# Patient Record
Sex: MALE | Race: WHITE | Employment: OTHER | URBAN - METROPOLITAN AREA
[De-identification: names, ages, dates, MRNs, and addresses within clinical notes are randomized per-mention and may not be internally consistent; named-entity substitution may affect disease eponyms.]

---

## 2018-01-01 ENCOUNTER — HOSPITAL ENCOUNTER (OUTPATIENT)
Dept: GENERAL RADIOLOGY | Age: 83
Discharge: HOME | End: 2018-11-15
Payer: MEDICARE

## 2018-01-01 DIAGNOSIS — R05.9 COUGH: ICD-10-CM

## 2018-01-01 PROCEDURE — 71046 X-RAY EXAM CHEST 2 VIEWS: CPT

## 2019-01-01 ENCOUNTER — HOSPITAL ENCOUNTER (OUTPATIENT)
Dept: LAB | Age: 84
Discharge: HOME | End: 2019-05-07
Attending: INTERNAL MEDICINE
Payer: MEDICARE

## 2019-01-01 ENCOUNTER — APPOINTMENT (OUTPATIENT)
Dept: CT IMAGING | Age: 84
End: 2019-01-01
Attending: EMERGENCY MEDICINE
Payer: MEDICARE

## 2019-01-01 ENCOUNTER — HOSPICE ADMISSION (OUTPATIENT)
Dept: HOSPICE | Facility: HOSPICE | Age: 84
End: 2019-01-01
Payer: MEDICARE

## 2019-01-01 ENCOUNTER — HOSPITAL ENCOUNTER (INPATIENT)
Age: 84
LOS: 4 days | Discharge: HOME HEALTH | DRG: 683 | End: 2019-07-19
Attending: EMERGENCY MEDICINE | Admitting: HOSPITALIST
Payer: MEDICARE

## 2019-01-01 ENCOUNTER — APPOINTMENT (OUTPATIENT)
Dept: GENERAL RADIOLOGY | Age: 84
DRG: 190 | End: 2019-01-01
Attending: EMERGENCY MEDICINE
Payer: MEDICARE

## 2019-01-01 ENCOUNTER — PATIENT OUTREACH (OUTPATIENT)
Dept: CASE MANAGEMENT | Age: 84
End: 2019-01-01

## 2019-01-01 ENCOUNTER — APPOINTMENT (OUTPATIENT)
Dept: GENERAL RADIOLOGY | Age: 84
DRG: 273 | End: 2019-01-01
Attending: SURGERY
Payer: MEDICARE

## 2019-01-01 ENCOUNTER — HOSPITAL ENCOUNTER (EMERGENCY)
Age: 84
Discharge: HOME | End: 2019-07-09
Attending: EMERGENCY MEDICINE
Payer: MEDICARE

## 2019-01-01 ENCOUNTER — HOSPITAL ENCOUNTER (OUTPATIENT)
Dept: LAB | Age: 84
Discharge: HOME | End: 2019-05-21
Payer: MEDICARE

## 2019-01-01 ENCOUNTER — APPOINTMENT (OUTPATIENT)
Dept: CT IMAGING | Age: 84
DRG: 273 | End: 2019-01-01
Attending: EMERGENCY MEDICINE
Payer: MEDICARE

## 2019-01-01 ENCOUNTER — HOSPITAL ENCOUNTER (OUTPATIENT)
Dept: LAB | Age: 84
Discharge: HOME | End: 2019-04-09
Payer: MEDICARE

## 2019-01-01 ENCOUNTER — APPOINTMENT (OUTPATIENT)
Dept: NUCLEAR MEDICINE | Age: 84
DRG: 281 | End: 2019-01-01
Attending: EMERGENCY MEDICINE
Payer: MEDICARE

## 2019-01-01 ENCOUNTER — APPOINTMENT (OUTPATIENT)
Dept: GENERAL RADIOLOGY | Age: 84
DRG: 167 | End: 2019-01-01
Attending: EMERGENCY MEDICINE
Payer: MEDICARE

## 2019-01-01 ENCOUNTER — APPOINTMENT (OUTPATIENT)
Dept: GENERAL RADIOLOGY | Age: 84
DRG: 683 | End: 2019-01-01
Attending: HOSPITALIST
Payer: MEDICARE

## 2019-01-01 ENCOUNTER — APPOINTMENT (OUTPATIENT)
Dept: ULTRASOUND IMAGING | Age: 84
DRG: 190 | End: 2019-01-01
Attending: INTERNAL MEDICINE
Payer: MEDICARE

## 2019-01-01 ENCOUNTER — ANESTHESIA EVENT (OUTPATIENT)
Dept: SURGERY | Age: 84
DRG: 273 | End: 2019-01-01
Payer: MEDICARE

## 2019-01-01 ENCOUNTER — ANESTHESIA (OUTPATIENT)
Dept: SURGERY | Age: 84
DRG: 273 | End: 2019-01-01
Payer: MEDICARE

## 2019-01-01 ENCOUNTER — APPOINTMENT (OUTPATIENT)
Dept: ULTRASOUND IMAGING | Age: 84
DRG: 273 | End: 2019-01-01
Attending: NURSE PRACTITIONER
Payer: MEDICARE

## 2019-01-01 ENCOUNTER — HOSPITAL ENCOUNTER (INPATIENT)
Age: 84
LOS: 3 days | Discharge: HOSPICE | DRG: 190 | End: 2019-07-24
Attending: EMERGENCY MEDICINE | Admitting: FAMILY MEDICINE
Payer: MEDICARE

## 2019-01-01 ENCOUNTER — HOSPITAL ENCOUNTER (INPATIENT)
Age: 84
LOS: 2 days | Discharge: HOME | DRG: 281 | End: 2019-06-26
Attending: EMERGENCY MEDICINE | Admitting: INTERNAL MEDICINE
Payer: MEDICARE

## 2019-01-01 ENCOUNTER — APPOINTMENT (OUTPATIENT)
Dept: CT IMAGING | Age: 84
DRG: 167 | End: 2019-01-01
Attending: EMERGENCY MEDICINE
Payer: MEDICARE

## 2019-01-01 ENCOUNTER — HOME CARE VISIT (OUTPATIENT)
Dept: HOME HEALTH SERVICES | Facility: HOME HEALTH | Age: 84
End: 2019-01-01

## 2019-01-01 ENCOUNTER — HOSPITAL ENCOUNTER (INPATIENT)
Age: 84
LOS: 3 days | Discharge: HOME | DRG: 167 | End: 2019-07-04
Attending: EMERGENCY MEDICINE | Admitting: FAMILY MEDICINE
Payer: MEDICARE

## 2019-01-01 ENCOUNTER — HOSPITAL ENCOUNTER (INPATIENT)
Age: 84
LOS: 9 days | Discharge: HOME HEALTH | DRG: 273 | End: 2019-03-22
Attending: EMERGENCY MEDICINE | Admitting: SURGERY
Payer: MEDICARE

## 2019-01-01 ENCOUNTER — HOSPITAL ENCOUNTER (OUTPATIENT)
Dept: LAB | Age: 84
Discharge: HOME | End: 2019-07-08
Payer: MEDICARE

## 2019-01-01 ENCOUNTER — HOME CARE VISIT (OUTPATIENT)
Dept: SCHEDULING | Facility: HOME HEALTH | Age: 84
End: 2019-01-01

## 2019-01-01 ENCOUNTER — APPOINTMENT (OUTPATIENT)
Dept: GENERAL RADIOLOGY | Age: 84
End: 2019-01-01
Attending: EMERGENCY MEDICINE
Payer: MEDICARE

## 2019-01-01 ENCOUNTER — APPOINTMENT (OUTPATIENT)
Dept: GENERAL RADIOLOGY | Age: 84
DRG: 281 | End: 2019-01-01
Attending: EMERGENCY MEDICINE
Payer: MEDICARE

## 2019-01-01 ENCOUNTER — HOSPITAL ENCOUNTER (OUTPATIENT)
Dept: LAB | Age: 84
Discharge: HOME | End: 2019-04-23
Payer: MEDICARE

## 2019-01-01 ENCOUNTER — APPOINTMENT (OUTPATIENT)
Dept: GENERAL RADIOLOGY | Age: 84
DRG: 683 | End: 2019-01-01
Attending: EMERGENCY MEDICINE
Payer: MEDICARE

## 2019-01-01 ENCOUNTER — HOME HEALTH ADMISSION (OUTPATIENT)
Dept: HOME HEALTH SERVICES | Facility: HOME HEALTH | Age: 84
End: 2019-01-01

## 2019-01-01 ENCOUNTER — HOSPITAL ENCOUNTER (INPATIENT)
Age: 84
LOS: 5 days | End: 2019-07-29
Attending: INTERNAL MEDICINE | Admitting: INTERNAL MEDICINE
Payer: MEDICARE

## 2019-01-01 ENCOUNTER — HOSPITAL ENCOUNTER (EMERGENCY)
Age: 84
Discharge: HOME | End: 2019-03-12
Attending: EMERGENCY MEDICINE
Payer: MEDICARE

## 2019-01-01 ENCOUNTER — HOSPITAL ENCOUNTER (OUTPATIENT)
Dept: LAB | Age: 84
Discharge: HOME | End: 2019-07-11
Payer: MEDICARE

## 2019-01-01 ENCOUNTER — HOSPITAL ENCOUNTER (OUTPATIENT)
Dept: NUCLEAR MEDICINE | Age: 84
Discharge: HOME | DRG: 190 | End: 2019-07-22
Attending: FAMILY MEDICINE
Payer: MEDICARE

## 2019-01-01 VITALS
BODY MASS INDEX: 24.49 KG/M2 | HEIGHT: 66 IN | OXYGEN SATURATION: 91 % | DIASTOLIC BLOOD PRESSURE: 66 MMHG | HEART RATE: 76 BPM | TEMPERATURE: 97.9 F | RESPIRATION RATE: 19 BRPM | WEIGHT: 152.4 LBS | SYSTOLIC BLOOD PRESSURE: 97 MMHG

## 2019-01-01 VITALS
SYSTOLIC BLOOD PRESSURE: 97 MMHG | RESPIRATION RATE: 20 BRPM | OXYGEN SATURATION: 92 % | DIASTOLIC BLOOD PRESSURE: 59 MMHG | TEMPERATURE: 97.6 F | HEART RATE: 90 BPM | WEIGHT: 136.6 LBS | HEIGHT: 67 IN | BODY MASS INDEX: 21.44 KG/M2

## 2019-01-01 VITALS
BODY MASS INDEX: 21.86 KG/M2 | HEART RATE: 80 BPM | RESPIRATION RATE: 18 BRPM | TEMPERATURE: 96.8 F | HEIGHT: 66 IN | WEIGHT: 136 LBS | OXYGEN SATURATION: 93 % | SYSTOLIC BLOOD PRESSURE: 96 MMHG | DIASTOLIC BLOOD PRESSURE: 65 MMHG

## 2019-01-01 VITALS
BODY MASS INDEX: 23.54 KG/M2 | HEART RATE: 79 BPM | RESPIRATION RATE: 16 BRPM | SYSTOLIC BLOOD PRESSURE: 99 MMHG | DIASTOLIC BLOOD PRESSURE: 66 MMHG | HEIGHT: 66 IN | WEIGHT: 146.5 LBS | OXYGEN SATURATION: 94 % | TEMPERATURE: 98 F

## 2019-01-01 VITALS
DIASTOLIC BLOOD PRESSURE: 62 MMHG | BODY MASS INDEX: 24.49 KG/M2 | WEIGHT: 147 LBS | HEART RATE: 80 BPM | OXYGEN SATURATION: 96 % | SYSTOLIC BLOOD PRESSURE: 94 MMHG | RESPIRATION RATE: 16 BRPM | TEMPERATURE: 97 F | HEIGHT: 65 IN

## 2019-01-01 VITALS
HEART RATE: 60 BPM | OXYGEN SATURATION: 98 % | DIASTOLIC BLOOD PRESSURE: 61 MMHG | RESPIRATION RATE: 18 BRPM | SYSTOLIC BLOOD PRESSURE: 111 MMHG | TEMPERATURE: 98.7 F

## 2019-01-01 VITALS
DIASTOLIC BLOOD PRESSURE: 43 MMHG | HEART RATE: 80 BPM | RESPIRATION RATE: 18 BRPM | TEMPERATURE: 96.7 F | SYSTOLIC BLOOD PRESSURE: 63 MMHG

## 2019-01-01 VITALS
SYSTOLIC BLOOD PRESSURE: 104 MMHG | RESPIRATION RATE: 18 BRPM | HEIGHT: 63 IN | HEART RATE: 89 BPM | DIASTOLIC BLOOD PRESSURE: 67 MMHG | BODY MASS INDEX: 24.49 KG/M2 | TEMPERATURE: 97.8 F | WEIGHT: 138.2 LBS | OXYGEN SATURATION: 96 %

## 2019-01-01 DIAGNOSIS — I50.9 CHRONIC CONGESTIVE HEART FAILURE, UNSPECIFIED HEART FAILURE TYPE (HCC): ICD-10-CM

## 2019-01-01 DIAGNOSIS — Z79.01 LONG TERM (CURRENT) USE OF ANTICOAGULANTS: ICD-10-CM

## 2019-01-01 DIAGNOSIS — Z85.46 H/O PROSTATE CANCER: ICD-10-CM

## 2019-01-01 DIAGNOSIS — I50.22 CHRONIC SYSTOLIC CHF (CONGESTIVE HEART FAILURE) (HCC): ICD-10-CM

## 2019-01-01 DIAGNOSIS — R09.02 HYPOXIA: ICD-10-CM

## 2019-01-01 DIAGNOSIS — R33.9 URINARY RETENTION: Primary | ICD-10-CM

## 2019-01-01 DIAGNOSIS — D53.9 MACROCYTIC ANEMIA: ICD-10-CM

## 2019-01-01 DIAGNOSIS — J43.2 CENTRILOBULAR EMPHYSEMA (HCC): Chronic | ICD-10-CM

## 2019-01-01 DIAGNOSIS — Z86.79 HISTORY OF CORONARY ARTERY DISEASE: ICD-10-CM

## 2019-01-01 DIAGNOSIS — R59.0 MEDIASTINAL ADENOPATHY: ICD-10-CM

## 2019-01-01 DIAGNOSIS — I74.9 ARTERIAL THROMBOSIS (HCC): Primary | ICD-10-CM

## 2019-01-01 DIAGNOSIS — Z51.5 ENCOUNTER FOR PALLIATIVE CARE: ICD-10-CM

## 2019-01-01 DIAGNOSIS — R07.89 PAIN, CHEST WALL: ICD-10-CM

## 2019-01-01 DIAGNOSIS — R06.00 DYSPNEA, UNSPECIFIED TYPE: ICD-10-CM

## 2019-01-01 DIAGNOSIS — Z51.5 PALLIATIVE CARE BY SPECIALIST: ICD-10-CM

## 2019-01-01 DIAGNOSIS — I48.91 ATRIAL FIBRILLATION, UNSPECIFIED TYPE (HCC): ICD-10-CM

## 2019-01-01 DIAGNOSIS — R07.81 RIB PAIN: ICD-10-CM

## 2019-01-01 DIAGNOSIS — J43.2 CENTRILOBULAR EMPHYSEMA (HCC): ICD-10-CM

## 2019-01-01 DIAGNOSIS — I51.3 THROMBUS OF LEFT ATRIAL APPENDAGE: ICD-10-CM

## 2019-01-01 DIAGNOSIS — Z85.46 HISTORY OF PROSTATE CANCER: ICD-10-CM

## 2019-01-01 DIAGNOSIS — R59.0 HILAR ADENOPATHY: ICD-10-CM

## 2019-01-01 DIAGNOSIS — R11.2 NAUSEA AND VOMITING, INTRACTABILITY OF VOMITING NOT SPECIFIED, UNSPECIFIED VOMITING TYPE: ICD-10-CM

## 2019-01-01 DIAGNOSIS — R06.02 SHORTNESS OF BREATH: Primary | ICD-10-CM

## 2019-01-01 DIAGNOSIS — R07.81 PLEURITIC CHEST PAIN: Primary | ICD-10-CM

## 2019-01-01 DIAGNOSIS — Z86.718 HISTORY OF DVT (DEEP VEIN THROMBOSIS): ICD-10-CM

## 2019-01-01 DIAGNOSIS — J44.9 CHRONIC OBSTRUCTIVE PULMONARY DISEASE, UNSPECIFIED COPD TYPE (HCC): ICD-10-CM

## 2019-01-01 DIAGNOSIS — C34.02 MALIGNANT NEOPLASM OF HILUS OF LEFT LUNG (HCC): ICD-10-CM

## 2019-01-01 DIAGNOSIS — N18.9 ACUTE ON CHRONIC RENAL INSUFFICIENCY: Primary | ICD-10-CM

## 2019-01-01 DIAGNOSIS — R91.8 HILAR MASS: ICD-10-CM

## 2019-01-01 DIAGNOSIS — N17.9 ACUTE KIDNEY INJURY (HCC): ICD-10-CM

## 2019-01-01 DIAGNOSIS — R33.9 URINARY RETENTION: ICD-10-CM

## 2019-01-01 DIAGNOSIS — Z95.810 AICD (AUTOMATIC CARDIOVERTER/DEFIBRILLATOR) PRESENT: ICD-10-CM

## 2019-01-01 DIAGNOSIS — I48.91 ATRIAL FIBRILLATION, UNSPECIFIED TYPE (HCC): Chronic | ICD-10-CM

## 2019-01-01 DIAGNOSIS — I50.22 CHRONIC SYSTOLIC CONGESTIVE HEART FAILURE (HCC): ICD-10-CM

## 2019-01-01 DIAGNOSIS — N30.00 ACUTE CYSTITIS WITHOUT HEMATURIA: ICD-10-CM

## 2019-01-01 DIAGNOSIS — G89.3 CANCER RELATED PAIN: ICD-10-CM

## 2019-01-01 DIAGNOSIS — N28.9 ACUTE ON CHRONIC RENAL INSUFFICIENCY: Primary | ICD-10-CM

## 2019-01-01 DIAGNOSIS — R77.8 ELEVATED TROPONIN: ICD-10-CM

## 2019-01-01 DIAGNOSIS — R11.2 INTRACTABLE VOMITING WITH NAUSEA, UNSPECIFIED VOMITING TYPE: ICD-10-CM

## 2019-01-01 DIAGNOSIS — R53.81 DEBILITY: ICD-10-CM

## 2019-01-01 DIAGNOSIS — R91.8 LUNG MASS: ICD-10-CM

## 2019-01-01 DIAGNOSIS — R62.7 FTT (FAILURE TO THRIVE) IN ADULT: ICD-10-CM

## 2019-01-01 DIAGNOSIS — Z79.01 LONG TERM (CURRENT) USE OF ANTICOAGULANTS: Chronic | ICD-10-CM

## 2019-01-01 DIAGNOSIS — R11.2 NON-INTRACTABLE VOMITING WITH NAUSEA, UNSPECIFIED VOMITING TYPE: Primary | ICD-10-CM

## 2019-01-01 DIAGNOSIS — I21.4 NSTEMI (NON-ST ELEVATED MYOCARDIAL INFARCTION) (HCC): Primary | ICD-10-CM

## 2019-01-01 DIAGNOSIS — N17.9 AKI (ACUTE KIDNEY INJURY) (HCC): ICD-10-CM

## 2019-01-01 DIAGNOSIS — C34.91 NON-SMALL CELL CANCER OF RIGHT LUNG (HCC): ICD-10-CM

## 2019-01-01 DIAGNOSIS — R77.8 ELEVATED TROPONIN I LEVEL: ICD-10-CM

## 2019-01-01 DIAGNOSIS — R53.83 FATIGUE, UNSPECIFIED TYPE: ICD-10-CM

## 2019-01-01 LAB
25(OH)D3+25(OH)D2 SERPL-MCNC: 25.1 NG/ML (ref 30–100)
ALBUMIN SERPL-MCNC: 2.1 G/DL (ref 3.2–4.6)
ALBUMIN SERPL-MCNC: 2.2 G/DL (ref 3.2–4.6)
ALBUMIN SERPL-MCNC: 2.2 G/DL (ref 3.2–4.6)
ALBUMIN SERPL-MCNC: 2.7 G/DL (ref 3.2–4.6)
ALBUMIN SERPL-MCNC: 2.8 G/DL (ref 3.2–4.6)
ALBUMIN SERPL-MCNC: 2.9 G/DL (ref 3.2–4.6)
ALBUMIN SERPL-MCNC: 2.9 G/DL (ref 3.2–4.6)
ALBUMIN SERPL-MCNC: 3 G/DL (ref 3.2–4.6)
ALBUMIN SERPL-MCNC: 3.1 G/DL (ref 3.2–4.6)
ALBUMIN SERPL-MCNC: 3.2 G/DL (ref 3.2–4.6)
ALBUMIN SERPL-MCNC: 3.5 G/DL (ref 3.2–4.6)
ALBUMIN SERPL-MCNC: 3.5 G/DL (ref 3.2–4.6)
ALBUMIN/GLOB SERPL: 0.6 {RATIO} (ref 1.2–3.5)
ALBUMIN/GLOB SERPL: 0.6 {RATIO} (ref 1.2–3.5)
ALBUMIN/GLOB SERPL: 0.7 {RATIO} (ref 1.2–3.5)
ALBUMIN/GLOB SERPL: 0.8 {RATIO} (ref 1.2–3.5)
ALBUMIN/GLOB SERPL: 0.9 {RATIO} (ref 1.2–3.5)
ALP SERPL-CCNC: 100 U/L (ref 50–136)
ALP SERPL-CCNC: 118 U/L (ref 50–136)
ALP SERPL-CCNC: 131 U/L (ref 50–136)
ALP SERPL-CCNC: 136 U/L (ref 50–136)
ALP SERPL-CCNC: 197 U/L (ref 50–136)
ALP SERPL-CCNC: 229 U/L (ref 50–136)
ALP SERPL-CCNC: 70 U/L (ref 50–136)
ALP SERPL-CCNC: 85 U/L (ref 50–136)
ALT SERPL-CCNC: 17 U/L (ref 12–65)
ALT SERPL-CCNC: 21 U/L (ref 12–65)
ALT SERPL-CCNC: 21 U/L (ref 12–65)
ALT SERPL-CCNC: 29 U/L (ref 12–65)
ALT SERPL-CCNC: 29 U/L (ref 12–65)
ALT SERPL-CCNC: 30 U/L (ref 12–65)
ALT SERPL-CCNC: 50 U/L (ref 12–65)
ALT SERPL-CCNC: 84 U/L (ref 12–65)
AMORPH CRY URNS QL MICRO: NORMAL
ANION GAP SERPL CALC-SCNC: 10 MMOL/L (ref 7–16)
ANION GAP SERPL CALC-SCNC: 11 MMOL/L (ref 7–16)
ANION GAP SERPL CALC-SCNC: 12 MMOL/L (ref 7–16)
ANION GAP SERPL CALC-SCNC: 14 MMOL/L (ref 7–16)
ANION GAP SERPL CALC-SCNC: 19 MMOL/L (ref 7–16)
ANION GAP SERPL CALC-SCNC: 6 MMOL/L (ref 7–16)
ANION GAP SERPL CALC-SCNC: 7 MMOL/L (ref 7–16)
ANION GAP SERPL CALC-SCNC: 7 MMOL/L (ref 7–16)
ANION GAP SERPL CALC-SCNC: 8 MMOL/L
ANION GAP SERPL CALC-SCNC: 8 MMOL/L (ref 7–16)
ANION GAP SERPL CALC-SCNC: 9 MMOL/L (ref 7–16)
APPEARANCE UR: ABNORMAL
APPEARANCE UR: ABNORMAL
APPEARANCE UR: CLEAR
APTT PPP: 28.6 SEC (ref 24.7–39.8)
APTT PPP: 45.5 SEC (ref 24.7–39.8)
APTT PPP: 65.3 SEC (ref 24.7–39.8)
APTT PPP: 65.8 SEC (ref 24.7–39.8)
APTT PPP: 70.9 SEC (ref 24.7–39.8)
APTT PPP: 72.6 SEC (ref 24.7–39.8)
APTT PPP: 73.7 SEC (ref 24.7–39.8)
APTT PPP: 87.2 SEC (ref 24.7–39.8)
APTT PPP: 87.4 SEC (ref 24.7–39.8)
APTT PPP: >200 SEC (ref 24.7–39.8)
ARTERIAL PATENCY WRIST A: ABNORMAL
AST SERPL-CCNC: 105 U/L (ref 15–37)
AST SERPL-CCNC: 28 U/L (ref 15–37)
AST SERPL-CCNC: 38 U/L (ref 15–37)
AST SERPL-CCNC: 41 U/L (ref 15–37)
AST SERPL-CCNC: 62 U/L (ref 15–37)
AST SERPL-CCNC: 64 U/L (ref 15–37)
AST SERPL-CCNC: 69 U/L (ref 15–37)
AST SERPL-CCNC: 72 U/L (ref 15–37)
ATRIAL RATE: 66 BPM
ATRIAL RATE: 77 BPM
ATRIAL RATE: 78 BPM
ATRIAL RATE: 81 BPM
ATRIAL RATE: 86 BPM
ATRIAL RATE: 88 BPM
BACTERIA SPEC CULT: NORMAL
BACTERIA URNS QL MICRO: 0 /HPF
BACTERIA URNS QL MICRO: NORMAL /HPF
BASE DEFICIT BLD-SCNC: 2 MMOL/L
BASOPHILS # BLD: 0 K/UL (ref 0–0.2)
BASOPHILS # BLD: 0.1 K/UL (ref 0–0.2)
BASOPHILS NFR BLD: 0 % (ref 0–2)
BASOPHILS NFR BLD: 1 % (ref 0–2)
BDY SITE: ABNORMAL
BILIRUB SERPL-MCNC: 0.4 MG/DL (ref 0.2–1.1)
BILIRUB SERPL-MCNC: 0.6 MG/DL (ref 0.2–1.1)
BILIRUB SERPL-MCNC: 0.8 MG/DL (ref 0.2–1.1)
BILIRUB SERPL-MCNC: 1 MG/DL (ref 0.2–1.1)
BILIRUB SERPL-MCNC: 1.3 MG/DL (ref 0.2–1.1)
BILIRUB UR QL: NEGATIVE
BNP SERPL-MCNC: 1212 PG/ML
BNP SERPL-MCNC: 428 PG/ML
BNP SERPL-MCNC: 934 PG/ML
BODY TEMPERATURE: 98.6
BUN SERPL-MCNC: 102 MG/DL (ref 8–23)
BUN SERPL-MCNC: 116 MG/DL (ref 8–23)
BUN SERPL-MCNC: 18 MG/DL (ref 8–23)
BUN SERPL-MCNC: 23 MG/DL (ref 8–23)
BUN SERPL-MCNC: 24 MG/DL (ref 8–23)
BUN SERPL-MCNC: 24 MG/DL (ref 8–23)
BUN SERPL-MCNC: 28 MG/DL (ref 8–23)
BUN SERPL-MCNC: 29 MG/DL (ref 8–23)
BUN SERPL-MCNC: 30 MG/DL (ref 8–23)
BUN SERPL-MCNC: 31 MG/DL (ref 8–23)
BUN SERPL-MCNC: 31 MG/DL (ref 8–23)
BUN SERPL-MCNC: 32 MG/DL (ref 8–23)
BUN SERPL-MCNC: 38 MG/DL (ref 8–23)
BUN SERPL-MCNC: 39 MG/DL (ref 8–23)
BUN SERPL-MCNC: 41 MG/DL (ref 8–23)
BUN SERPL-MCNC: 42 MG/DL (ref 8–23)
BUN SERPL-MCNC: 43 MG/DL (ref 8–23)
BUN SERPL-MCNC: 44 MG/DL (ref 8–23)
BUN SERPL-MCNC: 44 MG/DL (ref 8–23)
BUN SERPL-MCNC: 48 MG/DL (ref 8–23)
BUN SERPL-MCNC: 50 MG/DL (ref 8–23)
BUN SERPL-MCNC: 56 MG/DL (ref 8–23)
BUN SERPL-MCNC: 58 MG/DL (ref 8–23)
BUN SERPL-MCNC: 85 MG/DL (ref 8–23)
BUN SERPL-MCNC: 87 MG/DL (ref 8–23)
CALCIUM SERPL-MCNC: 8.1 MG/DL (ref 8.3–10.4)
CALCIUM SERPL-MCNC: 8.2 MG/DL (ref 8.3–10.4)
CALCIUM SERPL-MCNC: 8.4 MG/DL (ref 8.3–10.4)
CALCIUM SERPL-MCNC: 8.5 MG/DL (ref 8.3–10.4)
CALCIUM SERPL-MCNC: 8.5 MG/DL (ref 8.3–10.4)
CALCIUM SERPL-MCNC: 8.7 MG/DL (ref 8.3–10.4)
CALCIUM SERPL-MCNC: 8.8 MG/DL (ref 8.3–10.4)
CALCIUM SERPL-MCNC: 8.9 MG/DL (ref 8.3–10.4)
CALCIUM SERPL-MCNC: 9 MG/DL (ref 8.3–10.4)
CALCIUM SERPL-MCNC: 9.1 MG/DL (ref 8.3–10.4)
CALCIUM SERPL-MCNC: 9.1 MG/DL (ref 8.3–10.4)
CALCIUM SERPL-MCNC: 9.2 MG/DL (ref 8.3–10.4)
CALCIUM SERPL-MCNC: 9.2 MG/DL (ref 8.3–10.4)
CALCIUM SERPL-MCNC: 9.3 MG/DL (ref 8.3–10.4)
CALCIUM SERPL-MCNC: 9.4 MG/DL (ref 8.3–10.4)
CALCIUM SERPL-MCNC: 9.4 MG/DL (ref 8.3–10.4)
CALCIUM SERPL-MCNC: 9.5 MG/DL (ref 8.3–10.4)
CALCIUM SERPL-MCNC: 9.6 MG/DL (ref 8.3–10.4)
CALCIUM SERPL-MCNC: 9.7 MG/DL (ref 8.3–10.4)
CALCIUM SERPL-MCNC: 9.7 MG/DL (ref 8.3–10.4)
CALCULATED P AXIS, ECG09: 110 DEGREES
CALCULATED P AXIS, ECG09: 76 DEGREES
CALCULATED P AXIS, ECG09: 83 DEGREES
CALCULATED R AXIS, ECG10: -86 DEGREES
CALCULATED R AXIS, ECG10: -87 DEGREES
CALCULATED R AXIS, ECG10: -87 DEGREES
CALCULATED R AXIS, ECG10: -89 DEGREES
CALCULATED R AXIS, ECG10: -96 DEGREES
CALCULATED R AXIS, ECG10: 156 DEGREES
CALCULATED T AXIS, ECG11: -8 DEGREES
CALCULATED T AXIS, ECG11: 102 DEGREES
CALCULATED T AXIS, ECG11: 90 DEGREES
CALCULATED T AXIS, ECG11: 92 DEGREES
CALCULATED T AXIS, ECG11: 94 DEGREES
CALCULATED T AXIS, ECG11: 95 DEGREES
CASTS URNS QL MICRO: ABNORMAL /LPF
CASTS URNS QL MICRO: NORMAL /LPF
CEA SERPL-MCNC: 225.3 NG/ML (ref 0–3)
CHLORIDE SERPL-SCNC: 100 MMOL/L (ref 98–107)
CHLORIDE SERPL-SCNC: 101 MMOL/L (ref 98–107)
CHLORIDE SERPL-SCNC: 102 MMOL/L (ref 98–107)
CHLORIDE SERPL-SCNC: 103 MMOL/L (ref 98–107)
CHLORIDE SERPL-SCNC: 104 MMOL/L (ref 98–107)
CHLORIDE SERPL-SCNC: 105 MMOL/L (ref 98–107)
CHLORIDE SERPL-SCNC: 105 MMOL/L (ref 98–107)
CHLORIDE SERPL-SCNC: 106 MMOL/L (ref 98–107)
CHLORIDE SERPL-SCNC: 97 MMOL/L (ref 98–107)
CHOLEST SERPL-MCNC: 95 MG/DL
CO2 BLD-SCNC: 22 MMOL/L
CO2 SERPL-SCNC: 15 MMOL/L (ref 21–32)
CO2 SERPL-SCNC: 20 MMOL/L (ref 21–32)
CO2 SERPL-SCNC: 22 MMOL/L (ref 21–32)
CO2 SERPL-SCNC: 22 MMOL/L (ref 21–32)
CO2 SERPL-SCNC: 23 MMOL/L (ref 21–32)
CO2 SERPL-SCNC: 24 MMOL/L (ref 21–32)
CO2 SERPL-SCNC: 25 MMOL/L (ref 21–32)
CO2 SERPL-SCNC: 26 MMOL/L (ref 21–32)
CO2 SERPL-SCNC: 27 MMOL/L (ref 21–32)
CO2 SERPL-SCNC: 28 MMOL/L (ref 21–32)
CO2 SERPL-SCNC: 29 MMOL/L (ref 21–32)
CO2 SERPL-SCNC: 30 MMOL/L (ref 21–32)
CO2 SERPL-SCNC: 30 MMOL/L (ref 21–32)
COLLECT TIME,HTIME: 2018
COLOR UR: YELLOW
CREAT SERPL-MCNC: 1.66 MG/DL (ref 0.8–1.5)
CREAT SERPL-MCNC: 1.66 MG/DL (ref 0.8–1.5)
CREAT SERPL-MCNC: 1.69 MG/DL (ref 0.8–1.5)
CREAT SERPL-MCNC: 1.79 MG/DL (ref 0.8–1.5)
CREAT SERPL-MCNC: 1.83 MG/DL (ref 0.8–1.5)
CREAT SERPL-MCNC: 1.84 MG/DL (ref 0.8–1.5)
CREAT SERPL-MCNC: 1.89 MG/DL (ref 0.8–1.5)
CREAT SERPL-MCNC: 1.9 MG/DL (ref 0.8–1.5)
CREAT SERPL-MCNC: 1.94 MG/DL (ref 0.8–1.5)
CREAT SERPL-MCNC: 1.95 MG/DL (ref 0.8–1.5)
CREAT SERPL-MCNC: 2 MG/DL (ref 0.8–1.5)
CREAT SERPL-MCNC: 2.06 MG/DL (ref 0.8–1.5)
CREAT SERPL-MCNC: 2.16 MG/DL (ref 0.8–1.5)
CREAT SERPL-MCNC: 2.19 MG/DL (ref 0.8–1.5)
CREAT SERPL-MCNC: 2.2 MG/DL (ref 0.8–1.5)
CREAT SERPL-MCNC: 2.36 MG/DL (ref 0.8–1.5)
CREAT SERPL-MCNC: 2.39 MG/DL (ref 0.8–1.5)
CREAT SERPL-MCNC: 2.39 MG/DL (ref 0.8–1.5)
CREAT SERPL-MCNC: 2.4 MG/DL (ref 0.8–1.5)
CREAT SERPL-MCNC: 2.55 MG/DL (ref 0.8–1.5)
CREAT SERPL-MCNC: 2.77 MG/DL (ref 0.8–1.5)
CREAT SERPL-MCNC: 2.77 MG/DL (ref 0.8–1.5)
CREAT SERPL-MCNC: 3.13 MG/DL (ref 0.8–1.5)
CREAT SERPL-MCNC: 3.13 MG/DL (ref 0.8–1.5)
CREAT SERPL-MCNC: 3.18 MG/DL (ref 0.8–1.5)
CREAT SERPL-MCNC: 3.5 MG/DL (ref 0.8–1.5)
CREAT SERPL-MCNC: 3.54 MG/DL (ref 0.8–1.5)
CREAT SERPL-MCNC: 3.8 MG/DL (ref 0.8–1.5)
CREAT SERPL-MCNC: 4.37 MG/DL (ref 0.8–1.5)
CREAT UR-MCNC: 34.2 MG/DL
CREAT UR-MCNC: 85.4 MG/DL
CRYSTALS URNS QL MICRO: 0 /LPF
DIAGNOSIS, 93000: NORMAL
DIFFERENTIAL METHOD BLD: ABNORMAL
EOSINOPHIL # BLD: 0 K/UL (ref 0–0.8)
EOSINOPHIL # BLD: 0.1 K/UL (ref 0–0.8)
EOSINOPHIL # BLD: 0.2 K/UL (ref 0–0.8)
EOSINOPHIL NFR BLD: 0 % (ref 0.5–7.8)
EOSINOPHIL NFR BLD: 1 % (ref 0.5–7.8)
EOSINOPHIL NFR BLD: 2 % (ref 0.5–7.8)
EOSINOPHIL NFR BLD: 2 % (ref 0.5–7.8)
EOSINOPHIL NFR BLD: 4 % (ref 0.5–7.8)
EPI CELLS #/AREA URNS HPF: ABNORMAL /HPF
EPI CELLS #/AREA URNS HPF: ABNORMAL /HPF
EPI CELLS #/AREA URNS HPF: NORMAL /HPF
ERYTHROCYTE [DISTWIDTH] IN BLOOD BY AUTOMATED COUNT: 13.3 % (ref 11.9–14.6)
ERYTHROCYTE [DISTWIDTH] IN BLOOD BY AUTOMATED COUNT: 13.3 % (ref 11.9–14.6)
ERYTHROCYTE [DISTWIDTH] IN BLOOD BY AUTOMATED COUNT: 13.4 % (ref 11.9–14.6)
ERYTHROCYTE [DISTWIDTH] IN BLOOD BY AUTOMATED COUNT: 13.5 %
ERYTHROCYTE [DISTWIDTH] IN BLOOD BY AUTOMATED COUNT: 13.6 % (ref 11.9–14.6)
ERYTHROCYTE [DISTWIDTH] IN BLOOD BY AUTOMATED COUNT: 13.7 % (ref 11.9–14.6)
ERYTHROCYTE [DISTWIDTH] IN BLOOD BY AUTOMATED COUNT: 13.7 % (ref 11.9–14.6)
ERYTHROCYTE [DISTWIDTH] IN BLOOD BY AUTOMATED COUNT: 13.8 % (ref 11.9–14.6)
ERYTHROCYTE [DISTWIDTH] IN BLOOD BY AUTOMATED COUNT: 13.9 % (ref 11.9–14.6)
ERYTHROCYTE [DISTWIDTH] IN BLOOD BY AUTOMATED COUNT: 14.7 % (ref 11.9–14.6)
ERYTHROCYTE [DISTWIDTH] IN BLOOD BY AUTOMATED COUNT: 15.1 % (ref 11.9–14.6)
ERYTHROCYTE [DISTWIDTH] IN BLOOD BY AUTOMATED COUNT: 15.8 % (ref 11.9–14.6)
ERYTHROCYTE [DISTWIDTH] IN BLOOD BY AUTOMATED COUNT: 16.8 % (ref 11.9–14.6)
ERYTHROCYTE [DISTWIDTH] IN BLOOD BY AUTOMATED COUNT: 16.9 % (ref 11.9–14.6)
ERYTHROCYTE [DISTWIDTH] IN BLOOD BY AUTOMATED COUNT: 17.1 % (ref 11.9–14.6)
ERYTHROCYTE [DISTWIDTH] IN BLOOD BY AUTOMATED COUNT: 17.3 % (ref 11.9–14.6)
ERYTHROCYTE [DISTWIDTH] IN BLOOD BY AUTOMATED COUNT: 18.7 % (ref 11.9–14.6)
ERYTHROCYTE [DISTWIDTH] IN BLOOD BY AUTOMATED COUNT: 19.1 % (ref 11.9–14.6)
ERYTHROCYTE [DISTWIDTH] IN BLOOD BY AUTOMATED COUNT: 22.6 % (ref 11.9–14.6)
ERYTHROCYTE [DISTWIDTH] IN BLOOD BY AUTOMATED COUNT: 22.6 % (ref 11.9–14.6)
FLOW RATE ISTAT,IFRATE: 6 L/MIN
GAS FLOW.O2 O2 DELIVERY SYS: ABNORMAL L/MIN
GLOBULIN SER CALC-MCNC: 3.7 G/DL (ref 2.3–3.5)
GLOBULIN SER CALC-MCNC: 3.7 G/DL (ref 2.3–3.5)
GLOBULIN SER CALC-MCNC: 3.8 G/DL (ref 2.3–3.5)
GLOBULIN SER CALC-MCNC: 4 G/DL (ref 2.3–3.5)
GLOBULIN SER CALC-MCNC: 4.3 G/DL (ref 2.3–3.5)
GLOBULIN SER CALC-MCNC: 4.3 G/DL (ref 2.3–3.5)
GLOBULIN SER CALC-MCNC: 4.5 G/DL (ref 2.3–3.5)
GLOBULIN SER CALC-MCNC: 4.7 G/DL (ref 2.3–3.5)
GLUCOSE SERPL-MCNC: 100 MG/DL (ref 65–100)
GLUCOSE SERPL-MCNC: 101 MG/DL (ref 65–100)
GLUCOSE SERPL-MCNC: 101 MG/DL (ref 65–100)
GLUCOSE SERPL-MCNC: 102 MG/DL (ref 65–100)
GLUCOSE SERPL-MCNC: 103 MG/DL (ref 65–100)
GLUCOSE SERPL-MCNC: 104 MG/DL (ref 65–100)
GLUCOSE SERPL-MCNC: 106 MG/DL (ref 65–100)
GLUCOSE SERPL-MCNC: 107 MG/DL (ref 65–100)
GLUCOSE SERPL-MCNC: 108 MG/DL (ref 65–100)
GLUCOSE SERPL-MCNC: 111 MG/DL (ref 65–100)
GLUCOSE SERPL-MCNC: 117 MG/DL (ref 65–100)
GLUCOSE SERPL-MCNC: 119 MG/DL (ref 65–100)
GLUCOSE SERPL-MCNC: 121 MG/DL (ref 65–100)
GLUCOSE SERPL-MCNC: 122 MG/DL (ref 65–100)
GLUCOSE SERPL-MCNC: 123 MG/DL (ref 65–100)
GLUCOSE SERPL-MCNC: 124 MG/DL (ref 65–100)
GLUCOSE SERPL-MCNC: 134 MG/DL (ref 65–100)
GLUCOSE SERPL-MCNC: 137 MG/DL (ref 65–100)
GLUCOSE SERPL-MCNC: 137 MG/DL (ref 65–100)
GLUCOSE SERPL-MCNC: 152 MG/DL (ref 65–100)
GLUCOSE SERPL-MCNC: 87 MG/DL (ref 65–100)
GLUCOSE SERPL-MCNC: 89 MG/DL (ref 65–100)
GLUCOSE SERPL-MCNC: 94 MG/DL (ref 65–100)
GLUCOSE SERPL-MCNC: 95 MG/DL (ref 65–100)
GLUCOSE SERPL-MCNC: 95 MG/DL (ref 65–100)
GLUCOSE SERPL-MCNC: 96 MG/DL (ref 65–100)
GLUCOSE SERPL-MCNC: 99 MG/DL (ref 65–100)
GLUCOSE UR STRIP.AUTO-MCNC: NEGATIVE MG/DL
HCO3 BLD-SCNC: 21.2 MMOL/L (ref 22–26)
HCT VFR BLD AUTO: 23.3 % (ref 41.1–50.3)
HCT VFR BLD AUTO: 24.8 % (ref 41.1–50.3)
HCT VFR BLD AUTO: 25.3 % (ref 41.1–50.3)
HCT VFR BLD AUTO: 26.3 % (ref 41.1–50.3)
HCT VFR BLD AUTO: 27.2 % (ref 41.1–50.3)
HCT VFR BLD AUTO: 27.3 % (ref 41.1–50.3)
HCT VFR BLD AUTO: 29.9 % (ref 41.1–50.3)
HCT VFR BLD AUTO: 30.3 % (ref 41.1–50.3)
HCT VFR BLD AUTO: 30.7 % (ref 41.1–50.3)
HCT VFR BLD AUTO: 31.5 % (ref 41.1–50.3)
HCT VFR BLD AUTO: 31.6 % (ref 41.1–50.3)
HCT VFR BLD AUTO: 31.8 % (ref 41.1–50.3)
HCT VFR BLD AUTO: 32.5 % (ref 41.1–50.3)
HCT VFR BLD AUTO: 33.6 % (ref 41.1–50.3)
HCT VFR BLD AUTO: 34.2 % (ref 41.1–50.3)
HCT VFR BLD AUTO: 34.5 % (ref 41.1–50.3)
HCT VFR BLD AUTO: 34.5 % (ref 41.1–50.3)
HCT VFR BLD AUTO: 35.3 % (ref 41.1–50.3)
HCT VFR BLD AUTO: 35.8 % (ref 41.1–50.3)
HCT VFR BLD AUTO: 36.4 % (ref 41.1–50.3)
HDLC SERPL-MCNC: 45 MG/DL (ref 40–60)
HDLC SERPL: 2.1 {RATIO}
HEMOCCULT STL QL: NEGATIVE
HGB BLD-MCNC: 10 G/DL (ref 13.6–17.2)
HGB BLD-MCNC: 10.3 G/DL (ref 13.6–17.2)
HGB BLD-MCNC: 10.5 G/DL (ref 13.6–17.2)
HGB BLD-MCNC: 10.9 G/DL (ref 13.6–17.2)
HGB BLD-MCNC: 11.1 G/DL (ref 13.6–17.2)
HGB BLD-MCNC: 11.5 G/DL (ref 13.6–17.2)
HGB BLD-MCNC: 11.5 G/DL (ref 13.6–17.2)
HGB BLD-MCNC: 11.7 G/DL (ref 13.6–17.2)
HGB BLD-MCNC: 11.7 G/DL (ref 13.6–17.2)
HGB BLD-MCNC: 11.8 G/DL (ref 13.6–17.2)
HGB BLD-MCNC: 11.9 G/DL (ref 13.6–17.2)
HGB BLD-MCNC: 12.1 G/DL (ref 13.6–17.2)
HGB BLD-MCNC: 8.7 G/DL (ref 13.6–17.2)
HGB BLD-MCNC: 8.8 G/DL (ref 13.6–17.2)
HGB BLD-MCNC: 9 G/DL (ref 13.6–17.2)
HGB BLD-MCNC: 9.1 G/DL (ref 13.6–17.2)
HGB BLD-MCNC: 9.9 G/DL (ref 13.6–17.2)
HGB BLD-MCNC: 9.9 G/DL (ref 13.6–17.2)
HGB UR QL STRIP: ABNORMAL
HGB UR QL STRIP: NEGATIVE
HGB UR QL STRIP: NEGATIVE
IMM GRANULOCYTES # BLD AUTO: 0 K/UL (ref 0–0.5)
IMM GRANULOCYTES # BLD AUTO: 0.1 K/UL (ref 0–0.5)
IMM GRANULOCYTES # BLD AUTO: 0.2 K/UL (ref 0–0.5)
IMM GRANULOCYTES # BLD AUTO: 0.2 K/UL (ref 0–0.5)
IMM GRANULOCYTES NFR BLD AUTO: 1 % (ref 0–5)
IMM GRANULOCYTES NFR BLD AUTO: 2 % (ref 0–5)
INR PPP: 1.3
INR PPP: 1.6
INR PPP: 1.7
INR PPP: 2
INR PPP: 2.2
INR PPP: 2.5
INR PPP: 4.3
INR PPP: 4.4
KETONES UR QL STRIP.AUTO: NEGATIVE MG/DL
LACTATE BLD-SCNC: 1.72 MMOL/L (ref 0.5–1.9)
LACTATE BLD-SCNC: 1.8 MMOL/L (ref 0.5–1.9)
LDLC SERPL CALC-MCNC: 31.2 MG/DL
LEUKOCYTE ESTERASE UR QL STRIP.AUTO: ABNORMAL
LEUKOCYTE ESTERASE UR QL STRIP.AUTO: NEGATIVE
LEUKOCYTE ESTERASE UR QL STRIP.AUTO: NEGATIVE
LIPASE SERPL-CCNC: 139 U/L (ref 73–393)
LIPASE SERPL-CCNC: 86 U/L (ref 73–393)
LIPID PROFILE,FLP: NORMAL
LYMPHOCYTES # BLD: 0.3 K/UL (ref 0.5–4.6)
LYMPHOCYTES # BLD: 0.5 K/UL (ref 0.5–4.6)
LYMPHOCYTES # BLD: 0.5 K/UL (ref 0.5–4.6)
LYMPHOCYTES # BLD: 0.6 K/UL (ref 0.5–4.6)
LYMPHOCYTES # BLD: 0.6 K/UL (ref 0.5–4.6)
LYMPHOCYTES # BLD: 0.7 K/UL (ref 0.5–4.6)
LYMPHOCYTES # BLD: 0.8 K/UL (ref 0.5–4.6)
LYMPHOCYTES # BLD: 0.9 K/UL (ref 0.5–4.6)
LYMPHOCYTES # BLD: 0.9 K/UL (ref 0.5–4.6)
LYMPHOCYTES # BLD: 1.1 K/UL (ref 0.5–4.6)
LYMPHOCYTES # BLD: 1.1 K/UL (ref 0.5–4.6)
LYMPHOCYTES NFR BLD: 10 % (ref 13–44)
LYMPHOCYTES NFR BLD: 13 % (ref 13–44)
LYMPHOCYTES NFR BLD: 14 % (ref 13–44)
LYMPHOCYTES NFR BLD: 15 % (ref 13–44)
LYMPHOCYTES NFR BLD: 17 % (ref 13–44)
LYMPHOCYTES NFR BLD: 2 % (ref 13–44)
LYMPHOCYTES NFR BLD: 21 % (ref 13–44)
LYMPHOCYTES NFR BLD: 6 % (ref 13–44)
LYMPHOCYTES NFR BLD: 7 % (ref 13–44)
LYMPHOCYTES NFR BLD: 7 % (ref 13–44)
LYMPHOCYTES NFR BLD: 8 % (ref 13–44)
LYMPHOCYTES NFR BLD: 8 % (ref 13–44)
LYMPHOCYTES NFR BLD: 9 % (ref 13–44)
MAGNESIUM SERPL-MCNC: 1.8 MG/DL (ref 1.8–2.4)
MAGNESIUM SERPL-MCNC: 1.9 MG/DL (ref 1.8–2.4)
MAGNESIUM SERPL-MCNC: 2 MG/DL (ref 1.8–2.4)
MAGNESIUM SERPL-MCNC: 2.1 MG/DL (ref 1.8–2.4)
MCH RBC QN AUTO: 30.7 PG (ref 26.1–32.9)
MCH RBC QN AUTO: 31.1 PG (ref 26.1–32.9)
MCH RBC QN AUTO: 31.4 PG (ref 26.1–32.9)
MCH RBC QN AUTO: 31.9 PG (ref 26.1–32.9)
MCH RBC QN AUTO: 31.9 PG (ref 26.1–32.9)
MCH RBC QN AUTO: 32 PG (ref 26.1–32.9)
MCH RBC QN AUTO: 32.4 PG (ref 26.1–32.9)
MCH RBC QN AUTO: 32.8 PG (ref 26.1–32.9)
MCH RBC QN AUTO: 32.8 PG (ref 26.1–32.9)
MCH RBC QN AUTO: 33.2 PG (ref 26.1–32.9)
MCH RBC QN AUTO: 33.2 PG (ref 26.1–32.9)
MCH RBC QN AUTO: 33.8 PG (ref 26.1–32.9)
MCH RBC QN AUTO: 35.5 PG (ref 26.1–32.9)
MCH RBC QN AUTO: 36.3 PG (ref 26.1–32.9)
MCH RBC QN AUTO: 36.8 PG (ref 26.1–32.9)
MCH RBC QN AUTO: 36.8 PG (ref 26.1–32.9)
MCH RBC QN AUTO: 37.2 PG (ref 26.1–32.9)
MCH RBC QN AUTO: 37.3 PG (ref 26.1–32.9)
MCH RBC QN AUTO: 38.5 PG (ref 26.1–32.9)
MCH RBC QN AUTO: 40.4 PG (ref 26.1–32.9)
MCHC RBC AUTO-ENTMCNC: 32.6 G/DL (ref 31.4–35)
MCHC RBC AUTO-ENTMCNC: 32.7 G/DL (ref 31.4–35)
MCHC RBC AUTO-ENTMCNC: 33.1 G/DL (ref 31.4–35)
MCHC RBC AUTO-ENTMCNC: 33.1 G/DL (ref 31.4–35)
MCHC RBC AUTO-ENTMCNC: 33.4 G/DL (ref 31.4–35)
MCHC RBC AUTO-ENTMCNC: 33.6 G/DL (ref 31.4–35)
MCHC RBC AUTO-ENTMCNC: 33.8 G/DL (ref 31.4–35)
MCHC RBC AUTO-ENTMCNC: 33.9 G/DL (ref 31.4–35)
MCHC RBC AUTO-ENTMCNC: 34.2 G/DL (ref 31.4–35)
MCHC RBC AUTO-ENTMCNC: 34.2 G/DL (ref 31.4–35)
MCHC RBC AUTO-ENTMCNC: 34.6 G/DL (ref 31.4–35)
MCHC RBC AUTO-ENTMCNC: 34.8 G/DL (ref 31.4–35)
MCHC RBC AUTO-ENTMCNC: 35.5 G/DL (ref 31.4–35)
MCHC RBC AUTO-ENTMCNC: 35.6 G/DL (ref 31.4–35)
MCHC RBC AUTO-ENTMCNC: 36.2 G/DL (ref 31.4–35)
MCHC RBC AUTO-ENTMCNC: 36.3 G/DL (ref 31.4–35)
MCHC RBC AUTO-ENTMCNC: 36.4 G/DL (ref 31.4–35)
MCHC RBC AUTO-ENTMCNC: 36.6 G/DL (ref 31.4–35)
MCHC RBC AUTO-ENTMCNC: 36.6 G/DL (ref 31.4–35)
MCHC RBC AUTO-ENTMCNC: 39.1 G/DL (ref 31.4–35)
MCV RBC AUTO: 100.4 FL (ref 79.6–97.8)
MCV RBC AUTO: 100.4 FL (ref 79.6–97.8)
MCV RBC AUTO: 101.1 FL (ref 79.6–97.8)
MCV RBC AUTO: 102.9 FL (ref 79.6–97.8)
MCV RBC AUTO: 103.6 FL (ref 79.6–97.8)
MCV RBC AUTO: 105.1 FL (ref 79.6–97.8)
MCV RBC AUTO: 108.1 FL (ref 79.6–97.8)
MCV RBC AUTO: 92.4 FL (ref 79.6–97.8)
MCV RBC AUTO: 93.6 FL (ref 79.6–97.8)
MCV RBC AUTO: 94 FL (ref 79.6–97.8)
MCV RBC AUTO: 94.5 FL (ref 79.6–97.8)
MCV RBC AUTO: 95.4 FL (ref 79.6–97.8)
MCV RBC AUTO: 95.6 FL (ref 79.6–97.8)
MCV RBC AUTO: 95.8 FL (ref 79.6–97.8)
MCV RBC AUTO: 96 FL (ref 79.6–97.8)
MCV RBC AUTO: 96 FL (ref 79.6–97.8)
MCV RBC AUTO: 97.1 FL (ref 79.6–97.8)
MCV RBC AUTO: 97.9 FL (ref 79.6–97.8)
MCV RBC AUTO: 99 FL (ref 79.6–97.8)
MCV RBC AUTO: 99 FL (ref 79.6–97.8)
MM INDURATION POC: 0 MM (ref 0–5)
MM INDURATION POC: NORMAL 0 MM (ref 0–5)
MM INDURATION POC: NORMAL 0 MM (ref 0–5)
MM INDURATION POC: NORMAL MM (ref 0–5)
MONOCYTES # BLD: 0.4 K/UL (ref 0.1–1.3)
MONOCYTES # BLD: 0.4 K/UL (ref 0.1–1.3)
MONOCYTES # BLD: 0.5 K/UL (ref 0.1–1.3)
MONOCYTES # BLD: 0.6 K/UL (ref 0.1–1.3)
MONOCYTES # BLD: 0.6 K/UL (ref 0.1–1.3)
MONOCYTES # BLD: 0.8 K/UL (ref 0.1–1.3)
MONOCYTES # BLD: 0.9 K/UL (ref 0.1–1.3)
MONOCYTES # BLD: 1.2 K/UL (ref 0.1–1.3)
MONOCYTES NFR BLD: 11 % (ref 4–12)
MONOCYTES NFR BLD: 6 % (ref 4–12)
MONOCYTES NFR BLD: 6 % (ref 4–12)
MONOCYTES NFR BLD: 7 % (ref 4–12)
MONOCYTES NFR BLD: 7 % (ref 4–12)
MONOCYTES NFR BLD: 8 % (ref 4–12)
MONOCYTES NFR BLD: 9 % (ref 4–12)
MUCOUS THREADS URNS QL MICRO: 0 /LPF
NEUTS SEG # BLD: 16.6 K/UL (ref 1.7–8.2)
NEUTS SEG # BLD: 3.5 K/UL (ref 1.7–8.2)
NEUTS SEG # BLD: 3.5 K/UL (ref 1.7–8.2)
NEUTS SEG # BLD: 3.7 K/UL (ref 1.7–8.2)
NEUTS SEG # BLD: 3.8 K/UL (ref 1.7–8.2)
NEUTS SEG # BLD: 5.4 K/UL (ref 1.7–8.2)
NEUTS SEG # BLD: 6.4 K/UL (ref 1.7–8.2)
NEUTS SEG # BLD: 6.6 K/UL (ref 1.7–8.2)
NEUTS SEG # BLD: 6.8 K/UL (ref 1.7–8.2)
NEUTS SEG # BLD: 7.2 K/UL (ref 1.7–8.2)
NEUTS SEG # BLD: 7.4 K/UL (ref 1.7–8.2)
NEUTS SEG # BLD: 7.6 K/UL (ref 1.7–8.2)
NEUTS SEG # BLD: 8.4 K/UL (ref 1.7–8.2)
NEUTS SEG NFR BLD: 66 % (ref 43–78)
NEUTS SEG NFR BLD: 70 % (ref 43–78)
NEUTS SEG NFR BLD: 72 % (ref 43–78)
NEUTS SEG NFR BLD: 73 % (ref 43–78)
NEUTS SEG NFR BLD: 74 % (ref 43–78)
NEUTS SEG NFR BLD: 80 % (ref 43–78)
NEUTS SEG NFR BLD: 81 % (ref 43–78)
NEUTS SEG NFR BLD: 81 % (ref 43–78)
NEUTS SEG NFR BLD: 82 % (ref 43–78)
NEUTS SEG NFR BLD: 83 % (ref 43–78)
NEUTS SEG NFR BLD: 84 % (ref 43–78)
NEUTS SEG NFR BLD: 85 % (ref 43–78)
NEUTS SEG NFR BLD: 90 % (ref 43–78)
NITRITE UR QL STRIP.AUTO: NEGATIVE
NRBC # BLD: 0 K/UL (ref 0–0.2)
NRBC # BLD: 0.02 K/UL (ref 0–0.2)
NRBC # BLD: 0.1 K/UL (ref 0–0.2)
NRBC # BLD: 0.12 K/UL (ref 0–0.2)
O2/TOTAL GAS SETTING VFR VENT: 44 %
P-R INTERVAL, ECG05: 162 MS
P-R INTERVAL, ECG05: 216 MS
P-R INTERVAL, ECG05: 216 MS
PCO2 BLD: 30.6 MMHG (ref 35–45)
PH BLD: 7.45 [PH] (ref 7.35–7.45)
PH UR STRIP: 5 [PH] (ref 5–9)
PH UR STRIP: 5.5 [PH] (ref 5–9)
PH UR STRIP: 5.5 [PH] (ref 5–9)
PHOSPHATE SERPL-MCNC: 2.5 MG/DL (ref 2.3–3.7)
PHOSPHATE SERPL-MCNC: 2.9 MG/DL (ref 2.3–3.7)
PHOSPHATE SERPL-MCNC: 3.8 MG/DL (ref 2.3–3.7)
PHOSPHATE SERPL-MCNC: 4.1 MG/DL (ref 2.3–3.7)
PHOSPHATE SERPL-MCNC: 4.5 MG/DL (ref 2.3–3.7)
PLATELET # BLD AUTO: 104 K/UL (ref 150–450)
PLATELET # BLD AUTO: 111 K/UL (ref 150–450)
PLATELET # BLD AUTO: 127 K/UL (ref 150–450)
PLATELET # BLD AUTO: 132 K/UL (ref 150–450)
PLATELET # BLD AUTO: 141 K/UL (ref 150–450)
PLATELET # BLD AUTO: 149 K/UL (ref 150–450)
PLATELET # BLD AUTO: 151 K/UL (ref 150–450)
PLATELET # BLD AUTO: 154 K/UL
PLATELET # BLD AUTO: 156 K/UL (ref 150–450)
PLATELET # BLD AUTO: 157 K/UL (ref 150–450)
PLATELET # BLD AUTO: 162 K/UL (ref 150–450)
PLATELET # BLD AUTO: 165 K/UL (ref 150–450)
PLATELET # BLD AUTO: 166 K/UL (ref 150–450)
PLATELET # BLD AUTO: 168 K/UL (ref 150–450)
PLATELET # BLD AUTO: 170 K/UL (ref 150–450)
PLATELET # BLD AUTO: 172 K/UL (ref 150–450)
PLATELET # BLD AUTO: 197 K/UL (ref 150–450)
PLATELET # BLD AUTO: 204 K/UL (ref 150–450)
PLATELET # BLD AUTO: 206 K/UL (ref 150–450)
PLATELET # BLD AUTO: 74 K/UL (ref 150–450)
PMV BLD AUTO: 10.1 FL (ref 9.4–12.3)
PMV BLD AUTO: 10.3 FL (ref 9.4–12.3)
PMV BLD AUTO: 10.3 FL (ref 9.4–12.3)
PMV BLD AUTO: 10.4 FL (ref 9.4–12.3)
PMV BLD AUTO: 10.5 FL (ref 9.4–12.3)
PMV BLD AUTO: 10.5 FL (ref 9.4–12.3)
PMV BLD AUTO: 10.6 FL (ref 9.4–12.3)
PMV BLD AUTO: 10.7 FL (ref 9.4–12.3)
PMV BLD AUTO: 10.7 FL (ref 9.4–12.3)
PMV BLD AUTO: 10.8 FL (ref 9.4–12.3)
PMV BLD AUTO: 10.9 FL (ref 9.4–12.3)
PMV BLD AUTO: 11.1 FL (ref 9.4–12.3)
PMV BLD AUTO: 11.2 FL (ref 9.4–12.3)
PMV BLD AUTO: 9.4 FL (ref 9.4–12.3)
PMV BLD AUTO: 9.9 FL (ref 9.4–12.3)
PO2 BLD: 69 MMHG (ref 75–100)
POTASSIUM SERPL-SCNC: 2.9 MMOL/L (ref 3.5–5.1)
POTASSIUM SERPL-SCNC: 3.1 MMOL/L (ref 3.5–5.1)
POTASSIUM SERPL-SCNC: 3.2 MMOL/L (ref 3.5–5.1)
POTASSIUM SERPL-SCNC: 3.2 MMOL/L (ref 3.5–5.1)
POTASSIUM SERPL-SCNC: 3.3 MMOL/L (ref 3.5–5.1)
POTASSIUM SERPL-SCNC: 3.5 MMOL/L (ref 3.5–5.1)
POTASSIUM SERPL-SCNC: 3.7 MMOL/L (ref 3.5–5.1)
POTASSIUM SERPL-SCNC: 3.8 MMOL/L (ref 3.5–5.1)
POTASSIUM SERPL-SCNC: 3.8 MMOL/L (ref 3.5–5.1)
POTASSIUM SERPL-SCNC: 3.9 MMOL/L (ref 3.5–5.1)
POTASSIUM SERPL-SCNC: 3.9 MMOL/L (ref 3.5–5.1)
POTASSIUM SERPL-SCNC: 4 MMOL/L (ref 3.5–5.1)
POTASSIUM SERPL-SCNC: 4.2 MMOL/L (ref 3.5–5.1)
POTASSIUM SERPL-SCNC: 4.7 MMOL/L (ref 3.5–5.1)
POTASSIUM SERPL-SCNC: 4.7 MMOL/L (ref 3.5–5.1)
POTASSIUM SERPL-SCNC: 4.8 MMOL/L (ref 3.5–5.1)
POTASSIUM SERPL-SCNC: 5 MMOL/L (ref 3.5–5.1)
POTASSIUM SERPL-SCNC: 5.1 MMOL/L (ref 3.5–5.1)
POTASSIUM SERPL-SCNC: 5.5 MMOL/L (ref 3.5–5.1)
POTASSIUM SERPL-SCNC: 5.6 MMOL/L (ref 3.5–5.1)
POTASSIUM SERPL-SCNC: 5.9 MMOL/L (ref 3.5–5.1)
PPD POC: NEGATIVE NEGATIVE
PPD POC: NEGATIVE NEGATIVE
PPD POC: NORMAL
PPD POC: NORMAL NEGATIVE
PROT SERPL-MCNC: 6.6 G/DL (ref 6.3–8.2)
PROT SERPL-MCNC: 6.7 G/DL (ref 6.3–8.2)
PROT SERPL-MCNC: 6.8 G/DL (ref 6.3–8.2)
PROT SERPL-MCNC: 7.2 G/DL (ref 6.3–8.2)
PROT SERPL-MCNC: 7.2 G/DL (ref 6.3–8.2)
PROT SERPL-MCNC: 7.4 G/DL (ref 6.3–8.2)
PROT SERPL-MCNC: 7.5 G/DL (ref 6.3–8.2)
PROT SERPL-MCNC: 7.7 G/DL (ref 6.3–8.2)
PROT UR STRIP-MCNC: 30 MG/DL
PROT UR STRIP-MCNC: 30 MG/DL
PROT UR STRIP-MCNC: NEGATIVE MG/DL
PROT UR-MCNC: 48 MG/DL
PROT/CREAT UR-RTO: 1.4
PROTHROMBIN TIME: 16.2 SEC (ref 11.7–14.5)
PROTHROMBIN TIME: 19.7 SEC (ref 11.7–14.5)
PROTHROMBIN TIME: 19.9 SEC (ref 11.7–14.5)
PROTHROMBIN TIME: 22.8 SEC (ref 11.7–14.5)
PROTHROMBIN TIME: 26.3 SEC (ref 11.7–14.5)
PROTHROMBIN TIME: 26.7 SEC (ref 11.7–14.5)
PROTHROMBIN TIME: 51 SEC (ref 11.7–14.5)
PROTHROMBIN TIME: 52.3 SEC (ref 11.7–14.5)
PSA SERPL-MCNC: 0.1 NG/ML
PTH-INTACT SERPL-MCNC: 96.3 PG/ML (ref 18.5–88)
Q-T INTERVAL, ECG07: 434 MS
Q-T INTERVAL, ECG07: 472 MS
Q-T INTERVAL, ECG07: 480 MS
Q-T INTERVAL, ECG07: 484 MS
Q-T INTERVAL, ECG07: 528 MS
Q-T INTERVAL, ECG07: 542 MS
QRS DURATION, ECG06: 150 MS
QRS DURATION, ECG06: 162 MS
QRS DURATION, ECG06: 170 MS
QRS DURATION, ECG06: 196 MS
QRS DURATION, ECG06: 212 MS
QRS DURATION, ECG06: 214 MS
QTC CALCULATION (BEZET), ECG08: 525 MS
QTC CALCULATION (BEZET), ECG08: 548 MS
QTC CALCULATION (BEZET), ECG08: 553 MS
QTC CALCULATION (BEZET), ECG08: 565 MS
QTC CALCULATION (BEZET), ECG08: 590 MS
QTC CALCULATION (BEZET), ECG08: 629 MS
RBC # BLD AUTO: 2.25 M/UL (ref 4.23–5.6)
RBC # BLD AUTO: 2.34 M/UL (ref 4.23–5.6)
RBC # BLD AUTO: 2.36 M/UL (ref 4.23–5.6)
RBC # BLD AUTO: 2.62 M/UL (ref 4.23–5.6)
RBC # BLD AUTO: 2.69 M/UL (ref 4.23–5.6)
RBC # BLD AUTO: 2.72 M/UL (ref 4.23–5.6)
RBC # BLD AUTO: 3.02 M/UL (ref 4.23–5.6)
RBC # BLD AUTO: 3.06 M/UL (ref 4.23–5.6)
RBC # BLD AUTO: 3.09 M/UL (ref 4.23–5.6)
RBC # BLD AUTO: 3.28 M/UL (ref 4.23–5.6)
RBC # BLD AUTO: 3.28 M/UL (ref 4.23–5.6)
RBC # BLD AUTO: 3.32 M/UL (ref 4.23–5.6)
RBC # BLD AUTO: 3.36 M/UL (ref 4.23–5.67)
RBC # BLD AUTO: 3.46 M/UL (ref 4.23–5.6)
RBC # BLD AUTO: 3.6 M/UL (ref 4.23–5.6)
RBC # BLD AUTO: 3.61 M/UL (ref 4.23–5.6)
RBC # BLD AUTO: 3.7 M/UL
RBC # BLD AUTO: 3.7 M/UL (ref 4.23–5.6)
RBC # BLD AUTO: 3.79 M/UL (ref 4.23–5.6)
RBC # BLD AUTO: 3.79 M/UL (ref 4.23–5.6)
RBC #/AREA URNS HPF: ABNORMAL /HPF
RBC #/AREA URNS HPF: NORMAL /HPF
SAO2 % BLD: 95 % (ref 95–98)
SERVICE CMNT-IMP: ABNORMAL
SERVICE CMNT-IMP: ABNORMAL
SERVICE CMNT-IMP: NORMAL
SODIUM SERPL-SCNC: 133 MMOL/L (ref 136–145)
SODIUM SERPL-SCNC: 134 MMOL/L (ref 136–145)
SODIUM SERPL-SCNC: 134 MMOL/L (ref 136–145)
SODIUM SERPL-SCNC: 136 MMOL/L (ref 136–145)
SODIUM SERPL-SCNC: 137 MMOL/L (ref 136–145)
SODIUM SERPL-SCNC: 138 MMOL/L (ref 136–145)
SODIUM SERPL-SCNC: 139 MMOL/L (ref 136–145)
SODIUM SERPL-SCNC: 140 MMOL/L (ref 136–145)
SODIUM SERPL-SCNC: 141 MMOL/L (ref 136–145)
SODIUM SERPL-SCNC: 142 MMOL/L (ref 136–145)
SODIUM SERPL-SCNC: 143 MMOL/L (ref 136–145)
SODIUM SERPL-SCNC: 143 MMOL/L (ref 136–145)
SODIUM UR-SCNC: 21 MMOL/L
SODIUM UR-SCNC: 41 MMOL/L
SP GR UR REFRACTOMETRY: 1.01 (ref 1–1.02)
SP GR UR REFRACTOMETRY: 1.01 (ref 1–1.02)
SP GR UR REFRACTOMETRY: 1.02 (ref 1–1.02)
SPECIMEN TYPE: ABNORMAL
TRIGL SERPL-MCNC: 94 MG/DL (ref 35–150)
TROPONIN I BLD-MCNC: 0.47 NG/ML (ref 0.02–0.05)
TROPONIN I BLD-MCNC: 0.67 NG/ML (ref 0.02–0.05)
TROPONIN I SERPL-MCNC: 0.63 NG/ML (ref 0.02–0.05)
TROPONIN I SERPL-MCNC: 0.65 NG/ML (ref 0.02–0.05)
TROPONIN I SERPL-MCNC: 0.66 NG/ML (ref 0.02–0.05)
TROPONIN I SERPL-MCNC: 0.77 NG/ML (ref 0.02–0.05)
TROPONIN I SERPL-MCNC: 0.82 NG/ML (ref 0.02–0.05)
UROBILINOGEN UR QL STRIP.AUTO: 0.2 EU/DL (ref 0.2–1)
UROBILINOGEN UR QL STRIP.AUTO: 0.2 EU/DL (ref 0.2–1)
UROBILINOGEN UR QL STRIP.AUTO: 1 EU/DL (ref 0.2–1)
VENTRICULAR RATE, ECG03: 66 BPM
VENTRICULAR RATE, ECG03: 81 BPM
VENTRICULAR RATE, ECG03: 81 BPM
VENTRICULAR RATE, ECG03: 82 BPM
VENTRICULAR RATE, ECG03: 88 BPM
VENTRICULAR RATE, ECG03: 91 BPM
VLDLC SERPL CALC-MCNC: 18.8 MG/DL (ref 6–23)
WBC # BLD AUTO: 10 K/UL (ref 4.3–11.1)
WBC # BLD AUTO: 10.4 K/UL (ref 4.3–11.1)
WBC # BLD AUTO: 10.5 K/UL (ref 4.3–11.1)
WBC # BLD AUTO: 18.4 K/UL (ref 4.3–11.1)
WBC # BLD AUTO: 4.8 K/UL (ref 4.3–11.1)
WBC # BLD AUTO: 5.1 K/UL (ref 4.3–11.1)
WBC # BLD AUTO: 5.3 K/UL (ref 4.3–11.1)
WBC # BLD AUTO: 5.3 K/UL (ref 4.3–11.1)
WBC # BLD AUTO: 5.4 K/UL (ref 4.3–11.1)
WBC # BLD AUTO: 6 K/UL (ref 4.3–11.1)
WBC # BLD AUTO: 6.4 K/UL (ref 4.3–11.1)
WBC # BLD AUTO: 7 K/UL (ref 4.3–11.1)
WBC # BLD AUTO: 7.1 K/UL (ref 4.3–11.1)
WBC # BLD AUTO: 7.5 K/UL (ref 4.3–11.1)
WBC # BLD AUTO: 7.6 K/UL (ref 4.3–11.1)
WBC # BLD AUTO: 7.7 K/UL (ref 4.3–11.1)
WBC # BLD AUTO: 8.5 K/UL (ref 4.3–11.1)
WBC # BLD AUTO: 8.8 K/UL (ref 4.3–11.1)
WBC # BLD AUTO: 9 K/UL (ref 4.3–11.1)
WBC # BLD AUTO: 9.5 K/UL (ref 4.3–11.1)
WBC URNS QL MICRO: ABNORMAL /HPF
WBC URNS QL MICRO: NORMAL /HPF

## 2019-01-01 PROCEDURE — 74011250636 HC RX REV CODE- 250/636: Performed by: NURSE PRACTITIONER

## 2019-01-01 PROCEDURE — 94760 N-INVAS EAR/PLS OXIMETRY 1: CPT

## 2019-01-01 PROCEDURE — 0656 HSPC GENERAL INPATIENT

## 2019-01-01 PROCEDURE — 74011000302 HC RX REV CODE- 302: Performed by: HOSPITALIST

## 2019-01-01 PROCEDURE — 97535 SELF CARE MNGMENT TRAINING: CPT

## 2019-01-01 PROCEDURE — 94640 AIRWAY INHALATION TREATMENT: CPT

## 2019-01-01 PROCEDURE — 76450000000

## 2019-01-01 PROCEDURE — 74011250637 HC RX REV CODE- 250/637: Performed by: HOSPITALIST

## 2019-01-01 PROCEDURE — 74011000258 HC RX REV CODE- 258: Performed by: INTERNAL MEDICINE

## 2019-01-01 PROCEDURE — 84484 ASSAY OF TROPONIN QUANT: CPT

## 2019-01-01 PROCEDURE — 85027 COMPLETE CBC AUTOMATED: CPT

## 2019-01-01 PROCEDURE — 85730 THROMBOPLASTIN TIME PARTIAL: CPT

## 2019-01-01 PROCEDURE — 74011250636 HC RX REV CODE- 250/636: Performed by: HOSPITALIST

## 2019-01-01 PROCEDURE — 93005 ELECTROCARDIOGRAM TRACING: CPT | Performed by: INTERNAL MEDICINE

## 2019-01-01 PROCEDURE — 04CL0ZZ EXTIRPATION OF MATTER FROM LEFT FEMORAL ARTERY, OPEN APPROACH: ICD-10-PCS | Performed by: SURGERY

## 2019-01-01 PROCEDURE — 65660000000 HC RM CCU STEPDOWN

## 2019-01-01 PROCEDURE — 36415 COLL VENOUS BLD VENIPUNCTURE: CPT

## 2019-01-01 PROCEDURE — 80048 BASIC METABOLIC PNL TOTAL CA: CPT

## 2019-01-01 PROCEDURE — 97161 PT EVAL LOW COMPLEX 20 MIN: CPT

## 2019-01-01 PROCEDURE — 74011000250 HC RX REV CODE- 250: Performed by: SURGERY

## 2019-01-01 PROCEDURE — 85610 PROTHROMBIN TIME: CPT

## 2019-01-01 PROCEDURE — 77010033678 HC OXYGEN DAILY

## 2019-01-01 PROCEDURE — 74011250637 HC RX REV CODE- 250/637: Performed by: SURGERY

## 2019-01-01 PROCEDURE — 74011250636 HC RX REV CODE- 250/636: Performed by: FAMILY MEDICINE

## 2019-01-01 PROCEDURE — 88342 IMHCHEM/IMCYTCHM 1ST ANTB: CPT

## 2019-01-01 PROCEDURE — 82378 CARCINOEMBRYONIC ANTIGEN: CPT

## 2019-01-01 PROCEDURE — 99284 EMERGENCY DEPT VISIT MOD MDM: CPT | Performed by: EMERGENCY MEDICINE

## 2019-01-01 PROCEDURE — 99285 EMERGENCY DEPT VISIT HI MDM: CPT | Performed by: EMERGENCY MEDICINE

## 2019-01-01 PROCEDURE — G0299 HHS/HOSPICE OF RN EA 15 MIN: HCPCS

## 2019-01-01 PROCEDURE — 77030020263 HC SOL INJ SOD CL0.9% LFCR 1000ML

## 2019-01-01 PROCEDURE — 83605 ASSAY OF LACTIC ACID: CPT

## 2019-01-01 PROCEDURE — 74011250636 HC RX REV CODE- 250/636

## 2019-01-01 PROCEDURE — 74011250636 HC RX REV CODE- 250/636: Performed by: SURGERY

## 2019-01-01 PROCEDURE — 77030005518 HC CATH URETH FOL 2W BARD -B: Performed by: EMERGENCY MEDICINE

## 2019-01-01 PROCEDURE — 74011250637 HC RX REV CODE- 250/637: Performed by: NURSE PRACTITIONER

## 2019-01-01 PROCEDURE — 76040000026: Performed by: INTERNAL MEDICINE

## 2019-01-01 PROCEDURE — 74011000250 HC RX REV CODE- 250: Performed by: INTERNAL MEDICINE

## 2019-01-01 PROCEDURE — 99232 SBSQ HOSP IP/OBS MODERATE 35: CPT | Performed by: INTERNAL MEDICINE

## 2019-01-01 PROCEDURE — 74011000302 HC RX REV CODE- 302: Performed by: INTERNAL MEDICINE

## 2019-01-01 PROCEDURE — 74011250636 HC RX REV CODE- 250/636: Performed by: PHYSICIAN ASSISTANT

## 2019-01-01 PROCEDURE — 85025 COMPLETE CBC W/AUTO DIFF WBC: CPT

## 2019-01-01 PROCEDURE — 3336500001 HSPC ELECTION

## 2019-01-01 PROCEDURE — 36416 COLLJ CAPILLARY BLOOD SPEC: CPT

## 2019-01-01 PROCEDURE — 81003 URINALYSIS AUTO W/O SCOPE: CPT | Performed by: EMERGENCY MEDICINE

## 2019-01-01 PROCEDURE — 71046 X-RAY EXAM CHEST 2 VIEWS: CPT

## 2019-01-01 PROCEDURE — 71045 X-RAY EXAM CHEST 1 VIEW: CPT

## 2019-01-01 PROCEDURE — 74011250637 HC RX REV CODE- 250/637: Performed by: FAMILY MEDICINE

## 2019-01-01 PROCEDURE — 83735 ASSAY OF MAGNESIUM: CPT

## 2019-01-01 PROCEDURE — 81003 URINALYSIS AUTO W/O SCOPE: CPT

## 2019-01-01 PROCEDURE — 97530 THERAPEUTIC ACTIVITIES: CPT

## 2019-01-01 PROCEDURE — 80053 COMPREHEN METABOLIC PANEL: CPT

## 2019-01-01 PROCEDURE — 74011250637 HC RX REV CODE- 250/637: Performed by: INTERNAL MEDICINE

## 2019-01-01 PROCEDURE — 74011250636 HC RX REV CODE- 250/636: Performed by: ANESTHESIOLOGY

## 2019-01-01 PROCEDURE — 76210000006 HC OR PH I REC 0.5 TO 1 HR: Performed by: SURGERY

## 2019-01-01 PROCEDURE — 83690 ASSAY OF LIPASE: CPT

## 2019-01-01 PROCEDURE — 99223 1ST HOSP IP/OBS HIGH 75: CPT | Performed by: INTERNAL MEDICINE

## 2019-01-01 PROCEDURE — 74011000250 HC RX REV CODE- 250: Performed by: FAMILY MEDICINE

## 2019-01-01 PROCEDURE — 97165 OT EVAL LOW COMPLEX 30 MIN: CPT

## 2019-01-01 PROCEDURE — 80069 RENAL FUNCTION PANEL: CPT

## 2019-01-01 PROCEDURE — 88172 CYTP DX EVAL FNA 1ST EA SITE: CPT

## 2019-01-01 PROCEDURE — 74011000250 HC RX REV CODE- 250: Performed by: HOSPITALIST

## 2019-01-01 PROCEDURE — 74011250636 HC RX REV CODE- 250/636: Performed by: EMERGENCY MEDICINE

## 2019-01-01 PROCEDURE — B246ZZ4 ULTRASONOGRAPHY OF RIGHT AND LEFT HEART, TRANSESOPHAGEAL: ICD-10-PCS | Performed by: INTERNAL MEDICINE

## 2019-01-01 PROCEDURE — 70450 CT HEAD/BRAIN W/O DYE: CPT

## 2019-01-01 PROCEDURE — 77030031139 HC SUT VCRL2 J&J -A: Performed by: SURGERY

## 2019-01-01 PROCEDURE — 99153 MOD SED SAME PHYS/QHP EA: CPT | Performed by: INTERNAL MEDICINE

## 2019-01-01 PROCEDURE — 77030004534 HC CATH ANGI DX INFN CARD -A

## 2019-01-01 PROCEDURE — G0156 HHCP-SVS OF AIDE,EA 15 MIN: HCPCS

## 2019-01-01 PROCEDURE — 82570 ASSAY OF URINE CREATININE: CPT

## 2019-01-01 PROCEDURE — 82803 BLOOD GASES ANY COMBINATION: CPT

## 2019-01-01 PROCEDURE — 93005 ELECTROCARDIOGRAM TRACING: CPT | Performed by: EMERGENCY MEDICINE

## 2019-01-01 PROCEDURE — 74011636320 HC RX REV CODE- 636/320: Performed by: INTERNAL MEDICINE

## 2019-01-01 PROCEDURE — 77030013292 HC BOWL MX PRSM J&J -A: Performed by: ANESTHESIOLOGY

## 2019-01-01 PROCEDURE — 74011000250 HC RX REV CODE- 250: Performed by: NURSE PRACTITIONER

## 2019-01-01 PROCEDURE — 93458 L HRT ARTERY/VENTRICLE ANGIO: CPT

## 2019-01-01 PROCEDURE — P9047 ALBUMIN (HUMAN), 25%, 50ML: HCPCS | Performed by: HOSPITALIST

## 2019-01-01 PROCEDURE — 74011000250 HC RX REV CODE- 250

## 2019-01-01 PROCEDURE — 76770 US EXAM ABDO BACK WALL COMP: CPT

## 2019-01-01 PROCEDURE — 94729 DIFFUSING CAPACITY: CPT

## 2019-01-01 PROCEDURE — 76060000034 HC ANESTHESIA 1.5 TO 2 HR: Performed by: SURGERY

## 2019-01-01 PROCEDURE — 83880 ASSAY OF NATRIURETIC PEPTIDE: CPT

## 2019-01-01 PROCEDURE — C8929 TTE W OR WO FOL WCON,DOPPLER: HCPCS

## 2019-01-01 PROCEDURE — 5A2204Z RESTORATION OF CARDIAC RHYTHM, SINGLE: ICD-10-PCS | Performed by: INTERNAL MEDICINE

## 2019-01-01 PROCEDURE — 77030002933 HC SUT MCRYL J&J -A: Performed by: SURGERY

## 2019-01-01 PROCEDURE — 96375 TX/PRO/DX INJ NEW DRUG ADDON: CPT | Performed by: EMERGENCY MEDICINE

## 2019-01-01 PROCEDURE — 99231 SBSQ HOSP IP/OBS SF/LOW 25: CPT | Performed by: NURSE PRACTITIONER

## 2019-01-01 PROCEDURE — C1769 GUIDE WIRE: HCPCS | Performed by: SURGERY

## 2019-01-01 PROCEDURE — 77030009046 HC CATH BRNCH BLLN OCOA -B: Performed by: INTERNAL MEDICINE

## 2019-01-01 PROCEDURE — 51798 US URINE CAPACITY MEASURE: CPT

## 2019-01-01 PROCEDURE — 77030013140 HC MSK NEB VYRM -A

## 2019-01-01 PROCEDURE — 74011000258 HC RX REV CODE- 258: Performed by: NURSE PRACTITIONER

## 2019-01-01 PROCEDURE — 74022 RADEX COMPL AQT ABD SERIES: CPT

## 2019-01-01 PROCEDURE — 88305 TISSUE EXAM BY PATHOLOGIST: CPT

## 2019-01-01 PROCEDURE — 76060000033 HC ANESTHESIA 1 TO 1.5 HR: Performed by: INTERNAL MEDICINE

## 2019-01-01 PROCEDURE — 84300 ASSAY OF URINE SODIUM: CPT

## 2019-01-01 PROCEDURE — 93312 ECHO TRANSESOPHAGEAL: CPT

## 2019-01-01 PROCEDURE — 31652 BRONCH EBUS SAMPLNG 1/2 NODE: CPT | Performed by: INTERNAL MEDICINE

## 2019-01-01 PROCEDURE — 97166 OT EVAL MOD COMPLEX 45 MIN: CPT

## 2019-01-01 PROCEDURE — 88360 TUMOR IMMUNOHISTOCHEM/MANUAL: CPT

## 2019-01-01 PROCEDURE — 77030002996 HC SUT SLK J&J -A: Performed by: SURGERY

## 2019-01-01 PROCEDURE — 0BBG8ZX EXCISION OF LEFT UPPER LUNG LOBE, VIA NATURAL OR ARTIFICIAL OPENING ENDOSCOPIC, DIAGNOSTIC: ICD-10-PCS | Performed by: INTERNAL MEDICINE

## 2019-01-01 PROCEDURE — B2151ZZ FLUOROSCOPY OF LEFT HEART USING LOW OSMOLAR CONTRAST: ICD-10-PCS | Performed by: INTERNAL MEDICINE

## 2019-01-01 PROCEDURE — 78582 LUNG VENTILAT&PERFUS IMAGING: CPT

## 2019-01-01 PROCEDURE — 99152 MOD SED SAME PHYS/QHP 5/>YRS: CPT | Performed by: INTERNAL MEDICINE

## 2019-01-01 PROCEDURE — 96374 THER/PROPH/DIAG INJ IV PUSH: CPT | Performed by: EMERGENCY MEDICINE

## 2019-01-01 PROCEDURE — 96365 THER/PROPH/DIAG IV INF INIT: CPT | Performed by: EMERGENCY MEDICINE

## 2019-01-01 PROCEDURE — 93283 PRGRMG EVAL IMPLANTABLE DFB: CPT

## 2019-01-01 PROCEDURE — 65270000029 HC RM PRIVATE

## 2019-01-01 PROCEDURE — C1894 INTRO/SHEATH, NON-LASER: HCPCS

## 2019-01-01 PROCEDURE — 99222 1ST HOSP IP/OBS MODERATE 55: CPT | Performed by: INTERNAL MEDICINE

## 2019-01-01 PROCEDURE — 51702 INSERT TEMP BLADDER CATH: CPT | Performed by: EMERGENCY MEDICINE

## 2019-01-01 PROCEDURE — 87086 URINE CULTURE/COLONY COUNT: CPT

## 2019-01-01 PROCEDURE — 02583ZZ DESTRUCTION OF CONDUCTION MECHANISM, PERCUTANEOUS APPROACH: ICD-10-PCS | Performed by: INTERNAL MEDICINE

## 2019-01-01 PROCEDURE — C1732 CATH, EP, DIAG/ABL, 3D/VECT: HCPCS

## 2019-01-01 PROCEDURE — 84156 ASSAY OF PROTEIN URINE: CPT

## 2019-01-01 PROCEDURE — 88341 IMHCHEM/IMCYTCHM EA ADD ANTB: CPT

## 2019-01-01 PROCEDURE — C1760 CLOSURE DEV, VASC: HCPCS

## 2019-01-01 PROCEDURE — B2111ZZ FLUOROSCOPY OF MULTIPLE CORONARY ARTERIES USING LOW OSMOLAR CONTRAST: ICD-10-PCS | Performed by: INTERNAL MEDICINE

## 2019-01-01 PROCEDURE — 77030021907 HC KT URIN FOL O&M -A

## 2019-01-01 PROCEDURE — 74011250636 HC RX REV CODE- 250/636: Performed by: INTERNAL MEDICINE

## 2019-01-01 PROCEDURE — 99223 1ST HOSP IP/OBS HIGH 75: CPT | Performed by: NURSE PRACTITIONER

## 2019-01-01 PROCEDURE — 77030003406 HC NDL ASPIR BIOP OCOA -C: Performed by: INTERNAL MEDICINE

## 2019-01-01 PROCEDURE — 86580 TB INTRADERMAL TEST: CPT | Performed by: FAMILY MEDICINE

## 2019-01-01 PROCEDURE — 82306 VITAMIN D 25 HYDROXY: CPT

## 2019-01-01 PROCEDURE — 77030039425 HC BLD LARYNG TRULITE DISP TELE -A: Performed by: ANESTHESIOLOGY

## 2019-01-01 PROCEDURE — 86580 TB INTRADERMAL TEST: CPT | Performed by: HOSPITALIST

## 2019-01-01 PROCEDURE — 77030013687 HC GD NDL BARD -B

## 2019-01-01 PROCEDURE — 81001 URINALYSIS AUTO W/SCOPE: CPT

## 2019-01-01 PROCEDURE — 82270 OCCULT BLOOD FECES: CPT

## 2019-01-01 PROCEDURE — 93650 ICAR CATH ABLTJ AV NODE FUNC: CPT

## 2019-01-01 PROCEDURE — 77030021532 HC CATH ANGI DX IMPRS MRTM -B: Performed by: SURGERY

## 2019-01-01 PROCEDURE — 76010000162 HC OR TIME 1.5 TO 2 HR INTENSV-TIER 1: Performed by: SURGERY

## 2019-01-01 PROCEDURE — 77030004558 HC CATH ANGI DX SUPR TORQ CARD -A

## 2019-01-01 PROCEDURE — 77030037088 HC TUBE ENDOTRACH ORAL NSL COVD-A: Performed by: ANESTHESIOLOGY

## 2019-01-01 PROCEDURE — 94010 BREATHING CAPACITY TEST: CPT

## 2019-01-01 PROCEDURE — 77030035291 HC TBNG PMP SMARTABLATE J&J -B

## 2019-01-01 PROCEDURE — 77030019908 HC STETH ESOPH SIMS -A: Performed by: ANESTHESIOLOGY

## 2019-01-01 PROCEDURE — 88173 CYTOPATH EVAL FNA REPORT: CPT

## 2019-01-01 PROCEDURE — 81015 MICROSCOPIC EXAM OF URINE: CPT

## 2019-01-01 PROCEDURE — 99152 MOD SED SAME PHYS/QHP 5/>YRS: CPT

## 2019-01-01 PROCEDURE — 99231 SBSQ HOSP IP/OBS SF/LOW 25: CPT | Performed by: INTERNAL MEDICINE

## 2019-01-01 PROCEDURE — 36600 WITHDRAWAL OF ARTERIAL BLOOD: CPT

## 2019-01-01 PROCEDURE — 84100 ASSAY OF PHOSPHORUS: CPT

## 2019-01-01 PROCEDURE — C1894 INTRO/SHEATH, NON-LASER: HCPCS | Performed by: SURGERY

## 2019-01-01 PROCEDURE — 80061 LIPID PANEL: CPT

## 2019-01-01 PROCEDURE — 77030009397 HC ELECTRD ECG PACE ZOLL -B

## 2019-01-01 PROCEDURE — 77030005401 HC CATH RAD ARRO -A: Performed by: ANESTHESIOLOGY

## 2019-01-01 PROCEDURE — 77030004559 HC CATH ANGI DX SUPT CARD -B

## 2019-01-01 PROCEDURE — 71260 CT THORAX DX C+: CPT

## 2019-01-01 PROCEDURE — 83970 ASSAY OF PARATHORMONE: CPT

## 2019-01-01 PROCEDURE — 96360 HYDRATION IV INFUSION INIT: CPT | Performed by: EMERGENCY MEDICINE

## 2019-01-01 PROCEDURE — 77030012699 HC VLV SUC CNTRL OCOA -A: Performed by: INTERNAL MEDICINE

## 2019-01-01 PROCEDURE — 77030013794 HC KT TRNSDUC BLD EDWD -B: Performed by: ANESTHESIOLOGY

## 2019-01-01 PROCEDURE — C1757 CATH, THROMBECTOMY/EMBOLECT: HCPCS | Performed by: SURGERY

## 2019-01-01 PROCEDURE — 4A023N7 MEASUREMENT OF CARDIAC SAMPLING AND PRESSURE, LEFT HEART, PERCUTANEOUS APPROACH: ICD-10-PCS | Performed by: INTERNAL MEDICINE

## 2019-01-01 PROCEDURE — 97110 THERAPEUTIC EXERCISES: CPT

## 2019-01-01 PROCEDURE — 77030018673: Performed by: SURGERY

## 2019-01-01 PROCEDURE — 94726 PLETHYSMOGRAPHY LUNG VOLUMES: CPT

## 2019-01-01 PROCEDURE — 74011000258 HC RX REV CODE- 258: Performed by: EMERGENCY MEDICINE

## 2019-01-01 PROCEDURE — 84153 ASSAY OF PSA TOTAL: CPT

## 2019-01-01 PROCEDURE — 77030020782 HC GWN BAIR PAWS FLX 3M -B: Performed by: ANESTHESIOLOGY

## 2019-01-01 PROCEDURE — 07D74ZX EXTRACTION OF THORAX LYMPHATIC, PERCUTANEOUS ENDOSCOPIC APPROACH, DIAGNOSTIC: ICD-10-PCS | Performed by: INTERNAL MEDICINE

## 2019-01-01 PROCEDURE — 99153 MOD SED SAME PHYS/QHP EA: CPT

## 2019-01-01 PROCEDURE — 74011000302 HC RX REV CODE- 302: Performed by: FAMILY MEDICINE

## 2019-01-01 PROCEDURE — 86580 TB INTRADERMAL TEST: CPT | Performed by: INTERNAL MEDICINE

## 2019-01-01 PROCEDURE — 77030002986 HC SUT PROL J&J -A: Performed by: SURGERY

## 2019-01-01 PROCEDURE — 88177 CYTP FNA EVAL EA ADDL: CPT

## 2019-01-01 RX ORDER — ALBUTEROL SULFATE 0.83 MG/ML
2.5 SOLUTION RESPIRATORY (INHALATION)
Status: DISCONTINUED | OUTPATIENT
Start: 2019-01-01 | End: 2019-01-01 | Stop reason: HOSPADM

## 2019-01-01 RX ORDER — FENTANYL CITRATE 50 UG/ML
50 INJECTION, SOLUTION INTRAMUSCULAR; INTRAVENOUS ONCE
Status: COMPLETED | OUTPATIENT
Start: 2019-01-01 | End: 2019-01-01

## 2019-01-01 RX ORDER — ONDANSETRON 2 MG/ML
4 INJECTION INTRAMUSCULAR; INTRAVENOUS
Status: DISCONTINUED | OUTPATIENT
Start: 2019-01-01 | End: 2019-01-01 | Stop reason: HOSPADM

## 2019-01-01 RX ORDER — PROPOFOL 10 MG/ML
INJECTION, EMULSION INTRAVENOUS AS NEEDED
Status: DISCONTINUED | OUTPATIENT
Start: 2019-01-01 | End: 2019-01-01 | Stop reason: HOSPADM

## 2019-01-01 RX ORDER — NALOXONE HYDROCHLORIDE 0.4 MG/ML
0.4 INJECTION, SOLUTION INTRAMUSCULAR; INTRAVENOUS; SUBCUTANEOUS
Status: DISCONTINUED | OUTPATIENT
Start: 2019-01-01 | End: 2019-01-01 | Stop reason: HOSPADM

## 2019-01-01 RX ORDER — HEPARIN SODIUM 5000 [USP'U]/ML
35 INJECTION, SOLUTION INTRAVENOUS; SUBCUTANEOUS ONCE
Status: COMPLETED | OUTPATIENT
Start: 2019-01-01 | End: 2019-01-01

## 2019-01-01 RX ORDER — POTASSIUM CHLORIDE 20 MEQ/1
40 TABLET, EXTENDED RELEASE ORAL
Status: COMPLETED | OUTPATIENT
Start: 2019-01-01 | End: 2019-01-01

## 2019-01-01 RX ORDER — EPHEDRINE SULFATE 50 MG/ML
INJECTION, SOLUTION INTRAVENOUS AS NEEDED
Status: DISCONTINUED | OUTPATIENT
Start: 2019-01-01 | End: 2019-01-01 | Stop reason: HOSPADM

## 2019-01-01 RX ORDER — ROSUVASTATIN CALCIUM 20 MG/1
20 TABLET, COATED ORAL
Qty: 30 TAB | Refills: 11 | Status: SHIPPED | OUTPATIENT
Start: 2019-01-01 | End: 2019-01-01

## 2019-01-01 RX ORDER — POTASSIUM CHLORIDE 20 MEQ/1
40 TABLET, EXTENDED RELEASE ORAL 2 TIMES DAILY
Status: COMPLETED | OUTPATIENT
Start: 2019-01-01 | End: 2019-01-01

## 2019-01-01 RX ORDER — FENTANYL CITRATE 50 UG/ML
50 INJECTION, SOLUTION INTRAMUSCULAR; INTRAVENOUS
Status: DISCONTINUED | OUTPATIENT
Start: 2019-01-01 | End: 2019-01-01 | Stop reason: HOSPADM

## 2019-01-01 RX ORDER — PROTAMINE SULFATE 10 MG/ML
INJECTION, SOLUTION INTRAVENOUS AS NEEDED
Status: DISCONTINUED | OUTPATIENT
Start: 2019-01-01 | End: 2019-01-01 | Stop reason: HOSPADM

## 2019-01-01 RX ORDER — SODIUM CHLORIDE 0.9 % (FLUSH) 0.9 %
5-40 SYRINGE (ML) INJECTION EVERY 8 HOURS
Status: DISCONTINUED | OUTPATIENT
Start: 2019-01-01 | End: 2019-01-01 | Stop reason: HOSPADM

## 2019-01-01 RX ORDER — WARFARIN SODIUM 5 MG/1
5 TABLET ORAL EVERY EVENING
Status: DISCONTINUED | OUTPATIENT
Start: 2019-01-01 | End: 2019-01-01

## 2019-01-01 RX ORDER — MORPHINE SULFATE 2 MG/ML
2 INJECTION, SOLUTION INTRAMUSCULAR; INTRAVENOUS
Status: DISCONTINUED | OUTPATIENT
Start: 2019-01-01 | End: 2019-01-01 | Stop reason: HOSPADM

## 2019-01-01 RX ORDER — CEFAZOLIN SODIUM/WATER 2 G/20 ML
2 SYRINGE (ML) INTRAVENOUS
Status: COMPLETED | OUTPATIENT
Start: 2019-01-01 | End: 2019-01-01

## 2019-01-01 RX ORDER — ONDANSETRON 2 MG/ML
INJECTION INTRAMUSCULAR; INTRAVENOUS AS NEEDED
Status: DISCONTINUED | OUTPATIENT
Start: 2019-01-01 | End: 2019-01-01 | Stop reason: HOSPADM

## 2019-01-01 RX ORDER — HEPARIN SODIUM 5000 [USP'U]/100ML
18-36 INJECTION, SOLUTION INTRAVENOUS
Status: DISCONTINUED | OUTPATIENT
Start: 2019-01-01 | End: 2019-01-01

## 2019-01-01 RX ORDER — HYDROMORPHONE HYDROCHLORIDE 1 MG/ML
0.5 INJECTION, SOLUTION INTRAMUSCULAR; INTRAVENOUS; SUBCUTANEOUS
Status: DISCONTINUED | OUTPATIENT
Start: 2019-01-01 | End: 2019-01-01 | Stop reason: HOSPADM

## 2019-01-01 RX ORDER — BUDESONIDE 0.5 MG/2ML
500 INHALANT ORAL
Status: DISCONTINUED | OUTPATIENT
Start: 2019-01-01 | End: 2019-01-01 | Stop reason: HOSPADM

## 2019-01-01 RX ORDER — TRAMADOL HYDROCHLORIDE 50 MG/1
50 TABLET ORAL
Status: DISCONTINUED | OUTPATIENT
Start: 2019-01-01 | End: 2019-01-01 | Stop reason: HOSPADM

## 2019-01-01 RX ORDER — LIDOCAINE 4 G/100G
1 PATCH TOPICAL EVERY 24 HOURS
Status: DISCONTINUED | OUTPATIENT
Start: 2019-01-01 | End: 2019-01-01 | Stop reason: HOSPADM

## 2019-01-01 RX ORDER — GLYCOPYRROLATE 0.2 MG/ML
INJECTION INTRAMUSCULAR; INTRAVENOUS AS NEEDED
Status: DISCONTINUED | OUTPATIENT
Start: 2019-01-01 | End: 2019-01-01 | Stop reason: HOSPADM

## 2019-01-01 RX ORDER — CEFAZOLIN SODIUM/WATER 2 G/20 ML
2 SYRINGE (ML) INTRAVENOUS EVERY 12 HOURS
Status: COMPLETED | OUTPATIENT
Start: 2019-01-01 | End: 2019-01-01

## 2019-01-01 RX ORDER — ROSUVASTATIN CALCIUM 20 MG/1
20 TABLET, COATED ORAL
Status: DISCONTINUED | OUTPATIENT
Start: 2019-01-01 | End: 2019-01-01 | Stop reason: HOSPADM

## 2019-01-01 RX ORDER — ASPIRIN 81 MG/1
81 TABLET ORAL DAILY
Status: DISCONTINUED | OUTPATIENT
Start: 2019-01-01 | End: 2019-01-01 | Stop reason: HOSPADM

## 2019-01-01 RX ORDER — DIPHENHYDRAMINE HYDROCHLORIDE 50 MG/ML
12.5 INJECTION, SOLUTION INTRAMUSCULAR; INTRAVENOUS
Status: DISCONTINUED | OUTPATIENT
Start: 2019-01-01 | End: 2019-01-01 | Stop reason: HOSPADM

## 2019-01-01 RX ORDER — CIPROFLOXACIN 500 MG/1
500 TABLET ORAL 2 TIMES DAILY
Qty: 14 TAB | Refills: 0 | Status: SHIPPED | OUTPATIENT
Start: 2019-01-01 | End: 2019-01-01

## 2019-01-01 RX ORDER — METOPROLOL SUCCINATE 25 MG/1
25 TABLET, EXTENDED RELEASE ORAL DAILY
Status: DISCONTINUED | OUTPATIENT
Start: 2019-01-01 | End: 2019-01-01

## 2019-01-01 RX ORDER — ROCURONIUM BROMIDE 10 MG/ML
INJECTION, SOLUTION INTRAVENOUS AS NEEDED
Status: DISCONTINUED | OUTPATIENT
Start: 2019-01-01 | End: 2019-01-01 | Stop reason: HOSPADM

## 2019-01-01 RX ORDER — SODIUM CHLORIDE 9 MG/ML
50 INJECTION, SOLUTION INTRAVENOUS CONTINUOUS
Status: DISCONTINUED | OUTPATIENT
Start: 2019-01-01 | End: 2019-01-01

## 2019-01-01 RX ORDER — FUROSEMIDE 20 MG/1
20 TABLET ORAL DAILY
Status: DISCONTINUED | OUTPATIENT
Start: 2019-01-01 | End: 2019-01-01

## 2019-01-01 RX ORDER — SODIUM CHLORIDE, SODIUM LACTATE, POTASSIUM CHLORIDE, CALCIUM CHLORIDE 600; 310; 30; 20 MG/100ML; MG/100ML; MG/100ML; MG/100ML
75 INJECTION, SOLUTION INTRAVENOUS CONTINUOUS
Status: DISCONTINUED | OUTPATIENT
Start: 2019-01-01 | End: 2019-01-01 | Stop reason: HOSPADM

## 2019-01-01 RX ORDER — OXYCODONE AND ACETAMINOPHEN 5; 325 MG/1; MG/1
1 TABLET ORAL
Status: DISCONTINUED | OUTPATIENT
Start: 2019-01-01 | End: 2019-01-01 | Stop reason: HOSPADM

## 2019-01-01 RX ORDER — MIDAZOLAM HYDROCHLORIDE 1 MG/ML
2 INJECTION, SOLUTION INTRAMUSCULAR; INTRAVENOUS ONCE
Status: DISCONTINUED | OUTPATIENT
Start: 2019-01-01 | End: 2019-01-01

## 2019-01-01 RX ORDER — ONDANSETRON 2 MG/ML
4 INJECTION INTRAMUSCULAR; INTRAVENOUS
Status: COMPLETED | OUTPATIENT
Start: 2019-01-01 | End: 2019-01-01

## 2019-01-01 RX ORDER — PANTOPRAZOLE SODIUM 40 MG/1
40 TABLET, DELAYED RELEASE ORAL
Status: DISCONTINUED | OUTPATIENT
Start: 2019-01-01 | End: 2019-01-01 | Stop reason: HOSPADM

## 2019-01-01 RX ORDER — ACETAMINOPHEN 325 MG/1
650 TABLET ORAL
Status: DISCONTINUED | OUTPATIENT
Start: 2019-01-01 | End: 2019-01-01 | Stop reason: HOSPADM

## 2019-01-01 RX ORDER — TAMSULOSIN HYDROCHLORIDE 0.4 MG/1
0.4 CAPSULE ORAL DAILY
Status: DISCONTINUED | OUTPATIENT
Start: 2019-01-01 | End: 2019-01-01 | Stop reason: HOSPADM

## 2019-01-01 RX ORDER — OXYCODONE HYDROCHLORIDE 5 MG/1
10 TABLET ORAL
Status: DISCONTINUED | OUTPATIENT
Start: 2019-01-01 | End: 2019-01-01

## 2019-01-01 RX ORDER — IPRATROPIUM BROMIDE AND ALBUTEROL SULFATE 2.5; .5 MG/3ML; MG/3ML
3 SOLUTION RESPIRATORY (INHALATION)
Status: DISCONTINUED | OUTPATIENT
Start: 2019-01-01 | End: 2019-01-01 | Stop reason: HOSPADM

## 2019-01-01 RX ORDER — FENTANYL CITRATE 50 UG/ML
INJECTION, SOLUTION INTRAMUSCULAR; INTRAVENOUS AS NEEDED
Status: DISCONTINUED | OUTPATIENT
Start: 2019-01-01 | End: 2019-01-01 | Stop reason: HOSPADM

## 2019-01-01 RX ORDER — OXYCODONE HYDROCHLORIDE 5 MG/1
5 TABLET ORAL
Status: DISCONTINUED | OUTPATIENT
Start: 2019-01-01 | End: 2019-01-01 | Stop reason: HOSPADM

## 2019-01-01 RX ORDER — DILTIAZEM HYDROCHLORIDE 5 MG/ML
5 INJECTION INTRAVENOUS ONCE
Status: DISPENSED | OUTPATIENT
Start: 2019-01-01 | End: 2019-01-01

## 2019-01-01 RX ORDER — FUROSEMIDE 20 MG/1
20 TABLET ORAL 2 TIMES DAILY
Status: DISCONTINUED | OUTPATIENT
Start: 2019-01-01 | End: 2019-01-01 | Stop reason: HOSPADM

## 2019-01-01 RX ORDER — SODIUM CHLORIDE 0.9 % (FLUSH) 0.9 %
10 SYRINGE (ML) INJECTION
Status: COMPLETED | OUTPATIENT
Start: 2019-01-01 | End: 2019-01-01

## 2019-01-01 RX ORDER — SODIUM CHLORIDE 0.9 % (FLUSH) 0.9 %
5-40 SYRINGE (ML) INJECTION AS NEEDED
Status: DISCONTINUED | OUTPATIENT
Start: 2019-01-01 | End: 2019-01-01 | Stop reason: HOSPADM

## 2019-01-01 RX ORDER — METOPROLOL SUCCINATE 25 MG/1
25 TABLET, EXTENDED RELEASE ORAL DAILY
Status: DISCONTINUED | OUTPATIENT
Start: 2019-01-01 | End: 2019-01-01 | Stop reason: HOSPADM

## 2019-01-01 RX ORDER — ROSUVASTATIN CALCIUM 20 MG/1
20 TABLET, COATED ORAL
Status: DISCONTINUED | OUTPATIENT
Start: 2019-01-01 | End: 2019-01-01

## 2019-01-01 RX ORDER — ACETAMINOPHEN 650 MG/1
650 SUPPOSITORY RECTAL
Status: DISCONTINUED | OUTPATIENT
Start: 2019-01-01 | End: 2019-01-01 | Stop reason: HOSPADM

## 2019-01-01 RX ORDER — MAG HYDROX/ALUMINUM HYD/SIMETH 200-200-20
30 SUSPENSION, ORAL (FINAL DOSE FORM) ORAL
Status: DISCONTINUED | OUTPATIENT
Start: 2019-01-01 | End: 2019-01-01

## 2019-01-01 RX ORDER — NALOXONE HYDROCHLORIDE 0.4 MG/ML
0.2 INJECTION, SOLUTION INTRAMUSCULAR; INTRAVENOUS; SUBCUTANEOUS AS NEEDED
Status: DISCONTINUED | OUTPATIENT
Start: 2019-01-01 | End: 2019-01-01 | Stop reason: HOSPADM

## 2019-01-01 RX ORDER — SODIUM CHLORIDE, SODIUM LACTATE, POTASSIUM CHLORIDE, CALCIUM CHLORIDE 600; 310; 30; 20 MG/100ML; MG/100ML; MG/100ML; MG/100ML
100 INJECTION, SOLUTION INTRAVENOUS CONTINUOUS
Status: DISCONTINUED | OUTPATIENT
Start: 2019-01-01 | End: 2019-01-01

## 2019-01-01 RX ORDER — DEXTROSE MONOHYDRATE AND SODIUM CHLORIDE 5; .9 G/100ML; G/100ML
1000 INJECTION, SOLUTION INTRAVENOUS CONTINUOUS
Status: DISCONTINUED | OUTPATIENT
Start: 2019-01-01 | End: 2019-01-01

## 2019-01-01 RX ORDER — PROMETHAZINE HYDROCHLORIDE 25 MG/1
25 TABLET ORAL
Status: DISCONTINUED | OUTPATIENT
Start: 2019-01-01 | End: 2019-01-01 | Stop reason: HOSPADM

## 2019-01-01 RX ORDER — METOPROLOL SUCCINATE 25 MG/1
12.5 TABLET, EXTENDED RELEASE ORAL ONCE
Status: COMPLETED | OUTPATIENT
Start: 2019-01-01 | End: 2019-01-01

## 2019-01-01 RX ORDER — ALBUTEROL SULFATE 0.83 MG/ML
2.5 SOLUTION RESPIRATORY (INHALATION) AS NEEDED
Status: DISCONTINUED | OUTPATIENT
Start: 2019-01-01 | End: 2019-01-01

## 2019-01-01 RX ORDER — LIDOCAINE HYDROCHLORIDE 10 MG/ML
0.1 INJECTION INFILTRATION; PERINEURAL AS NEEDED
Status: DISCONTINUED | OUTPATIENT
Start: 2019-01-01 | End: 2019-01-01 | Stop reason: HOSPADM

## 2019-01-01 RX ORDER — OXYCODONE HYDROCHLORIDE 5 MG/1
2.5 TABLET ORAL
Status: DISCONTINUED | OUTPATIENT
Start: 2019-01-01 | End: 2019-01-01 | Stop reason: HOSPADM

## 2019-01-01 RX ORDER — MORPHINE SULFATE 2 MG/ML
2 INJECTION, SOLUTION INTRAMUSCULAR; INTRAVENOUS
Status: COMPLETED | OUTPATIENT
Start: 2019-01-01 | End: 2019-01-01

## 2019-01-01 RX ORDER — METOPROLOL SUCCINATE 25 MG/1
12.5 TABLET, EXTENDED RELEASE ORAL
Status: DISCONTINUED | OUTPATIENT
Start: 2019-01-01 | End: 2019-01-01 | Stop reason: SDUPTHER

## 2019-01-01 RX ORDER — PROPOFOL 10 MG/ML
INJECTION, EMULSION INTRAVENOUS
Status: DISCONTINUED | OUTPATIENT
Start: 2019-01-01 | End: 2019-01-01 | Stop reason: HOSPADM

## 2019-01-01 RX ORDER — LIDOCAINE HYDROCHLORIDE 40 MG/ML
SOLUTION TOPICAL ONCE
Status: COMPLETED | OUTPATIENT
Start: 2019-01-01 | End: 2019-01-01

## 2019-01-01 RX ORDER — LIDOCAINE HYDROCHLORIDE 20 MG/ML
JELLY TOPICAL ONCE
Status: DISCONTINUED | OUTPATIENT
Start: 2019-01-01 | End: 2019-01-01 | Stop reason: HOSPADM

## 2019-01-01 RX ORDER — ALBUTEROL SULFATE 90 UG/1
2 AEROSOL, METERED RESPIRATORY (INHALATION)
Status: DISCONTINUED | OUTPATIENT
Start: 2019-01-01 | End: 2019-01-01 | Stop reason: SDUPTHER

## 2019-01-01 RX ORDER — ACETAMINOPHEN 325 MG/1
650 TABLET ORAL EVERY 8 HOURS
Status: DISCONTINUED | OUTPATIENT
Start: 2019-01-01 | End: 2019-01-01 | Stop reason: HOSPADM

## 2019-01-01 RX ORDER — ALBUMIN HUMAN 250 G/1000ML
12.5 SOLUTION INTRAVENOUS EVERY 6 HOURS
Status: COMPLETED | OUTPATIENT
Start: 2019-01-01 | End: 2019-01-01

## 2019-01-01 RX ORDER — HALOPERIDOL 5 MG/ML
2 INJECTION INTRAMUSCULAR
Status: DISCONTINUED | OUTPATIENT
Start: 2019-01-01 | End: 2019-01-01 | Stop reason: HOSPADM

## 2019-01-01 RX ORDER — MELATONIN
1000 DAILY
Status: DISCONTINUED | OUTPATIENT
Start: 2019-01-01 | End: 2019-01-01 | Stop reason: HOSPADM

## 2019-01-01 RX ORDER — TRAMADOL HYDROCHLORIDE 50 MG/1
50 TABLET ORAL
Qty: 30 TAB | Refills: 0 | Status: SHIPPED | OUTPATIENT
Start: 2019-01-01 | End: 2019-01-01

## 2019-01-01 RX ORDER — LANOLIN ALCOHOL/MO/W.PET/CERES
1000 CREAM (GRAM) TOPICAL DAILY
Status: DISCONTINUED | OUTPATIENT
Start: 2019-01-01 | End: 2019-01-01 | Stop reason: HOSPADM

## 2019-01-01 RX ORDER — SODIUM CHLORIDE 9 MG/ML
75 INJECTION, SOLUTION INTRAVENOUS ONCE
Status: COMPLETED | OUTPATIENT
Start: 2019-01-01 | End: 2019-01-01

## 2019-01-01 RX ORDER — POTASSIUM CHLORIDE 20 MEQ/1
20 TABLET, EXTENDED RELEASE ORAL 2 TIMES DAILY
Status: COMPLETED | OUTPATIENT
Start: 2019-01-01 | End: 2019-01-01

## 2019-01-01 RX ORDER — FENTANYL CITRATE 50 UG/ML
100 INJECTION, SOLUTION INTRAMUSCULAR; INTRAVENOUS ONCE
Status: DISCONTINUED | OUTPATIENT
Start: 2019-01-01 | End: 2019-01-01 | Stop reason: HOSPADM

## 2019-01-01 RX ORDER — ONDANSETRON 8 MG/1
8 TABLET, ORALLY DISINTEGRATING ORAL
Qty: 12 TAB | Refills: 0 | Status: ON HOLD | OUTPATIENT
Start: 2019-01-01 | End: 2019-01-01

## 2019-01-01 RX ORDER — SODIUM CHLORIDE, SODIUM LACTATE, POTASSIUM CHLORIDE, CALCIUM CHLORIDE 600; 310; 30; 20 MG/100ML; MG/100ML; MG/100ML; MG/100ML
25 INJECTION, SOLUTION INTRAVENOUS CONTINUOUS
Status: DISCONTINUED | OUTPATIENT
Start: 2019-01-01 | End: 2019-01-01 | Stop reason: HOSPADM

## 2019-01-01 RX ORDER — SODIUM POLYSTYRENE SULFONATE 4.1 MEQ/G
30 POWDER, FOR SUSPENSION ORAL; RECTAL
Status: COMPLETED | OUTPATIENT
Start: 2019-01-01 | End: 2019-01-01

## 2019-01-01 RX ORDER — LISINOPRIL 2.5 MG/1
2.5 TABLET ORAL DAILY
Qty: 30 TAB | Refills: 11 | Status: SHIPPED | OUTPATIENT
Start: 2019-01-01 | End: 2019-01-01

## 2019-01-01 RX ORDER — NALOXONE HYDROCHLORIDE 0.4 MG/ML
0.4 INJECTION, SOLUTION INTRAMUSCULAR; INTRAVENOUS; SUBCUTANEOUS AS NEEDED
Status: DISCONTINUED | OUTPATIENT
Start: 2019-01-01 | End: 2019-01-01 | Stop reason: HOSPADM

## 2019-01-01 RX ORDER — IPRATROPIUM BROMIDE AND ALBUTEROL SULFATE 2.5; .5 MG/3ML; MG/3ML
3 SOLUTION RESPIRATORY (INHALATION)
Status: DISCONTINUED | OUTPATIENT
Start: 2019-01-01 | End: 2019-01-01

## 2019-01-01 RX ORDER — AZITHROMYCIN 250 MG/1
250 TABLET, FILM COATED ORAL DAILY
Status: DISCONTINUED | OUTPATIENT
Start: 2019-01-01 | End: 2019-01-01 | Stop reason: HOSPADM

## 2019-01-01 RX ORDER — HYDROMORPHONE HYDROCHLORIDE 2 MG/ML
0.5 INJECTION, SOLUTION INTRAMUSCULAR; INTRAVENOUS; SUBCUTANEOUS
Status: DISCONTINUED | OUTPATIENT
Start: 2019-01-01 | End: 2019-01-01

## 2019-01-01 RX ORDER — LIDOCAINE 50 MG/G
1 PATCH TOPICAL EVERY 24 HOURS
Status: DISCONTINUED | OUTPATIENT
Start: 2019-01-01 | End: 2019-01-01 | Stop reason: CLARIF

## 2019-01-01 RX ORDER — OXYCODONE HYDROCHLORIDE 5 MG/1
5 TABLET ORAL
Status: DISCONTINUED | OUTPATIENT
Start: 2019-01-01 | End: 2019-01-01

## 2019-01-01 RX ORDER — FUROSEMIDE 10 MG/ML
20 INJECTION INTRAMUSCULAR; INTRAVENOUS ONCE
Status: COMPLETED | OUTPATIENT
Start: 2019-01-01 | End: 2019-01-01

## 2019-01-01 RX ORDER — HALOPERIDOL 5 MG/ML
2 INJECTION INTRAMUSCULAR EVERY 6 HOURS
Status: DISCONTINUED | OUTPATIENT
Start: 2019-01-01 | End: 2019-01-01 | Stop reason: HOSPADM

## 2019-01-01 RX ORDER — CIPROFLOXACIN 500 MG/1
500 TABLET ORAL EVERY 24 HOURS
Status: COMPLETED | OUTPATIENT
Start: 2019-01-01 | End: 2019-01-01

## 2019-01-01 RX ORDER — HALOPERIDOL 5 MG/ML
2 INJECTION INTRAMUSCULAR EVERY 8 HOURS
Status: DISCONTINUED | OUTPATIENT
Start: 2019-01-01 | End: 2019-01-01

## 2019-01-01 RX ORDER — MIDAZOLAM HYDROCHLORIDE 1 MG/ML
2 INJECTION, SOLUTION INTRAMUSCULAR; INTRAVENOUS
Status: DISCONTINUED | OUTPATIENT
Start: 2019-01-01 | End: 2019-01-01

## 2019-01-01 RX ORDER — AZITHROMYCIN 250 MG/1
500 TABLET, FILM COATED ORAL DAILY
Status: DISCONTINUED | OUTPATIENT
Start: 2019-01-01 | End: 2019-01-01

## 2019-01-01 RX ORDER — LORAZEPAM 2 MG/ML
1 INJECTION INTRAMUSCULAR
Status: DISCONTINUED | OUTPATIENT
Start: 2019-01-01 | End: 2019-01-01 | Stop reason: HOSPADM

## 2019-01-01 RX ORDER — SODIUM CHLORIDE 9 MG/ML
100 INJECTION, SOLUTION INTRAVENOUS CONTINUOUS
Status: DISCONTINUED | OUTPATIENT
Start: 2019-01-01 | End: 2019-01-01 | Stop reason: HOSPADM

## 2019-01-01 RX ORDER — HEPARIN SODIUM 5000 [USP'U]/100ML
12-25 INJECTION, SOLUTION INTRAVENOUS
Status: DISCONTINUED | OUTPATIENT
Start: 2019-01-01 | End: 2019-01-01

## 2019-01-01 RX ORDER — ONDANSETRON 2 MG/ML
8 INJECTION INTRAMUSCULAR; INTRAVENOUS
Status: COMPLETED | OUTPATIENT
Start: 2019-01-01 | End: 2019-01-01

## 2019-01-01 RX ORDER — POTASSIUM CHLORIDE 20 MEQ/1
20 TABLET, EXTENDED RELEASE ORAL
Status: COMPLETED | OUTPATIENT
Start: 2019-01-01 | End: 2019-01-01

## 2019-01-01 RX ORDER — HYDROMORPHONE HYDROCHLORIDE 2 MG/ML
0.5 INJECTION, SOLUTION INTRAMUSCULAR; INTRAVENOUS; SUBCUTANEOUS
Status: DISCONTINUED | OUTPATIENT
Start: 2019-01-01 | End: 2019-01-01 | Stop reason: HOSPADM

## 2019-01-01 RX ORDER — GLYCOPYRROLATE 0.2 MG/ML
0.2 INJECTION INTRAMUSCULAR; INTRAVENOUS
Status: DISCONTINUED | OUTPATIENT
Start: 2019-01-01 | End: 2019-01-01 | Stop reason: HOSPADM

## 2019-01-01 RX ORDER — GUAIFENESIN 100 MG/5ML
81 LIQUID (ML) ORAL DAILY
Status: DISCONTINUED | OUTPATIENT
Start: 2019-01-01 | End: 2019-01-01 | Stop reason: SDUPTHER

## 2019-01-01 RX ORDER — MIDAZOLAM HYDROCHLORIDE 1 MG/ML
.5-2 INJECTION, SOLUTION INTRAMUSCULAR; INTRAVENOUS
Status: DISCONTINUED | OUTPATIENT
Start: 2019-01-01 | End: 2019-01-01

## 2019-01-01 RX ORDER — ONDANSETRON 4 MG/1
4 TABLET, ORALLY DISINTEGRATING ORAL
Status: DISCONTINUED | OUTPATIENT
Start: 2019-01-01 | End: 2019-01-01 | Stop reason: HOSPADM

## 2019-01-01 RX ORDER — MELATONIN
1000 DAILY
Status: DISCONTINUED | OUTPATIENT
Start: 2019-01-01 | End: 2019-01-01

## 2019-01-01 RX ORDER — SUCCINYLCHOLINE CHLORIDE 20 MG/ML
INJECTION INTRAMUSCULAR; INTRAVENOUS AS NEEDED
Status: DISCONTINUED | OUTPATIENT
Start: 2019-01-01 | End: 2019-01-01 | Stop reason: HOSPADM

## 2019-01-01 RX ORDER — ONDANSETRON 8 MG/1
8 TABLET, ORALLY DISINTEGRATING ORAL
Qty: 30 TAB | Refills: 2 | Status: SHIPPED | OUTPATIENT
Start: 2019-01-01 | End: 2019-01-01

## 2019-01-01 RX ORDER — MORPHINE SULFATE 10 MG/ML
5 INJECTION, SOLUTION INTRAMUSCULAR; INTRAVENOUS
Status: DISCONTINUED | OUTPATIENT
Start: 2019-01-01 | End: 2019-01-01 | Stop reason: HOSPADM

## 2019-01-01 RX ORDER — LISINOPRIL 5 MG/1
2.5 TABLET ORAL DAILY
Status: DISCONTINUED | OUTPATIENT
Start: 2019-01-01 | End: 2019-01-01 | Stop reason: HOSPADM

## 2019-01-01 RX ORDER — ALBUTEROL SULFATE 0.83 MG/ML
2.5 SOLUTION RESPIRATORY (INHALATION)
Status: DISCONTINUED | OUTPATIENT
Start: 2019-01-01 | End: 2019-01-01

## 2019-01-01 RX ORDER — MIDAZOLAM HYDROCHLORIDE 1 MG/ML
2 INJECTION, SOLUTION INTRAMUSCULAR; INTRAVENOUS ONCE
Status: DISCONTINUED | OUTPATIENT
Start: 2019-01-01 | End: 2019-01-01 | Stop reason: HOSPADM

## 2019-01-01 RX ORDER — DIPHENHYDRAMINE HYDROCHLORIDE 50 MG/ML
12.5 INJECTION, SOLUTION INTRAMUSCULAR; INTRAVENOUS
Status: DISCONTINUED | OUTPATIENT
Start: 2019-01-01 | End: 2019-01-01

## 2019-01-01 RX ORDER — LOSARTAN POTASSIUM 25 MG/1
25 TABLET ORAL DAILY
Status: DISCONTINUED | OUTPATIENT
Start: 2019-01-01 | End: 2019-01-01

## 2019-01-01 RX ORDER — SODIUM CHLORIDE 0.9 % (FLUSH) 0.9 %
3 SYRINGE (ML) INJECTION AS NEEDED
Status: DISCONTINUED | OUTPATIENT
Start: 2019-01-01 | End: 2019-01-01 | Stop reason: HOSPADM

## 2019-01-01 RX ORDER — LIDOCAINE HYDROCHLORIDE 10 MG/ML
0.1 INJECTION INFILTRATION; PERINEURAL AS NEEDED
Status: DISCONTINUED | OUTPATIENT
Start: 2019-01-01 | End: 2019-01-01

## 2019-01-01 RX ORDER — METOPROLOL SUCCINATE 25 MG/1
25 TABLET, EXTENDED RELEASE ORAL DAILY
Qty: 30 TAB | Refills: 5 | Status: SHIPPED | OUTPATIENT
Start: 2019-01-01 | End: 2019-01-01

## 2019-01-01 RX ORDER — MIDAZOLAM HYDROCHLORIDE 1 MG/ML
.5-5 INJECTION, SOLUTION INTRAMUSCULAR; INTRAVENOUS
Status: DISCONTINUED | OUTPATIENT
Start: 2019-01-01 | End: 2019-01-01

## 2019-01-01 RX ORDER — HEPARIN SODIUM 5000 [USP'U]/ML
5000 INJECTION, SOLUTION INTRAVENOUS; SUBCUTANEOUS
Status: COMPLETED | OUTPATIENT
Start: 2019-01-01 | End: 2019-01-01

## 2019-01-01 RX ORDER — LIDOCAINE HYDROCHLORIDE 20 MG/ML
INJECTION, SOLUTION EPIDURAL; INFILTRATION; INTRACAUDAL; PERINEURAL AS NEEDED
Status: DISCONTINUED | OUTPATIENT
Start: 2019-01-01 | End: 2019-01-01 | Stop reason: HOSPADM

## 2019-01-01 RX ORDER — FLUMAZENIL 0.1 MG/ML
0.2 INJECTION INTRAVENOUS
Status: DISCONTINUED | OUTPATIENT
Start: 2019-01-01 | End: 2019-01-01 | Stop reason: HOSPADM

## 2019-01-01 RX ORDER — SODIUM CHLORIDE 9 MG/ML
50 INJECTION, SOLUTION INTRAVENOUS CONTINUOUS
Status: DISCONTINUED | OUTPATIENT
Start: 2019-01-01 | End: 2019-01-01 | Stop reason: HOSPADM

## 2019-01-01 RX ORDER — NALOXONE HYDROCHLORIDE 0.4 MG/ML
0.1 INJECTION, SOLUTION INTRAMUSCULAR; INTRAVENOUS; SUBCUTANEOUS AS NEEDED
Status: DISCONTINUED | OUTPATIENT
Start: 2019-01-01 | End: 2019-01-01

## 2019-01-01 RX ORDER — CARVEDILOL 6.25 MG/1
6.25 TABLET ORAL 2 TIMES DAILY
Status: DISCONTINUED | OUTPATIENT
Start: 2019-01-01 | End: 2019-01-01

## 2019-01-01 RX ORDER — SODIUM CHLORIDE 9 MG/ML
75 INJECTION, SOLUTION INTRAVENOUS CONTINUOUS
Status: DISCONTINUED | OUTPATIENT
Start: 2019-01-01 | End: 2019-01-01

## 2019-01-01 RX ORDER — ONDANSETRON 8 MG/1
8 TABLET, ORALLY DISINTEGRATING ORAL
Status: DISCONTINUED | OUTPATIENT
Start: 2019-01-01 | End: 2019-01-01 | Stop reason: HOSPADM

## 2019-01-01 RX ORDER — HEPARIN SODIUM 5000 [USP'U]/ML
4000 INJECTION, SOLUTION INTRAVENOUS; SUBCUTANEOUS ONCE
Status: COMPLETED | OUTPATIENT
Start: 2019-01-01 | End: 2019-01-01

## 2019-01-01 RX ORDER — HEPARIN SODIUM 1000 [USP'U]/ML
INJECTION, SOLUTION INTRAVENOUS; SUBCUTANEOUS AS NEEDED
Status: DISCONTINUED | OUTPATIENT
Start: 2019-01-01 | End: 2019-01-01 | Stop reason: HOSPADM

## 2019-01-01 RX ORDER — SODIUM CHLORIDE, SODIUM LACTATE, POTASSIUM CHLORIDE, CALCIUM CHLORIDE 600; 310; 30; 20 MG/100ML; MG/100ML; MG/100ML; MG/100ML
INJECTION, SOLUTION INTRAVENOUS
Status: DISCONTINUED | OUTPATIENT
Start: 2019-01-01 | End: 2019-01-01 | Stop reason: HOSPADM

## 2019-01-01 RX ORDER — MIDAZOLAM HYDROCHLORIDE 1 MG/ML
2 INJECTION, SOLUTION INTRAMUSCULAR; INTRAVENOUS
Status: DISCONTINUED | OUTPATIENT
Start: 2019-01-01 | End: 2019-01-01 | Stop reason: HOSPADM

## 2019-01-01 RX ORDER — CEFAZOLIN SODIUM/WATER 2 G/20 ML
SYRINGE (ML) INTRAVENOUS AS NEEDED
Status: DISCONTINUED | OUTPATIENT
Start: 2019-01-01 | End: 2019-01-01 | Stop reason: HOSPADM

## 2019-01-01 RX ORDER — SIMVASTATIN 20 MG/1
20 TABLET, FILM COATED ORAL
Qty: 30 TAB | Refills: 5 | Status: SHIPPED | OUTPATIENT
Start: 2019-01-01 | End: 2019-01-01

## 2019-01-01 RX ORDER — NALOXONE HYDROCHLORIDE 0.4 MG/ML
0.2 INJECTION, SOLUTION INTRAMUSCULAR; INTRAVENOUS; SUBCUTANEOUS
Status: DISCONTINUED | OUTPATIENT
Start: 2019-01-01 | End: 2019-01-01 | Stop reason: HOSPADM

## 2019-01-01 RX ORDER — FENTANYL CITRATE 50 UG/ML
25-100 INJECTION, SOLUTION INTRAMUSCULAR; INTRAVENOUS
Status: DISCONTINUED | OUTPATIENT
Start: 2019-01-01 | End: 2019-01-01

## 2019-01-01 RX ORDER — FENTANYL CITRATE 50 UG/ML
100 INJECTION, SOLUTION INTRAMUSCULAR; INTRAVENOUS ONCE
Status: DISCONTINUED | OUTPATIENT
Start: 2019-01-01 | End: 2019-01-01

## 2019-01-01 RX ORDER — MIDAZOLAM HYDROCHLORIDE 1 MG/ML
.25-5 INJECTION, SOLUTION INTRAMUSCULAR; INTRAVENOUS
Status: DISCONTINUED | OUTPATIENT
Start: 2019-01-01 | End: 2019-01-01 | Stop reason: HOSPADM

## 2019-01-01 RX ORDER — METOPROLOL SUCCINATE 25 MG/1
12.5 TABLET, EXTENDED RELEASE ORAL
Status: DISCONTINUED | OUTPATIENT
Start: 2019-01-01 | End: 2019-01-01

## 2019-01-01 RX ORDER — LIDOCAINE HYDROCHLORIDE 20 MG/ML
15 SOLUTION OROPHARYNGEAL AS NEEDED
Status: DISCONTINUED | OUTPATIENT
Start: 2019-01-01 | End: 2019-01-01 | Stop reason: HOSPADM

## 2019-01-01 RX ORDER — WARFARIN SODIUM 5 MG/1
5 TABLET ORAL EVERY EVENING
Qty: 30 TAB | Refills: 5 | Status: SHIPPED | OUTPATIENT
Start: 2019-01-01 | End: 2019-01-01 | Stop reason: ALTCHOICE

## 2019-01-01 RX ORDER — NITROGLYCERIN 0.4 MG/1
0.4 TABLET SUBLINGUAL
Status: DISCONTINUED | OUTPATIENT
Start: 2019-01-01 | End: 2019-01-01 | Stop reason: HOSPADM

## 2019-01-01 RX ORDER — ASPIRIN 81 MG/1
81 TABLET ORAL DAILY
Status: DISCONTINUED | OUTPATIENT
Start: 2019-01-01 | End: 2019-01-01 | Stop reason: SDUPTHER

## 2019-01-01 RX ORDER — HEPARIN SODIUM 200 [USP'U]/100ML
3 INJECTION, SOLUTION INTRAVENOUS CONTINUOUS
Status: DISCONTINUED | OUTPATIENT
Start: 2019-01-01 | End: 2019-01-01

## 2019-01-01 RX ORDER — ONDANSETRON 2 MG/ML
4 INJECTION INTRAMUSCULAR; INTRAVENOUS ONCE
Status: DISCONTINUED | OUTPATIENT
Start: 2019-01-01 | End: 2019-01-01

## 2019-01-01 RX ORDER — FENTANYL 25 UG/1
1 PATCH TRANSDERMAL
Status: DISCONTINUED | OUTPATIENT
Start: 2019-01-01 | End: 2019-01-01 | Stop reason: HOSPADM

## 2019-01-01 RX ORDER — FENTANYL CITRATE 50 UG/ML
25-50 INJECTION, SOLUTION INTRAMUSCULAR; INTRAVENOUS
Status: DISCONTINUED | OUTPATIENT
Start: 2019-01-01 | End: 2019-01-01

## 2019-01-01 RX ORDER — NEOSTIGMINE METHYLSULFATE 1 MG/ML
INJECTION, SOLUTION INTRAVENOUS AS NEEDED
Status: DISCONTINUED | OUTPATIENT
Start: 2019-01-01 | End: 2019-01-01 | Stop reason: HOSPADM

## 2019-01-01 RX ORDER — ONDANSETRON 4 MG/1
4 TABLET, ORALLY DISINTEGRATING ORAL
Qty: 20 TAB | Refills: 0 | Status: SHIPPED | OUTPATIENT
Start: 2019-01-01 | End: 2019-01-01

## 2019-01-01 RX ORDER — LIDOCAINE HYDROCHLORIDE 20 MG/ML
JELLY TOPICAL
Status: COMPLETED | OUTPATIENT
Start: 2019-01-01 | End: 2019-01-01

## 2019-01-01 RX ORDER — ALBUTEROL SULFATE 0.83 MG/ML
SOLUTION RESPIRATORY (INHALATION)
Status: COMPLETED
Start: 2019-01-01 | End: 2019-01-01

## 2019-01-01 RX ORDER — SODIUM CHLORIDE 0.9 % (FLUSH) 0.9 %
3 SYRINGE (ML) INJECTION EVERY 12 HOURS
Status: DISCONTINUED | OUTPATIENT
Start: 2019-01-01 | End: 2019-01-01 | Stop reason: HOSPADM

## 2019-01-01 RX ORDER — FACIAL-BODY WIPES
10 EACH TOPICAL AS NEEDED
Status: DISCONTINUED | OUTPATIENT
Start: 2019-01-01 | End: 2019-01-01 | Stop reason: HOSPADM

## 2019-01-01 RX ORDER — LIDOCAINE HYDROCHLORIDE 10 MG/ML
10-200 INJECTION INFILTRATION; PERINEURAL ONCE
Status: COMPLETED | OUTPATIENT
Start: 2019-01-01 | End: 2019-01-01

## 2019-01-01 RX ORDER — AMLODIPINE BESYLATE 5 MG/1
2.5 TABLET ORAL DAILY
Status: DISCONTINUED | OUTPATIENT
Start: 2019-01-01 | End: 2019-01-01

## 2019-01-01 RX ORDER — ASPIRIN 81 MG/1
81 TABLET ORAL DAILY
Status: DISCONTINUED | OUTPATIENT
Start: 2019-01-01 | End: 2019-01-01

## 2019-01-01 RX ORDER — SIMVASTATIN 40 MG/1
20 TABLET, FILM COATED ORAL
Status: DISCONTINUED | OUTPATIENT
Start: 2019-01-01 | End: 2019-01-01 | Stop reason: HOSPADM

## 2019-01-01 RX ADMIN — Medication 5 ML: at 05:05

## 2019-01-01 RX ADMIN — HEPARIN SODIUM 2350 UNITS: 5000 INJECTION INTRAVENOUS; SUBCUTANEOUS at 19:58

## 2019-01-01 RX ADMIN — TAMSULOSIN HYDROCHLORIDE 0.4 MG: 0.4 CAPSULE ORAL at 08:26

## 2019-01-01 RX ADMIN — MIDAZOLAM HYDROCHLORIDE 2 MG: 1 INJECTION, SOLUTION INTRAMUSCULAR; INTRAVENOUS at 11:20

## 2019-01-01 RX ADMIN — TAMSULOSIN HYDROCHLORIDE 0.4 MG: 0.4 CAPSULE ORAL at 08:19

## 2019-01-01 RX ADMIN — CARVEDILOL 6.25 MG: 6.25 TABLET, FILM COATED ORAL at 17:28

## 2019-01-01 RX ADMIN — ALBUTEROL SULFATE 2.5 MG: 2.5 SOLUTION RESPIRATORY (INHALATION) at 07:44

## 2019-01-01 RX ADMIN — MIDAZOLAM HYDROCHLORIDE 2 MG: 1 INJECTION, SOLUTION INTRAMUSCULAR; INTRAVENOUS at 15:00

## 2019-01-01 RX ADMIN — ALBUTEROL SULFATE 2.5 MG: 2.5 SOLUTION RESPIRATORY (INHALATION) at 21:39

## 2019-01-01 RX ADMIN — SODIUM CHLORIDE, PRESERVATIVE FREE 3 ML: 5 INJECTION INTRAVENOUS at 08:29

## 2019-01-01 RX ADMIN — GLYCOPYRROLATE 0.2 MG: 0.2 INJECTION, SOLUTION INTRAMUSCULAR; INTRAVENOUS at 05:53

## 2019-01-01 RX ADMIN — Medication 10 ML: at 13:31

## 2019-01-01 RX ADMIN — SODIUM CHLORIDE, PRESERVATIVE FREE 3 ML: 5 INJECTION INTRAVENOUS at 23:28

## 2019-01-01 RX ADMIN — Medication 10 ML: at 09:09

## 2019-01-01 RX ADMIN — Medication 10 ML: at 22:20

## 2019-01-01 RX ADMIN — HYDROMORPHONE HYDROCHLORIDE 0.5 MG: 1 INJECTION, SOLUTION INTRAMUSCULAR; INTRAVENOUS; SUBCUTANEOUS at 09:17

## 2019-01-01 RX ADMIN — TRAMADOL HYDROCHLORIDE 50 MG: 50 TABLET, FILM COATED ORAL at 20:18

## 2019-01-01 RX ADMIN — APIXABAN 2.5 MG: 2.5 TABLET, FILM COATED ORAL at 09:35

## 2019-01-01 RX ADMIN — FENTANYL CITRATE 50 MCG: 50 INJECTION INTRAMUSCULAR; INTRAVENOUS at 13:21

## 2019-01-01 RX ADMIN — HALOPERIDOL LACTATE 2 MG: 5 INJECTION INTRAMUSCULAR at 21:18

## 2019-01-01 RX ADMIN — CIPROFLOXACIN HYDROCHLORIDE 500 MG: 500 TABLET, FILM COATED ORAL at 18:13

## 2019-01-01 RX ADMIN — CYANOCOBALAMIN TAB 1000 MCG 1000 MCG: 1000 TAB at 09:08

## 2019-01-01 RX ADMIN — TIOTROPIUM BROMIDE 18 MCG: 18 CAPSULE ORAL; RESPIRATORY (INHALATION) at 07:44

## 2019-01-01 RX ADMIN — WARFARIN SODIUM 5 MG: 5 TABLET ORAL at 17:44

## 2019-01-01 RX ADMIN — FUROSEMIDE 20 MG: 20 TABLET ORAL at 22:19

## 2019-01-01 RX ADMIN — VITAMIN D, TAB 1000IU (100/BT) 1000 UNITS: 25 TAB at 09:09

## 2019-01-01 RX ADMIN — HALOPERIDOL LACTATE 2 MG: 5 INJECTION INTRAMUSCULAR at 17:41

## 2019-01-01 RX ADMIN — APIXABAN 2.5 MG: 2.5 TABLET, FILM COATED ORAL at 17:45

## 2019-01-01 RX ADMIN — Medication 5 ML: at 21:44

## 2019-01-01 RX ADMIN — Medication 2 G: at 09:58

## 2019-01-01 RX ADMIN — SIMVASTATIN 20 MG: 40 TABLET, FILM COATED ORAL at 20:58

## 2019-01-01 RX ADMIN — METOPROLOL SUCCINATE 25 MG: 25 TABLET, EXTENDED RELEASE ORAL at 17:34

## 2019-01-01 RX ADMIN — NEOSTIGMINE METHYLSULFATE 3 MG: 1 INJECTION, SOLUTION INTRAVENOUS at 16:10

## 2019-01-01 RX ADMIN — TIOTROPIUM BROMIDE 18 MCG: 18 CAPSULE ORAL; RESPIRATORY (INHALATION) at 07:53

## 2019-01-01 RX ADMIN — ALBUMIN (HUMAN) 12.5 G: 0.25 INJECTION, SOLUTION INTRAVENOUS at 17:22

## 2019-01-01 RX ADMIN — SODIUM CHLORIDE 100 ML/HR: 900 INJECTION, SOLUTION INTRAVENOUS at 03:00

## 2019-01-01 RX ADMIN — BUDESONIDE 500 MCG: 0.5 INHALANT RESPIRATORY (INHALATION) at 08:32

## 2019-01-01 RX ADMIN — METOPROLOL SUCCINATE 25 MG: 25 TABLET, EXTENDED RELEASE ORAL at 09:00

## 2019-01-01 RX ADMIN — Medication 10 ML: at 21:46

## 2019-01-01 RX ADMIN — SODIUM CHLORIDE 150 ML: 900 INJECTION, SOLUTION INTRAVENOUS at 19:45

## 2019-01-01 RX ADMIN — GLYCOPYRROLATE 0.2 MG: 0.2 INJECTION, SOLUTION INTRAMUSCULAR; INTRAVENOUS at 20:55

## 2019-01-01 RX ADMIN — TAMSULOSIN HYDROCHLORIDE 0.4 MG: 0.4 CAPSULE ORAL at 08:54

## 2019-01-01 RX ADMIN — TAMSULOSIN HYDROCHLORIDE 0.4 MG: 0.4 CAPSULE ORAL at 09:35

## 2019-01-01 RX ADMIN — Medication 5 ML: at 20:59

## 2019-01-01 RX ADMIN — SODIUM CHLORIDE, PRESERVATIVE FREE 3 ML: 5 INJECTION INTRAVENOUS at 05:23

## 2019-01-01 RX ADMIN — ONDANSETRON 4 MG: 2 INJECTION INTRAMUSCULAR; INTRAVENOUS at 22:59

## 2019-01-01 RX ADMIN — ALBUTEROL SULFATE 2.5 MG: 2.5 SOLUTION RESPIRATORY (INHALATION) at 07:38

## 2019-01-01 RX ADMIN — ASPIRIN 81 MG: 81 TABLET, COATED ORAL at 10:00

## 2019-01-01 RX ADMIN — CARVEDILOL 6.25 MG: 6.25 TABLET, FILM COATED ORAL at 08:41

## 2019-01-01 RX ADMIN — Medication 10 ML: at 05:25

## 2019-01-01 RX ADMIN — LISINOPRIL 2.5 MG: 5 TABLET ORAL at 09:07

## 2019-01-01 RX ADMIN — FUROSEMIDE 20 MG: 20 TABLET ORAL at 21:17

## 2019-01-01 RX ADMIN — ALBUTEROL SULFATE 2.5 MG: 2.5 SOLUTION RESPIRATORY (INHALATION) at 09:15

## 2019-01-01 RX ADMIN — HALOPERIDOL LACTATE 2 MG: 5 INJECTION INTRAMUSCULAR at 00:28

## 2019-01-01 RX ADMIN — AMLODIPINE BESYLATE 2.5 MG: 5 TABLET ORAL at 09:58

## 2019-01-01 RX ADMIN — HYDROMORPHONE HYDROCHLORIDE 0.5 MG: 1 INJECTION, SOLUTION INTRAMUSCULAR; INTRAVENOUS; SUBCUTANEOUS at 05:26

## 2019-01-01 RX ADMIN — HEPARIN SODIUM 12 UNITS/KG/HR: 5000 INJECTION, SOLUTION INTRAVENOUS at 20:45

## 2019-01-01 RX ADMIN — SODIUM CHLORIDE, PRESERVATIVE FREE 3 ML: 5 INJECTION INTRAVENOUS at 23:56

## 2019-01-01 RX ADMIN — APIXABAN 5 MG: 5 TABLET, FILM COATED ORAL at 09:17

## 2019-01-01 RX ADMIN — VITAMIN D, TAB 1000IU (100/BT) 1000 UNITS: 25 TAB at 08:35

## 2019-01-01 RX ADMIN — PANTOPRAZOLE SODIUM 40 MG: 40 TABLET, DELAYED RELEASE ORAL at 05:51

## 2019-01-01 RX ADMIN — ALBUTEROL SULFATE 2.5 MG: 2.5 SOLUTION RESPIRATORY (INHALATION) at 09:06

## 2019-01-01 RX ADMIN — TAMSULOSIN HYDROCHLORIDE 0.4 MG: 0.4 CAPSULE ORAL at 08:09

## 2019-01-01 RX ADMIN — ALBUTEROL SULFATE 2.5 MG: 2.5 SOLUTION RESPIRATORY (INHALATION) at 07:20

## 2019-01-01 RX ADMIN — Medication 10 ML: at 15:12

## 2019-01-01 RX ADMIN — TAMSULOSIN HYDROCHLORIDE 0.4 MG: 0.4 CAPSULE ORAL at 09:00

## 2019-01-01 RX ADMIN — Medication 10 ML: at 21:35

## 2019-01-01 RX ADMIN — CIPROFLOXACIN HYDROCHLORIDE 500 MG: 500 TABLET, FILM COATED ORAL at 18:31

## 2019-01-01 RX ADMIN — Medication 5 ML: at 22:32

## 2019-01-01 RX ADMIN — FUROSEMIDE 20 MG: 20 TABLET ORAL at 08:13

## 2019-01-01 RX ADMIN — ALBUTEROL SULFATE 2.5 MG: 2.5 SOLUTION RESPIRATORY (INHALATION) at 20:37

## 2019-01-01 RX ADMIN — GLYCOPYRROLATE 0.2 MG: 0.2 INJECTION, SOLUTION INTRAMUSCULAR; INTRAVENOUS at 08:24

## 2019-01-01 RX ADMIN — SUCCINYLCHOLINE CHLORIDE 120 MG: 20 INJECTION INTRAMUSCULAR; INTRAVENOUS at 14:54

## 2019-01-01 RX ADMIN — TAMSULOSIN HYDROCHLORIDE 0.4 MG: 0.4 CAPSULE ORAL at 08:51

## 2019-01-01 RX ADMIN — ALBUTEROL SULFATE 2.5 MG: 2.5 SOLUTION RESPIRATORY (INHALATION) at 07:22

## 2019-01-01 RX ADMIN — SIMVASTATIN 20 MG: 40 TABLET, FILM COATED ORAL at 21:37

## 2019-01-01 RX ADMIN — METOPROLOL SUCCINATE 25 MG: 25 TABLET, EXTENDED RELEASE ORAL at 11:49

## 2019-01-01 RX ADMIN — HALOPERIDOL LACTATE 2 MG: 5 INJECTION INTRAMUSCULAR at 05:52

## 2019-01-01 RX ADMIN — ALBUTEROL SULFATE 2.5 MG: 2.5 SOLUTION RESPIRATORY (INHALATION) at 15:06

## 2019-01-01 RX ADMIN — SODIUM CHLORIDE 1000 ML: 900 INJECTION, SOLUTION INTRAVENOUS at 00:08

## 2019-01-01 RX ADMIN — FUROSEMIDE 20 MG: 20 TABLET ORAL at 08:26

## 2019-01-01 RX ADMIN — SODIUM CHLORIDE, PRESERVATIVE FREE 3 ML: 5 INJECTION INTRAVENOUS at 08:08

## 2019-01-01 RX ADMIN — APIXABAN 2.5 MG: 2.5 TABLET, FILM COATED ORAL at 08:32

## 2019-01-01 RX ADMIN — APIXABAN 5 MG: 5 TABLET, FILM COATED ORAL at 09:15

## 2019-01-01 RX ADMIN — METOPROLOL SUCCINATE 25 MG: 25 TABLET, EXTENDED RELEASE ORAL at 08:19

## 2019-01-01 RX ADMIN — APIXABAN 5 MG: 5 TABLET, FILM COATED ORAL at 08:21

## 2019-01-01 RX ADMIN — ROSUVASTATIN CALCIUM 20 MG: 20 TABLET, COATED ORAL at 20:53

## 2019-01-01 RX ADMIN — ALBUTEROL SULFATE 2.5 MG: 2.5 SOLUTION RESPIRATORY (INHALATION) at 14:04

## 2019-01-01 RX ADMIN — TUBERCULIN PURIFIED PROTEIN DERIVATIVE 5 UNITS: 5 INJECTION, SOLUTION INTRADERMAL at 13:39

## 2019-01-01 RX ADMIN — HEPARIN SODIUM 2400 UNITS: 5000 INJECTION INTRAVENOUS; SUBCUTANEOUS at 05:50

## 2019-01-01 RX ADMIN — Medication 5 ML: at 18:15

## 2019-01-01 RX ADMIN — ASPIRIN 81 MG: 81 TABLET, COATED ORAL at 08:51

## 2019-01-01 RX ADMIN — TIOTROPIUM BROMIDE 18 MCG: 18 CAPSULE ORAL; RESPIRATORY (INHALATION) at 07:47

## 2019-01-01 RX ADMIN — GLYCOPYRROLATE 0.2 MG: 0.2 INJECTION, SOLUTION INTRAMUSCULAR; INTRAVENOUS at 11:11

## 2019-01-01 RX ADMIN — FENTANYL CITRATE 50 MCG: 50 INJECTION, SOLUTION INTRAMUSCULAR; INTRAVENOUS at 15:24

## 2019-01-01 RX ADMIN — SODIUM CHLORIDE, PRESERVATIVE FREE 3 ML: 5 INJECTION INTRAVENOUS at 06:21

## 2019-01-01 RX ADMIN — HEPARIN SODIUM 18 UNITS/KG/HR: 5000 INJECTION, SOLUTION INTRAVENOUS at 13:21

## 2019-01-01 RX ADMIN — ALBUTEROL SULFATE 2.5 MG: 2.5 SOLUTION RESPIRATORY (INHALATION) at 13:48

## 2019-01-01 RX ADMIN — FUROSEMIDE 20 MG: 20 TABLET ORAL at 08:23

## 2019-01-01 RX ADMIN — PROTAMINE SULFATE 10 MG: 10 INJECTION, SOLUTION INTRAVENOUS at 15:47

## 2019-01-01 RX ADMIN — SODIUM CHLORIDE 250 ML: 900 INJECTION, SOLUTION INTRAVENOUS at 20:56

## 2019-01-01 RX ADMIN — GLYCOPYRROLATE 0.2 MG: 0.2 INJECTION, SOLUTION INTRAMUSCULAR; INTRAVENOUS at 18:17

## 2019-01-01 RX ADMIN — FAMOTIDINE 20 MG: 10 INJECTION, SOLUTION INTRAVENOUS at 09:02

## 2019-01-01 RX ADMIN — APIXABAN 2.5 MG: 2.5 TABLET, FILM COATED ORAL at 08:54

## 2019-01-01 RX ADMIN — SODIUM CHLORIDE 75 ML/HR: 900 INJECTION, SOLUTION INTRAVENOUS at 06:17

## 2019-01-01 RX ADMIN — HYDROMORPHONE HYDROCHLORIDE 0.5 MG: 1 INJECTION, SOLUTION INTRAMUSCULAR; INTRAVENOUS; SUBCUTANEOUS at 08:28

## 2019-01-01 RX ADMIN — ONDANSETRON 4 MG: 2 INJECTION INTRAMUSCULAR; INTRAVENOUS at 17:06

## 2019-01-01 RX ADMIN — Medication 5 ML: at 21:45

## 2019-01-01 RX ADMIN — TIOTROPIUM BROMIDE 18 MCG: 18 CAPSULE ORAL; RESPIRATORY (INHALATION) at 08:28

## 2019-01-01 RX ADMIN — ONDANSETRON 4 MG: 2 INJECTION INTRAMUSCULAR; INTRAVENOUS at 17:46

## 2019-01-01 RX ADMIN — FUROSEMIDE 20 MG: 20 TABLET ORAL at 21:43

## 2019-01-01 RX ADMIN — BUDESONIDE 500 MCG: 0.5 INHALANT RESPIRATORY (INHALATION) at 07:50

## 2019-01-01 RX ADMIN — SODIUM CHLORIDE, PRESERVATIVE FREE 3 ML: 5 INJECTION INTRAVENOUS at 08:13

## 2019-01-01 RX ADMIN — Medication 10 ML: at 05:31

## 2019-01-01 RX ADMIN — BUDESONIDE 500 MCG: 0.5 INHALANT RESPIRATORY (INHALATION) at 19:04

## 2019-01-01 RX ADMIN — PERFLUTREN 1 ML: 6.52 INJECTION, SUSPENSION INTRAVENOUS at 11:42

## 2019-01-01 RX ADMIN — IPRATROPIUM BROMIDE AND ALBUTEROL SULFATE 3 ML: .5; 3 SOLUTION RESPIRATORY (INHALATION) at 14:57

## 2019-01-01 RX ADMIN — SODIUM CHLORIDE, PRESERVATIVE FREE 3 ML: 5 INJECTION INTRAVENOUS at 21:18

## 2019-01-01 RX ADMIN — SODIUM CHLORIDE, PRESERVATIVE FREE 3 ML: 5 INJECTION INTRAVENOUS at 18:15

## 2019-01-01 RX ADMIN — BUDESONIDE 500 MCG: 0.5 INHALANT RESPIRATORY (INHALATION) at 07:20

## 2019-01-01 RX ADMIN — Medication 5 ML: at 22:12

## 2019-01-01 RX ADMIN — ALBUTEROL SULFATE 2.5 MG: 2.5 SOLUTION RESPIRATORY (INHALATION) at 07:48

## 2019-01-01 RX ADMIN — HALOPERIDOL LACTATE 2 MG: 5 INJECTION INTRAMUSCULAR at 23:28

## 2019-01-01 RX ADMIN — ONDANSETRON 4 MG: 2 INJECTION INTRAMUSCULAR; INTRAVENOUS at 13:43

## 2019-01-01 RX ADMIN — TRAMADOL HYDROCHLORIDE 50 MG: 50 TABLET, FILM COATED ORAL at 13:43

## 2019-01-01 RX ADMIN — FUROSEMIDE 20 MG: 20 TABLET ORAL at 08:41

## 2019-01-01 RX ADMIN — ACETAMINOPHEN 650 MG: 325 TABLET, FILM COATED ORAL at 21:23

## 2019-01-01 RX ADMIN — FENTANYL CITRATE 25 MCG: 50 INJECTION, SOLUTION INTRAMUSCULAR; INTRAVENOUS at 15:20

## 2019-01-01 RX ADMIN — SODIUM CHLORIDE 250 ML: 900 INJECTION, SOLUTION INTRAVENOUS at 20:46

## 2019-01-01 RX ADMIN — ACETAMINOPHEN 650 MG: 325 TABLET, FILM COATED ORAL at 09:07

## 2019-01-01 RX ADMIN — CYANOCOBALAMIN TAB 1000 MCG 1000 MCG: 1000 TAB at 08:54

## 2019-01-01 RX ADMIN — MIDAZOLAM HYDROCHLORIDE 2 MG: 1 INJECTION, SOLUTION INTRAMUSCULAR; INTRAVENOUS at 15:42

## 2019-01-01 RX ADMIN — FAMOTIDINE 20 MG: 10 INJECTION, SOLUTION INTRAVENOUS at 08:34

## 2019-01-01 RX ADMIN — ONDANSETRON 4 MG: 2 INJECTION INTRAMUSCULAR; INTRAVENOUS at 13:27

## 2019-01-01 RX ADMIN — METOPROLOL SUCCINATE 12.5 MG: 25 TABLET, EXTENDED RELEASE ORAL at 23:23

## 2019-01-01 RX ADMIN — ALBUTEROL SULFATE 5 MG: 2.5 SOLUTION RESPIRATORY (INHALATION) at 22:08

## 2019-01-01 RX ADMIN — ACETAMINOPHEN 650 MG: 325 TABLET, FILM COATED ORAL at 13:42

## 2019-01-01 RX ADMIN — TIOTROPIUM BROMIDE 18 MCG: 18 CAPSULE ORAL; RESPIRATORY (INHALATION) at 08:37

## 2019-01-01 RX ADMIN — Medication 5 ML: at 13:04

## 2019-01-01 RX ADMIN — PANTOPRAZOLE SODIUM 40 MG: 40 TABLET, DELAYED RELEASE ORAL at 05:48

## 2019-01-01 RX ADMIN — Medication 10 ML: at 12:14

## 2019-01-01 RX ADMIN — TUBERCULIN PURIFIED PROTEIN DERIVATIVE 5 UNITS: 5 INJECTION, SOLUTION INTRADERMAL at 17:55

## 2019-01-01 RX ADMIN — ALBUTEROL SULFATE 2.5 MG: 2.5 SOLUTION RESPIRATORY (INHALATION) at 07:39

## 2019-01-01 RX ADMIN — IPRATROPIUM BROMIDE AND ALBUTEROL SULFATE 3 ML: .5; 3 SOLUTION RESPIRATORY (INHALATION) at 07:27

## 2019-01-01 RX ADMIN — VITAMIN D, TAB 1000IU (100/BT) 1000 UNITS: 25 TAB at 09:00

## 2019-01-01 RX ADMIN — ALBUTEROL SULFATE 2.5 MG: 2.5 SOLUTION RESPIRATORY (INHALATION) at 19:48

## 2019-01-01 RX ADMIN — APIXABAN 2.5 MG: 2.5 TABLET, FILM COATED ORAL at 09:07

## 2019-01-01 RX ADMIN — BUDESONIDE 500 MCG: 0.5 INHALANT RESPIRATORY (INHALATION) at 19:48

## 2019-01-01 RX ADMIN — ONDANSETRON 4 MG: 2 INJECTION INTRAMUSCULAR; INTRAVENOUS at 05:39

## 2019-01-01 RX ADMIN — APIXABAN 2.5 MG: 2.5 TABLET, FILM COATED ORAL at 08:26

## 2019-01-01 RX ADMIN — GLYCOPYRROLATE 0.4 MG: 0.2 INJECTION INTRAMUSCULAR; INTRAVENOUS at 16:10

## 2019-01-01 RX ADMIN — VITAMIN D, TAB 1000IU (100/BT) 1000 UNITS: 25 TAB at 09:03

## 2019-01-01 RX ADMIN — SODIUM CHLORIDE 75 ML/HR: 900 INJECTION, SOLUTION INTRAVENOUS at 10:16

## 2019-01-01 RX ADMIN — ASPIRIN 81 MG: 81 TABLET, COATED ORAL at 08:13

## 2019-01-01 RX ADMIN — TAMSULOSIN HYDROCHLORIDE 0.4 MG: 0.4 CAPSULE ORAL at 09:58

## 2019-01-01 RX ADMIN — FUROSEMIDE 20 MG: 20 TABLET ORAL at 08:52

## 2019-01-01 RX ADMIN — FUROSEMIDE 20 MG: 20 TABLET ORAL at 09:58

## 2019-01-01 RX ADMIN — Medication 10 ML: at 15:23

## 2019-01-01 RX ADMIN — CARVEDILOL 6.25 MG: 6.25 TABLET, FILM COATED ORAL at 16:57

## 2019-01-01 RX ADMIN — ALBUMIN (HUMAN) 12.5 G: 0.25 INJECTION, SOLUTION INTRAVENOUS at 00:05

## 2019-01-01 RX ADMIN — IPRATROPIUM BROMIDE AND ALBUTEROL SULFATE 3 ML: .5; 3 SOLUTION RESPIRATORY (INHALATION) at 03:26

## 2019-01-01 RX ADMIN — ALBUTEROL SULFATE 2.5 MG: 2.5 SOLUTION RESPIRATORY (INHALATION) at 07:55

## 2019-01-01 RX ADMIN — LISINOPRIL 2.5 MG: 5 TABLET ORAL at 09:42

## 2019-01-01 RX ADMIN — Medication 2 G: at 05:27

## 2019-01-01 RX ADMIN — Medication 10 ML: at 05:28

## 2019-01-01 RX ADMIN — Medication 5 ML: at 05:36

## 2019-01-01 RX ADMIN — ACETAMINOPHEN 650 MG: 325 TABLET, FILM COATED ORAL at 13:31

## 2019-01-01 RX ADMIN — TIOTROPIUM BROMIDE 18 MCG: 18 CAPSULE ORAL; RESPIRATORY (INHALATION) at 07:39

## 2019-01-01 RX ADMIN — TIOTROPIUM BROMIDE 18 MCG: 18 CAPSULE ORAL; RESPIRATORY (INHALATION) at 07:56

## 2019-01-01 RX ADMIN — APIXABAN 2.5 MG: 2.5 TABLET, FILM COATED ORAL at 17:10

## 2019-01-01 RX ADMIN — Medication 10 ML: at 20:53

## 2019-01-01 RX ADMIN — ASPIRIN 81 MG: 81 TABLET, COATED ORAL at 08:26

## 2019-01-01 RX ADMIN — FAMOTIDINE 20 MG: 10 INJECTION, SOLUTION INTRAVENOUS at 08:53

## 2019-01-01 RX ADMIN — HALOPERIDOL LACTATE 2 MG: 5 INJECTION INTRAMUSCULAR at 05:14

## 2019-01-01 RX ADMIN — BUDESONIDE 500 MCG: 0.5 INHALANT RESPIRATORY (INHALATION) at 08:37

## 2019-01-01 RX ADMIN — HEPARIN SODIUM 3 ML/HR: 5000 INJECTION, SOLUTION INTRAVENOUS; SUBCUTANEOUS at 15:29

## 2019-01-01 RX ADMIN — MORPHINE SULFATE 2 MG: 2 INJECTION, SOLUTION INTRAMUSCULAR; INTRAVENOUS at 23:25

## 2019-01-01 RX ADMIN — APIXABAN 5 MG: 5 TABLET, FILM COATED ORAL at 13:46

## 2019-01-01 RX ADMIN — Medication 20 ML: at 00:33

## 2019-01-01 RX ADMIN — VITAMIN D, TAB 1000IU (100/BT) 1000 UNITS: 25 TAB at 08:53

## 2019-01-01 RX ADMIN — FUROSEMIDE 20 MG: 20 TABLET ORAL at 21:45

## 2019-01-01 RX ADMIN — FUROSEMIDE 20 MG: 20 TABLET ORAL at 09:15

## 2019-01-01 RX ADMIN — FUROSEMIDE 20 MG: 20 TABLET ORAL at 20:00

## 2019-01-01 RX ADMIN — GLYCOPYRROLATE 0.2 MG: 0.2 INJECTION, SOLUTION INTRAMUSCULAR; INTRAVENOUS at 15:32

## 2019-01-01 RX ADMIN — ONDANSETRON 4 MG: 2 INJECTION INTRAMUSCULAR; INTRAVENOUS at 00:28

## 2019-01-01 RX ADMIN — ONDANSETRON 4 MG: 2 INJECTION INTRAMUSCULAR; INTRAVENOUS at 22:26

## 2019-01-01 RX ADMIN — TAMSULOSIN HYDROCHLORIDE 0.4 MG: 0.4 CAPSULE ORAL at 08:13

## 2019-01-01 RX ADMIN — TAMSULOSIN HYDROCHLORIDE 0.4 MG: 0.4 CAPSULE ORAL at 08:21

## 2019-01-01 RX ADMIN — METOPROLOL SUCCINATE 25 MG: 25 TABLET, EXTENDED RELEASE ORAL at 09:17

## 2019-01-01 RX ADMIN — ONDANSETRON 4 MG: 2 INJECTION INTRAMUSCULAR; INTRAVENOUS at 18:05

## 2019-01-01 RX ADMIN — ONDANSETRON 8 MG: 2 INJECTION INTRAMUSCULAR; INTRAVENOUS at 12:32

## 2019-01-01 RX ADMIN — TIOTROPIUM BROMIDE 18 MCG: 18 CAPSULE ORAL; RESPIRATORY (INHALATION) at 10:27

## 2019-01-01 RX ADMIN — ALBUTEROL SULFATE 2.5 MG: 2.5 SOLUTION RESPIRATORY (INHALATION) at 19:04

## 2019-01-01 RX ADMIN — HEPARIN SODIUM 18 UNITS/KG/HR: 5000 INJECTION, SOLUTION INTRAVENOUS at 15:17

## 2019-01-01 RX ADMIN — BUDESONIDE 500 MCG: 0.5 INHALANT RESPIRATORY (INHALATION) at 19:40

## 2019-01-01 RX ADMIN — Medication 10 ML: at 05:58

## 2019-01-01 RX ADMIN — CYANOCOBALAMIN TAB 1000 MCG 1000 MCG: 1000 TAB at 09:41

## 2019-01-01 RX ADMIN — WARFARIN SODIUM 5 MG: 5 TABLET ORAL at 20:00

## 2019-01-01 RX ADMIN — PANTOPRAZOLE SODIUM 40 MG: 40 TABLET, DELAYED RELEASE ORAL at 05:31

## 2019-01-01 RX ADMIN — TIOTROPIUM BROMIDE 18 MCG: 18 CAPSULE ORAL; RESPIRATORY (INHALATION) at 09:21

## 2019-01-01 RX ADMIN — ASPIRIN 81 MG: 81 TABLET, COATED ORAL at 08:24

## 2019-01-01 RX ADMIN — SODIUM CHLORIDE, PRESERVATIVE FREE 3 ML: 5 INJECTION INTRAVENOUS at 07:16

## 2019-01-01 RX ADMIN — VITAMIN D, TAB 1000IU (100/BT) 1000 UNITS: 25 TAB at 09:42

## 2019-01-01 RX ADMIN — FUROSEMIDE 20 MG: 20 TABLET ORAL at 08:19

## 2019-01-01 RX ADMIN — METOPROLOL SUCCINATE 25 MG: 25 TABLET, EXTENDED RELEASE ORAL at 08:13

## 2019-01-01 RX ADMIN — APIXABAN 2.5 MG: 2.5 TABLET, FILM COATED ORAL at 09:00

## 2019-01-01 RX ADMIN — ROSUVASTATIN CALCIUM 20 MG: 20 TABLET, COATED ORAL at 02:41

## 2019-01-01 RX ADMIN — FUROSEMIDE 20 MG: 20 TABLET ORAL at 21:39

## 2019-01-01 RX ADMIN — IOPAMIDOL 55 ML: 755 INJECTION, SOLUTION INTRAVENOUS at 15:51

## 2019-01-01 RX ADMIN — Medication 10 ML: at 06:08

## 2019-01-01 RX ADMIN — CYANOCOBALAMIN TAB 1000 MCG 1000 MCG: 1000 TAB at 08:35

## 2019-01-01 RX ADMIN — IOPAMIDOL 100 ML: 755 INJECTION, SOLUTION INTRAVENOUS at 10:14

## 2019-01-01 RX ADMIN — POTASSIUM CHLORIDE 20 MEQ: 20 TABLET, EXTENDED RELEASE ORAL at 18:07

## 2019-01-01 RX ADMIN — ONDANSETRON 4 MG: 2 INJECTION INTRAMUSCULAR; INTRAVENOUS at 11:10

## 2019-01-01 RX ADMIN — BUDESONIDE 500 MCG: 0.5 INHALANT RESPIRATORY (INHALATION) at 19:55

## 2019-01-01 RX ADMIN — FUROSEMIDE 20 MG: 20 TABLET ORAL at 09:17

## 2019-01-01 RX ADMIN — APIXABAN 5 MG: 5 TABLET, FILM COATED ORAL at 21:06

## 2019-01-01 RX ADMIN — CIPROFLOXACIN HYDROCHLORIDE 500 MG: 500 TABLET, FILM COATED ORAL at 18:07

## 2019-01-01 RX ADMIN — ASPIRIN 81 MG: 81 TABLET, COATED ORAL at 09:15

## 2019-01-01 RX ADMIN — TIOTROPIUM BROMIDE 18 MCG: 18 CAPSULE ORAL; RESPIRATORY (INHALATION) at 07:41

## 2019-01-01 RX ADMIN — METHYLPREDNISOLONE SODIUM SUCCINATE 60 MG: 125 INJECTION, POWDER, FOR SOLUTION INTRAMUSCULAR; INTRAVENOUS at 18:00

## 2019-01-01 RX ADMIN — FUROSEMIDE 20 MG: 20 TABLET ORAL at 08:21

## 2019-01-01 RX ADMIN — METOPROLOL SUCCINATE 12.5 MG: 25 TABLET, EXTENDED RELEASE ORAL at 14:18

## 2019-01-01 RX ADMIN — ALBUTEROL SULFATE 2.5 MG: 2.5 SOLUTION RESPIRATORY (INHALATION) at 08:32

## 2019-01-01 RX ADMIN — PANTOPRAZOLE SODIUM 40 MG: 40 TABLET, DELAYED RELEASE ORAL at 09:42

## 2019-01-01 RX ADMIN — ALBUTEROL SULFATE 2.5 MG: 2.5 SOLUTION RESPIRATORY (INHALATION) at 08:19

## 2019-01-01 RX ADMIN — SIMVASTATIN 20 MG: 40 TABLET, FILM COATED ORAL at 21:43

## 2019-01-01 RX ADMIN — BUDESONIDE 500 MCG: 0.5 INHALANT RESPIRATORY (INHALATION) at 08:19

## 2019-01-01 RX ADMIN — ROSUVASTATIN CALCIUM 20 MG: 20 TABLET, COATED ORAL at 21:29

## 2019-01-01 RX ADMIN — PROPOFOL 20 MG: 10 INJECTION, EMULSION INTRAVENOUS at 13:13

## 2019-01-01 RX ADMIN — ALBUTEROL SULFATE 2.5 MG: 2.5 SOLUTION RESPIRATORY (INHALATION) at 17:30

## 2019-01-01 RX ADMIN — SODIUM CHLORIDE 50 ML/HR: 900 INJECTION, SOLUTION INTRAVENOUS at 22:30

## 2019-01-01 RX ADMIN — NITROGLYCERIN 1 INCH: 20 OINTMENT TOPICAL at 20:42

## 2019-01-01 RX ADMIN — ALBUTEROL SULFATE 2.5 MG: 2.5 SOLUTION RESPIRATORY (INHALATION) at 09:20

## 2019-01-01 RX ADMIN — ROSUVASTATIN CALCIUM 20 MG: 20 TABLET, COATED ORAL at 21:44

## 2019-01-01 RX ADMIN — HYDROMORPHONE HYDROCHLORIDE 0.5 MG: 1 INJECTION, SOLUTION INTRAMUSCULAR; INTRAVENOUS; SUBCUTANEOUS at 08:13

## 2019-01-01 RX ADMIN — ONDANSETRON 4 MG: 2 INJECTION INTRAMUSCULAR; INTRAVENOUS at 15:23

## 2019-01-01 RX ADMIN — FUROSEMIDE 20 MG: 20 TABLET ORAL at 22:32

## 2019-01-01 RX ADMIN — METOPROLOL SUCCINATE 25 MG: 25 TABLET, EXTENDED RELEASE ORAL at 08:24

## 2019-01-01 RX ADMIN — Medication 10 ML: at 21:07

## 2019-01-01 RX ADMIN — BUDESONIDE 500 MCG: 0.5 INHALANT RESPIRATORY (INHALATION) at 08:56

## 2019-01-01 RX ADMIN — Medication 5 ML: at 17:54

## 2019-01-01 RX ADMIN — SODIUM CHLORIDE, SODIUM LACTATE, POTASSIUM CHLORIDE, AND CALCIUM CHLORIDE 25 ML/HR: 600; 310; 30; 20 INJECTION, SOLUTION INTRAVENOUS at 14:15

## 2019-01-01 RX ADMIN — APIXABAN 5 MG: 5 TABLET, FILM COATED ORAL at 08:40

## 2019-01-01 RX ADMIN — Medication 10 ML: at 19:23

## 2019-01-01 RX ADMIN — ASPIRIN 81 MG: 81 TABLET, COATED ORAL at 08:41

## 2019-01-01 RX ADMIN — METOPROLOL SUCCINATE 25 MG: 25 TABLET, EXTENDED RELEASE ORAL at 08:09

## 2019-01-01 RX ADMIN — PANTOPRAZOLE SODIUM 40 MG: 40 TABLET, DELAYED RELEASE ORAL at 08:19

## 2019-01-01 RX ADMIN — ACETAMINOPHEN 650 MG: 325 TABLET, FILM COATED ORAL at 15:19

## 2019-01-01 RX ADMIN — ALBUTEROL SULFATE 2.5 MG: 2.5 SOLUTION RESPIRATORY (INHALATION) at 02:46

## 2019-01-01 RX ADMIN — PANTOPRAZOLE SODIUM 40 MG: 40 TABLET, DELAYED RELEASE ORAL at 08:13

## 2019-01-01 RX ADMIN — LOSARTAN POTASSIUM 25 MG: 25 TABLET ORAL at 08:21

## 2019-01-01 RX ADMIN — Medication 10 ML: at 08:14

## 2019-01-01 RX ADMIN — FUROSEMIDE 20 MG: 20 TABLET ORAL at 21:06

## 2019-01-01 RX ADMIN — HYDROMORPHONE HYDROCHLORIDE 0.5 MG: 1 INJECTION, SOLUTION INTRAMUSCULAR; INTRAVENOUS; SUBCUTANEOUS at 19:49

## 2019-01-01 RX ADMIN — NITROGLYCERIN 1 INCH: 20 OINTMENT TOPICAL at 05:42

## 2019-01-01 RX ADMIN — Medication 5 ML: at 22:06

## 2019-01-01 RX ADMIN — PROPOFOL 50 MG: 10 INJECTION, EMULSION INTRAVENOUS at 15:45

## 2019-01-01 RX ADMIN — TIOTROPIUM BROMIDE 18 MCG: 18 CAPSULE ORAL; RESPIRATORY (INHALATION) at 08:56

## 2019-01-01 RX ADMIN — ALBUTEROL SULFATE 2.5 MG: 2.5 SOLUTION RESPIRATORY (INHALATION) at 15:04

## 2019-01-01 RX ADMIN — PERFLUTREN 1 ML: 6.52 INJECTION, SUSPENSION INTRAVENOUS at 14:00

## 2019-01-01 RX ADMIN — Medication 10 ML: at 17:08

## 2019-01-01 RX ADMIN — IPRATROPIUM BROMIDE AND ALBUTEROL SULFATE 3 ML: .5; 3 SOLUTION RESPIRATORY (INHALATION) at 07:26

## 2019-01-01 RX ADMIN — SODIUM CHLORIDE, PRESERVATIVE FREE 3 ML: 5 INJECTION INTRAVENOUS at 19:49

## 2019-01-01 RX ADMIN — SODIUM CHLORIDE, PRESERVATIVE FREE 3 ML: 5 INJECTION INTRAVENOUS at 18:31

## 2019-01-01 RX ADMIN — ALBUTEROL SULFATE 2.5 MG: 2.5 SOLUTION RESPIRATORY (INHALATION) at 19:55

## 2019-01-01 RX ADMIN — PANTOPRAZOLE SODIUM 40 MG: 40 TABLET, DELAYED RELEASE ORAL at 05:58

## 2019-01-01 RX ADMIN — ONDANSETRON 4 MG: 2 INJECTION INTRAMUSCULAR; INTRAVENOUS at 20:18

## 2019-01-01 RX ADMIN — Medication 5 ML: at 13:02

## 2019-01-01 RX ADMIN — IPRATROPIUM BROMIDE AND ALBUTEROL SULFATE 3 ML: .5; 3 SOLUTION RESPIRATORY (INHALATION) at 11:35

## 2019-01-01 RX ADMIN — BUDESONIDE 500 MCG: 0.5 INHALANT RESPIRATORY (INHALATION) at 07:55

## 2019-01-01 RX ADMIN — METOPROLOL SUCCINATE 25 MG: 25 TABLET, EXTENDED RELEASE ORAL at 08:26

## 2019-01-01 RX ADMIN — HYDROMORPHONE HYDROCHLORIDE 0.5 MG: 1 INJECTION, SOLUTION INTRAMUSCULAR; INTRAVENOUS; SUBCUTANEOUS at 21:12

## 2019-01-01 RX ADMIN — SODIUM CHLORIDE, PRESERVATIVE FREE 3 ML: 5 INJECTION INTRAVENOUS at 08:57

## 2019-01-01 RX ADMIN — ROSUVASTATIN CALCIUM 20 MG: 20 TABLET, COATED ORAL at 21:20

## 2019-01-01 RX ADMIN — WARFARIN SODIUM 5 MG: 5 TABLET ORAL at 17:46

## 2019-01-01 RX ADMIN — ONDANSETRON 4 MG: 2 INJECTION INTRAMUSCULAR; INTRAVENOUS at 02:19

## 2019-01-01 RX ADMIN — PANTOPRAZOLE SODIUM 40 MG: 40 TABLET, DELAYED RELEASE ORAL at 09:08

## 2019-01-01 RX ADMIN — APIXABAN 2.5 MG: 2.5 TABLET, FILM COATED ORAL at 09:03

## 2019-01-01 RX ADMIN — ALBUTEROL SULFATE 2.5 MG: 2.5 SOLUTION RESPIRATORY (INHALATION) at 13:51

## 2019-01-01 RX ADMIN — AMLODIPINE BESYLATE 2.5 MG: 5 TABLET ORAL at 08:21

## 2019-01-01 RX ADMIN — SIMVASTATIN 20 MG: 40 TABLET, FILM COATED ORAL at 22:31

## 2019-01-01 RX ADMIN — FUROSEMIDE 20 MG: 20 TABLET ORAL at 09:08

## 2019-01-01 RX ADMIN — Medication 5 ML: at 15:18

## 2019-01-01 RX ADMIN — FENTANYL CITRATE 25 MCG: 50 INJECTION, SOLUTION INTRAMUSCULAR; INTRAVENOUS at 11:20

## 2019-01-01 RX ADMIN — HALOPERIDOL LACTATE 2 MG: 5 INJECTION INTRAMUSCULAR at 07:14

## 2019-01-01 RX ADMIN — METHYLPREDNISOLONE SODIUM SUCCINATE 60 MG: 125 INJECTION, POWDER, FOR SOLUTION INTRAMUSCULAR; INTRAVENOUS at 10:49

## 2019-01-01 RX ADMIN — BUDESONIDE 500 MCG: 0.5 INHALANT RESPIRATORY (INHALATION) at 07:39

## 2019-01-01 RX ADMIN — ONDANSETRON 4 MG: 2 INJECTION INTRAMUSCULAR; INTRAVENOUS at 09:15

## 2019-01-01 RX ADMIN — SIMVASTATIN 20 MG: 40 TABLET, FILM COATED ORAL at 21:06

## 2019-01-01 RX ADMIN — ALBUTEROL SULFATE 2.5 MG: 2.5 SOLUTION RESPIRATORY (INHALATION) at 08:56

## 2019-01-01 RX ADMIN — HYDROMORPHONE HYDROCHLORIDE 0.5 MG: 1 INJECTION, SOLUTION INTRAMUSCULAR; INTRAVENOUS; SUBCUTANEOUS at 14:30

## 2019-01-01 RX ADMIN — ACETAMINOPHEN 650 MG: 325 TABLET, FILM COATED ORAL at 20:57

## 2019-01-01 RX ADMIN — ACETAMINOPHEN 650 MG: 325 TABLET, FILM COATED ORAL at 05:25

## 2019-01-01 RX ADMIN — ALBUTEROL SULFATE 2.5 MG: 2.5 SOLUTION RESPIRATORY (INHALATION) at 15:35

## 2019-01-01 RX ADMIN — POTASSIUM CHLORIDE 40 MEQ: 20 TABLET, EXTENDED RELEASE ORAL at 16:57

## 2019-01-01 RX ADMIN — TIOTROPIUM BROMIDE 18 MCG: 18 CAPSULE ORAL; RESPIRATORY (INHALATION) at 09:18

## 2019-01-01 RX ADMIN — ASPIRIN 81 MG: 81 TABLET ORAL at 09:00

## 2019-01-01 RX ADMIN — Medication 5 ML: at 21:18

## 2019-01-01 RX ADMIN — TAMSULOSIN HYDROCHLORIDE 0.4 MG: 0.4 CAPSULE ORAL at 10:00

## 2019-01-01 RX ADMIN — SODIUM CHLORIDE 100 ML/HR: 900 INJECTION, SOLUTION INTRAVENOUS at 14:13

## 2019-01-01 RX ADMIN — PROTAMINE SULFATE 10 MG: 10 INJECTION, SOLUTION INTRAVENOUS at 15:49

## 2019-01-01 RX ADMIN — TAMSULOSIN HYDROCHLORIDE 0.4 MG: 0.4 CAPSULE ORAL at 08:24

## 2019-01-01 RX ADMIN — FUROSEMIDE 20 MG: 20 TABLET ORAL at 20:55

## 2019-01-01 RX ADMIN — Medication 2 G: at 15:16

## 2019-01-01 RX ADMIN — FUROSEMIDE 20 MG: 20 TABLET ORAL at 21:37

## 2019-01-01 RX ADMIN — ROSUVASTATIN CALCIUM 20 MG: 20 TABLET, COATED ORAL at 21:17

## 2019-01-01 RX ADMIN — ALBUMIN (HUMAN) 12.5 G: 0.25 INJECTION, SOLUTION INTRAVENOUS at 05:24

## 2019-01-01 RX ADMIN — ALBUTEROL SULFATE 2.5 MG: 2.5 SOLUTION RESPIRATORY (INHALATION) at 13:53

## 2019-01-01 RX ADMIN — TIOTROPIUM BROMIDE 18 MCG: 18 CAPSULE ORAL; RESPIRATORY (INHALATION) at 08:00

## 2019-01-01 RX ADMIN — HEPARIN SODIUM 5000 UNITS: 1000 INJECTION, SOLUTION INTRAVENOUS; SUBCUTANEOUS at 15:28

## 2019-01-01 RX ADMIN — CYANOCOBALAMIN TAB 1000 MCG 1000 MCG: 1000 TAB at 08:18

## 2019-01-01 RX ADMIN — TAMSULOSIN HYDROCHLORIDE 0.4 MG: 0.4 CAPSULE ORAL at 08:32

## 2019-01-01 RX ADMIN — Medication 5 ML: at 14:01

## 2019-01-01 RX ADMIN — CYANOCOBALAMIN TAB 1000 MCG 1000 MCG: 1000 TAB at 09:09

## 2019-01-01 RX ADMIN — CYANOCOBALAMIN TAB 1000 MCG 1000 MCG: 1000 TAB at 09:00

## 2019-01-01 RX ADMIN — LIDOCAINE HYDROCHLORIDE: 40 SOLUTION TOPICAL at 15:18

## 2019-01-01 RX ADMIN — SIMVASTATIN 20 MG: 40 TABLET, FILM COATED ORAL at 22:06

## 2019-01-01 RX ADMIN — SODIUM CHLORIDE, SODIUM LACTATE, POTASSIUM CHLORIDE, CALCIUM CHLORIDE: 600; 310; 30; 20 INJECTION, SOLUTION INTRAVENOUS at 13:07

## 2019-01-01 RX ADMIN — ALBUTEROL SULFATE 2.5 MG: 2.5 SOLUTION RESPIRATORY (INHALATION) at 21:35

## 2019-01-01 RX ADMIN — Medication 5 ML: at 04:23

## 2019-01-01 RX ADMIN — LIDOCAINE HYDROCHLORIDE 10 ML: 10 INJECTION, SOLUTION INFILTRATION; PERINEURAL at 15:42

## 2019-01-01 RX ADMIN — BUDESONIDE 500 MCG: 0.5 INHALANT RESPIRATORY (INHALATION) at 19:50

## 2019-01-01 RX ADMIN — LIDOCAINE HYDROCHLORIDE 50 MG: 20 INJECTION, SOLUTION EPIDURAL; INFILTRATION; INTRACAUDAL; PERINEURAL at 14:54

## 2019-01-01 RX ADMIN — SODIUM CHLORIDE, PRESERVATIVE FREE 3 ML: 5 INJECTION INTRAVENOUS at 20:53

## 2019-01-01 RX ADMIN — ALBUTEROL SULFATE 2.5 MG: 2.5 SOLUTION RESPIRATORY (INHALATION) at 19:31

## 2019-01-01 RX ADMIN — APIXABAN 5 MG: 5 TABLET, FILM COATED ORAL at 22:19

## 2019-01-01 RX ADMIN — Medication 2 G: at 13:13

## 2019-01-01 RX ADMIN — ALBUTEROL SULFATE 2.5 MG: 2.5 SOLUTION RESPIRATORY (INHALATION) at 13:49

## 2019-01-01 RX ADMIN — ALBUTEROL SULFATE 2.5 MG: 2.5 SOLUTION RESPIRATORY (INHALATION) at 13:55

## 2019-01-01 RX ADMIN — HEPARIN SODIUM 2150 UNITS: 5000 INJECTION, SOLUTION INTRAVENOUS; SUBCUTANEOUS at 05:37

## 2019-01-01 RX ADMIN — ALBUTEROL SULFATE 2.5 MG: 2.5 SOLUTION RESPIRATORY (INHALATION) at 08:03

## 2019-01-01 RX ADMIN — BUDESONIDE 500 MCG: 0.5 INHALANT RESPIRATORY (INHALATION) at 20:13

## 2019-01-01 RX ADMIN — FUROSEMIDE 20 MG: 20 TABLET ORAL at 08:09

## 2019-01-01 RX ADMIN — FUROSEMIDE 20 MG: 20 TABLET ORAL at 21:15

## 2019-01-01 RX ADMIN — SODIUM CHLORIDE 1000 ML: 900 INJECTION, SOLUTION INTRAVENOUS at 23:25

## 2019-01-01 RX ADMIN — SODIUM CHLORIDE, PRESERVATIVE FREE 3 ML: 5 INJECTION INTRAVENOUS at 00:30

## 2019-01-01 RX ADMIN — FENTANYL CITRATE 25 MCG: 50 INJECTION, SOLUTION INTRAMUSCULAR; INTRAVENOUS at 15:42

## 2019-01-01 RX ADMIN — CEFTRIAXONE SODIUM 1 G: 1 INJECTION, POWDER, FOR SOLUTION INTRAMUSCULAR; INTRAVENOUS at 23:25

## 2019-01-01 RX ADMIN — CIPROFLOXACIN HYDROCHLORIDE 500 MG: 500 TABLET, FILM COATED ORAL at 18:41

## 2019-01-01 RX ADMIN — HEPARIN SODIUM 2350 UNITS: 5000 INJECTION INTRAVENOUS; SUBCUTANEOUS at 12:59

## 2019-01-01 RX ADMIN — BUDESONIDE 500 MCG: 0.5 INHALANT RESPIRATORY (INHALATION) at 19:31

## 2019-01-01 RX ADMIN — BUDESONIDE 500 MCG: 0.5 INHALANT RESPIRATORY (INHALATION) at 07:13

## 2019-01-01 RX ADMIN — HALOPERIDOL LACTATE 2 MG: 5 INJECTION INTRAMUSCULAR at 11:49

## 2019-01-01 RX ADMIN — OXYCODONE AND ACETAMINOPHEN 1 TABLET: 5; 325 TABLET ORAL at 15:49

## 2019-01-01 RX ADMIN — LISINOPRIL 2.5 MG: 5 TABLET ORAL at 08:26

## 2019-01-01 RX ADMIN — PANTOPRAZOLE SODIUM 40 MG: 40 TABLET, DELAYED RELEASE ORAL at 05:24

## 2019-01-01 RX ADMIN — TAMSULOSIN HYDROCHLORIDE 0.4 MG: 0.4 CAPSULE ORAL at 09:03

## 2019-01-01 RX ADMIN — VITAMIN D, TAB 1000IU (100/BT) 1000 UNITS: 25 TAB at 08:19

## 2019-01-01 RX ADMIN — BUDESONIDE 500 MCG: 0.5 INHALANT RESPIRATORY (INHALATION) at 19:02

## 2019-01-01 RX ADMIN — Medication 10 ML: at 21:31

## 2019-01-01 RX ADMIN — HALOPERIDOL LACTATE 2 MG: 5 INJECTION INTRAMUSCULAR at 05:23

## 2019-01-01 RX ADMIN — HALOPERIDOL LACTATE 2 MG: 5 INJECTION INTRAMUSCULAR at 08:56

## 2019-01-01 RX ADMIN — ONDANSETRON 4 MG: 4 TABLET, ORALLY DISINTEGRATING ORAL at 17:09

## 2019-01-01 RX ADMIN — APIXABAN 5 MG: 5 TABLET, FILM COATED ORAL at 08:23

## 2019-01-01 RX ADMIN — METOPROLOL SUCCINATE 25 MG: 25 TABLET, EXTENDED RELEASE ORAL at 09:07

## 2019-01-01 RX ADMIN — ONDANSETRON 4 MG: 2 INJECTION INTRAMUSCULAR; INTRAVENOUS at 05:47

## 2019-01-01 RX ADMIN — ALBUTEROL SULFATE 2.5 MG: 2.5 SOLUTION RESPIRATORY (INHALATION) at 19:56

## 2019-01-01 RX ADMIN — FENTANYL CITRATE 50 MCG: 50 INJECTION, SOLUTION INTRAMUSCULAR; INTRAVENOUS at 15:08

## 2019-01-01 RX ADMIN — HYDROMORPHONE HYDROCHLORIDE 0.5 MG: 1 INJECTION, SOLUTION INTRAMUSCULAR; INTRAVENOUS; SUBCUTANEOUS at 01:46

## 2019-01-01 RX ADMIN — ROSUVASTATIN CALCIUM 20 MG: 20 TABLET, COATED ORAL at 20:55

## 2019-01-01 RX ADMIN — GLYCOPYRROLATE 0.2 MG: 0.2 INJECTION, SOLUTION INTRAMUSCULAR; INTRAVENOUS at 06:29

## 2019-01-01 RX ADMIN — ALBUTEROL SULFATE 2.5 MG: 2.5 SOLUTION RESPIRATORY (INHALATION) at 07:13

## 2019-01-01 RX ADMIN — SODIUM CHLORIDE 100 ML: 900 INJECTION, SOLUTION INTRAVENOUS at 10:14

## 2019-01-01 RX ADMIN — Medication 10 ML: at 06:21

## 2019-01-01 RX ADMIN — ONDANSETRON 4 MG: 2 INJECTION INTRAMUSCULAR; INTRAVENOUS at 10:51

## 2019-01-01 RX ADMIN — ROSUVASTATIN CALCIUM 20 MG: 20 TABLET, COATED ORAL at 21:23

## 2019-01-01 RX ADMIN — APIXABAN 5 MG: 5 TABLET, FILM COATED ORAL at 21:37

## 2019-01-01 RX ADMIN — SODIUM CHLORIDE 75 ML/HR: 900 INJECTION, SOLUTION INTRAVENOUS at 02:19

## 2019-01-01 RX ADMIN — FENTANYL CITRATE 50 MCG: 50 INJECTION, SOLUTION INTRAMUSCULAR; INTRAVENOUS at 15:00

## 2019-01-01 RX ADMIN — FAMOTIDINE 20 MG: 10 INJECTION, SOLUTION INTRAVENOUS at 08:09

## 2019-01-01 RX ADMIN — HYDROMORPHONE HYDROCHLORIDE 0.5 MG: 1 INJECTION, SOLUTION INTRAMUSCULAR; INTRAVENOUS; SUBCUTANEOUS at 06:19

## 2019-01-01 RX ADMIN — ASPIRIN 81 MG: 81 TABLET ORAL at 09:08

## 2019-01-01 RX ADMIN — APIXABAN 2.5 MG: 2.5 TABLET, FILM COATED ORAL at 17:00

## 2019-01-01 RX ADMIN — AZITHROMYCIN 500 MG: 250 TABLET, FILM COATED ORAL at 08:54

## 2019-01-01 RX ADMIN — ONDANSETRON 4 MG: 2 INJECTION INTRAMUSCULAR; INTRAVENOUS at 00:19

## 2019-01-01 RX ADMIN — ALBUTEROL SULFATE 2.5 MG: 2.5 SOLUTION RESPIRATORY (INHALATION) at 20:13

## 2019-01-01 RX ADMIN — CARVEDILOL 6.25 MG: 6.25 TABLET, FILM COATED ORAL at 08:21

## 2019-01-01 RX ADMIN — SODIUM CHLORIDE, SODIUM LACTATE, POTASSIUM CHLORIDE, AND CALCIUM CHLORIDE 1000 ML: 600; 310; 30; 20 INJECTION, SOLUTION INTRAVENOUS at 12:31

## 2019-01-01 RX ADMIN — ASPIRIN 81 MG: 81 TABLET ORAL at 08:53

## 2019-01-01 RX ADMIN — TIOTROPIUM BROMIDE 18 MCG: 18 CAPSULE ORAL; RESPIRATORY (INHALATION) at 09:06

## 2019-01-01 RX ADMIN — METOPROLOL SUCCINATE 25 MG: 25 TABLET, EXTENDED RELEASE ORAL at 17:45

## 2019-01-01 RX ADMIN — FUROSEMIDE 20 MG: 10 INJECTION, SOLUTION INTRAMUSCULAR; INTRAVENOUS at 17:22

## 2019-01-01 RX ADMIN — ASPIRIN 81 MG: 81 TABLET ORAL at 08:35

## 2019-01-01 RX ADMIN — HALOPERIDOL LACTATE 2 MG: 5 INJECTION INTRAMUSCULAR at 23:56

## 2019-01-01 RX ADMIN — CIPROFLOXACIN HYDROCHLORIDE 500 MG: 500 TABLET, FILM COATED ORAL at 19:23

## 2019-01-01 RX ADMIN — HALOPERIDOL LACTATE 2 MG: 5 INJECTION INTRAMUSCULAR at 18:30

## 2019-01-01 RX ADMIN — Medication 10 ML: at 22:31

## 2019-01-01 RX ADMIN — POTASSIUM CHLORIDE 20 MEQ: 20 TABLET, EXTENDED RELEASE ORAL at 11:28

## 2019-01-01 RX ADMIN — TIOTROPIUM BROMIDE 18 MCG: 18 CAPSULE ORAL; RESPIRATORY (INHALATION) at 08:32

## 2019-01-01 RX ADMIN — VITAMIN D, TAB 1000IU (100/BT) 1000 UNITS: 25 TAB at 08:09

## 2019-01-01 RX ADMIN — HYDROMORPHONE HYDROCHLORIDE 0.5 MG: 1 INJECTION, SOLUTION INTRAMUSCULAR; INTRAVENOUS; SUBCUTANEOUS at 08:24

## 2019-01-01 RX ADMIN — TIOTROPIUM BROMIDE 18 MCG: 18 CAPSULE ORAL; RESPIRATORY (INHALATION) at 07:27

## 2019-01-01 RX ADMIN — ASPIRIN 81 MG: 81 TABLET, COATED ORAL at 09:17

## 2019-01-01 RX ADMIN — FAMOTIDINE 20 MG: 10 INJECTION, SOLUTION INTRAVENOUS at 09:00

## 2019-01-01 RX ADMIN — TIOTROPIUM BROMIDE 18 MCG: 18 CAPSULE ORAL; RESPIRATORY (INHALATION) at 07:22

## 2019-01-01 RX ADMIN — TAMSULOSIN HYDROCHLORIDE 0.4 MG: 0.4 CAPSULE ORAL at 09:08

## 2019-01-01 RX ADMIN — Medication 10 ML: at 05:47

## 2019-01-01 RX ADMIN — HYDROMORPHONE HYDROCHLORIDE 0.5 MG: 1 INJECTION, SOLUTION INTRAMUSCULAR; INTRAVENOUS; SUBCUTANEOUS at 00:28

## 2019-01-01 RX ADMIN — Medication 10 ML: at 20:52

## 2019-01-01 RX ADMIN — SODIUM CHLORIDE 75 ML/HR: 900 INJECTION, SOLUTION INTRAVENOUS at 06:01

## 2019-01-01 RX ADMIN — ASPIRIN 81 MG: 81 TABLET ORAL at 09:42

## 2019-01-01 RX ADMIN — METHYLPREDNISOLONE SODIUM SUCCINATE 60 MG: 125 INJECTION, POWDER, FOR SOLUTION INTRAMUSCULAR; INTRAVENOUS at 09:35

## 2019-01-01 RX ADMIN — AZITHROMYCIN 250 MG: 250 TABLET, FILM COATED ORAL at 09:35

## 2019-01-01 RX ADMIN — ASPIRIN 81 MG: 81 TABLET, COATED ORAL at 08:09

## 2019-01-01 RX ADMIN — HALOPERIDOL LACTATE 2 MG: 5 INJECTION INTRAMUSCULAR at 08:13

## 2019-01-01 RX ADMIN — HEPARIN SODIUM 4000 UNITS: 5000 INJECTION, SOLUTION INTRAVENOUS; SUBCUTANEOUS at 20:43

## 2019-01-01 RX ADMIN — HALOPERIDOL LACTATE 2 MG: 5 INJECTION INTRAMUSCULAR at 14:30

## 2019-01-01 RX ADMIN — HALOPERIDOL LACTATE 2 MG: 5 INJECTION INTRAMUSCULAR at 09:17

## 2019-01-01 RX ADMIN — SODIUM CHLORIDE 50 ML/HR: 900 INJECTION, SOLUTION INTRAVENOUS at 22:14

## 2019-01-01 RX ADMIN — ASPIRIN 81 MG: 81 TABLET, COATED ORAL at 08:21

## 2019-01-01 RX ADMIN — TRAMADOL HYDROCHLORIDE 50 MG: 50 TABLET, FILM COATED ORAL at 00:32

## 2019-01-01 RX ADMIN — CARVEDILOL 6.25 MG: 6.25 TABLET, FILM COATED ORAL at 18:13

## 2019-01-01 RX ADMIN — ALBUTEROL SULFATE 2.5 MG: 2.5 SOLUTION RESPIRATORY (INHALATION) at 20:01

## 2019-01-01 RX ADMIN — Medication 5 ML: at 21:37

## 2019-01-01 RX ADMIN — ASPIRIN 81 MG: 81 TABLET ORAL at 09:09

## 2019-01-01 RX ADMIN — ACETAMINOPHEN 650 MG: 325 TABLET, FILM COATED ORAL at 21:45

## 2019-01-01 RX ADMIN — SIMVASTATIN 20 MG: 40 TABLET, FILM COATED ORAL at 21:39

## 2019-01-01 RX ADMIN — Medication 10 ML: at 13:07

## 2019-01-01 RX ADMIN — FUROSEMIDE 20 MG: 20 TABLET ORAL at 09:42

## 2019-01-01 RX ADMIN — PANTOPRAZOLE SODIUM 40 MG: 40 TABLET, DELAYED RELEASE ORAL at 09:09

## 2019-01-01 RX ADMIN — Medication 10 ML: at 13:56

## 2019-01-01 RX ADMIN — TIOTROPIUM BROMIDE 18 MCG: 18 CAPSULE ORAL; RESPIRATORY (INHALATION) at 08:19

## 2019-01-01 RX ADMIN — OXYCODONE HYDROCHLORIDE 5 MG: 5 TABLET ORAL at 20:51

## 2019-01-01 RX ADMIN — Medication 10 ML: at 05:46

## 2019-01-01 RX ADMIN — FUROSEMIDE 20 MG: 20 TABLET ORAL at 09:03

## 2019-01-01 RX ADMIN — PANTOPRAZOLE SODIUM 40 MG: 40 TABLET, DELAYED RELEASE ORAL at 05:27

## 2019-01-01 RX ADMIN — ALBUTEROL SULFATE 2.5 MG: 2.5 SOLUTION RESPIRATORY (INHALATION) at 19:40

## 2019-01-01 RX ADMIN — SODIUM CHLORIDE, PRESERVATIVE FREE 3 ML: 5 INJECTION INTRAVENOUS at 01:47

## 2019-01-01 RX ADMIN — HYDROMORPHONE HYDROCHLORIDE 0.5 MG: 1 INJECTION, SOLUTION INTRAMUSCULAR; INTRAVENOUS; SUBCUTANEOUS at 07:15

## 2019-01-01 RX ADMIN — SIMVASTATIN 20 MG: 40 TABLET, FILM COATED ORAL at 22:36

## 2019-01-01 RX ADMIN — CIPROFLOXACIN HYDROCHLORIDE 500 MG: 500 TABLET, FILM COATED ORAL at 20:00

## 2019-01-01 RX ADMIN — FUROSEMIDE 20 MG: 20 TABLET ORAL at 21:29

## 2019-01-01 RX ADMIN — METOPROLOL SUCCINATE 25 MG: 25 TABLET, EXTENDED RELEASE ORAL at 09:03

## 2019-01-01 RX ADMIN — Medication 10 ML: at 20:56

## 2019-01-01 RX ADMIN — SODIUM CHLORIDE, PRESERVATIVE FREE 3 ML: 5 INJECTION INTRAVENOUS at 14:30

## 2019-01-01 RX ADMIN — LIDOCAINE HYDROCHLORIDE 15 ML: 20 SOLUTION ORAL; TOPICAL at 10:00

## 2019-01-01 RX ADMIN — IPRATROPIUM BROMIDE AND ALBUTEROL SULFATE 3 ML: .5; 3 SOLUTION RESPIRATORY (INHALATION) at 19:02

## 2019-01-01 RX ADMIN — HEPARIN SODIUM 5000 UNITS: 5000 INJECTION INTRAVENOUS; SUBCUTANEOUS at 13:04

## 2019-01-01 RX ADMIN — ALBUTEROL SULFATE 2.5 MG: 2.5 SOLUTION RESPIRATORY (INHALATION) at 10:26

## 2019-01-01 RX ADMIN — ROSUVASTATIN CALCIUM 20 MG: 20 TABLET, COATED ORAL at 21:45

## 2019-01-01 RX ADMIN — APIXABAN 2.5 MG: 2.5 TABLET, FILM COATED ORAL at 17:34

## 2019-01-01 RX ADMIN — SODIUM CHLORIDE 75 ML/HR: 900 INJECTION, SOLUTION INTRAVENOUS at 17:10

## 2019-01-01 RX ADMIN — SODIUM POLYSTYRENE SULFONATE 30 G: 4.1 POWDER, FOR SUSPENSION ORAL; RECTAL at 08:54

## 2019-01-01 RX ADMIN — VITAMIN D, TAB 1000IU (100/BT) 1000 UNITS: 25 TAB at 09:07

## 2019-01-01 RX ADMIN — HALOPERIDOL LACTATE 2 MG: 5 INJECTION INTRAMUSCULAR at 03:14

## 2019-01-01 RX ADMIN — Medication 10 ML: at 10:14

## 2019-01-01 RX ADMIN — BUDESONIDE 500 MCG: 0.5 INHALANT RESPIRATORY (INHALATION) at 09:20

## 2019-01-01 RX ADMIN — CIPROFLOXACIN HYDROCHLORIDE 500 MG: 500 TABLET, FILM COATED ORAL at 18:16

## 2019-01-01 RX ADMIN — ALBUMIN (HUMAN) 12.5 G: 0.25 INJECTION, SOLUTION INTRAVENOUS at 11:31

## 2019-01-01 RX ADMIN — TAMSULOSIN HYDROCHLORIDE 0.4 MG: 0.4 CAPSULE ORAL at 09:42

## 2019-01-01 RX ADMIN — ALBUTEROL SULFATE 2.5 MG: 2.5 SOLUTION RESPIRATORY (INHALATION) at 08:42

## 2019-01-01 RX ADMIN — ONDANSETRON 4 MG: 2 INJECTION INTRAMUSCULAR; INTRAVENOUS at 01:23

## 2019-01-01 RX ADMIN — EPHEDRINE SULFATE 10 MG: 50 INJECTION, SOLUTION INTRAVENOUS at 15:54

## 2019-01-01 RX ADMIN — SODIUM CHLORIDE, PRESERVATIVE FREE 3 ML: 5 INJECTION INTRAVENOUS at 21:12

## 2019-01-01 RX ADMIN — TUBERCULIN PURIFIED PROTEIN DERIVATIVE 5 UNITS: 5 INJECTION, SOLUTION INTRADERMAL at 14:24

## 2019-01-01 RX ADMIN — ONDANSETRON 4 MG: 2 INJECTION INTRAMUSCULAR; INTRAVENOUS at 23:25

## 2019-01-01 RX ADMIN — LISINOPRIL 2.5 MG: 5 TABLET ORAL at 08:19

## 2019-01-01 RX ADMIN — ROSUVASTATIN CALCIUM 20 MG: 20 TABLET, COATED ORAL at 21:34

## 2019-01-01 RX ADMIN — HYDROMORPHONE HYDROCHLORIDE 0.5 MG: 1 INJECTION, SOLUTION INTRAMUSCULAR; INTRAVENOUS; SUBCUTANEOUS at 08:56

## 2019-01-01 RX ADMIN — SODIUM CHLORIDE 75 ML/HR: 900 INJECTION, SOLUTION INTRAVENOUS at 00:23

## 2019-01-01 RX ADMIN — ALBUTEROL SULFATE 2.5 MG: 2.5 SOLUTION RESPIRATORY (INHALATION) at 19:50

## 2019-01-01 RX ADMIN — SODIUM CHLORIDE, PRESERVATIVE FREE 3 ML: 5 INJECTION INTRAVENOUS at 15:09

## 2019-01-01 RX ADMIN — SODIUM CHLORIDE 50 ML/HR: 900 INJECTION, SOLUTION INTRAVENOUS at 05:44

## 2019-01-01 RX ADMIN — Medication 10 ML: at 17:50

## 2019-01-01 RX ADMIN — POTASSIUM CHLORIDE 40 MEQ: 20 TABLET, EXTENDED RELEASE ORAL at 10:16

## 2019-01-01 RX ADMIN — ROCURONIUM BROMIDE 10 MG: 10 INJECTION, SOLUTION INTRAVENOUS at 14:54

## 2019-01-01 RX ADMIN — PANTOPRAZOLE SODIUM 40 MG: 40 TABLET, DELAYED RELEASE ORAL at 06:01

## 2019-01-01 RX ADMIN — APIXABAN 5 MG: 5 TABLET, FILM COATED ORAL at 21:15

## 2019-01-01 RX ADMIN — METHYLPREDNISOLONE SODIUM SUCCINATE 60 MG: 125 INJECTION, POWDER, FOR SOLUTION INTRAMUSCULAR; INTRAVENOUS at 09:02

## 2019-01-01 RX ADMIN — HALOPERIDOL LACTATE 2 MG: 5 INJECTION INTRAMUSCULAR at 11:11

## 2019-01-01 RX ADMIN — TAMSULOSIN HYDROCHLORIDE 0.4 MG: 0.4 CAPSULE ORAL at 08:41

## 2019-01-01 RX ADMIN — SIMVASTATIN 20 MG: 40 TABLET, FILM COATED ORAL at 21:15

## 2019-01-01 RX ADMIN — POTASSIUM CHLORIDE 20 MEQ: 20 TABLET, EXTENDED RELEASE ORAL at 09:12

## 2019-01-01 RX ADMIN — SODIUM CHLORIDE, SODIUM LACTATE, POTASSIUM CHLORIDE, AND CALCIUM CHLORIDE: 600; 310; 30; 20 INJECTION, SOLUTION INTRAVENOUS at 16:10

## 2019-01-01 RX ADMIN — OXYCODONE AND ACETAMINOPHEN 1 TABLET: 5; 325 TABLET ORAL at 13:01

## 2019-01-01 RX ADMIN — GLYCOPYRROLATE 0.2 MG: 0.2 INJECTION, SOLUTION INTRAMUSCULAR; INTRAVENOUS at 01:46

## 2019-01-01 RX ADMIN — FENTANYL CITRATE 50 MCG: 50 INJECTION, SOLUTION INTRAMUSCULAR; INTRAVENOUS at 15:46

## 2019-01-01 RX ADMIN — FENTANYL CITRATE 25 MCG: 50 INJECTION, SOLUTION INTRAMUSCULAR; INTRAVENOUS at 15:25

## 2019-01-01 RX ADMIN — APIXABAN 2.5 MG: 2.5 TABLET, FILM COATED ORAL at 22:27

## 2019-01-01 RX ADMIN — BUDESONIDE 500 MCG: 0.5 INHALANT RESPIRATORY (INHALATION) at 19:56

## 2019-01-01 RX ADMIN — ACETAMINOPHEN 650 MG: 325 TABLET, FILM COATED ORAL at 21:17

## 2019-01-01 RX ADMIN — LOSARTAN POTASSIUM 25 MG: 25 TABLET ORAL at 09:58

## 2019-01-01 RX ADMIN — Medication 10 ML: at 05:43

## 2019-01-01 RX ADMIN — METHYLPREDNISOLONE SODIUM SUCCINATE 60 MG: 125 INJECTION, POWDER, FOR SOLUTION INTRAMUSCULAR; INTRAVENOUS at 17:47

## 2019-01-01 RX ADMIN — APIXABAN 2.5 MG: 2.5 TABLET, FILM COATED ORAL at 17:22

## 2019-01-01 RX ADMIN — CYANOCOBALAMIN TAB 1000 MCG 1000 MCG: 1000 TAB at 08:09

## 2019-01-01 RX ADMIN — POTASSIUM CHLORIDE 40 MEQ: 20 TABLET, EXTENDED RELEASE ORAL at 09:42

## 2019-01-01 RX ADMIN — ONDANSETRON 4 MG: 2 INJECTION INTRAMUSCULAR; INTRAVENOUS at 16:03

## 2019-01-01 RX ADMIN — ACETAMINOPHEN 650 MG: 325 TABLET, FILM COATED ORAL at 06:21

## 2019-01-01 RX ADMIN — HALOPERIDOL LACTATE 2 MG: 5 INJECTION INTRAMUSCULAR at 15:08

## 2019-01-01 RX ADMIN — AZITHROMYCIN 500 MG: 250 TABLET, FILM COATED ORAL at 13:27

## 2019-01-01 RX ADMIN — TRAMADOL HYDROCHLORIDE 50 MG: 50 TABLET, FILM COATED ORAL at 13:56

## 2019-01-01 RX ADMIN — ASPIRIN 81 MG: 81 TABLET ORAL at 08:19

## 2019-01-01 RX ADMIN — METHYLPREDNISOLONE SODIUM SUCCINATE 60 MG: 125 INJECTION, POWDER, FOR SOLUTION INTRAMUSCULAR; INTRAVENOUS at 17:08

## 2019-01-01 RX ADMIN — ACETAMINOPHEN 650 MG: 325 TABLET, FILM COATED ORAL at 17:10

## 2019-01-01 RX ADMIN — SODIUM CHLORIDE, PRESERVATIVE FREE 3 ML: 5 INJECTION INTRAVENOUS at 20:56

## 2019-01-01 RX ADMIN — LISINOPRIL 2.5 MG: 5 TABLET ORAL at 09:09

## 2019-01-01 RX ADMIN — TAMSULOSIN HYDROCHLORIDE 0.4 MG: 0.4 CAPSULE ORAL at 08:35

## 2019-01-01 RX ADMIN — LIDOCAINE HYDROCHLORIDE: 20 JELLY TOPICAL at 15:18

## 2019-01-01 RX ADMIN — FENTANYL CITRATE 50 MCG: 50 INJECTION, SOLUTION INTRAMUSCULAR; INTRAVENOUS at 14:48

## 2019-01-01 RX ADMIN — HALOPERIDOL LACTATE 2 MG: 5 INJECTION INTRAMUSCULAR at 05:22

## 2019-01-01 RX ADMIN — HEPARIN SODIUM 18 UNITS/KG/HR: 5000 INJECTION, SOLUTION INTRAVENOUS at 20:14

## 2019-01-01 RX ADMIN — Medication 10 ML: at 05:10

## 2019-01-01 RX ADMIN — SODIUM CHLORIDE, PRESERVATIVE FREE 3 ML: 5 INJECTION INTRAVENOUS at 05:53

## 2019-01-01 RX ADMIN — TAMSULOSIN HYDROCHLORIDE 0.4 MG: 0.4 CAPSULE ORAL at 09:09

## 2019-01-01 RX ADMIN — ALBUTEROL SULFATE 2.5 MG: 2.5 SOLUTION RESPIRATORY (INHALATION) at 19:14

## 2019-01-01 RX ADMIN — OXYCODONE AND ACETAMINOPHEN 1 TABLET: 5; 325 TABLET ORAL at 12:58

## 2019-01-01 RX ADMIN — APIXABAN 2.5 MG: 2.5 TABLET, FILM COATED ORAL at 22:12

## 2019-01-01 RX ADMIN — TIOTROPIUM BROMIDE 18 MCG: 18 CAPSULE ORAL; RESPIRATORY (INHALATION) at 08:04

## 2019-01-01 RX ADMIN — TIOTROPIUM BROMIDE 18 MCG: 18 CAPSULE ORAL; RESPIRATORY (INHALATION) at 07:13

## 2019-01-01 RX ADMIN — Medication 5 ML: at 06:04

## 2019-01-01 RX ADMIN — BUDESONIDE 500 MCG: 0.5 INHALANT RESPIRATORY (INHALATION) at 07:27

## 2019-01-01 RX ADMIN — ACETAMINOPHEN 650 MG: 325 TABLET, FILM COATED ORAL at 05:36

## 2019-01-01 RX ADMIN — HALOPERIDOL LACTATE 2 MG: 5 INJECTION INTRAMUSCULAR at 20:31

## 2019-01-01 RX ADMIN — PROPOFOL 75 MCG/KG/MIN: 10 INJECTION, EMULSION INTRAVENOUS at 13:13

## 2019-01-01 RX ADMIN — ONDANSETRON 4 MG: 2 INJECTION INTRAMUSCULAR; INTRAVENOUS at 03:10

## 2019-01-01 RX ADMIN — SODIUM CHLORIDE, PRESERVATIVE FREE 3 ML: 5 INJECTION INTRAVENOUS at 05:15

## 2019-01-01 RX ADMIN — ASPIRIN 81 MG: 81 TABLET ORAL at 08:09

## 2019-01-01 RX ADMIN — ALBUTEROL SULFATE 2.5 MG: 2.5 SOLUTION RESPIRATORY (INHALATION) at 21:55

## 2019-01-01 RX ADMIN — BUDESONIDE 500 MCG: 0.5 INHALANT RESPIRATORY (INHALATION) at 08:41

## 2019-01-01 RX ADMIN — SODIUM CHLORIDE, PRESERVATIVE FREE 3 ML: 5 INJECTION INTRAVENOUS at 06:29

## 2019-01-01 RX ADMIN — METHYLPREDNISOLONE SODIUM SUCCINATE 60 MG: 125 INJECTION, POWDER, FOR SOLUTION INTRAMUSCULAR; INTRAVENOUS at 02:02

## 2019-01-01 RX ADMIN — ALBUTEROL SULFATE 2.5 MG: 2.5 SOLUTION RESPIRATORY (INHALATION) at 20:29

## 2019-01-01 RX ADMIN — PROMETHAZINE HYDROCHLORIDE 25 MG: 25 TABLET ORAL at 04:46

## 2019-01-01 RX ADMIN — HALOPERIDOL LACTATE 2 MG: 5 INJECTION INTRAMUSCULAR at 18:15

## 2019-01-01 RX ADMIN — METHYLPREDNISOLONE SODIUM SUCCINATE 60 MG: 125 INJECTION, POWDER, FOR SOLUTION INTRAMUSCULAR; INTRAVENOUS at 01:19

## 2019-01-01 RX ADMIN — METOPROLOL SUCCINATE 25 MG: 25 TABLET, EXTENDED RELEASE ORAL at 09:09

## 2019-01-01 RX ADMIN — APIXABAN 5 MG: 5 TABLET, FILM COATED ORAL at 20:58

## 2019-01-01 RX ADMIN — Medication 5 ML: at 05:46

## 2019-01-01 RX ADMIN — ASPIRIN 81 MG: 81 TABLET, COATED ORAL at 09:58

## 2019-01-01 RX ADMIN — Medication 10 ML: at 14:26

## 2019-01-01 RX ADMIN — ALBUTEROL SULFATE 2.5 MG: 2.5 SOLUTION RESPIRATORY (INHALATION) at 19:47

## 2019-01-01 RX ADMIN — Medication 5 ML: at 13:08

## 2019-01-01 RX ADMIN — Medication 10 ML: at 22:06

## 2019-01-01 RX ADMIN — APIXABAN 2.5 MG: 2.5 TABLET, FILM COATED ORAL at 08:35

## 2019-01-01 RX ADMIN — Medication 5 ML: at 06:02

## 2019-01-01 RX ADMIN — TRAMADOL HYDROCHLORIDE 50 MG: 50 TABLET, FILM COATED ORAL at 22:27

## 2019-01-01 RX ADMIN — PANTOPRAZOLE SODIUM 40 MG: 40 TABLET, DELAYED RELEASE ORAL at 05:10

## 2019-01-01 RX ADMIN — ROSUVASTATIN CALCIUM 20 MG: 20 TABLET, COATED ORAL at 00:32

## 2019-01-01 RX ADMIN — GLYCOPYRROLATE 0.2 MG: 0.2 INJECTION, SOLUTION INTRAMUSCULAR; INTRAVENOUS at 14:30

## 2019-01-01 RX ADMIN — ASPIRIN 81 MG: 81 TABLET ORAL at 09:03

## 2019-01-01 RX ADMIN — HYDROMORPHONE HYDROCHLORIDE 0.5 MG: 1 INJECTION, SOLUTION INTRAMUSCULAR; INTRAVENOUS; SUBCUTANEOUS at 03:14

## 2019-01-01 RX ADMIN — BUDESONIDE 500 MCG: 0.5 INHALANT RESPIRATORY (INHALATION) at 08:04

## 2019-01-01 RX ADMIN — ALBUTEROL SULFATE 2.5 MG: 2.5 SOLUTION RESPIRATORY (INHALATION) at 08:37

## 2019-01-01 RX ADMIN — SODIUM CHLORIDE, PRESERVATIVE FREE 3 ML: 5 INJECTION INTRAVENOUS at 09:18

## 2019-01-01 RX ADMIN — HEPARIN SODIUM 2350 UNITS: 5000 INJECTION INTRAVENOUS; SUBCUTANEOUS at 10:54

## 2019-01-01 RX ADMIN — ALBUTEROL SULFATE 2.5 MG: 2.5 SOLUTION RESPIRATORY (INHALATION) at 07:47

## 2019-01-01 RX ADMIN — Medication 5 ML: at 21:40

## 2019-01-01 RX ADMIN — IPRATROPIUM BROMIDE AND ALBUTEROL SULFATE 3 ML: .5; 3 SOLUTION RESPIRATORY (INHALATION) at 23:31

## 2019-01-01 RX ADMIN — BUDESONIDE 500 MCG: 0.5 INHALANT RESPIRATORY (INHALATION) at 19:14

## 2019-01-01 RX ADMIN — TAMSULOSIN HYDROCHLORIDE 0.4 MG: 0.4 CAPSULE ORAL at 09:17

## 2019-01-01 RX ADMIN — Medication 5 ML: at 05:51

## 2019-01-01 RX ADMIN — FUROSEMIDE 20 MG: 20 TABLET ORAL at 09:09

## 2019-01-01 RX ADMIN — TAMSULOSIN HYDROCHLORIDE 0.4 MG: 0.4 CAPSULE ORAL at 09:15

## 2019-01-01 RX ADMIN — CYANOCOBALAMIN TAB 1000 MCG 1000 MCG: 1000 TAB at 09:03

## 2019-01-01 RX ADMIN — METOPROLOL SUCCINATE 25 MG: 25 TABLET, EXTENDED RELEASE ORAL at 09:42

## 2019-01-01 RX ADMIN — CARVEDILOL 6.25 MG: 6.25 TABLET, FILM COATED ORAL at 09:58

## 2019-03-12 NOTE — DISCHARGE INSTRUCTIONS
Patient Education        Urinary Tract Infections in Men: Care Instructions  Your Care Instructions    A urinary tract infection, or UTI, is a general term for an infection anywhere between the kidneys and the tip of the penis. UTIs can also be a result of a prostate problem. Most cause pain or burning when you urinate. Most UTIs are caused by bacteria and can be cured with antibiotics. It is important to complete your treatment so that the infection does not get worse. Follow-up care is a key part of your treatment and safety. Be sure to make and go to all appointments, and call your doctor if you are having problems. It's also a good idea to know your test results and keep a list of the medicines you take. How can you care for yourself at home? · Take your antibiotics as prescribed. Do not stop taking them just because you feel better. You need to take the full course of antibiotics. · Take your medicines exactly as prescribed. Your doctor may have prescribed a medicine, such as phenazopyridine (Pyridium), to help relieve pain when you urinate. This turns your urine orange. You may stop taking it when your symptoms get better. But be sure to take all of your antibiotics, which treat the infection. · Drink extra water for the next day or two. This will help make the urine less concentrated and help wash out the bacteria causing the infection. (If you have kidney, heart, or liver disease and have to limit your fluids, talk with your doctor before you increase your fluid intake.)  · Avoid drinks that are carbonated or have caffeine. They can irritate the bladder. · Urinate often. Try to empty your bladder each time. · To relieve pain, take a hot bath or lay a heating pad (set on low) over your lower belly or genital area. Never go to sleep with a heating pad in place. To help prevent UTIs  · Drink plenty of fluids, enough so that your urine is light yellow or clear like water.  If you have kidney, heart, or liver disease and have to limit fluids, talk with your doctor before you increase the amount of fluids you drink. · Urinate when you have the urge. Do not hold your urine for a long time. Urinate before you go to sleep. · Keep your penis clean. Catheter care  If you have a drainage tube (catheter) in place, the following steps will help you care for it. · Always wash your hands before and after touching your catheter. · Check the area around the urethra for inflammation or signs of infection. Signs of infection include irritated, swollen, red, or tender skin, or pus around the catheter. · Clean the area around the catheter with soap and water two times a day. Dry with a clean towel afterward. · Do not apply powder or lotion to the skin around the catheter. To empty the urine collection bag  · Wash your hands with soap and water. · Without touching the drain spout, remove the spout from its sleeve at the bottom of the collection bag. Open the valve on the spout. · Let the urine flow out of the bag and into the toilet or a container. Do not let the tubing or drain spout touch anything. · After you empty the bag, clean the end of the drain spout with tissue and water. Close the valve and put the drain spout back into its sleeve at the bottom of the collection bag. · Wash your hands with soap and water. When should you call for help? Call your doctor now or seek immediate medical care if:    · Symptoms such as a fever, chills, nausea, or vomiting get worse or happen for the first time.     · You have new pain in your back just below your rib cage. This is called flank pain.     · There is new blood or pus in your urine.     · You are not able to take or keep down your antibiotics.    Watch closely for changes in your health, and be sure to contact your doctor if:    · You are not getting better after taking an antibiotic for 2 days.     · Your symptoms go away but then come back.    Where can you learn more?  Go to http://rachel-amarilys.info/. Enter N200 in the search box to learn more about \"Urinary Tract Infections in Men: Care Instructions. \"  Current as of: March 20, 2018  Content Version: 11.9  © 1665-4242 [employer]. Care instructions adapted under license by [employer] (which disclaims liability or warranty for this information). If you have questions about a medical condition or this instruction, always ask your healthcare professional. [place] [address] any warranty or liability for your use of this information. Patient Education        Urinary Retention: Care Instructions  Your Care Instructions    Urinary retention means that you aren't able to urinate. In men, it is often caused by a blockage of the urinary tract from an enlarged prostate gland. In men and women, it can also be caused by an infection or nerve damage. Or it may be a side effect of a medicine. The doctor may have drained the urine from your bladder. If you still have problems passing urine, you may need to use a catheter at home. This is used to empty your bladder until the problem can be fixed. Your doctor may put a catheter in your bladder before you go home. If so, he or she will tell you when to come back to have the catheter removed. The doctor has checked you closely. But problems can develop later. If you notice any problems or new symptoms, get medical treatment right away. Follow-up care is a key part of your treatment and safety. Be sure to make and go to all appointments, and call your doctor if you are having problems. It's also a good idea to know your test results and keep a list of the medicines you take. How can you care for yourself at home? · Take your medicines exactly as prescribed. Call your doctor if you think you are having a problem with your medicine. You will get more details on the specific medicines your doctor prescribes.   · Check with your doctor before you use any over-the-counter medicines. Many cold and allergy medicines, for example, can make this problem worse. Make sure your doctor knows all of the medicines, vitamins, supplements, and herbal remedies you take. · Spread out through the day the amount of fluid you drink. Do not drink a lot at bedtime. · Avoid alcohol and caffeine. · If you have been given a catheter, or if one is already in place, follow the instructions you were given. Always wash your hands before and after you handle the catheter. When should you call for help? Call your doctor now or seek immediate medical care if:    · You cannot urinate at all, or it is getting harder to urinate.     · You have symptoms of a urinary tract infection. These may include:  ? Pain or burning when you urinate. ? A frequent need to urinate without being able to pass much urine. ? Pain in the flank, which is just below the rib cage and above the waist on either side of the back. ? Blood in your urine. ? A fever.    Watch closely for changes in your health, and be sure to contact your doctor if:    · You have any problems with your catheter.     · You do not get better as expected. Where can you learn more? Go to http://rachel-amarilys.info/. Enter M244 in the search box to learn more about \"Urinary Retention: Care Instructions. \"  Current as of: March 20, 2018  Content Version: 11.9  © 4900-7385 [employer], [employer]. Care instructions adapted under license by [employer] (which disclaims liability or warranty for this information). If you have questions about a medical condition or this instruction, always ask your healthcare professional. [place] [address] any warranty or liability for your use of this information.

## 2019-03-12 NOTE — ED TRIAGE NOTES
Pt coming from home,  Pt has not been able to empty his bladder since yesterday, pt is on Lasix, pt states this has never happened to him before. Pt states he is in pain, bowel movements normal. Pt does states he has an enlarged prostate as well as hx of prostate cancer.

## 2019-03-12 NOTE — ED NOTES
I have reviewed discharge instructions with the patient and caregiver. The patient and caregiver verbalized understanding. Patient left ED via Discharge Method: wheelchair to Home with family. Opportunity for questions and clarification provided. Patient given 2 scripts. To continue your aftercare when you leave the hospital, you may receive an automated call from our care team to check in on how you are doing. This is a free service and part of our promise to provide the best care and service to meet your aftercare needs.  If you have questions, or wish to unsubscribe from this service please call [phone]. Thank you for Choosing our [facility] Department.

## 2019-03-12 NOTE — ED PROVIDER NOTES
Patient presented to the ER complaining of decreased urinary output. Reports around 5:00 today he had urge urinate but only got a couple \"dribbles out\". Reports some suprapubic pain and tenderness. Denies any fevers or vomiting. Patient does have a known history of prostate cancer. The history is provided by the patient. Urinary Retention    This is a new problem. The current episode started 6 to 12 hours ago. The problem occurs constantly. The pain is located in the suprapubic region. The quality of the pain is aching and cramping. The pain is at a severity of 5/10. The pain is moderate. Pertinent negatives include no fever, no hematochezia, no nausea, no vomiting, no constipation, no frequency, no chest pain and no back pain. Nothing worsens the pain. His past medical history does not include PUD, GERD, ulcerative colitis, ovarian cysts or small bowel obstruction. Past Medical History:   Diagnosis Date    Abnormal EKG     Acute kidney injury (HCC)     AICD (automatic cardioverter/defibrillator) present     Alterations of sensations     Anemia     Arthritis     Back pain     Back pain 6/17/2016    Benign prostatic hyperplasia 6/17/2016    Bilateral pubic rami fractures [facility]     CAD (coronary artery disease)     ?  MI 4-2014    CHF (congestive heart failure) ([facility])     Chronic obstructive pulmonary disease (HCC)     Chronic systolic CHF (congestive heart failure) ([facility] 10/7/2014    COPD (chronic obstructive pulmonary disease) ([facility])     CRI (chronic renal insufficiency)     Depression     Dizziness     Dyspnea     Elevated ferritin     Elevated PSA 6/17/2016    GERD (gastroesophageal reflux disease)     Heart failure ([facility])     Hernia, inguinal     Hernia, inguinal 6/17/2016    Hyperlipidemia     Hyperlipidemia 10/19/2015    Hypertension     Hypokalemia     Hypokalemia 6/17/2016    Ill-defined condition     EF 35%    Keratosis, actinic     Leg cramps     Malaise and fatigue     Malaise and fatigue 2016    OA (osteoarthritis) of hip 2016    Orthostasis     Pneumonia     Respiratory failure ([facility] 2016    Due to tractor accident      Sinoatrial node dysfunction ([facility]     Sinusitis        Past Surgical History:   Procedure Laterality Date    HX CARPAL TUNNEL RELEASE      bilat    HX CATARACT REMOVAL      HX CATARACT REMOVAL      bilat    HX COLONOSCOPY      HX HEART CATHETERIZATION      HX ORTHOPAEDIC      internal fixation fractured pelvis    HX OTHER SURGICAL  2006    prostate bx     HX OTHER SURGICAL      transiliac screw fixation of L hemipelvis and ORIF of anterior pelvic ring disruption     HX OTHER SURGICAL      irrigation and debridment of R hip wound: VAC placement     HX OTHER SURGICAL      implanted defibrillator    HX PACEMAKER      dual chamber          Family History:   Problem Relation Age of Onset    Heart Attack Son 48        mi    Heart Disease Other        Social History     Socioeconomic History    Marital status:      Spouse name: Not on file    Number of children: Not on file    Years of education: Not on file    Highest education level: Not on file   Social Needs    Financial resource strain: Not on file    Food insecurity - worry: Not on file    Food insecurity - inability: Not on file   [employer] needs - medical: Not on file   [employer] needs - non-medical: Not on file   Occupational History    Not on file   Tobacco Use    Smoking status: Former Smoker     Packs/day: 2.00     Years: 50.00     Pack years: 100.00     Last attempt to quit: 10/17/2013     Years since quittin.4    Smokeless tobacco: Never Used   Substance and Sexual Activity    Alcohol use: No    Drug use: No    Sexual activity: Not on file   Other Topics Concern    Not on file   Social History Narrative    ** Merged History Encounter **              ALLERGIES: Lortab [hydrocodone-acetaminophen];  Lortab [hydrocodone-acetaminophen]; and Other medication    Review of Systems   Constitutional: Negative for fever and unexpected weight change. HENT: Negative for congestion and dental problem. Eyes: Negative for photophobia, redness and visual disturbance. Respiratory: Negative for choking and chest tightness. Cardiovascular: Negative for chest pain. Gastrointestinal: Negative for abdominal pain, constipation, hematochezia, nausea and vomiting. Endocrine: Negative for polydipsia, polyphagia and polyuria. Genitourinary: Positive for decreased urine volume and urgency. Negative for flank pain and frequency. Musculoskeletal: Negative for back pain. Skin: Negative for pallor and rash. Allergic/Immunologic: Negative for food allergies and immunocompromised state. Neurological: Negative for light-headedness. Hematological: Negative for adenopathy. Does not bruise/bleed easily. Psychiatric/Behavioral: Negative for behavioral problems. All other systems reviewed and are negative. Vitals:    03/11/19 2212   BP: 117/81   Pulse: 60   Resp: 18   Temp: 98.7 °F (37.1 °C)   SpO2: 98%            Physical Exam   Constitutional: He is oriented to person, place, and time. He appears well-developed and well-nourished. HENT:   Head: Normocephalic and atraumatic. Cardiovascular: Normal rate, regular rhythm, normal heart sounds and intact distal pulses. No murmur heard. Pulmonary/Chest: Effort normal and breath sounds normal. No respiratory distress. Abdominal: Soft. Bowel sounds are normal. There is tenderness in the suprapubic area. Musculoskeletal: Normal range of motion. He exhibits no edema or deformity. Neurological: He is alert and oriented to person, place, and time. No cranial nerve deficit. Nursing note and vitals reviewed.        MDM  Number of Diagnoses or Management Options  Diagnosis management comments: Differential diagnoses includes urinary retention, UTI, BPH    11:15 PM  Small amount of urine output. Normal white blood cell count. Normal hemoglobin. Chemistry panel shows a stable creatinine    Urine obtained is cloudy, concern for infection. 1:23 AM  Urine was sent for culture. We'll send him out on Cipro. Encouraged follow-up with his urologist       Amount and/or Complexity of Data Reviewed  Clinical lab tests: ordered and reviewed    Risk of Complications, Morbidity, and/or Mortality  Presenting problems: moderate  Diagnostic procedures: low  Management options: low    Patient Progress  Patient progress: improved         Procedures           Results Include:    Recent Results (from the past 24 hour(s))   CBC WITH AUTOMATED DIFF    Collection Time: 03/11/19 10:32 PM   Result Value Ref Range    WBC 8.5 4.3 - 11.1 K/uL    RBC 3.60 (L) 4.23 - 5.6 M/uL    HGB 11.8 (L) 13.6 - 17.2 g/dL    HCT 34.5 (L) 41.1 - 50.3 %    MCV 95.8 79.6 - 97.8 FL    MCH 32.8 26.1 - 32.9 PG    MCHC 34.2 31.4 - 35.0 g/dL    RDW 13.4 11.9 - 14.6 %    PLATELET 129 (L) 996 - 450 K/uL    MPV 10.9 9.4 - 12.3 FL    ABSOLUTE NRBC 0.00 0.0 - 0.2 K/uL    DF AUTOMATED      NEUTROPHILS 81 (H) 43 - 78 %    LYMPHOCYTES 10 (L) 13 - 44 %    MONOCYTES 7 4.0 - 12.0 %    EOSINOPHILS 1 0.5 - 7.8 %    BASOPHILS 1 0.0 - 2.0 %    IMMATURE GRANULOCYTES 1 0.0 - 5.0 %    ABS. NEUTROPHILS 6.8 1.7 - 8.2 K/UL    ABS. LYMPHOCYTES 0.8 0.5 - 4.6 K/UL    ABS. MONOCYTES 0.6 0.1 - 1.3 K/UL    ABS. EOSINOPHILS 0.1 0.0 - 0.8 K/UL    ABS. BASOPHILS 0.1 0.0 - 0.2 K/UL    ABS. IMM.  GRANS. 0.1 0.0 - 0.5 K/UL   METABOLIC PANEL, COMPREHENSIVE    Collection Time: 03/11/19 10:32 PM   Result Value Ref Range    Sodium 139 136 - 145 mmol/L    Potassium 3.5 3.5 - 5.1 mmol/L    Chloride 106 98 - 107 mmol/L    CO2 25 21 - 32 mmol/L    Anion gap 8 7 - 16 mmol/L    Glucose 123 (H) 65 - 100 mg/dL    BUN 18 8 - 23 MG/DL    Creatinine 1.66 (H) 0.8 - 1.5 MG/DL    GFR est AA 51 (L) >60 ml/min/1.73m2    GFR est non-AA 42 (L) >60 ml/min/1.73m2    Calcium 9.2 8.3 - 10.4 MG/DL    Bilirubin, total 1.0 0.2 - 1.1 MG/DL    ALT (SGPT) 21 12 - 65 U/L    AST (SGOT) 28 15 - 37 U/L    Alk. phosphatase 85 50 - 136 U/L    Protein, total 7.2 6.3 - 8.2 g/dL    Albumin 3.5 3.2 - 4.6 g/dL    Globulin 3.7 (H) 2.3 - 3.5 g/dL    A-G Ratio 0.9 (L) 1.2 - 3.5     BNP    Collection Time: 03/11/19 10:32 PM   Result Value Ref Range     (H) 0 pg/mL     Voice dictation software was used during the making of this note. This software is not perfect and grammatical and other typographical errors may be present. This note has been proofread, but may still contain errors.   Kishan Rubin MD; 3/11/2019 @11:15 PM   ===================================================================

## 2019-03-13 PROBLEM — I82.90 THROMBUS: Status: ACTIVE | Noted: 2019-01-01

## 2019-03-13 PROBLEM — I99.8 ISCHEMIA OF LEFT LOWER EXTREMITY: Status: ACTIVE | Noted: 2019-01-01

## 2019-03-13 NOTE — ED NOTES
Patient transported off floor to preop at this time. Belongings secured in patient belongings bag, family member at bedside to accompany patient to preop.

## 2019-03-13 NOTE — PROGRESS NOTES
TRANSFER - IN REPORT:    Verbal report received from YANI Brown on Ulyess Longest being received from PACU for routine post - op      Report consisted of patients Situation, Background, Assessment and Recommendations(SBAR). Information from the following report(s) SBAR, Kardex, Procedure Summary, Intake/Output, MAR and Recent Results was reviewed with the receiving nurse. Opportunity for questions and clarification was provided. Assessment completed upon patients arrival to unit and care assumed. Patient placed on tele monitor. Bed low and locked, call light within reach. Educated on limited movement of arm and leg. Both sites assessed, all are clean dry and intact and free of swelling. Patient denies needs, family at bedside. Skin: sites are free of bleeding and swelling. Left groin site incision is glued together. Small bruises over entire body. Back and sacrum are intact.

## 2019-03-13 NOTE — BRIEF OP NOTE
BRIEF OPERATIVE NOTE    Date of Procedure: 3/13/2019   Preoperative Diagnosis: COLD LEFT LEG  Postoperative Diagnosis: COLD LEFT LEG    Procedure(s):  LEFT COMMON FEMORAL ARTERY THROMBO -embolectomy   Surgeon(s) and Role:     * Tiago Nayak MD - Primary         Surgical Assistant:    Surgical Staff:  Circ-1: Willa Matthews RN  Circ-Relief: Janey Doss RN; Belvin Holter, RN  Radiology Technician: Israel Pineda RT, R  Scrub Tech-1: Des Sheriff  Scrub Tech-2: Lukasz ALBERT  Event Time In Time Out   Incision Start 1518    Incision Close 1608      Anesthesia: General   Estimated Blood Loss: 25cc  Specimens:   ID Type Source Tests Collected by Time Destination   1 : left femoral thrombus  Fresh Leg, Left  Tiago Nayak MD 3/13/2019 1534 Pathology      Findings: acute thrombus at CFA bifurcation   Complications: None  Implants: * No implants in log *

## 2019-03-13 NOTE — ANESTHESIA PREPROCEDURE EVALUATION
Anesthetic History   No history of anesthetic complications            Review of Systems / Medical History  Patient summary reviewed and pertinent labs reviewed    Pulmonary    COPD: moderate      Shortness of breath and smoker (Current smoker)         Neuro/Psych         Psychiatric history (Depression)     Cardiovascular    Hypertension: well controlled        Dysrhythmias : PVC  Pacemaker (AICD/BiV pacemaker) and PAD    Exercise tolerance: <4 METS  Comments: TTE 2015 EF 38%  Nonischemic cardiomyopathy   GI/Hepatic/Renal     GERD: well controlled    Renal disease: CRI       Endo/Other        Arthritis     Other Findings            Physical Exam    Airway  Mallampati: II  TM Distance: 4 - 6 cm  Neck ROM: normal range of motion   Mouth opening: Normal     Cardiovascular    Rhythm: regular  Rate: normal         Dental    Dentition: Edentulous     Pulmonary      Decreased breath sounds: bilateral           Abdominal  GI exam deferred       Other Findings            Anesthetic Plan    ASA: 4, emergent  Anesthesia type: general    Monitoring Plan: Arterial line      Induction: Intravenous  Anesthetic plan and risks discussed with: Patient and Family

## 2019-03-13 NOTE — ED NOTES
TRANSFER - OUT REPORT:    Verbal report given to [facility] RN(name) on Ngozi Mccullough  being transferred to preop(unit) for urgent transfer       Report consisted of patients Situation, Background, Assessment and   Recommendations(SBAR). Information from the following report(s) SBAR, ED Summary and Recent Results was reviewed with the receiving nurse. Lines:   Peripheral IV 03/13/19 Right Antecubital (Active)   Site Assessment Clean, dry, & intact 3/13/2019  1:01 PM   Phlebitis Assessment 0 3/13/2019  1:01 PM   Infiltration Assessment 0 3/13/2019  1:01 PM   Dressing Status Clean, dry, & intact 3/13/2019  1:01 PM   Dressing Type Transparent 3/13/2019  1:01 PM   Hub Color/Line Status Green 3/13/2019  1:01 PM       Peripheral IV 03/13/19 Left Antecubital (Active)        Opportunity for questions and clarification was provided.

## 2019-03-13 NOTE — ED TRIAGE NOTES
Pt arrives from home via [facility] with c/o lle pain. Per ems upon their arrival weak pedal pulse noted however unable to detect pedal pulse enroute. Upon arrival to ed discoloration noted to left lower extremity extending from groin to left foot. Leg noted to be cold to touch from knee down to left foot. Unable to move foot or toes to left foot on arrival.  Pt seen here yesterday with c/o urinary retention, candelaria placed with follow up this am with urology.

## 2019-03-13 NOTE — ED PROVIDER NOTES
Presents with complaints of acute pain in his left lower extremity. States it started this morning around 8:30. Unable to move his foot since around 10:00. Numb to his knee. EMS reports over past 10 minutes mottled appearance of his left lower extremity has moved from his ankle to above his knee. no injury no previous. He denies chest pain or abdominal pain. The history is provided by the patient. The history is limited by the condition of the patient (acutely ill). Leg Pain    This is a new problem. Episode onset: 8:30 AM. The problem has been rapidly worsening. The pain is present in the left lower leg. The quality of the pain is described as constant. The pain is severe. Associated symptoms include numbness and limited range of motion. There has been no history of extremity trauma. Past Medical History:   Diagnosis Date    Abnormal EKG     Acute kidney injury (HCC)     AICD (automatic cardioverter/defibrillator) present     Alterations of sensations     Anemia     Arthritis     Back pain     Back pain 6/17/2016    Benign prostatic hyperplasia 6/17/2016    Bilateral pubic rami fractures ([facility])     CAD (coronary artery disease)     ?  MI 4-2014    CHF (congestive heart failure) ([facility]     Chronic obstructive pulmonary disease (HCC)     Chronic systolic CHF (congestive heart failure) ([facility] [address] 10/7/2014    COPD (chronic obstructive pulmonary disease) ([facility])     CRI (chronic renal insufficiency)     Depression     Dizziness     Dyspnea     Elevated ferritin     Elevated PSA 6/17/2016    GERD (gastroesophageal reflux disease)     Heart failure ([facility])     Hernia, inguinal     Hernia, inguinal 6/17/2016    Hyperlipidemia     Hyperlipidemia 10/19/2015    Hypertension     Hypokalemia     Hypokalemia 6/17/2016    Ill-defined condition     EF 35%    Keratosis, actinic     Leg cramps     Malaise and fatigue     Malaise and fatigue 6/17/2016    OA (osteoarthritis) of hip 2016    Orthostasis     Pneumonia     Respiratory failure ([facility] [address] 2016    Due to tractor accident      Sinoatrial node dysfunction ([facility] [address]     Sinusitis        Past Surgical History:   Procedure Laterality Date    HX CARPAL TUNNEL RELEASE      bilat    HX CATARACT REMOVAL      HX CATARACT REMOVAL      bilat    HX COLONOSCOPY      HX HEART CATHETERIZATION      HX ORTHOPAEDIC      internal fixation fractured pelvis    HX OTHER SURGICAL  2006    prostate bx     HX OTHER SURGICAL      transiliac screw fixation of L hemipelvis and ORIF of anterior pelvic ring disruption     HX OTHER SURGICAL      irrigation and debridment of R hip wound: VAC placement     HX OTHER SURGICAL      implanted defibrillator    HX PACEMAKER      dual chamber          Family History:   Problem Relation Age of Onset    Heart Attack Son 48        mi    Heart Disease Other        Social History     Socioeconomic History    Marital status:      Spouse name: Not on file    Number of children: Not on file    Years of education: Not on file    Highest education level: Not on file   Social Needs    Financial resource strain: Not on file    Food insecurity - worry: Not on file    Food insecurity - inability: Not on file   [employer] needs - medical: Not on file   [employer] needs - non-medical: Not on file   Occupational History    Not on file   Tobacco Use    Smoking status: Former Smoker     Packs/day: 2.00     Years: 50.00     Pack years: 100.00     Last attempt to quit: 10/17/2013     Years since quittin.4    Smokeless tobacco: Never Used   Substance and Sexual Activity    Alcohol use: No    Drug use: No    Sexual activity: Not on file   Other Topics Concern    Not on file   Social History Narrative    ** Merged History Encounter **              ALLERGIES: Lortab [hydrocodone-acetaminophen];  Lortab [hydrocodone-acetaminophen]; and Other medication    Review of Systems   Unable to perform ROS: Unstable vital signs   Constitutional: Negative for chills and fever. Neurological: Positive for numbness. All other systems reviewed and are negative. Vitals:    03/13/19 1252 03/13/19 1322   BP: 150/83 139/67   Pulse: 83 83   Resp: 20 24   Temp: 98 °F (36.7 °C)    SpO2: 95% 96%   Weight: 68.5 kg (151 lb)    Height: 5' 4.5\" (1.638 m)             Physical Exam   Constitutional: He is oriented to person, place, and time. He appears well-developed and well-nourished. He appears distressed. HENT:   Head: Normocephalic and atraumatic. Neck: Normal range of motion. Neck supple. Cardiovascular: Normal rate. An irregular rhythm present. Pulmonary/Chest: Effort normal. No respiratory distress. Abdominal: Soft. He exhibits no distension. There is no tenderness. There is no guarding. Musculoskeletal: He exhibits no edema. Left lower extremity is cold, but is pale, mottled to mid thigh, no palpable pulse in the left femoral artery, unable to move LLE below knee   Neurological: He is alert and oriented to person, place, and time. No cranial nerve deficit. Skin: Skin is warm and dry. Mottled LLE   Nursing note and vitals reviewed. MDM  Number of Diagnoses or Management Options  Arterial thrombosis [facility]):   Diagnosis management comments: Called and spoke with Dr. Jose Alberto Uribe immediately after I saw pt. Patient with acute arterial occlusion no CT scan per Dr. Jose Alberto Uribe. He received heparin bolus and heparin drip and will be going to the OR. Fentanyl for pain control. Pt to OR.      ===================================================================  This patient is critically ill and there is a high probability of of imminent or life threatening deterioration in the patient's condition without immediate management.     The nature of the patient's clinical problem is:LLE arterial occlusion  I have spent 35 minutes in direct patient care, documentation, review of labs/xrays/old records, discussion with Staff, Colleague, Nursing . The time involved in the performance of separately reportable procedures was not counted toward critical care time.      Shae Duran MD; 3/13/2019 @1:49 PM  ===================================================================           Amount and/or Complexity of Data Reviewed  Clinical lab tests: ordered and reviewed  Review and summarize past medical records: yes (Here 2 days ago for urinary retention)  Discuss the patient with other providers: yes  Independent visualization of images, tracings, or specimens: yes (Sinus no ST elevation)    Risk of Complications, Morbidity, and/or Mortality  Presenting problems: high  Diagnostic procedures: high  Management options: high    Critical Care  Total time providing critical care: 30-74 minutes    Patient Progress  Patient progress: stable         Procedures

## 2019-03-13 NOTE — ANESTHESIA PROCEDURE NOTES
Arterial Line Placement    Start time: 3/13/2019 2:53 PM  End time: 3/13/2019 2:53 PM  Performed by: Kaylee Dubois CRNA  Authorized by: Kaylee Dubois CRNA     Pre-Procedure  Indications:  Arterial pressure monitoring and blood sampling  Preanesthetic Checklist: patient identified, risks and benefits discussed, anesthesia consent, site marked, patient being monitored, timeout performed and patient being monitored    Timeout Time: 14:52        Procedure:   Prep:  Chlorhexidine  Seldinger Technique?: Yes    Orientation:  Left  Location:  Radial artery  Catheter size:  20 G  Number of attempts:  2  Cont Cardiac Output Sensor: No      Assessment:   Post-procedure:  Line secured and sterile dressing applied  Patient Tolerance:  Patient tolerated the procedure well with no immediate complications

## 2019-03-13 NOTE — PERIOP NOTES
TRANSFER - IN REPORT:    Verbal report received from Santosh on Gordan Camera  being received from ER for ordered procedure      Report consisted of patients Situation, Background, Assessment and   Recommendations(SBAR). Information from the following report(s) SBAR and MAR was reviewed with the receiving nurse. Opportunity for questions and clarification was provided. Assessment completed upon patients arrival to unit and care assumed.

## 2019-03-13 NOTE — PERIOP NOTES
TRANSFER - OUT REPORT:    Verbal report given to [address] RN on Addi Kirk  being transferred to [phone] for routine post - op       Report consisted of patients Situation, Background, Assessment and   Recommendations(SBAR). Information from the following report(s) SBAR, OR Summary, Procedure Summary, Intake/Output and MAR was reviewed with the receiving nurse. Lines:   Peripheral IV 03/13/19 Right Antecubital (Active)   Site Assessment Clean, dry, & intact 3/13/2019  5:08 PM   Phlebitis Assessment 0 3/13/2019  4:34 PM   Infiltration Assessment 0 3/13/2019  4:34 PM   Dressing Status Clean, dry, & intact; Occlusive 3/13/2019  4:34 PM   Dressing Type Transparent;Tape 3/13/2019  4:34 PM   Hub Color/Line Status Green; Infusing 3/13/2019  4:34 PM   Alcohol Cap Used No 3/13/2019  4:34 PM       Peripheral IV 03/13/19 Left Antecubital (Active)   Site Assessment Clean, dry, & intact 3/13/2019  4:34 PM   Phlebitis Assessment 0 3/13/2019  4:34 PM   Infiltration Assessment 0 3/13/2019  4:34 PM   Dressing Status Clean, dry, & intact; Occlusive 3/13/2019  4:34 PM   Dressing Type Transparent;Tape 3/13/2019  4:34 PM   Hub Color/Line Status Green; Infusing 3/13/2019  4:34 PM   Alcohol Cap Used No 3/13/2019  4:34 PM       Arterial Line 03/13/19 Left Radial artery (Active)   Site Assessment Clean, dry, & intact 3/13/2019  4:34 PM   Dressing Status Clean, dry, & intact; Occlusive 3/13/2019  4:34 PM   Dressing Type Transparent;Tape 3/13/2019  4:34 PM   Line Status Intact and in place 3/13/2019  4:34 PM        Opportunity for questions and clarification was provided. Patient transported with:   Monitor  O2 @ 4 liters  Registered Nurse    VTE prophylaxis orders have been written for Addi Kirk. Patient and family given floor number and nurses name. Family updated re: pt status after security code verified.

## 2019-03-13 NOTE — ANESTHESIA POSTPROCEDURE EVALUATION
Procedure(s):  LEFT LEG THROMBOLECTOMY.     Anesthesia Post Evaluation      Multimodal analgesia: multimodal analgesia not used between 6 hours prior to anesthesia start to PACU discharge  Patient location during evaluation: PACU  Patient participation: complete - patient participated  Level of consciousness: awake  Pain management: adequate  Airway patency: patent  Anesthetic complications: no  Cardiovascular status: acceptable  Respiratory status: nasal airway and acceptable  Hydration status: acceptable  Post anesthesia nausea and vomiting:  none      Visit Vitals  /87   Pulse 78   Temp 37.2 °C (98.9 °F)   Resp 16   Ht 5' 4.5\" (1.638 m)   Wt 68.5 kg (151 lb)   SpO2 94%   BMI 25.52 kg/m²

## 2019-03-13 NOTE — CONSULTS
[address] [facility]  [phone]    History and Physical / Surgical Consultation   Aspen Talley    Admit date: 3/13/2019    MRN: [id number]     : 1935     Age: 82 y.o.          3/13/2019 1:19 PM    Subjective/HPI:   This patient is a 82 y.o. white male seen at the request of Gardenia Santiago MD and evaluated for cold, pulseless left foot. PMH with CHF, HTN, AICD in place, COPD, past prostate cancer, chronic renal insufficiency and other medical issues, patient was brought in to ED via EMS with painful L LE. Pedal pulses present in field per paramedic, with pulses lost in transport ~12h00. Patient states he ambulated around house as normal until ~10h00, then L LE became painful. Patient reports sensation to only noxious stimuli below knee, significant pain above knee. States he cannot move left foot or toes. Patient takes daily ASA and took one already this AM; he denies other anticoagulants. Cardiac history includes defibrillator placement ~5y ago, [facility] 2014 ([facility]) with nonischemic cardiomyopathy. Patient was in ED yesterday with urinary retention, received candelaria catheter and was to follow-up with outpatient urology. He states he last ate Monday and has been nauseated since with some vomiting. He has extensive past smoking history but admits to intermittent tobacco use in recent past, last cigarette was last week. He denies fever or chills. Patient's past medical, surgical, family and social histories were reviewed as noted here and below. Review of Systems  Pertinent items are noted in HPI.     Past Medical History:   Diagnosis Date    Abnormal EKG     Acute kidney injury (HCC)     AICD (automatic cardioverter/defibrillator) present     Alterations of sensations     Anemia     Arthritis     Back pain     Back pain 2016    Benign prostatic hyperplasia 2016    Bilateral pubic rami fractures (HCC)     CAD (coronary artery disease)     ? MI 4-2014    CHF (congestive heart failure) (HCC)     Chronic obstructive pulmonary disease (HCC)     Chronic systolic CHF (congestive heart failure) ([facility] [address] 10/7/2014    COPD (chronic obstructive pulmonary disease) (HCC)     CRI (chronic renal insufficiency)     Depression     Dizziness     Dyspnea     Elevated ferritin     Elevated PSA 6/17/2016    GERD (gastroesophageal reflux disease)     Heart failure (HCC)     Hernia, inguinal     Hernia, inguinal 6/17/2016    Hyperlipidemia     Hyperlipidemia 10/19/2015    Hypertension     Hypokalemia     Hypokalemia 6/17/2016    Ill-defined condition     EF 35%    Keratosis, actinic     Leg cramps     Malaise and fatigue     Malaise and fatigue 6/17/2016    OA (osteoarthritis) of hip 9/16/2016    Orthostasis     Pneumonia     Respiratory failure ([facility] [address] 6/17/2016    Due to tractor accident      Sinoatrial node dysfunction ([facility] [address]     Sinusitis       Past Surgical History:   Procedure Laterality Date    HX CARPAL TUNNEL RELEASE      bilat    HX CATARACT REMOVAL      HX CATARACT REMOVAL      bilat    HX COLONOSCOPY      HX HEART CATHETERIZATION      HX ORTHOPAEDIC      internal fixation fractured pelvis    HX OTHER SURGICAL  4/2006    prostate bx     HX OTHER SURGICAL      transiliac screw fixation of L hemipelvis and ORIF of anterior pelvic ring disruption     HX OTHER SURGICAL      irrigation and debridment of R hip wound: VAC placement     HX OTHER SURGICAL      implanted defibrillator    HX PACEMAKER      dual chamber       Allergies   Allergen Reactions    Lortab [Hydrocodone-Acetaminophen] Other (comments)     Makes him sick. 1/2 tab doesn't make him sick     Lortab [Hydrocodone-Acetaminophen] Other (comments)     Pt states that a whole pill makes him feel sick. 1/2 tab does not.  Other Medication Other (comments)     He has a hx of a prolonged QT interval, so precaution with meds that affect that. Social History     Tobacco Use    Smoking status: Former Smoker     Packs/day: 2.00     Years: 50.00     Pack years: 100.00     Last attempt to quit: 10/17/2013     Years since quittin.4    Smokeless tobacco: Never Used   Substance Use Topics    Alcohol use: No      Family History   Problem Relation Age of Onset    Heart Attack Son 48        mi    Heart Disease Other       Prior to Admission Medications   Prescriptions Last Dose Informant Patient Reported? Taking? albuterol (PROVENTIL HFA, VENTOLIN HFA, PROAIR HFA) 90 mcg/actuation inhaler   No No   Sig: Take 2 Puffs by inhalation every four (4) hours as needed for Wheezing. albuterol-ipratropium (DUO-NEB) 2.5 mg-0.5 mg/3 ml nebu   No No   Sig: 3 mL by Nebulization route every four (4) hours as needed for Cough. amLODIPine (NORVASC) 2.5 mg tablet   No No   Sig: Take 1 Tab by mouth daily. aspirin delayed-release 81 mg tablet   Yes No   Sig: Take 81 mg by mouth daily. carvedilol (COREG) 6.25 mg tablet   No No   Sig: Take 1 Tab by mouth two (2) times a day. cholecalciferol (VITAMIN D3) 1,000 unit tablet   Yes No   Sig: Take  by mouth daily. ciprofloxacin HCl (CIPRO) 500 mg tablet   No No   Sig: Take 1 Tab by mouth two (2) times a day for 7 days. cyanocobalamin (VITAMIN B-12) 1,000 mcg sublingual tablet   Yes No   Sig: Take 1,000 mcg by mouth daily. fluticasone-vilanterol (BREO ELLIPTA) 100-25 mcg/dose inhaler   Yes No   Sig: Take 1 Puff by inhalation daily. furosemide (LASIX) 20 mg tablet   No No   Sig: Take 1 Tab by mouth two (2) times a day. losartan (COZAAR) 25 mg tablet   No No   Sig: Take 1 Tab by mouth daily. omeprazole (PRILOSEC) 20 mg capsule   No No   Sig: Take 1 Cap by mouth daily. ondansetron (ZOFRAN ODT) 8 mg disintegrating tablet   No No   Sig: Take 1 Tab by mouth every eight (8) hours as needed for Nausea. simvastatin (ZOCOR) 40 mg tablet   No No   Sig: Take 1 Tab by mouth nightly.    tamsulosin (FLOMAX) 0.4 mg capsule   No No   Sig: Take 1 Cap by mouth daily. tiotropium (SPIRIVA WITH HANDIHALER) 18 mcg inhalation capsule   Yes No   Sig: Take 1 Cap by inhalation daily. Facility-Administered Medications: None     Current Facility-Administered Medications   Medication Dose Route Frequency    heparin 25,000 units in dextrose 500 mL infusion  18-36 Units/kg/hr IntraVENous TITRATE    fentaNYL citrate (PF) injection 50 mcg  50 mcg IntraVENous ONCE     Current Outpatient Medications   Medication Sig    ciprofloxacin HCl (CIPRO) 500 mg tablet Take 1 Tab by mouth two (2) times a day for 7 days.  ondansetron (ZOFRAN ODT) 8 mg disintegrating tablet Take 1 Tab by mouth every eight (8) hours as needed for Nausea.  albuterol-ipratropium (DUO-NEB) 2.5 mg-0.5 mg/3 ml nebu 3 mL by Nebulization route every four (4) hours as needed for Cough.  tamsulosin (FLOMAX) 0.4 mg capsule Take 1 Cap by mouth daily.  simvastatin (ZOCOR) 40 mg tablet Take 1 Tab by mouth nightly.  omeprazole (PRILOSEC) 20 mg capsule Take 1 Cap by mouth daily.  albuterol (PROVENTIL HFA, VENTOLIN HFA, PROAIR HFA) 90 mcg/actuation inhaler Take 2 Puffs by inhalation every four (4) hours as needed for Wheezing.  amLODIPine (NORVASC) 2.5 mg tablet Take 1 Tab by mouth daily.  furosemide (LASIX) 20 mg tablet Take 1 Tab by mouth two (2) times a day.  carvedilol (COREG) 6.25 mg tablet Take 1 Tab by mouth two (2) times a day.  losartan (COZAAR) 25 mg tablet Take 1 Tab by mouth daily.  tiotropium (SPIRIVA WITH HANDIHALER) 18 mcg inhalation capsule Take 1 Cap by inhalation daily.  fluticasone-vilanterol (BREO ELLIPTA) 100-25 mcg/dose inhaler Take 1 Puff by inhalation daily.  cholecalciferol (VITAMIN D3) 1,000 unit tablet Take  by mouth daily.  aspirin delayed-release 81 mg tablet Take 81 mg by mouth daily.  cyanocobalamin (VITAMIN B-12) 1,000 mcg sublingual tablet Take 1,000 mcg by mouth daily.      Objective:     Vitals: 03/13/19 1252   BP: 150/83   Pulse: 83   Resp: 20   Temp: 98 °F (36.7 °C)   SpO2: 95%   Weight: 151 lb (68.5 kg)   Height: 5' 4.5\" (1.638 m)       Physical Exam:   General well-developed, mildly anxious and in pain  Skin  (see below for L LE) warm, dry with texture of chronic sun exposure; no jaundice or rashes  HEENT normocephalic, extraocular muscles intact, edentulous, oral mucosa moist  Neck  supple without JVD or bruits; trachea midline  Lungs  respirations unlabored, lungs clear to auscultation bilaterally  Heart  regular rate and rhythm, no appreciable murmurs  Abdomen soft, protuberant but non-distended, non-tender; bowel sounds infrequent but of normal pitch and character  Extremities L LE cool to touch, mottled to hip with diminished sensation below knee, no active ROM below knee; B UE and R LE warm without cyanosis  Pulses  non-palpable, non-Dopplerable left pedal pulses, non-palpable left femoral pulse; bounding R LE pulses  Neurological alert and oriented; sensorimotor deficits as above     Data Review   Recent Labs     03/11/19  2232   WBC 8.5   HGB 11.8*   HCT 34.5*   *     Recent Labs     03/11/19  2232      K 3.5      CO2 25   *   BUN 18   CREA 1.66*       Assessment / Plan / Recommendations / Medical Decision Making:     Hospital Problems  Date Reviewed: 9/18/2017    None          Patient Active Problem List    Diagnosis Date Noted    Prostate [facility] ([facility] 11/01/2016    OA (osteoarthritis) of hip 09/16/2016    History of respiratory failure 06/17/2016    Back pain 06/17/2016    Hernia, inguinal 06/17/2016    Hypokalemia 06/17/2016    Benign prostatic hyperplasia, 06/17/2016    Hypokalemia     CRI (chronic renal insufficiency)     Hyperlipidemia 10/19/2015    Hypertension     Arthritis     GERD (gastroesophageal reflux disease)     CAD (coronary artery disease), atherosclerotic of the native vessel     Chronic obstructive pulmonary disease ([facility] [address]     History of sinoatrial node dysfunction     AICD (automatic cardioverter/defibrillator) present     Chronic systolic CHF (congestive heart failure) ([facility] [address] 10/07/2014         Bernarda Cates is a 82 y.o. male with acute onset L LE ischemia. Patient seen in the ED in conjunction with Dr. Kee Champagne and will be taken urgently to the OR for thrombolytic intervention. - remains NPO  - heparin bolus + IV  - pain control  - consent    Thank you very much for this referral. We appreciate the opportunity to participate in this patient's care. We will follow along with above stated plan. I have seen and examined the patient with the PA and agree with the above assessment and plan. Acute LLe Ischemia, appears to be L CFA acute occlusion. Plan to take to OR for thrombectomy. I have discussed the need for the procedure with the patient. I have discussed the procedure with the patient/family in detail today including but not limited to the risk of death, bleeding requiring transfusion, infection, limb loss, or the necessity of subsequent procedures. All questions were answered. He understands and agree to proceed. Keira Platt MD    Elements of this note have been dictated using speech recognition software. As a result, errors of speech recognition may have occurred. Megha Kaur PA-C  Physician Assistant with [facility]  Reji Kimble.  Beulah Rosales MD / Keira Platt MD

## 2019-03-14 NOTE — CONSULTS
Urology Consult    Subjective:     Date of Consultation:  March 14, 2019    Referring Physician: Familia Payne MD    Reason for Consultation:  Recent urinary retention with candelaria catheter. History of Present Illness:   Mr. Armin Khoury is a 82 y.o.  male who was recently in the ER with urinary retention and had candelaria catheter placed. He had also c/o vomiting. UA negative for infection, urine culture negative. He has hx of prostate cancer and followed at [facility] by Dr. Fay Geronimo. He was sent home from the ER and had urgent follow-up appt scheduled with Dr. Fay Geronimo on 3/13 but could not make it due to current events. He started having pain in his LLE around 10 am Tuesday AM and when he got to the ER, he had cold, pulseless left foot. He was seen by vascular surgery and underwent thrombectomy 3/13. We are consulted due to his urologic hx. Cn has increased to 2.39. Currently, he is resting and pleasant. Candelaria catheter in place draining clear yellow urine. Flomax restarted by primary team.  He has no complaints. Cn is 2.39. He does report he no longer has nausea/vomiting since his procedure. He denies hx of kidney stones. Denies flank pain. Past Medical History:   Diagnosis Date    Abnormal EKG     Acute kidney injury (HCC)     AICD (automatic cardioverter/defibrillator) present     Alterations of sensations     Anemia     Arthritis     Back pain     Back pain 6/17/2016    Benign prostatic hyperplasia 6/17/2016    Bilateral pubic rami fractures (HCC)     CAD (coronary artery disease)     ?  MI 4-2014    CHF (congestive heart failure) (HCC)     Chronic obstructive pulmonary disease (HCC)     Chronic systolic CHF (congestive heart failure) ([facility] [address] 10/7/2014    COPD (chronic obstructive pulmonary disease) (HCC)     CRI (chronic renal insufficiency)     Depression     Dizziness     Dyspnea     Elevated ferritin     Elevated PSA 6/17/2016    GERD (gastroesophageal reflux disease)  Heart failure ([facility] [address]     Hernia, inguinal     Hernia, inguinal 2016    Hyperlipidemia     Hyperlipidemia 10/19/2015    Hypertension     Hypokalemia     Hypokalemia 2016    Ill-defined condition     EF 35%    Keratosis, actinic     Leg cramps     Malaise and fatigue     Malaise and fatigue 2016    OA (osteoarthritis) of hip 2016    Orthostasis     Pneumonia     Respiratory failure ([address] 2016    Due to tractor accident      Sinoatrial node dysfunction ([facility] [address]     Sinusitis       Past Surgical History:   Procedure Laterality Date    HX CARPAL TUNNEL RELEASE      bilat    HX CATARACT REMOVAL      HX CATARACT REMOVAL      bilat    HX COLONOSCOPY      HX HEART CATHETERIZATION      HX ORTHOPAEDIC      internal fixation fractured pelvis    HX OTHER SURGICAL  2006    prostate bx     HX OTHER SURGICAL      transiliac screw fixation of L hemipelvis and ORIF of anterior pelvic ring disruption     HX OTHER SURGICAL      irrigation and debridment of R hip wound: VAC placement     HX OTHER SURGICAL      implanted defibrillator    HX PACEMAKER      dual chamber       Family History   Problem Relation Age of Onset    Heart Attack Son 48        mi    Heart Disease Other       Social History     Tobacco Use    Smoking status: Former Smoker     Packs/day: 2.00     Years: 50.00     Pack years: 100.00     Last attempt to quit: 10/17/2013     Years since quittin.4    Smokeless tobacco: Never Used   Substance Use Topics    Alcohol use: No     Allergies   Allergen Reactions    Lortab [Hydrocodone-Acetaminophen] Other (comments)     Makes him sick. 1/2 tab doesn't make him sick     Lortab [Hydrocodone-Acetaminophen] Other (comments)     Pt states that a whole pill makes him feel sick. 1/2 tab does not.  Other Medication Other (comments)     He has a hx of a prolonged QT interval, so precaution with meds that affect that.          Prior to Admission medications Medication Sig Start Date End Date Taking? Authorizing Provider   ciprofloxacin HCl (CIPRO) 500 mg tablet Take 1 Tab by mouth two (2) times a day for 7 days. 3/12/19 3/19/19 Yes Pranav Doyle MD   ondansetron (ZOFRAN ODT) 8 mg disintegrating tablet Take 1 Tab by mouth every eight (8) hours as needed for Nausea. 3/12/19  Yes Pranav Doyle MD   simvastatin (ZOCOR) 40 mg tablet Take 1 Tab by mouth nightly. 2/21/19  Yes Shanice Dasilva NP   omeprazole (PRILOSEC) 20 mg capsule Take 1 Cap by mouth daily. 2/21/19  Yes Shanice Dasilva NP   albuterol (PROVENTIL HFA, VENTOLIN HFA, PROAIR HFA) 90 mcg/actuation inhaler Take 2 Puffs by inhalation every four (4) hours as needed for Wheezing. 11/15/18  Yes Shanice Dasilva NP   amLODIPine (NORVASC) 2.5 mg tablet Take 1 Tab by mouth daily. 11/15/18  Yes Shanice Dasilva NP   furosemide (LASIX) 20 mg tablet Take 1 Tab by mouth two (2) times a day. 11/15/18  Yes Shanice Dasilva NP   carvedilol (COREG) 6.25 mg tablet Take 1 Tab by mouth two (2) times a day. 6/29/18  Yes Paddy Aquino MD   losartan (COZAAR) 25 mg tablet Take 1 Tab by mouth daily. 5/15/18  Yes Shanice Dasilva NP   aspirin delayed-release 81 mg tablet Take 81 mg by mouth daily. Yes Provider, Historical   albuterol-ipratropium (DUO-NEB) 2.5 mg-0.5 mg/3 ml nebu 3 mL by Nebulization route every four (4) hours as needed for Cough. 2/27/19   Shanice Dasilva NP   tamsulosin (FLOMAX) 0.4 mg capsule Take 1 Cap by mouth daily. 2/21/19   Shanice Dasilva NP   tiotropium (SPIRIVA WITH HANDIHALER) 18 mcg inhalation capsule Take 1 Cap by inhalation daily. Provider, Historical   fluticasone-vilanterol (BREO ELLIPTA) 100-25 mcg/dose inhaler Take 1 Puff by inhalation daily. Provider, Historical   cholecalciferol (VITAMIN D3) 1,000 unit tablet Take  by mouth daily.     Provider, Historical   cyanocobalamin (VITAMIN B-12) 1,000 mcg sublingual tablet Take 1,000 mcg by mouth daily.    Provider, Historical         Review of Systems:  A comprehensive review of systems was negative except for that written in the HPI. Objective:     Patient Vitals for the past 8 hrs:   BP Temp Pulse Resp SpO2   19 1248 100/57 98 °F (36.7 °C) 80 17 93 %   19 0831 107/69 97.9 °F (36.6 °C) 81 16 95 %   19 0829     99 %     Temp (24hrs), Av.1 °F (36.7 °C), Min:97.4 °F (36.3 °C), Max:98.9 °F (37.2 °C)      Intake and Output:    1901 -  0700  In: 6674 [P.O.:650; I.V.:1000]  Out: 255 [Urine:225]    Physical Exam:            General:    alert, fatigued, cooperative                     Skin:  no rash or abnormalities                HEENT:  PERRLA, EOMI, fundi benign        Throat/Neck:  normal and no erythema or exudates noted. Lymph nodes:  Cervical, supraclavicular, and axillary nodes normal.                 Lungs:  clear to auscultation bilaterally      Cardiovascular:  Regular rate and rhythm, S1S2 present, without murmur or extra heart sounds, pedal pulses normal and no edema             Abdomen[de-identified]  soft, non-tender. Bowel sounds normal. No masses,  no organomegaly             Genitalia:  defer exam          Extremities:  peripheral pulses 2+ and symmetric       Assessment:     Principal Problem:    Ischemia of left lower extremity (3/13/2019)    Active Problems:    Chronic systolic CHF (congestive heart failure) (HCC) (10/7/2014)      Overview: [place] Class I.  EF 35%. CRI (chronic renal insufficiency) ()      Thrombus (3/13/2019)      Urinary retention with candelaria catheter in place. Hx of prostate CA. Plan: With rise in Cn and with recent urinary retention, will get renal US to evaluate further. No recent imaging. He denies hx of kidney stones and denies nausea/vomiting, denies flank pain. Continue candelaria catheter. Pt will follow-up with his primary urologist, Dr. Rambo Ram,  at time of d/c. Continue flomax daily.        Signed By: Mohamud Pope NP                         March 14, 2019     I have reviewed the above note and examined the patient. I agree with the exam, assessment and plan. Hernandez Beyer M.D.     [facility]  [address] [place] [address] [address]  Phone: [phone]  Fax: [phone]

## 2019-03-14 NOTE — PROGRESS NOTES
Problem: Falls - Risk of  Goal: *Absence of Falls  Document Talha Fall Risk and appropriate interventions in the flowsheet. Outcome: Progressing Towards Goal  Fall Risk Interventions:            Medication Interventions: Evaluate medications/consider consulting pharmacy, Patient to call before getting OOB, Teach patient to arise slowly    Elimination Interventions: Call light in reach, Elevated toilet seat, Patient to call for help with toileting needs, Toilet paper/wipes in reach, Toileting schedule/hourly rounds             Problem: Pressure Injury - Risk of  Goal: *Prevention of pressure injury  Document Chance Scale and appropriate interventions in the flowsheet. Pressure Injury Interventions:             Activity Interventions: Increase time out of bed, PT/OT evaluation, Pressure redistribution bed/mattress(bed type)    Mobility Interventions: HOB 30 degrees or less, Pressure redistribution bed/mattress (bed type)    Nutrition Interventions: Document food/fluid/supplement intake

## 2019-03-14 NOTE — CONSULTS
[facility] Cardiology Consult                Date of  Admission: 3/13/2019 12:39 PM     Primary Care Physician:  Dr. Colt Ahumada  Primary Cardiologist:  Dr. Melida Ferro  Referring Physician:  Dr. Jackelyn Coppola Physician:  Dr. Melida Ferro    CC/Reason for consult: Thrombosis, ? Cardiac source    Juanito Henning is a 82 y.o. male with a PMH of CAD (nonobstructive disease on cath in 2014), HTN, HLD, HFrEF (EF 35% on echo 2014), [employer] ICD, CKI, smoker and BPH, who presented to the ED with c/o LLE pain. In the ED the leg was noted to be cold and blue. No pedal pulses noted in the ED. Patient was taken urgently to OR for thrombolytic intervention. Cardiology was consult for work up of possible cardiac source. Interrogation of his ICD revealed new onset of pAF starting on 3-6-19, with longest episode being 15 minutes.   Echo pending    Patient Active Problem List   Diagnosis Code    Chronic systolic CHF (congestive heart failure) ([facility] I50.22    Hypertension I10    Arthritis M19.90    GERD (gastroesophageal reflux disease) K21.9    CAD (coronary artery disease), atherosclerotic of the native vessel I25.10    Chronic obstructive pulmonary disease ([address] J44.9    History of sinoatrial node dysfunction Z86.79    Hyperlipidemia E78.5    AICD (automatic cardioverter/defibrillator) present Z95.810    History of respiratory failure Z87.09    Back pain M54.9    Hernia, inguinal K40.90    Hypokalemia E87.6    Benign prostatic hyperplasia, N40.0    OA (osteoarthritis) of hip M16.9    Hypokalemia E87.6    CRI (chronic renal insufficiency) N18.9    Prostate cancer ([facility]) C61    Ischemia of left lower extremity I99.8    Thrombus I82.90       Past Medical History:   Diagnosis Date    Abnormal EKG     Acute kidney injury ([address]     AICD (automatic cardioverter/defibrillator) present     Alterations of sensations     Anemia     Arthritis     Back pain     Back pain 6/17/2016    Benign prostatic hyperplasia 6/17/2016    Bilateral pubic rami fractures (HCC)     CAD (coronary artery disease)     ? MI 4-2014    CHF (congestive heart failure) (HCC)     Chronic obstructive pulmonary disease (HCC)     Chronic systolic CHF (congestive heart failure) ([address] 10/7/2014    COPD (chronic obstructive pulmonary disease) (HCC)     CRI (chronic renal insufficiency)     Depression     Dizziness     Dyspnea     Elevated ferritin     Elevated PSA 6/17/2016    GERD (gastroesophageal reflux disease)     Heart failure (HCC)     Hernia, inguinal     Hernia, inguinal 6/17/2016    Hyperlipidemia     Hyperlipidemia 10/19/2015    Hypertension     Hypokalemia     Hypokalemia 6/17/2016    Ill-defined condition     EF 35%    Keratosis, actinic     Leg cramps     Malaise and fatigue     Malaise and fatigue 6/17/2016    OA (osteoarthritis) of hip 9/16/2016    Orthostasis     Pneumonia     Respiratory failure ([address] 6/17/2016    Due to tractor accident      Sinoatrial node dysfunction ([address]     Sinusitis       Past Surgical History:   Procedure Laterality Date    HX CARPAL TUNNEL RELEASE      bilat    HX CATARACT REMOVAL      HX CATARACT REMOVAL      bilat    HX COLONOSCOPY      HX HEART CATHETERIZATION      HX ORTHOPAEDIC      internal fixation fractured pelvis    HX OTHER SURGICAL  4/2006    prostate bx     HX OTHER SURGICAL      transiliac screw fixation of L hemipelvis and ORIF of anterior pelvic ring disruption     HX OTHER SURGICAL      irrigation and debridment of R hip wound: VAC placement     HX OTHER SURGICAL      implanted defibrillator    HX PACEMAKER      dual chamber      Allergies   Allergen Reactions    Lortab [Hydrocodone-Acetaminophen] Other (comments)     Makes him sick. 1/2 tab doesn't make him sick     Lortab [Hydrocodone-Acetaminophen] Other (comments)     Pt states that a whole pill makes him feel sick. 1/2 tab does not.      Other Medication Other (comments)     He has a hx of a prolonged QT interval, so precaution with meds that affect that. Family History   Problem Relation Age of Onset    Heart Attack Son 48        mi    Heart Disease Other         Current Facility-Administered Medications   Medication Dose Route Frequency    albuterol-ipratropium (DUO-NEB) 2.5 MG-0.5 MG/3 ML  3 mL Nebulization Q4H PRN    amLODIPine (NORVASC) tablet 2.5 mg  2.5 mg Oral DAILY    aspirin delayed-release tablet 81 mg  81 mg Oral DAILY    carvedilol (COREG) tablet 6.25 mg  6.25 mg Oral BID    ciprofloxacin HCl (CIPRO) tablet 500 mg  500 mg Oral Q24H    furosemide (LASIX) tablet 20 mg  20 mg Oral BID    losartan (COZAAR) tablet 25 mg  25 mg Oral DAILY    simvastatin (ZOCOR) tablet 20 mg  20 mg Oral QHS    tamsulosin (FLOMAX) capsule 0.4 mg  0.4 mg Oral DAILY    tiotropium (SPIRIVA) inhalation capsule 18 mcg  1 Cap Inhalation DAILY    dextrose 5% and 0.9% NaCl infusion 1,000 mL  1,000 mL IntraVENous CONTINUOUS    sodium chloride (NS) flush 5-40 mL  5-40 mL IntraVENous Q8H    sodium chloride (NS) flush 5-40 mL  5-40 mL IntraVENous PRN    acetaminophen (TYLENOL) tablet 650 mg  650 mg Oral Q4H PRN    oxyCODONE-acetaminophen (PERCOCET) 5-325 mg per tablet 1 Tab  1 Tab Oral Q4H PRN    morphine 10 mg/ml injection 5 mg  5 mg IntraVENous Q3H PRN    naloxone (NARCAN) injection 0.4 mg  0.4 mg IntraVENous PRN    ondansetron (ZOFRAN) injection 4 mg  4 mg IntraVENous Q4H PRN    diphenhydrAMINE (BENADRYL) injection 12.5 mg  12.5 mg IntraVENous Q4H PRN    heparin 25,000 units in dextrose 500 mL infusion  18-36 Units/kg/hr IntraVENous TITRATE    albuterol (PROVENTIL VENTOLIN) nebulizer solution 2.5 mg  2.5 mg Nebulization Q4H PRN       Review of Systems   Constitutional: Negative. HENT: Negative. Eyes: Negative. Respiratory: Negative. Cardiovascular: Positive for claudication. Gastrointestinal: Negative. Genitourinary: Negative. Musculoskeletal: Negative. Skin: Negative. Neurological: Negative. Endo/Heme/Allergies: Negative. Psychiatric/Behavioral: Negative. Physical Exam  Vitals:    03/14/19 0047 03/14/19 0546 03/14/19 0829 03/14/19 0831   BP: 107/57 112/67  107/69   Pulse: 81 80  81   Resp: 14 16 16   Temp: 97.8 °F (36.6 °C) 97.7 °F (36.5 °C)  97.9 °F (36.6 °C)   SpO2: 96% 97% 99% 95%   Weight:  67.8 kg (149 lb 6.4 oz)     Height:           Physical Exam:  Physical Exam   Constitutional: He is oriented to person, place, and time and well-developed, well-nourished, and in no distress. HENT:   Head: Normocephalic. Eyes: Pupils are equal, round, and reactive to light. Neck: Normal range of motion. Cardiovascular: Normal rate. Pulmonary/Chest: Effort normal and breath sounds normal.   Abdominal: Soft. Bowel sounds are normal.   Musculoskeletal:        Left hip: He exhibits decreased range of motion and tenderness. S/P left femoral thrombo   Neurological: He is alert and oriented to person, place, and time. Skin: Skin is warm and dry. Psychiatric: Mood, memory, affect and judgment normal.       Cardiographics    Telemetry: normal sinus rhythm  ECG: normal sinus rhythm  Echocardiogram: pending    Labs:   Recent Labs     03/14/19  0404 03/13/19  1303 03/13/19  1302    137  --    K 4.0 3.7  --    BUN 31* 23  --    CREA 2.39* 2.16*  --    * 137*  --    WBC 10.0  --  18.4*   HGB 12.1*  --  11.7*   HCT 35.8*  --  34.5*   PLT 74*  --  111*   INR  --  1.3  --         Assessment/Plan:     Assessment:      Principal Problem:    Ischemia of left lower extremity-- s/p Left  CFA thromboembolectomy       Paroxsymal a fib-- new onset, likely source of thrombus. Change heparin to eliquis 5 mg Q12h. Will do 5 mg dose for thrombus due to pt age and renal function. Echo pending, if thrombus noted on echo will need to change OAC to coumadin. Chronic systolic CHF (congestive heart failure)-- appears euvolemic. No c/o shortness of breath or edema. Overview: [place] Class I.  EF 35%. CRI (chronic renal insufficiency) -- creatinine up slightly from baseline. Continue to monitor closely. Thrombus -- see above          Thank you very much for this referral. We appreciate the opportunity to participate in this patient's care. We will follow along with above stated plan.     Maya Hahn NP  Consulting MD: Nikko Camacho

## 2019-03-14 NOTE — PROGRESS NOTES
Problem: Mobility Impaired (Adult and Pediatric)  Goal: *Acute Goals and Plan of Care (Insert Text)  LTG:  (1.)Mr. Jazzy Bryan will move from supine to sit and sit to supine , scoot up and down and roll side to side with INDEPENDENT within 7 treatment day(s) from flat surface without handrail. (2.)Mr. Jazzy Bryan will transfer from bed to chair and chair to bed with MODIFIED INDEPENDENCE using the least restrictive device within 7 treatment day(s). (3.)Mr. Jazzy Bryan will ambulate with MODIFIED INDEPENDENCE for 250+ feet with the least restrictive device within 7 treatment day(s) while maintaining normal vital signs. (4.)Mr. Jazzy Bryan will perform 1-2 steps with HR and SBA within 7 treatment days for safety ascending and descending stairs for home.   ___________________________________________________________________________________________    PHYSICAL THERAPY: Initial Assessment and PM 3/14/2019  INPATIENT: PT Visit Days : 1  [place] / [place] B / Product Type: Medicare /       NAME/AGE/GENDER: Brenarda Cates is a 82 y.o. male   PRIMARY DIAGNOSIS: Thrombus [I82.90] Ischemia of left lower extremity Ischemia of left lower extremity  Procedure(s) (LRB):  LEFT LEG THROMBOLECTOMY (Left)  1 Day Post-Op  ICD-10: Treatment Diagnosis:    · Generalized Muscle Weakness (M62.81)  · Difficulty in walking, Not elsewhere classified (R26.2)   Precaution/Allergies:  Lortab [hydrocodone-acetaminophen]; Lortab [hydrocodone-acetaminophen]; and Other medication      ASSESSMENT:     Mr. Jazzy Bryan presents with decreased bed mobility, transfers, ambulation, balance, activity tolerance, strength and overall general functional mobility s/p hospital admission with above surgery on 3/13/19. Pt A & O x 4, grandson present in room. Pt normally independent at baseline, lives alone, drives. Pt has O2 for home use PRN, 2 L/min.  Pt today on 3 L/min O2 continuous, states no pain at rest in L LE in supine, does complain of abdominal pain, RN present in room and aware. Pt has all DME needs at home due to prior surgery if needed at discharge. Pt demo minimal mobility of L LE in supine, required AAROM to PROM L LE; L LE 1/5 grossly, hip ab 2/5, DF 1+/5. Pt without sensation L ankle/foot to touch, pedal pulse intact. Pt MIN A for bed mobility, transfers. Pt used RW for support in standing, performed weight shifting to R and L, L knee braced by PT with weight shift. Pt able to take side steps to [facility] with RW for ~2', verbal and tactile cues required. Pt overall functioning well below baseline level of independent. Pt would benefit from further skilled PT services at discharge. PT to cont to follow for acute care needs. This section established at most recent assessment   PROBLEM LIST (Impairments causing functional limitations):  1. Decreased Strength  2. Decreased ADL/Functional Activities  3. Decreased Transfer Abilities  4. Decreased Ambulation Ability/Technique  5. Decreased Balance  6. Increased Pain  7. Decreased Activity Tolerance  8. Decreased Flexibility/Joint Mobility  9. Decreased [place] with Home Exercise Program   INTERVENTIONS PLANNED: (Benefits and precautions of physical therapy have been discussed with the patient.)  1. Balance Exercise  2. Bed Mobility  3. Family Education  4. Gait Training  5. Home Exercise Program (HEP)  6. Therapeutic Activites  7. Therapeutic Exercise/Strengthening  8. Transfer Training     TREATMENT PLAN: Frequency/Duration: 3 times a week for duration of hospital stay  Rehabilitation Potential For Stated Goals: Good     RECOMMENDED REHABILITATION/EQUIPMENT: (at time of discharge pending progress): Due to the probability of continued deficits (see above) this patient will likely need continued skilled physical therapy after discharge. Equipment:    None at this time              HISTORY:   History of Present Injury/Illness (Reason for Referral):   This patient is a 82 y.o. white male seen at the request of Vipul Valentin MD and evaluated for cold, pulseless left foot. PMH with CHF, HTN, AICD in place, COPD, past prostate cancer, chronic renal insufficiency and other medical issues, patient was brought in to ED via EMS with painful L LE. Pedal pulses present in field per paramedic, with pulses lost in transport ~12h00.      Patient states he ambulated around house as normal until ~10h00, then L LE became painful. Patient reports sensation to only noxious stimuli below knee, significant pain above knee. States he cannot move left foot or toes. Patient takes daily ASA and took one already this AM; he denies other anticoagulants. Cardiac history includes defibrillator placement ~5y ago, [facility] with nonischemic cardiomyopathy.      Patient was in ED yesterday with urinary retention, received candelaria catheter and was to follow-up with outpatient urology. He states he last ate Monday and has been nauseated since with some vomiting. He has extensive past smoking history but admits to intermittent tobacco use in recent past, last cigarette was last week.  He denies fever or chills.        Past Medical History/Comorbidities:   Mr. Sharla Painter  has a past medical history of Abnormal EKG, Acute kidney injury ([address] AICD (automatic cardioverter/defibrillator) present, Alterations of sensations, Anemia, Arthritis, Back pain, Back pain (6/17/2016), Benign prostatic hyperplasia (6/17/2016), Bilateral pubic rami fractures (HCC), CAD (coronary artery disease), CHF (congestive heart failure) ([address] Chronic obstructive pulmonary disease ([address] Chronic systolic CHF (congestive heart failure) ([address] (10/7/2014), COPD (chronic obstructive pulmonary disease) ([address] CRI (chronic renal insufficiency), Depression, Dizziness, Dyspnea, Elevated ferritin, Elevated PSA (6/17/2016), GERD (gastroesophageal reflux disease), Heart failure ([address] Hernia, inguinal, Hernia, inguinal (6/17/2016), Hyperlipidemia, Hyperlipidemia (10/19/2015), Hypertension, Hypokalemia, Hypokalemia (6/17/2016), Ill-defined condition, Keratosis, actinic, Leg cramps, Malaise and fatigue, Malaise and fatigue (6/17/2016), OA (osteoarthritis) of hip (9/16/2016), Orthostasis, Pneumonia, Respiratory failure ([facility] (6/17/2016), Sinoatrial node dysfunction ([facility] and Sinusitis. Mr. Ben Austin  has a past surgical history that includes hx pacemaker; hx cataract removal; hx other surgical (4/2006); hx other surgical; hx other surgical; hx orthopaedic; hx carpal tunnel release; hx heart catheterization; hx cataract removal; hx colonoscopy; and hx other surgical.  Social History/Living Environment:   Home Environment: Private residence  # Steps to Enter: 0  One/Two Story Residence: One story  Living Alone: Yes  Support Systems: Child(chantelle)  Patient Expects to be Discharged to[de-identified] Private residence  Current DME Used/Available at Home: [employer], straight, Commode, bedside, Oxygen, portable, Shower chair, Walker, rolling, Wheelchair(independent at [employer])  Prior Level of Function/Work/Activity:  Lives alone, independent at baseline, drives; has O2 for home use PRN  Personal Factors:          Sex:  male        Age:  82 y.o.         Overall Behavior:  agreeable   Number of Personal Factors/Comorbidities that affect the Plan of Care:  CHF, COPD, age 3+: HIGH COMPLEXITY   EXAMINATION:   Most Recent Physical Functioning:   Gross Assessment:  AROM: Grossly decreased, non-functional(L LE)  PROM: Within functional limits(B LE)  Strength: Grossly decreased, non-functional(L LE trace to 1/5 hip flex, knee ext, hip ab 2/5, DF 2/5)  Coordination: Generally decreased, functional  Sensation: Impaired(L foot/ankle w/o sensation)               Posture:  Posture (WDL): Exceptions to WDL  Posture Assessment: Rounded shoulders  Balance:  Sitting: Intact  Standing: Impaired  Standing - Static: Fair  Standing - Dynamic : Fair Bed Mobility:  Supine to Sit: Minimum assistance(B LE)  Sit to Supine: Minimum assistance(B LE)  Scooting: Contact guard assistance;Minimum assistance  Wheelchair Mobility:     Transfers:  Sit to Stand: Minimum assistance  Stand to Sit: Minimum assistance  Gait:     Base of Support: Center of gravity altered;Narrowed; Shift to right  Speed/Tonya: Pace decreased (<100 feet/min)  Step Length: Left shortened;Right shortened  Swing Pattern: Left asymmetrical  Gait Abnormalities: Decreased step clearance; Steppage gait  Distance (ft): 2 Feet (ft)(to [facility])  Assistive Device: Walker, rolling  Ambulation - Level of Assistance: Minimal assistance  Interventions: Safety awareness training; Tactile cues; Verbal cues      Body Structures Involved:  1. Muscles Body Functions Affected:  1. Movement Related Activities and Participation Affected:  1. General Tasks and Demands  2. Mobility  3. Self Care   Number of elements that affect the Plan of Care: 4+: HIGH COMPLEXITY   CLINICAL PRESENTATION:   Presentation: Evolving clinical presentation with changing clinical characteristics: MODERATE COMPLEXITY   CLINICAL DECISION MAKIN [facility] Mobility Inpatient Short Form  How much difficulty does the patient currently have. .. Unable A Lot A Little None   1. Turning over in bed (including adjusting bedclothes, sheets and blankets)? [] 1   [] 2   [x] 3   [] 4   2. Sitting down on and standing up from a chair with arms ( e.g., wheelchair, bedside commode, etc.)   [] 1   [] 2   [x] 3   [] 4   3. Moving from lying on back to sitting on the side of the bed? [] 1   [] 2   [x] 3   [] 4   How much help from another person does the patient currently need. .. Total A Lot A Little None   4. Moving to and from a bed to a chair (including a wheelchair)? [] 1   [] 2   [x] 3   [] 4   5. Need to walk in hospital room? [] 1   [] 2   [x] 3   [] 4   6. Climbing 3-5 steps with a railing? [] 1   [x] 2   [] 3   [] 4   © , Trustees of [address] [place] [address] [address] [address], under license to [employer].  All rights reserved      Score:  Initial: 17 Most Recent: X (Date: -- )    Interpretation of Tool:  Represents activities that are increasingly more difficult (i.e. Bed mobility, Transfers, Gait). Medical Necessity:     · Patient is expected to demonstrate progress in strength, range of motion, balance and coordination to decrease assistance required with overall functional mobility, transfers, ambulation. · Patient demonstrates good rehab potential due to higher previous functional level. Reason for Services/Other Comments:  · Patient continues to require present interventions due to patient's inability to perform bed mobility, transfers, ambulation safely and effectively. Use of outcome tool(s) and clinical judgement create a POC that gives a: Clear prediction of patient's progress: LOW COMPLEXITY            TREATMENT:   (In addition to Assessment/Re-Assessment sessions the following treatments were rendered)   Pre-treatment Symptoms/Complaints:  \"I can't move that leg\"  Pain: Initial:   Pain Intensity 1: 0(in leg; states sharp abdominal pain, RN aware)  Post Session:  0/10 post session in chair     Therapeutic Activity: (    10 min): Therapeutic activities including Bed transfers, Ambulation on level ground, weight shifting, walker safety, posture to improve mobility, strength, balance and coordination. Required minimal Safety awareness training; Tactile cues; Verbal cues to promote static and dynamic balance in standing and promote coordination of bilateral, lower extremity(s).      Braces/Orthotics/Lines/Etc:   · IV  · candelaria catheter  · O2 Device: Nasal cannula  Treatment/Session Assessment:    · Response to Treatment:  Tolerated well  · Interdisciplinary Collaboration:   o Physical Therapist  o Registered Nurse  · After treatment position/precautions:   o Supine in bed  o Bed/Chair-wheels locked  o Call light within reach  o RN notified  o Family at bedside  o Side rails x 2  o procedure at bedside   · Compliance with Program/Exercises: Will assess as treatment progresses  · Recommendations/Intent for next treatment session: \"Next visit will focus on advancements to more challenging activities\".   Total Treatment Duration:  PT Patient Time In/Time Out  Time In: 1248  Time Out: [address], PT

## 2019-03-14 NOTE — PROGRESS NOTES
Bedside and Verbal shift change report given to [facility]. Report included the following information SBAR, Kardex, MAR, Accordion and Recent Results. Heparin gtt verified. IV heparin rate has been adjusted based on the most recent PTT results.     Lab Results   Component Value Date/Time    aPTT 65.8 (H) 03/14/2019 04:04 AM

## 2019-03-14 NOTE — PROGRESS NOTES
Care Management Interventions  PCP Verified by CM: Yes  Last Visit to PCP: 02/21/19  Mode of Transport at Discharge: Other (see comment)(Daughter Neema Martinez [phone])  Transition of Care Consult (CM Consult): Discharge Planning, Home Health(vs SNF)  Discharge Durable Medical Equipment: No  Physical Therapy Consult: Yes  Occupational Therapy Consult: No  Current Support Network: Own Home, Lives Alone  Confirm Follow Up Transport: Family  Plan discussed with Pt/Family/Caregiver: Yes  Freedom of Choice Offered: Yes  Discharge Location  Discharge Placement: Home with home health(vs SNF)      Pt admitted to 3rd floor tele s/p thrombolectomy. CM met with pt to discuss CM needs & DCP. Pt is A&Ox4. Pt is indep at home with all ADLS. Pt lives alone. Pt has  O2- portable tank & concentrator, uses as needed- from [facility]; no further DME needs. Pt has no difficulty with obtaining medications or transport. Pt has never been to STR. PT eval is pending. DCP home with [facility] vs rehab. CM to continue to monitor. 1400 PT recommendation: rehab. CM met with pt & grandson to discuss rehab choices. CM left list of STR at bedside, Pt will discuss with family. CM to followup tomorrow- 3/15 PPD ordered.

## 2019-03-14 NOTE — PROGRESS NOTES
Progress Note    Patient: Renetta Mccloud MRN: [id number]  SSN: [id number]    YOB: 1935  Age: 82 y.o. Sex: male      Admission Date: 3/13/2019    LOS: 1 day     1 Day Post-Op    PROCEDURE(S):  Left lower extremity thromboembolectomy    Subjective:     Patient states he cannot bend left knee. Otherwise, has improved pain and sensation in L LE. Mild incisional pain at left groin. Tolerating CLD with minimal nausea, no vomiting. Urology consulted for urinary retention, will get Cardiology's help determining etiology of thrombus. Objective:     Vitals:    03/14/19 0047 03/14/19 0546 03/14/19 0829 03/14/19 0831   BP: 107/57 112/67  107/69   Pulse: 81 80  81   Resp: 14 16  16   Temp: 97.8 °F (36.6 °C) 97.7 °F (36.5 °C)  97.9 °F (36.6 °C)   SpO2: 96% 97% 99% 95%   Weight:  149 lb 6.4 oz (67.8 kg)     Height:            Physical Exam:   GENERAL: alert, cooperative, no distress  LUNG: clear to auscultation bilaterally, normal respiratory effort  HEART: regular rate and rhythm  ABDOMEN: soft, nontender, nondistended, bowel sounds normoactive  EXTREMITIES: warm, L LE with greatly improved color, sensation and movement; pain with passive knee ROM, no AROM  PULSES: radial, brachial, DP and PT palpable 2+ and symmetric bilaterally    Lab/Data Review: All lab results for the last 24 hours reviewed. Assessment / Plan / Recommendations / Medical Decision Making:     Principal Problem:    Ischemia of left lower extremity (3/13/2019)    Active Problems:    Chronic systolic CHF (congestive heart failure) (HCC) (10/7/2014)      Overview: [place] Class I.  EF 35%.         CRI (chronic renal insufficiency) ()      Thrombus (3/13/2019)        Consult Cardiology for thrombus w/u, transition to oral anticoagulation  Urology consult for recent ED visit for UTI, urinary retention  Mobilize, PT consult  Continue CLD for now      Signed By: Diamantina Nageotte, PA-C    March 14, 2019      Physician Assistant with Mick Esquivel [facility] Vascular Surgery  Melvin Milton.  Jalen Forde MD / Fercho Grissom MD

## 2019-03-14 NOTE — PROGRESS NOTES
Bedside and Verbal shift change report given to self (oncoming nurse) by Pepe Cunningham (offgoing nurse). Report included the following information SBAR, Kardex, Intake/Output and MAR.

## 2019-03-15 NOTE — CONSULTS
[address]    Name:  Corrina Turner  MR#:  [id number]  :  1935  ACCOUNT #:  [de-identified]  DATE OF SERVICE:  03/15/2019      REASON FOR CONSULTATION:  We are seeing the patient at the request of Dr. Leelee Smith with regards to acute-on-chronic kidney disease. HISTORY OF PRESENT ILLNESS:  The patient is an 17-year-old male who presented to [facility] on 2019 with a painful left lower extremity. His foot was found to be cold and ischemic and he was taken to the operating room and had a thrombectomy done by Dr. Jordin Gordon. He is also found to have paroxysmal atrial fibrillation which they believe predisposed him to developing this embolism. We have been asked to see him with regards to worsening azotemia. The patient was told several years ago by his primary care physician that he had abnormal kidney function but knows very little else. Review of his records indicate that he has had an elevated creatinine dating back to at least 2014 when his creatinine was 1.40. It has varied between 1.2 and 1.57 over the past few years. On 2019, his creatinine was 1.66 when he was admitted. Today, he has a sodium of 134, potassium is 2.9, chloride 100, CO2 is 27, blood sugar 137, BUN is 38, creatinine is 3.13.  A urinalysis on admission showed urine specific gravity of 1.021 with only a trace of protein. The patient has had some problems with urinary retention and so he needed a Fuentes catheter placed for the past three or four days. PAST MEDICAL HISTORY:  Significant for atherosclerotic coronary artery disease, nonobstructive, on cath in . He has chronic hypertension. He has hyperlipidemia. He has heart failure with reduced ejection fraction of 35%, status post ICD, and a history of prostate cancer treated with radiation therapy. ALLERGIES:  HE HAS A HISTORY OF DRUG ALLERGY TO LORTAB WHICH CAUSES NAUSEA AND VOMITING. CURRENT MEDICATIONS:  1. Norvasc 2.5 mg p.o. daily. 2.  Eliquis 5 mg p.o. q.12 hours. 3.  Aspirin 81 mg p.o. daily. 4.  Coreg 6.25 mg p.o. daily. 5.  Cipro 500 mg p.o. daily. 6.  Lasix 20 mg p.o. twice a day. 7.  Cozaar 25 mg p.o. daily/  8. K-Dur 40 mEq p.o. x2 dosages. 9.  Zocor 20 mg p.o. daily. 10.  Flomax 0.4 mg p.o. daily. 11.  Spiriva daily. SOCIAL HISTORY:  He is a . He lives alone. He still smokes cigarettes. He does not drink any alcohol. FAMILY HISTORY:  Negative for any kidney disease. REVIEW OF SYSTEMS:  He denies any fevers, chills. No headaches, dizzy spells, fainting spells. No shortness of breath, cough, wheezing. No chest pain or palpitations. He had some nausea and vomiting earlier this week but that has resolved after admission. No abdominal pain. No constipation or diarrhea. No dysuria or polyuria. PHYSICAL EXAMINATION:  GENERAL:  An elderly white male in no acute distress. VITAL SIGNS:  He is afebrile, blood pressure is 113/55, pulse is 77, respirations are 16, they are not labored. HEENT:  His head is normocephalic. Eyes:  Pupils are equal, react to light and accommodation. Extraocular muscles are intact. Fundi were not visualized. LUNGS:  Clear. HEART:  Regular rate and rhythm. ABDOMEN:  Soft. Bowel sounds are present. There is no hepatosplenomegaly. GENITAL/RECTAL:  Exam was deferred. EXTREMITIES:  He has no peripheral edema. IMPRESSION:  1. Acute-on-chronic stage III kidney disease, probably some mild acute tubular necrosis versus prerenal azotemia. 2.  Status post thrombectomy, left leg. 3.  Paroxysmal atrial fibrillation. 4.  Systolic congestive heart failure. 5.  Type 2 diabetes mellitus. 6.  Chronic hypertension. 7.  Hyperlipidemia. PLAN:  A renal ultrasound has already been ordered. We will get repeat urine studies and check serial labs.   Hopefully, over the next several days, that his renal function will start returning back to baseline with a GFR of about 40% to 45%. Thank you very much for allowing us to see him and helping in his care. We will follow closely with you.       Ricardo Bond MD      [id number]  D:  03/15/2019 10:30  T:  03/15/2019 14:30  JOB #:  [id number]  CC:  [facility]       MD Ariel Rodgers MD

## 2019-03-15 NOTE — PROGRESS NOTES
Renal US reviewed and show no hydronephrosis or upper tract obstruction. Recommend continuation of candelaria catheter for urinary retention and elevated Cr until patient recovers from acute thrombus and hospitalization. He should follow up with his primary Urologist Dr. Michelle Palafox in about 1 week for catheter removal and voiding trial in clinic. Renal US: IMPRESSION: Essentially unremarkable renal ultrasound. There is no  hydronephrosis. A small amount of nonspecific perinephric fluid is noted adjacent to the left kidney. Will sign off. Call with questions.      Hernandez Khalil M.D.     [facility]  [address] [place] [address] [address]  Phone: [phone]  Fax: [phone]

## 2019-03-15 NOTE — PROGRESS NOTES
Problem: Breathing Pattern - Ineffective  Goal: *Absence of hypoxia  Outcome: Progressing Towards Goal  91% on 2L, breath sounds clear, nasally congested

## 2019-03-15 NOTE — PROGRESS NOTES
CM met with pt zacarias, 1st choice [facility]. Referral sent. Pt will not need precert. CM to continue to follow for dc needs. Care Management Interventions  PCP Verified by CM: Yes  Last Visit to PCP: 02/21/19  Mode of Transport at Discharge:  Other (see comment)(Lela Chen [phone])  Transition of Care Consult (CM Consult): SNF, Discharge Planning  Partner SNF: Yes  Discharge Durable Medical Equipment: No  Physical Therapy Consult: Yes  Occupational Therapy Consult: No  Speech Therapy Consult: No  Current Support Network: Own Home, Lives Alone  Confirm Follow Up Transport: Family  Plan discussed with Pt/Family/Caregiver: Yes  Freedom of Choice Offered: Yes  Discharge Location  Discharge Placement: Rehab Unit Subacute

## 2019-03-15 NOTE — PROGRESS NOTES
Bedside and verbal report given to Dominique Nicholson by Luz Maria Becerril. Report included the following information SBAR, Kardex, Intake/Output and MAR. Oncoming RN assumed care of the patient.

## 2019-03-15 NOTE — PROGRESS NOTES
150 ml Bolus completed. BP reassessed 86/56. PARIS Leon contacted. Instructed to start cardizem gtt with NO bolus if HR sustains in the 120s or above. If SBP is <80, restart NS at 75 ml/hr. No fuirther action taken at this time. Patient educated an asymptomatic. Family member present and educated as well. Will continue to monitor.

## 2019-03-15 NOTE — PROGRESS NOTES
Progress Note    Patient: Maia Horowitz MRN: [id number]  SSN: [id number]    YOB: 1935  Age: 82 y.o. Sex: male      Admission Date: 3/13/2019    LOS: 2 days     2 Days Post-Op    PROCEDURE(S):  Left femoral thromboembolectomy    Subjective:     Patient has no complaints, denies left groin and LE pain, reports improved sensation in L LE. Left knee ROM improved, working with PT. Tolerating FLD now. Renal function deteriorating. Cardiology service now primary.     Objective:     Vitals:    03/15/19 0104 03/15/19 0423 03/15/19 0753 03/15/19 0828   BP: 98/57 110/64  113/55   Pulse: 74 75  77   Resp: 17 17  16   Temp: 99.5 °F (37.5 °C) 99.8 °F (37.7 °C)  97.8 °F (36.6 °C)   SpO2: 92% 91% 91% 92%   Weight:  135 lb 8 oz (61.5 kg)     Height:            Physical Exam:   GENERAL: alert, cooperative, no distress  LUNG: clear to auscultation bilaterally, normal respiratory effort  HEART: regular rate and rhythm  ABDOMEN: soft, nontender, nondistended, bowel sounds normoactive  EXTREMITIES: warm, L LE with normal coloration, greatly improved sensation and ROM  PULSES: radial, brachial, DP and PT palpable 2+ and symmetric bilaterally    Lab/Data Review:  BMP:   Lab Results   Component Value Date/Time     (L) 03/15/2019 08:27 AM    K 2.9 (LL) 03/15/2019 08:27 AM     03/15/2019 08:27 AM    CO2 27 03/15/2019 08:27 AM    AGAP 7 03/15/2019 08:27 AM     (H) 03/15/2019 08:27 AM    BUN 38 (H) 03/15/2019 08:27 AM    CREA 3.13 (H) 03/15/2019 08:27 AM    GFRAA 25 (L) 03/15/2019 08:27 AM    GFRNA 20 (L) 03/15/2019 08:27 AM     CBC:   Lab Results   Component Value Date/Time    WBC 9.5 03/15/2019 08:27 AM    HGB 10.5 (L) 03/15/2019 08:27 AM    HCT 30.7 (L) 03/15/2019 08:27 AM     (L) 03/15/2019 08:27 AM        Assessment / Plan / Recommendations / Medical Decision Making:     Principal Problem:    Ischemia of left lower extremity (3/13/2019)    Active Problems:    Chronic systolic CHF (congestive heart failure) ([address] (10/7/2014)      Overview: [place] Class I.  EF 35%. CRI (chronic renal insufficiency) ()      Thrombus (3/13/2019)        Continue care per primary  Mobilize, appreciate PT  Dry gauze dressing to left groin, change daily and any time this becomes moist  We will continue to follow loosely      Signed By: Eli Mckenna PA-C    March 15, 2019      Physician Assistant with [facility] Vascular Surgery  Reji Kimble.  Beulah Rosales MD / Keira Platt MD

## 2019-03-15 NOTE — OP NOTES
[facility]  OPERATIVE REPORT    Name:  Brinda Rivera  MR#:  [id number]  :  1935  ACCOUNT #:  [de-identified]  DATE OF SERVICE:  2019    PREOPERATIVE DIAGNOSIS:  Acute left lower extremity ischemia (left common femoral artery occlusion). POSTOPERATIVE DIAGNOSIS:  Acute left lower extremity ischemia (left common femoral artery occlusion). PROCEDURE PERFORMED:  Left femoral thromboembolectomy. SURGEON:  Kayla Alberto MD    ASSISTANT:  None. ANESTHESIA:  General endotracheal.    COMPLICATIONS:  None. SPECIMENS REMOVED:  Left femoral thrombus. IMPLANTS:  _____. ESTIMATED BLOOD LOSS:  50 mL. DRAINS:  None. INDICATIONS:  An 82year-old that presented to the emergency room with acute onset of left lower extremity pain and discoloration. He was found to have femoral artery occlusion by ultrasound. He was taken to the operating room for definitive repair. DESCRIPTION OF PROCEDURE:  The patient was brought to the OR and placed on the operating table in the supine position. Following adequate general endotracheal anesthesia and a time-out procedure, the left groin was draped and prepped in sterile fashion. A vertical incision was made. The wound was deepened using Bovie to control bleeding. The dissection was carried down to the level of the common femoral artery. He was found to be pulseless. The common femoral artery was encircled with vessel loops proximally in Pineda fashion. Next, the profunda and SFA were isolated and several vessel loops in Pineda fashion as well. The patient was systemically heparinized. Next, the common profunda and SFA were occluded sequentially. A transverse arteriotomy was made just above the bifurcation of the common femoral artery. It was organized fresh thrombus present. A #4 Sohail catheter was then passed for about 3 cm proximally and returned with a organized fibrin cap and excellent pulsatile inflow.   Next, the Sohail was sent down to profunda and SFA, both of which returned thrombus followed by brisk back bleeding. At this point, the native vessels were flushed. The site of the arteriotomy was irrigated and then closed with a running 5-0 Prolene suture brought up from each corner. Prior to completion of the closure, the vessels were flushed and back-bled. The closure was then completed and flow restored. At this point, there was excellent flow in the profunda and SFA. There was palpable dorsalis pedis pulse. At this point, Protamine was administered to reverse the heparin effect. The wound was then irrigated, hemostasis was achieved. The wounds were then closed in layers with Vicryl suture. Sterile dry dressings were applied. The patient was awakened from anesthesia and transported to the recovery room in stable condition. The patient tolerated the procedure well. No complications. All needle and sponge counts were reported as correct.         Mesha Henry MD      [id number]  D:  03/14/2019 15:57  T:  03/15/2019 0:38  JOB #:  [id number]

## 2019-03-15 NOTE — PROGRESS NOTES
Problem: Falls - Risk of  Goal: *Absence of Falls  Document Talha Fall Risk and appropriate interventions in the flowsheet. Outcome: Progressing Towards Goal  Fall Risk Interventions:            Medication Interventions: Patient to call before getting OOB, Teach patient to arise slowly    Elimination Interventions: Call light in reach             Problem: Pressure Injury - Risk of  Goal: *Prevention of pressure injury  Document Chance Scale and appropriate interventions in the flowsheet. Outcome: Progressing Towards Goal  Pressure Injury Interventions:             Activity Interventions: Pressure redistribution bed/mattress(bed type)    Mobility Interventions: Pressure redistribution bed/mattress (bed type)    Nutrition Interventions: Document food/fluid/supplement intake

## 2019-03-15 NOTE — PROGRESS NOTES
3/15/2019 6:36 AM    Admit Date: 3/13/2019    Admit Diagnosis: Thrombus [I82.90]      Subjective:    Patient sp embolectomy for embolism likely from newly discovered PAF. Needs [address]      Objective:      Visit Vitals  /64 (BP 1 Location: Left arm, BP Patient Position: At rest)   Pulse 75   Temp 99.8 °F (37.7 °C)   Resp 17   Ht 5' 4.5\" (1.638 m)   Wt 61.5 kg (135 lb 8 oz)   SpO2 91%   BMI 22.90 kg/m²       ROS:  General ROS: negative for - chills  Hematological and Lymphatic ROS: negative for - blood clots or jaundice  Respiratory ROS: no cough, shortness of breath, or wheezing  Cardiovascular ROS: no chest pain or dyspnea on exertion  Gastrointestinal ROS: no abdominal pain, change in bowel habits, or black or bloody stools  Neurological ROS: no TIA or stroke symptoms    Physical Exam:    Physical Examination: General appearance - alert, well appearing, and in no distress  Mental status - alert, oriented to person, place, and time  Eyes - pupils equal and reactive, extraocular eye movements intact  Neck/lymph - supple, no significant adenopathy  Chest/CV - clear to auscultation, no wheezes, rales or rhonchi, symmetric air entry  Heart - normal rate, regular rhythm, normal S1, S2, no murmurs, rubs, clicks or gallops  Abdomen/GI - soft, nontender, nondistended, no masses or organomegaly  Musculoskeletal - no joint tenderness, deformity or swelling  Extremities - peripheral pulses normal, no pedal edema, no clubbing or cyanosis  Skin - normal coloration and turgor, no rashes, no suspicious skin lesions noted    Current Facility-Administered Medications   Medication Dose Route Frequency    apixaban (ELIQUIS) tablet 5 mg  5 mg Oral Q12H    tuberculin injection 5 Units  5 Units IntraDERMal ONCE    albuterol-ipratropium (DUO-NEB) 2.5 MG-0.5 MG/3 ML  3 mL Nebulization Q4H PRN    amLODIPine (NORVASC) tablet 2.5 mg  2.5 mg Oral DAILY    aspirin delayed-release tablet 81 mg  81 mg Oral DAILY    carvedilol (COREG) tablet 6.25 mg  6.25 mg Oral BID    ciprofloxacin HCl (CIPRO) tablet 500 mg  500 mg Oral Q24H    furosemide (LASIX) tablet 20 mg  20 mg Oral BID    losartan (COZAAR) tablet 25 mg  25 mg Oral DAILY    simvastatin (ZOCOR) tablet 20 mg  20 mg Oral QHS    tamsulosin (FLOMAX) capsule 0.4 mg  0.4 mg Oral DAILY    tiotropium (SPIRIVA) inhalation capsule 18 mcg  1 Cap Inhalation DAILY    dextrose 5% and 0.9% NaCl infusion 1,000 mL  1,000 mL IntraVENous CONTINUOUS    sodium chloride (NS) flush 5-40 mL  5-40 mL IntraVENous Q8H    sodium chloride (NS) flush 5-40 mL  5-40 mL IntraVENous PRN    acetaminophen (TYLENOL) tablet 650 mg  650 mg Oral Q4H PRN    oxyCODONE-acetaminophen (PERCOCET) 5-325 mg per tablet 1 Tab  1 Tab Oral Q4H PRN    morphine 10 mg/ml injection 5 mg  5 mg IntraVENous Q3H PRN    naloxone (NARCAN) injection 0.4 mg  0.4 mg IntraVENous PRN    ondansetron (ZOFRAN) injection 4 mg  4 mg IntraVENous Q4H PRN    diphenhydrAMINE (BENADRYL) injection 12.5 mg  12.5 mg IntraVENous Q4H PRN    albuterol (PROVENTIL VENTOLIN) nebulizer solution 2.5 mg  2.5 mg Nebulization Q4H PRN       Data Review:   @LABRCNT(Na,K,BUN,CREA,WBC,HGB,HCT,PLT,INR,TRP,TCHOL*,Triglyceride*,LDL*,LDLCPOC HDL*,HDL])@    TELEMETRY: nsr    Assessment/Plan:     Principal Problem:    Ischemia of left lower extremity (3/13/2019)    Active Problems:    Chronic systolic CHF (congestive heart failure) (HCC) (10/7/2014)      Overview: [place] Class I.  EF 35%. CRI (chronic renal insufficiency) ()      Thrombus (3/13/2019)    He is on appropriate chf meds there is no lv thrombus seen on echo. [place] [facility]  He is ready to start disposition planning      Felicia Majano MD

## 2019-03-15 NOTE — PROGRESS NOTES
Shortness of breath noted while pt eating. Lungs coarse/rales.  Reviewed with Renetta Travis NP. RT called for albuterol tx, reviewed labs and 5 beat wide complex tach yesterday and today- repeat electrolyte labs in AM. Continue to monitor breaths sounds and SOB

## 2019-03-15 NOTE — CONSULTS
[facility]        Subjective: A on CKD  Renal consult dictated # [id number]    Review of Systems -   General ROS: negative for - fever, chills  Respiratory ROS: no SOB, cough, AWAD  Cardiovascular ROS: no CP, palpitations  Gastrointestinal ROS: no N&V, abdominal pain, diarrhea  Genito-Urinary ROS: no difficulty voiding, dysuria  Neurological ROS: no seizures, focal weekness        Objective:    Vitals:    03/15/19 0104 03/15/19 0423 03/15/19 0753 03/15/19 0828   BP: 98/57 110/64  113/55   Pulse: 74 75  77   Resp: 17 17  16   Temp: 99.5 °F (37.5 °C) 99.8 °F (37.7 °C)  97.8 °F (36.6 °C)   SpO2: 92% 91% 91% 92%   Weight:  61.5 kg (135 lb 8 oz)     Height:           PE  Gen: in no acute distress  CV:reg rate  Chest:clear  Abd: soft  Ext/Access: no edema       . LAB  Recent Labs     03/15/19  0827 03/14/19  0404 03/13/19  1303 03/13/19  1302   WBC 9.5 10.0  --  18.4*   HGB 10.5* 12.1*  --  11.7*   HCT 30.7* 35.8*  --  34.5*   * 74*  --  111*   INR  --   --  1.3  --      Recent Labs     03/15/19  0827 03/14/19  0404 03/13/19  1303   * 137 137   K 2.9* 4.0 3.7    104 100   CO2 27 23 25   * 102* 137*   BUN 38* 31* 23   CREA 3.13* 2.39* 2.16*   MG 1.8  --   --    CA 8.4 8.4 9.1   ALB  --   --  3.1*   TBILI  --   --  1.3*   ALT  --   --  29   SGOT  --   --  62*           Radiology    A/P:   Patient Active Problem List   Diagnosis Code    Chronic systolic CHF (congestive heart failure) ([facility]) I50.22    Hypertension I10    Arthritis M19.90    GERD (gastroesophageal reflux disease) K21.9    CAD (coronary artery disease), atherosclerotic of the native vessel I25.10    Chronic obstructive pulmonary disease (HCC) J44.9    History of sinoatrial node dysfunction Z86.79    Hyperlipidemia E78.5    AICD (automatic cardioverter/defibrillator) present Z95.810    History of respiratory failure Z87.09    Back pain M54.9    Hernia, inguinal K40.90    Hypokalemia E87.6    Benign prostatic hyperplasia, N40.0    OA (osteoarthritis) of hip M16.9    Hypokalemia E87.6    CRI (chronic renal insufficiency) N18.9    Prostate cancer (HCC) C61    Ischemia of left lower extremity I99.8    Thrombus I82.90       A on CKD3 - probably some ATN or pre- renal on top of his CKD3. Work up ordered. Hopefully, his creatinine will start coming down soon. Urinary retention - candelaria, per Urology    Thrombectomy    AFib/ CHF - per cardiology.       Beckie St MD

## 2019-03-15 NOTE — PROGRESS NOTES
Verbal bedside report received from [facility], [facility]. Assumed care of patient. Patient flipped into Afib from paced rhythm around 1830 on day shift. Jon Concepcion NP and Luis Antonio Almonte MD ordered cardizem bolus 5 mg and drip. Patient SBP in the 80s-90s. Jon Concepcion NP contacted. Ordered 150 ml bolus, then reassess for cardizem bolus and gtt. Will continue to monitor.

## 2019-03-15 NOTE — DISCHARGE SUMMARY
[facility] Cardiology Discharge Summary     Patient ID:  Esau Taveras  [id number]  9 y.o.  1935    Admit date: 3/13/2019    Discharge date:  03/15/2019    Admitting Physician: Connor Spain MD     Discharge Physician: Cherri Cespedes NP/Dr. Joss Solorio    Admission Diagnoses: Thrombus [I82.90]    Discharge Diagnoses:    Diagnosis    Ischemia of left lower extremity    Thrombus    OA (osteoarthritis) of hip    CRI (chronic renal insufficiency)    Hyperlipidemia    Hypertension    Arthritis    GERD (gastroesophageal reflux disease)    CAD (coronary artery disease), atherosclerotic of the native vessel    Chronic obstructive pulmonary disease (HCC)    AICD (automatic cardioverter/defibrillator) present    Chronic systolic CHF (congestive heart failure) [facility])       Cardiology Procedures this admission: echo, MIGUEL  Consults: Vascular Surgery, Nephrology     Hospital Course: Patient presented to the ED with c/o LLE pain. In the ED the leg was noted to be cold and blue. No pedal pulses noted in the ED. Patient was taken urgently to OR for left femoral thromboembolectomy. Cardiology was consult for work up of possible cardiac source. Interrogation of his ICD revealed new onset of pAF starting on 3-6-19, with longest episode being 15 minutes. He was placed on heparin drip post op. Echo showed -  Left ventricle: The ventricle was mildly dilated. Systolic function was moderately to markedly reduced. Ejection fraction was estimated in the range of 25 % to 30 %. There was severe hypokinesis of the basal-mid anterior, basal anteroseptal, basal inferoseptal, basal inferior, basal-mid inferolateral, and basal-mid anterolateral wall(s). There was moderate concentric hypertrophy. The E/e' ratio was 18. There was Grade II Diastolic Dysfunction. -  Left atrium: The atrium was moderately dilated. -  Right atrium: The atrium was moderately dilated. -  Mitral valve: There was mild to moderate regurgitation. -  Tricuspid valve: There was mild to moderate regurgitation. The patient went into A. Fib with RVR and her coreg was changed to toprol. Dr. Joy Mccoy was going to perform MIGUEL/DCCV but during her MIGUEL she was found to have a thrombus identified on the anterior wall of the BONILLA. The DCCV was cancelled. The patient remained with uncontrolled A. Fib and underwent AV sylvia ablation with Dr. Joy Mccoy on 3/20/19. Nephrology saw patient for JAZZY/CKD and felt probably some ATN or pre renal and renal failure improved throughout hospital stay with d/c Cr at 2.39. Urology was consulted for urinary retention that required placement of candelaria cath. He underwent renal US that showed no hydronephrosis or upper tract obstruction. Recommend continuation of candelaria catheter for urinary retention and elevated Cr until patient recovers from acute thrombus and hospitalization. The patient has chronic systolic heart failure and will be d/c home with Toprol, but no ACE/ARB d/t hypotension. The patietn will  Need [facility] SN/PT at d/c. He was placed on coumadin prior to d/c and d/c INR was 2.5. He will need INR on 3/25/19 (home health will draw) and see Dr. Cain Stephens (TC-7) appt on 3/26/19 at [address] [place] [address] in [facility]. He should follow up with his primary Urologist Dr. Argelia Zamora in about 1 week for catheter removal and voiding trial in clinic per Urology. The patient will need to see Dr. Pollo Hobson in ~ 1 month. DISPOSITION: The patient is being discharged home in stable condition on a low saturated fat, low cholesterol and low salt diet. The patient is instructed to advance activities as tolerated to the limit of fatigue or shortness of breath. The patient is instructed to call the office or return to the ER for immediate evaluation for any severe shortness of breath, chest pain, prolonged palpitations, near syncope or syncope.     Discharge Exam:   Visit Vitals  BP 93/54 (BP 1 Location: Left arm, BP Patient Position: At rest)   Pulse 80 Temp 97.5 °F (36.4 °C)   Resp 17   Ht 5' 4.5\" (1.638 m)   Wt 66.7 kg (147 lb)   SpO2 94%   BMI 24.84 kg/m²     Patient has been seen by Dr. Tyesha Arce: see his progress note for exam details. Recent Results (from the past 24 hour(s))   PROTHROMBIN TIME + INR    Collection Time: 03/22/19  4:03 AM   Result Value Ref Range    Prothrombin time 26.3 (H) 11.7 - 14.5 sec    INR 2.5           Patient Instructions:     Current Discharge Medication List      START taking these medications    Details   metoprolol succinate (TOPROL-XL) 25 mg XL tablet Take 1 Tab by mouth daily. Qty: 30 Tab, Refills: 5      warfarin (COUMADIN) 5 mg tablet Take 1 Tab by mouth every evening. Qty: 30 Tab, Refills: 5         CONTINUE these medications which have CHANGED    Details   simvastatin (ZOCOR) 20 mg tablet Take 1 Tab by mouth nightly. Qty: 30 Tab, Refills: 5         CONTINUE these medications which have NOT CHANGED    Details   ondansetron (ZOFRAN ODT) 8 mg disintegrating tablet Take 1 Tab by mouth every eight (8) hours as needed for Nausea. Qty: 12 Tab, Refills: 0      omeprazole (PRILOSEC) 20 mg capsule Take 1 Cap by mouth daily. Qty: 90 Cap, Refills: 3    Associated Diagnoses: Gastroesophageal reflux disease without esophagitis      albuterol (PROVENTIL HFA, VENTOLIN HFA, PROAIR HFA) 90 mcg/actuation inhaler Take 2 Puffs by inhalation every four (4) hours as needed for Wheezing. Qty: 1 Inhaler, Refills: 3    Associated Diagnoses: Chronic obstructive pulmonary disease, unspecified COPD type (HCC)      furosemide (LASIX) 20 mg tablet Take 1 Tab by mouth two (2) times a day. Qty: 180 Tab, Refills: 3    Comments: Please inform pt needs to schedule a follow up with Dr. Agueda Marte before this refill runs out. Associated Diagnoses: Congestive heart failure, unspecified HF chronicity, unspecified heart failure type (HCC)      aspirin delayed-release 81 mg tablet Take 81 mg by mouth daily.       albuterol-ipratropium (DUO-NEB) 2.5 mg-0.5 mg/3 ml nebu 3 mL by Nebulization route every four (4) hours as needed for Cough. Qty: 30 Nebule, Refills: 11    Associated Diagnoses: Chronic bronchitis, unspecified chronic bronchitis type (HCC)      tamsulosin (FLOMAX) 0.4 mg capsule Take 1 Cap by mouth daily. Qty: 90 Cap, Refills: 1    Associated Diagnoses: Benign prostatic hyperplasia without lower urinary tract symptoms      tiotropium (SPIRIVA WITH HANDIHALER) 18 mcg inhalation capsule Take 1 Cap by inhalation daily. fluticasone-vilanterol (BREO ELLIPTA) 100-25 mcg/dose inhaler Take 1 Puff by inhalation daily. cholecalciferol (VITAMIN D3) 1,000 unit tablet Take  by mouth daily. cyanocobalamin (VITAMIN B-12) 1,000 mcg sublingual tablet Take 1,000 mcg by mouth daily.     Associated Diagnoses: Weakness         STOP taking these medications       ciprofloxacin HCl (CIPRO) 500 mg tablet Comments:   Reason for Stopping:         amLODIPine (NORVASC) 2.5 mg tablet Comments:   Reason for Stopping:         carvedilol (COREG) 6.25 mg tablet Comments:   Reason for Stopping:         losartan (COZAAR) 25 mg tablet Comments:   Reason for Stopping:               Signed:  MAULIK Maier  3/22/2019  7:49 AM

## 2019-03-16 NOTE — PROGRESS NOTES
Bedside and Verbal shift change report received from [place]. Report included the following information SBAR, Kardex, ED Summary, OR Summary, Procedure Summary, Intake/Output, MAR, Recent Results and Cardiac Rhythm A FIB.

## 2019-03-16 NOTE — PROGRESS NOTES
CARDIOTHORACIC SURGERY PROGRESS NOTE    3/16/2019  10:06 AM     Chart reviewed. Interval History/Events of Past 24 hours:   Stabl;e  Good palpable pulse. Wound dry, no infection    Subjective:  Patient seen and examined on rounds today. Spoke with available staff regarding patient   . Objective:  Vital signs:   Visit Vitals  /61 (BP 1 Location: Left arm, BP Patient Position: At rest;Supine)   Pulse (!) 118   Temp 98.5 °F (36.9 °C)   Resp 18   Ht 5' 4.5\" (1.638 m)   Wt 68.6 kg (151 lb 3.2 oz)   SpO2 93%   BMI 25.55 kg/m²     Temp (24hrs), Av.9 °F (37.2 °C), Min:98.1 °F (36.7 °C), Max:99.6 °F (37.6 °C)    Admission Weight: Last Weight   Weight: 68.5 kg (151 lb) Weight: 68.6 kg (151 lb 3.2 oz)     Physical Examination:     General:  Alert, oriented   Lungs: Clear to ascultation without rales, wheezes or rhonchi. Heart: I RRR without rub or murmur at rate below 100. Abdomen: Active sounds. Soft and non-tender without masses. Extremities: Edema not present. Warm and well perfused. Neuro:   No deficit. DVT Prophylaxis:   pneumatic compression boots: No  Compression stockings:  No  Heparin:  Yes          Labs:  Lab Results   Component Value Date/Time    WBC 9.5 03/15/2019 08:27 AM    HCT 30.7 (L) 03/15/2019 08:27 AM     (L) 03/15/2019 08:27 AM                Assessment:    Leg stable  A fib    Plans:  1. Continue vasc. care  2.   Medicine for a fib Tx    Amy Srinivasan DO

## 2019-03-16 NOTE — PROGRESS NOTES
Problem: Falls - Risk of  Goal: *Absence of Falls  Document Talha Fall Risk and appropriate interventions in the flowsheet. Outcome: Progressing Towards Goal  Fall Risk Interventions:  Mobility Interventions: Communicate number of staff needed for ambulation/transfer, Patient to call before getting OOB         Medication Interventions: Patient to call before getting OOB    Elimination Interventions: Call light in reach, Patient to call for help with toileting needs             Problem: Pressure Injury - Risk of  Goal: *Prevention of pressure injury  Document Chance Scale and appropriate interventions in the flowsheet. Outcome: Progressing Towards Goal  Pressure Injury Interventions:             Activity Interventions: Increase time out of bed, Pressure redistribution bed/mattress(bed type), PT/OT evaluation    Mobility Interventions: HOB 30 degrees or less, Pressure redistribution bed/mattress (bed type), PT/OT evaluation    Nutrition Interventions: Document food/fluid/supplement intake

## 2019-03-16 NOTE — PROGRESS NOTES
Verbal and bedside report received from [place].

## 2019-03-16 NOTE — PROGRESS NOTES
Verbal bedside report given to sahil Nunes RN. Patient's situation, background, assessment and recommendations provided. Opportunity for questions provided. Oncoming RN assumed care of patient.

## 2019-03-16 NOTE — PROGRESS NOTES
3/16/2019 11:37 AM    Admit Date: 3/13/2019    Admit Diagnosis: Thrombus [I82.90]      Subjective:   No cp or sob      Objective:      Visit Vitals  /61 (BP 1 Location: Left arm, BP Patient Position: At rest;Supine)   Pulse (!) 118   Temp 98.5 °F (36.9 °C)   Resp 18   Ht 5' 4.5\" (1.638 m)   Wt 68.6 kg (151 lb 3.2 oz)   SpO2 93%   BMI 25.55 kg/m²       Physical Exam:  Sheral Coventry, Well Nourished, No Acute Distress, Alert & Oriented x 3, appropriate mood. Neck- supple, no JVD  CV- irregular rate and rhythm no MRG  Lung- clear bilaterally  Abd- soft, nontender, nondistended  Ext- no edema bilaterally. Skin- warm and dry        Data Review:   Recent Labs     03/16/19  0952 03/16/19  0436  03/13/19  1303   NA  --  136   < > 137   K  --  3.7   < > 3.7   BUN  --  38*   < > 23   CREA  --  3.18*   < > 2.16*   WBC 10.4  --    < >  --    HGB 11.8*  --    < >  --    HCT 35.3*  --    < >  --      --    < >  --    INR  --   --   --  1.3    < > = values in this interval not displayed. Assessment/Plan:     Principal Problem:    Ischemia of left lower extremity (3/13/2019)    Active Problems:    Chronic systolic CHF (congestive heart failure) ([facility] (10/7/2014)Stable. Continue current medical therapy. no ace/arb  due to hypotension        Overview: [place] Class I.  EF 35%.         Acute on CRI (chronic renal insufficiency) ()  Cr stable- hopefully will improve in the next day or so    Thrombus (3/13/2019)    Af with rvr- limited rate control options- will change to toprol- bp low and don't want to exacerbate arf with worsening hypotension

## 2019-03-16 NOTE — PROGRESS NOTES
[facility]        Subjective: A on CKD  No new complaints    Review of Systems -   General ROS: negative for - fever, chills  Respiratory ROS: no SOB, cough, AWAD  Cardiovascular ROS: no CP, palpitations  Gastrointestinal ROS: no N&V, abdominal pain, diarrhea  Genito-Urinary ROS: no difficulty voiding, dysuria  Neurological ROS: no seizures, focal weekness        Objective:    Vitals:    03/16/19 0506 03/16/19 0544 03/16/19 0909 03/16/19 0915   BP: (!) 89/64  100/61    Pulse: (!) 130 (!) 119 (!) 118    Resp: 18  18    Temp: 98.9 °F (37.2 °C)  98.5 °F (36.9 °C)    SpO2: 94%  92% 93%   Weight: 68.6 kg (151 lb 3.2 oz)      Height:           PE  Gen: in no acute distress  CV:reg rate  Chest:clear  Abd: soft  Ext/Access: no edema       . LAB  Recent Labs     03/16/19  0952 03/15/19  0827 03/14/19  0404 03/13/19  1303   WBC 10.4 9.5 10.0  --    HGB 11.8* 10.5* 12.1*  --    HCT 35.3* 30.7* 35.8*  --     104* 74*  --    INR  --   --   --  1.3     Recent Labs     03/16/19  0436 03/15/19  0827 03/14/19  0404 03/13/19  1303    134* 137 137   K 3.7 2.9* 4.0 3.7    100 104 100   CO2 24 27 23 25   GLU 99 137* 102* 137*   BUN 38* 38* 31* 23   CREA 3.18* 3.13* 2.39* 2.16*   MG 1.8 1.8  --   --    PHOS 2.5  --   --   --    CA 8.4 8.4 8.4 9.1   ALB 2.1*  --   --  3.1*   TBILI  --   --   --  1.3*   ALT  --   --   --  29   SGOT  --   --   --  58*           Radiology    A/P:   Patient Active Problem List   Diagnosis Code    Chronic systolic CH[facility] (congestive heart failure) ([facility]) I50.22    Hypertension I10    Arthritis M19.90    GERD (gastroesophageal reflux disease) K21.9    CAD (coronary artery disease), atherosclerotic of the native vessel I25.10    Chronic obstructive pulmonary disease (HCC) J44.9    History of sinoatrial node dysfunction Z86.79    Hyperlipidemia E78.5    AICD (automatic cardioverter/defibrillator) present Z95.810    History of respiratory failure Z87.09    Back pain M54.9    Hernia, inguinal K40.90    Hypokalemia E87.6    Benign prostatic hyperplasia, N40.0    OA (osteoarthritis) of hip M16.9    Hypokalemia E87.6    CRI (chronic renal insufficiency) N18.9    Prostate cancer (HCC) C61    Ischemia of left lower extremity I99.8    Thrombus I82.90       A on CKD3 - probably some ATN or pre- renal on top of his CKD3. Renal US unremarkable. Labs are about the same today as yesterday. Domo check in AM.     Urinary retention - candelaria, per Urology    Thrombectomy    AFib/ CHF - per cardiology.       Geovany Hwang MD

## 2019-03-17 NOTE — PROGRESS NOTES
3/17/2019 1:04 PM    Admit Date: 3/13/2019    Admit Diagnosis: Thrombus [I82.90]      Subjective:   No cp or sob      Objective:      Visit Vitals  BP 98/58 (BP 1 Location: Left arm, BP Patient Position: At rest)   Pulse (!) 103   Temp 98.8 °F (37.1 °C)   Resp 18   Ht 5' 4.5\" (1.638 m)   Wt 55.3 kg (122 lb)   SpO2 93%   BMI 20.62 kg/m²       Physical Exam:  Maida Maxin, Well Nourished, No Acute Distress, Alert & Oriented x 3, appropriate mood. Neck- supple, no JVD  CV- irregular rate and rhythm no MRG  Lung- clear bilaterally  Abd- soft, nontender, nondistended  Ext- no edema bilaterally. Skin- warm and dry        Data Review:   Recent Labs     03/17/19  0320 03/16/19  0952     --    K 3.3*  --    BUN 44*  --    CREA 3.13*  --    WBC  --  10.4   HGB  --  11.8*   HCT  --  35.3*   PLT  --  154       Assessment/Plan:     Principal Problem:    Ischemia of left lower extremity (3/13/2019)- on eliquis - per vasc    Active Problems:    Chronic systolic CHF (congestive heart failure) ([facility] [address] (10/7/2014)Stable. Continue current medical therapy. no ace/arb or beta blocker due to hypotension\  WIll ask palliative care to see      Overview: [place] Class I.  EF 35%.         CRI (chronic renal insufficiency) ()      Thrombus (3/13/2019)    Af- difficulty with rate control- bp not tolerating meds

## 2019-03-17 NOTE — PROGRESS NOTES
No complaints  Heart rate and blood pressure remain an issue, cardiology working on  Left femoral incision clean no evidence of infection  Left foot warm and well-perfused, palpable dorsalis pedis pulse  Following

## 2019-03-17 NOTE — PROGRESS NOTES
Bedside and Verbal shift change report received from [address]. Report included the following information SBAR, Kardex, Procedure Summary, Intake/Output, MAR, Recent Results and Cardiac Rhythm A FIB.

## 2019-03-17 NOTE — PROGRESS NOTES
[facility]        Subjective: A on CKD  No new complaints  Resting comfortably    Review of Systems -   General ROS: negative for - fever, chills  Respiratory ROS: no SOB, cough, AWAD  Cardiovascular ROS: no CP, palpitations  Gastrointestinal ROS: no N&V, abdominal pain, diarrhea  Genito-Urinary ROS: no difficulty voiding, dysuria  Neurological ROS: no seizures, focal weekness        Objective:    Vitals:    03/17/19 0400 03/17/19 0430 03/17/19 0439 03/17/19 0530   BP: (!) 88/54 95/57 95/57 98/53   Pulse: 96 94 (!) 104 96   Resp:   18    Temp:   97.8 °F (36.6 °C)    SpO2:   96%    Weight:   55.3 kg (122 lb)    Height:           PE  Gen: in no acute distress  CV:reg rate  Chest:clear  Abd: soft  Ext/Access: no edema       . LAB  Recent Labs     03/16/19  0952 03/15/19  0827   WBC 10.4 9.5   HGB 11.8* 10.5*   HCT 35.3* 30.7*    104*     Recent Labs     03/17/19  0320 03/16/19  0436 03/15/19  0827    136 134*   K 3.3* 3.7 2.9*    104 100   CO2 26 24 27   * 99 137*   BUN 44* 38* 38*   CREA 3.13* 3.18* 3.13*   MG  --  1.8 1.8   PHOS  --  2.5  --    CA 8.1* 8.4 8.4   ALB  --  2.1*  --            Radiology    A/P:   Patient Active Problem List   Diagnosis Code    Chronic systolic CHF (congestive heart failure) ([facility]) I50.22    Hypertension I10    Arthritis M19.90    GERD (gastroesophageal reflux disease) K21.9    CAD (coronary artery disease), atherosclerotic of the native vessel I25.10    Chronic obstructive pulmonary disease ([facility]) J44.9    History of sinoatrial node dysfunction Z86.79    Hyperlipidemia E78.5    AICD (automatic cardioverter/defibrillator) present Z95.810    History of respiratory failure Z87.09    Back pain M54.9    Hernia, inguinal K40.90    Hypokalemia E87.6    Benign prostatic hyperplasia, N40.0    OA (osteoarthritis) of hip M16.9    Hypokalemia E87.6    CRI (chronic renal insufficiency) N18.9    Prostate cancer (HCC) C61    Ischemia of left lower extremity I99.8    Thrombus I82.90       A on CKD3 - . [facility] Labs are about the same today as yesterday. Domo continue currnet therapy. Urinary retention - candelaria, per Urology    Thrombectomy    AFib/ CHF - per cardiology.       Judith Major MD

## 2019-03-18 NOTE — PROGRESS NOTES
Patient's urine output and BP has improved throughout night shift. BP on final assessment this morning was 100/66 in right arm. Urine output total at this time is 1025 ml from 2000 to 0415. Patient has been NPO since midnight for possible EP procedure this am. Left groin incision is clean, dry, intact. Will continue to monitor.

## 2019-03-18 NOTE — PROGRESS NOTES
Spoke with Dr. Eddy Ko regarding patient's HR- afib 120-130's. Informed him of no major change from 12.5mg dose of toprol-xl given earlier per orders. Orders now for a one time dose of additional 12.5mg po toprol-xl to be given at bedtime tonight. No other orders received at this time. Will continue to monitor.

## 2019-03-18 NOTE — PROGRESS NOTES
[facility] CARDIOLOGY PROGRESS NOTE           3/18/2019 6:44 AM    Admit Date: 3/13/2019    Admit Diagnosis: Thrombus [I82.90]      Subjective:   No complaints this AM, no chest pain or shortness of breath    Interval History: (History of pertinent interval events obtained from nursing staff)  No events    ROS:  GEN:  No fever or chills  Cardiovascular:  As noted above  Pulmonary:  As noted above  Neuro:  No new focal motor or sensory loss      Objective:     Vitals:    03/17/19 2043 03/18/19 0138 03/18/19 0420 03/18/19 0446   BP: 98/57 126/69 100/66 115/75   Pulse: (!) 112 (!) 114 (!) 122 (!) 103   Resp: 16 18 18   Temp: 98 °F (36.7 °C) 98.2 °F (36.8 °C)  98.5 °F (36.9 °C)   SpO2: 96% 98%  96%   Weight:    67.9 kg (149 lb 11.2 oz)   Height:           Physical Exam:  General- chronically ill appearing, No Acute Distress, Alert & Oriented x 3, appropriate mood. Neck- supple, no JVD  CV- irreg irreg, distant no MRG  Lung- decreased BS B /L  Abd- soft, nontender, nondistended  Ext- no edema bilaterally.   Skin- warm and dry    Current Facility-Administered Medications   Medication Dose Route Frequency    lip protectant (BLISTEX) ointment   Topical PRN    metoprolol succinate (TOPROL-XL) XL tablet 25 mg  25 mg Oral DAILY    albuterol (PROVENTIL VENTOLIN) nebulizer solution 2.5 mg  2.5 mg Nebulization BID RT    apixaban (ELIQUIS) tablet 5 mg  5 mg Oral Q12H    albuterol-ipratropium (DUO-NEB) 2.5 MG-0.5 MG/3 ML  3 mL Nebulization Q4H PRN    aspirin delayed-release tablet 81 mg  81 mg Oral DAILY    ciprofloxacin HCl (CIPRO) tablet 500 mg  500 mg Oral Q24H    furosemide (LASIX) tablet 20 mg  20 mg Oral BID    simvastatin (ZOCOR) tablet 20 mg  20 mg Oral QHS    tamsulosin (FLOMAX) capsule 0.4 mg  0.4 mg Oral DAILY    tiotropium (SPIRIVA) inhalation capsule 18 mcg  1 Cap Inhalation DAILY    dextrose 5% and 0.9% NaCl infusion 1,000 mL  1,000 mL IntraVENous CONTINUOUS    sodium chloride (NS) flush 5-40 mL  5-40 mL IntraVENous Q8H    sodium chloride (NS) flush 5-40 mL  5-40 mL IntraVENous PRN    acetaminophen (TYLENOL) tablet 650 mg  650 mg Oral Q4H PRN    oxyCODONE-acetaminophen (PERCOCET) 5-325 mg per tablet 1 Tab  1 Tab Oral Q4H PRN    morphine 10 mg/ml injection 5 mg  5 mg IntraVENous Q3H PRN    naloxone (NARCAN) injection 0.4 mg  0.4 mg IntraVENous PRN    ondansetron (ZOFRAN) injection 4 mg  4 mg IntraVENous Q4H PRN    diphenhydrAMINE (BENADRYL) injection 12.5 mg  12.5 mg IntraVENous Q4H PRN    albuterol (PROVENTIL VENTOLIN) nebulizer solution 2.5 mg  2.5 mg Nebulization Q4H PRN     Data Review:   Recent Results (from the past 24 hour(s))   PLEASE READ & DOCUMENT PPD TEST IN 72 HRS    Collection Time: 03/17/19  1:40 PM   Result Value Ref Range    PPD  Negative    mm Induration  0 - 5 0 mm       EKG:  (EKG has been independently visualized by me with interpretation below)  Assessment:     Principal Problem:    Ischemia of left lower extremity (3/13/2019)    Active Problems:    Chronic systolic CHF (congestive heart failure) (HCC) (10/7/2014)      Overview: [place] Class I.  EF 35%. CRI (chronic renal insufficiency) ()      Thrombus (3/13/2019)      Plan:   1. Ischemia of left lower extremity (3/13/2019)- on eliquis - per vascular surgery     2. Chronic systolic CHF (congestive heart failure) ([facility] [address] (10/7/2014)Stable. Continue current medical therapy, no ace/arb or beta blocker due to hypotension, palliative care consult over weekend      Overview: [place] Class I.  EF 35%.       3. Af- difficulty with rate control- bp slightly improved, will attempt to increase BB dose today with addition of 12.5 mg metoprolol tonight    4. CVA protection: liu Davis MD  Cardiology/Electrophysiology
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
## 2019-03-18 NOTE — PROGRESS NOTES
Spoke with EP lab about patient's plan for procedure/NPO status. Stated that they did not have him on the list for a procedure.

## 2019-03-18 NOTE — PROGRESS NOTES
Spoke with PARIS Saucedo regarding patient's rising sustaining -130's. Tanisha Barnhart to talk to Dr. Gisella Pandya and get a plan for HR control. No new orders received at this time. Addendum @1307  Paged Dr. Gisella Pandya directly, orders received to give patient's scheduled bedtime dose of 12.5 mg metoprolol now and cancel the bedtime dose to see how patient responds.

## 2019-03-18 NOTE — PROGRESS NOTES
CM met with pt & zacarias, awaiting [facility] decision, unsure if they have a 2nd choice. At this time pt states he prefers to go home. Zacarias & POA are not in agreement with this. Pt will discuss with family. CM to followup tomorrow. Care Management Interventions  PCP Verified by CM: Yes  Last Visit to PCP: 02/21/19  Mode of Transport at Discharge:  Other (see comment)([facility] [phone])  Transition of Care Consult (CM Consult): SNF, Discharge Planning  Partner SNF: Yes  Discharge Durable Medical Equipment: No  Physical Therapy Consult: Yes  Occupational Therapy Consult: No  Speech Therapy Consult: No  Current Support Network: Own Home, Lives Alone  Confirm Follow Up Transport: Family  Plan discussed with Pt/Family/Caregiver: Yes  Freedom of Choice Offered: Yes  Discharge Location  Discharge Placement: Rehab Unit Subacute

## 2019-03-18 NOTE — PROGRESS NOTES
Bedside and Verbal shift change report given to self (oncoming nurse) by Cindi Tan RN (offgoing nurse). Report included the following information SBAR, Kardex, MAR and Recent Results.   Bed alarm on and functioning

## 2019-03-18 NOTE — PROGRESS NOTES
Patient seen and examined, s/p left femoral thromboembolectomy 3/13/2019 by Dr. Pollo Hobson. Reports left LE feels much better. Left groin incision intact, minimal ecchymosis, no induration, drainage or signs of infection  Left foot warm with normal coloration  Bilateral DP and PT pulses 2+ palpable    We will continue to follow loosely. Harry Siegel PA-C  Physician Assistant with [facility]  [place].  Alyssa Espinal MD / Dennise Page MD

## 2019-03-18 NOTE — PROGRESS NOTES
MEWS score noted to be 4. Charge nurse, Isela Frost RN informed and assessed patient.  informed and will assess patient. High MEWS score noted due to HR and BP. Will continue to monitor.

## 2019-03-18 NOTE — PROGRESS NOTES
Problem: Falls - Risk of  Goal: *Absence of Falls  Document Talha Fall Risk and appropriate interventions in the flowsheet. Outcome: Progressing Towards Goal  Fall Risk Interventions:  Mobility Interventions: Communicate number of staff needed for ambulation/transfer, Patient to call before getting OOB         Medication Interventions: Bed/chair exit alarm    Elimination Interventions: Bed/chair exit alarm    History of Falls Interventions: Evaluate medications/consider consulting pharmacy        Problem: Pressure Injury - Risk of  Goal: *Prevention of pressure injury  Document Chance Scale and appropriate interventions in the flowsheet. Outcome: Progressing Towards Goal  Pressure Injury Interventions:             Activity Interventions: Increase time out of bed    Mobility Interventions: Pressure redistribution bed/mattress (bed type)    Nutrition Interventions: Document food/fluid/supplement intake    Friction and Shear Interventions: HOB 30 degrees or less

## 2019-03-18 NOTE — PROGRESS NOTES
Bedside and verbal shift report received from YANI Herman. Included SBAR, MAR, Kardex, I's and O's, and Recent results.

## 2019-03-18 NOTE — PROGRESS NOTES
Problem: Mobility Impaired (Adult and Pediatric)  Goal: *Acute Goals and Plan of Care (Insert Text)  LTG:  (1.)Mr. Shanelle Rinaldi will move from supine to sit and sit to supine , scoot up and down and roll side to side with INDEPENDENT within 7 treatment day(s) from flat surface without handrail. (2.)Mr. Shanelle Rinaldi will transfer from bed to chair and chair to bed with MODIFIED INDEPENDENCE using the least restrictive device within 7 treatment day(s). (3.)Mr. Shanelle Rinaldi will ambulate with MODIFIED INDEPENDENCE for 250+ feet with the least restrictive device within 7 treatment day(s) while maintaining normal vital signs. (4.)Mr. Shanelle Rinaldi will perform 1-2 steps with HR and SBA within 7 treatment days for safety ascending and descending stairs for home.   ___________________________________________________________________________________________    PHYSICAL THERAPY: Daily Note and PM 3/18/2019  INPATIENT: PT Visit Days : 2  [place] / [place] / Product Type: Medicare /       NAME/AGE/GENDER: Aspen Talley is a 82 y.o. male   PRIMARY DIAGNOSIS: Thrombus [I82.90] Ischemia of left lower extremity Ischemia of left lower extremity  Procedure(s) (LRB):  LEFT LEG THROMBOLECTOMY (Left)  5 Days Post-Op  ICD-10: Treatment Diagnosis:    · Generalized Muscle Weakness (M62.81)  · Difficulty in walking, Not elsewhere classified (R26.2)   Precaution/Allergies:  Lortab [hydrocodone-acetaminophen]; Lortab [hydrocodone-acetaminophen]; and Other medication      ASSESSMENT:     Mr. Shanelle Rinaldi presents with decreased bed mobility, transfers, ambulation, balance, activity tolerance, strength and overall general functional mobility s/p hospital admission with above surgery on 3/13/19. Pt A & O x 4. He is sitting on bedside commode with CNA present and assisting with bath. HR sustained in 140-150's, per RN limit activity. Assisted patient with sit to stand transfer, stand pivot back to bed and side steps.  HR did not increase during this activity, however patient very fatigued once returning to supine. Limited progress noted this session with patient poorly tolerating activity. No buckling of L knee appreciated this session. Feel he could mobilize further if he could tolerate activity with stable vital signs. Pt overall functioning well below baseline level of independent. Pt would benefit from further skilled PT services at discharge. PT to cont to follow for acute care needs. This section established at most recent assessment   PROBLEM LIST (Impairments causing functional limitations):  1. Decreased Strength  2. Decreased ADL/Functional Activities  3. Decreased Transfer Abilities  4. Decreased Ambulation Ability/Technique  5. Decreased Balance  6. Increased Pain  7. Decreased Activity Tolerance  8. Decreased Flexibility/Joint Mobility  9. Decreased Sandy with Home Exercise Program   INTERVENTIONS PLANNED: (Benefits and precautions of physical therapy have been discussed with the patient.)  1. Balance Exercise  2. Bed Mobility  3. Family Education  4. Gait Training  5. Home Exercise Program (HEP)  6. Therapeutic Activites  7. Therapeutic Exercise/Strengthening  8. Transfer Training     TREATMENT PLAN: Frequency/Duration: 3 times a week for duration of hospital stay  Rehabilitation Potential For Stated Goals: Good     RECOMMENDED REHABILITATION/EQUIPMENT: (at time of discharge pending progress): Due to the probability of continued deficits (see above) this patient will likely need continued skilled physical therapy after discharge. Equipment:    None at this time              HISTORY:   History of Present Injury/Illness (Reason for Referral): This patient is a 82 y.o. white male seen at the request of Luca Alonso MD and evaluated for cold, pulseless left foot. PMH with CHF, HTN, AICD in place, COPD, past prostate cancer, chronic renal insufficiency and other medical issues, patient was brought in to ED via EMS with painful L LE. Pedal pulses present in field per paramedic, with pulses lost in transport ~12h00.      Patient states he ambulated around house as normal until ~10h00, then L LE became painful. Patient reports sensation to only noxious stimuli below knee, significant pain above knee. States he cannot move left foot or toes. Patient takes daily ASA and took one already this AM; he denies other anticoagulants. Cardiac history includes defibrillator placement ~5y ago, [facility] 6/2014 ([place]) with nonischemic cardiomyopathy.      Patient was in ED yesterday with urinary retention, received candelaria catheter and was to follow-up with outpatient urology. He states he last ate Monday and has been nauseated since with some vomiting. He has extensive past smoking history but admits to intermittent tobacco use in recent past, last cigarette was last week.  He denies fever or chills.        Past Medical History/Comorbidities:   Mr. Jonna Reeder  has a past medical history of Abnormal EKG, Acute kidney injury ([address] AICD (automatic cardioverter/defibrillator) present, Alterations of sensations, Anemia, Arthritis, Back pain, Back pain (6/17/2016), Benign prostatic hyperplasia (6/17/2016), Bilateral pubic rami fractures (HCC), CAD (coronary artery disease), CHF (congestive heart failure) ([address] Chronic obstructive pulmonary disease ([address] Chronic systolic CHF (congestive heart failure) ([address] (10/7/2014), COPD (chronic obstructive pulmonary disease) ([address] CRI (chronic renal insufficiency), Depression, Dizziness, Dyspnea, Elevated ferritin, Elevated PSA (6/17/2016), GERD (gastroesophageal reflux disease), Heart failure ([address] Hernia, inguinal, Hernia, inguinal (6/17/2016), Hyperlipidemia, Hyperlipidemia (10/19/2015), Hypertension, Hypokalemia, Hypokalemia (6/17/2016), Ill-defined condition, Keratosis, actinic, Leg cramps, Malaise and fatigue, Malaise and fatigue (6/17/2016), OA (osteoarthritis) of hip (9/16/2016), Orthostasis, Pneumonia, Respiratory failure ([facility] (6/17/2016), Sinoatrial node dysfunction ([facility] and Sinusitis. Mr. Ben Austin  has a past surgical history that includes hx pacemaker; hx cataract removal; hx other surgical (4/2006); hx other surgical; hx other surgical; hx orthopaedic; hx carpal tunnel release; hx heart catheterization; hx cataract removal; hx colonoscopy; and hx other surgical.  Social History/Living Environment:   Home Environment: Private residence  # Steps to Enter: 0  One/Two Story Residence: One story  Living Alone: Yes  Support Systems: Child(chantelle)  Patient Expects to be Discharged to[de-identified] Private residence  Current DME Used/Available at Home: Tianna Spark, straight, Commode, bedside, Oxygen, portable, Shower chair, Walker, rolling, Wheelchair(independent at [employer])  Prior Level of Function/Work/Activity:  Lives alone, independent at baseline, drives; has O2 for home use PRN  Personal Factors:          Sex:  male        Age:  82 y.o. Overall Behavior:  agreeable   Number of Personal Factors/Comorbidities that affect the Plan of Care:  CHF, COPD, age 3+: HIGH COMPLEXITY   EXAMINATION:   Most Recent Physical Functioning:   Gross Assessment:                  Posture:     Balance:  Sitting: Intact  Standing: Impaired  Standing - Static: Fair  Standing - Dynamic : Fair Bed Mobility:  Sit to Supine: Contact guard assistance  Scooting: Contact guard assistance  Wheelchair Mobility:     Transfers:  Sit to Stand: Minimum assistance  Stand to Sit: Minimum assistance  Gait:     Base of Support: Center of gravity altered;Narrowed  Speed/Tonya: Shuffled; Slow  Step Length: Right shortened;Left shortened  Swing Pattern: Left symmetrical  Gait Abnormalities: Decreased step clearance; Path deviations; Shuffling gait;Trunk sway increased  Distance (ft): 2 Feet (ft)  Assistive Device: Walker, rolling  Ambulation - Level of Assistance: Minimal assistance  Interventions: Safety awareness training;Verbal cues      Body Structures Involved:  1.  Muscles Body Functions Affected:  1. Movement Related Activities and Participation Affected:  1. General Tasks and Demands  2. Mobility  3. Self Care   Number of elements that affect the Plan of Care: 4+: HIGH COMPLEXITY   CLINICAL PRESENTATION:   Presentation: Evolving clinical presentation with changing clinical characteristics: MODERATE COMPLEXITY   CLINICAL DECISION MAKIN [facility] Mobility Inpatient Short Form  How much difficulty does the patient currently have. .. Unable A Lot A Little None   1. Turning over in bed (including adjusting bedclothes, sheets and blankets)? [] 1   [] 2   [x] 3   [] 4   2. Sitting down on and standing up from a chair with arms ( e.g., wheelchair, bedside commode, etc.)   [] 1   [] 2   [x] 3   [] 4   3. Moving from lying on back to sitting on the side of the bed? [] 1   [] 2   [x] 3   [] 4   How much help from another person does the patient currently need. .. Total A Lot A Little None   4. Moving to and from a bed to a chair (including a wheelchair)? [] 1   [] 2   [x] 3   [] 4   5. Need to walk in hospital room? [] 1   [] 2   [x] 3   [] 4   6. Climbing 3-5 steps with a railing? [] 1   [x] 2   [] 3   [] 4   © , Trustees of [facility] [employer], under license to [employer]. All rights reserved      Score:  Initial: 17 Most Recent: X (Date: -- )    Interpretation of Tool:  Represents activities that are increasingly more difficult (i.e. Bed mobility, Transfers, Gait). Medical Necessity:     · Patient is expected to demonstrate progress in strength, range of motion, balance and coordination to decrease assistance required with overall functional mobility, transfers, ambulation. · Patient demonstrates good rehab potential due to higher previous functional level. Reason for Services/Other Comments:  · Patient continues to require present interventions due to patient's inability to perform bed mobility, transfers, ambulation safely and effectively. Use of outcome tool(s) and clinical judgement create a POC that gives a: Clear prediction of patient's progress: LOW COMPLEXITY            TREATMENT:   (In addition to Assessment/Re-Assessment sessions the following treatments were rendered)   Pre-treatment Symptoms/Complaints:  \"I can't move that leg\"  Pain: Initial:   Pain Intensity 1: 0  Post Session:  0/10     Therapeutic Activity: (    10 min): Therapeutic activities including Bed transfers, Ambulation on level ground, weight shifting, walker safety, posture to improve mobility, strength, balance and coordination. Required minimal Safety awareness training;Verbal cues to promote static and dynamic balance in standing and promote coordination of bilateral, lower extremity(s). Braces/Orthotics/Lines/Etc:   · IV  · candelaria catheter  · O2 Device: Nasal cannula  Treatment/Session Assessment:    · Response to Treatment:  Tolerated well  · Interdisciplinary Collaboration:   o Physical Therapist  o Registered Nurse  o Certified Nursing Assistant/Patient Care Technician  · After treatment position/precautions:   o Supine in bed  o Bed/Chair-wheels locked  o Call light within reach  o RN notified  o Family at bedside  o Side rails x 2   · Compliance with Program/Exercises: Will assess as treatment progresses  · Recommendations/Intent for next treatment session: \"Next visit will focus on advancements to more challenging activities\".   Total Treatment Duration:  PT Patient Time In/Time Out  Time In: 1335  Time Out: 1200 DANIEL Elliott, SANTOS

## 2019-03-18 NOTE — PROGRESS NOTES
Verbal bedside report given to Nacho and Alex oncoming RN. Patient's situation, background, assessment and recommendations provided. Opportunity for questions provided. Oncoming RN assumed care of patient.

## 2019-03-19 NOTE — PROGRESS NOTES
Bedside and Verbal shift report received from [facility]. Included SBAR, Kardex, MAR, and Recent results.

## 2019-03-19 NOTE — PROGRESS NOTES
Physical Therapy Note: 
 
Treatment attempted, patient off the floor. Will attempt another time/day as patient is available and schedule allows. Thank you, DANIA NurT

## 2019-03-19 NOTE — PROGRESS NOTES
MEWS score noted to be 4. Charge nurse, Kaitlin Buckner RN informed and assessed patient. High MEWS score noted due to hypotension and tachycardia. Vitals signs to be completed every 2 hours. Will continue to monitor. VS below discussed and reviewed with Miracle Hoffmann NP. Patient has been hypotensive and tachycardia in which Dr Katya Rdz is aware.        03/18/19 2115   Vital Signs   Temp 98 °F (36.7 °C)   Temp Source Oral   Pulse (Heart Rate) (!) 118   Heart Rate Source Monitor   Cardiac Rhythm A Fib   Resp Rate 18   O2 Sat (%) 96 %   Level of Consciousness Alert   BP (!) 83/61   MEWS Score 4

## 2019-03-19 NOTE — PROGRESS NOTES
Problem: Falls - Risk of 
Goal: *Absence of Falls Document Ebb Welsh Fall Risk and appropriate interventions in the flowsheet. Outcome: Not Progressing Towards Goal 
Fall Risk Interventions: 
Mobility Interventions: Patient to call before getting OOB Medication Interventions: Bed/chair exit alarm Elimination Interventions: Bed/chair exit alarm History of Falls Interventions: Evaluate medications/consider consulting pharmacy Problem: Pressure Injury - Risk of 
Goal: *Prevention of pressure injury Document Chance Scale and appropriate interventions in the flowsheet. Outcome: Progressing Towards Goal 
Pressure Injury Interventions: Activity Interventions: Increase time out of bed Mobility Interventions: Pressure redistribution bed/mattress (bed type) Nutrition Interventions: Document food/fluid/supplement intake Friction and Shear Interventions: HOB 30 degrees or less

## 2019-03-19 NOTE — PROGRESS NOTES
Report received from [facility] for continued care following MIGUEL. Sleeping with no complaints.

## 2019-03-19 NOTE — PROGRESS NOTES
Problem: Falls - Risk of  Goal: *Absence of Falls  Document Talha Fall Risk and appropriate interventions in the flowsheet.   Outcome: Progressing Towards Goal  Fall Risk Interventions:  Mobility Interventions: Patient to call before getting OOB, Bed/chair exit alarm         Medication Interventions: Bed/chair exit alarm, Evaluate medications/consider consulting pharmacy, Patient to call before getting OOB    Elimination Interventions: Bed/chair exit alarm, Call light in reach, Toileting schedule/hourly rounds, Patient to call for help with toileting needs    History of Falls Interventions: Evaluate medications/consider consulting pharmacy

## 2019-03-19 NOTE — PROCEDURES
Pre-Procedure Diagnosis 1. Atrial fibrillation, persistent 
  
Procedure Performed 1. Direct Current Cardioversion Estimated Blood Loss: None, not applicable Procedural Description: The patient was brought to the procedure room in a fasting, nonsedated state. The risks, benefits and alternatives of the procedure were reviewed with the patient, and final questions answered. Informed consent was confirmed. A procedural timeout was called and completed per institutional policy. Once appropriate monitors were applied, fentanyl and versed were given in increments of 1-2mg versed and 25 mcg fentanyl to obtain an appropriate level of conscious sedation with continuous oxygen saturation measurement and blood pressure monitoring at minimal increments of every 5 minutes. Once an appropriate level of sedation was achieved, a transesophageal echocardiogram probe was inserted into the esophagus with ease. A comprehensive MIGUEL study was completed and the full report available in the chart. There was evidence of spontaneous echo contrast as well as a mobile echodensity noted within the BONILLA consistent with a BONILLA thrombus. The patient awoke from his procedure without overt complications. Post Procedure Diagnosis: MIGUEL revealing BONILLA thrombus Arvin Popp Daivd Cogan MD, MS Clinical Cardiac Electrophysiology 3/19/2019 
11:35 AM

## 2019-03-19 NOTE — PROGRESS NOTES
Bedside and Verbal shift change report given to self (oncoming nurse) by Nacho and YANI Johnson (offgoing nurse). Report included the following information SBAR, Kardex, ED Summary, Procedure Summary, Intake/Output, MAR, Recent Results and Cardiac Rhythm Afib 100s-120s. L groin site visualized. New gauze and paper tape placed.

## 2019-03-19 NOTE — PROGRESS NOTES
Bedside and Verbal shift change report given to Tirso Bashir RN (oncoming nurse) by self Remigio lance). Report included the following information SBAR, Kardex, MAR and Recent Results. Bed alarm on and functioning.

## 2019-03-19 NOTE — PROGRESS NOTES
Warfarin dosing per pharmacist 
 
Marybeth Vines is a 82 y.o. male. Height: 5' 4.5\" (163.8 cm)    Weight: 66.6 kg (146 lb 12.8 oz) Indication:  BONILLA thrombus Goal INR:  2-3 Home dose:  New start Risk factors or significant drug interactions:  Cipro (ends 3/19) Other anticoagulants:  Heparin drip bridge Daily Monitoring Date  INR     Warfarin dose HGB              Notes 3/13  1.3  ----  11.7 
---------------------------------------------------------------------------------- 
3/19  ---  5mg  ---- Pharmacy consulted to dose warfarin. Patient was started on Eliquis on admission, but today was found to have an BONILLA thrombus. With patient's renal dysfunction, changing to warfarin and heparin drip bridge for anticoagulation. Will not check an INR today as patient was on Eliquis which can affect the INR. Patient took a dose of Eliquis this morning at 0823, so will start heparin drip and warfarin tonight at 2000. Start warfarin 5mg daily. Will begin checking daily INRs tomorrow. Thank you, Kev Jansen, PharmD

## 2019-03-19 NOTE — PROGRESS NOTES
Re-evaluated previously held Toprol. SBP now > 100. Instructed by Pastora Tse NP to administered dose now and monitor.      03/18/19 2321   Vital Signs   Pulse (Heart Rate) (!) 118   Heart Rate Source Monitor   Cardiac Rhythm A Fib   /55   MAP (Calculated) 71

## 2019-03-19 NOTE — PROGRESS NOTES
[facility], [place] CARDIOLOGY PROGRESS NOTE           3/19/2019 6:33 AM    Admit Date: 3/13/2019    Admit Diagnosis: Thrombus [I82.90]      Subjective:   No complaints this AM, no chest pain or shortness of breath    Interval History: (History of pertinent interval events obtained from nursing staff)    ROS:  GEN:  No fever or chills  Cardiovascular:  As noted above  Pulmonary:  As noted above  Neuro:  No new focal motor or sensory loss      Objective:     Vitals:    03/19/19 0212 03/19/19 0334 03/19/19 0421 03/19/19 0536   BP: 97/66 102/65 100/62 106/70   Pulse: (!) 109 (!) 119  (!) 115   Resp:    18   Temp:    98 °F (36.7 °C)   SpO2:    99%   Weight:    66.6 kg (146 lb 12.8 oz)   Height:           Physical Exam:  General- chronically ill appearing, No Acute Distress, Alert & Oriented x 3, appropriate mood. Neck- supple, no JVD  CV- irreg irreg, tachycardic  Lung- coarse BS  Abd- soft, nontender, nondistended  Ext- trace edema bilaterally.   Skin- warm and dry    Current Facility-Administered Medications   Medication Dose Route Frequency    metoprolol succinate (TOPROL-XL) XL tablet 12.5 mg  12.5 mg Oral QHS    lip protectant (BLISTEX) ointment   Topical PRN    metoprolol succinate (TOPROL-XL) XL tablet 25 mg  25 mg Oral DAILY    albuterol (PROVENTIL VENTOLIN) nebulizer solution 2.5 mg  2.5 mg Nebulization BID RT    apixaban (ELIQUIS) tablet 5 mg  5 mg Oral Q12H    albuterol-ipratropium (DUO-NEB) 2.5 MG-0.5 MG/3 ML  3 mL Nebulization Q4H PRN    aspirin delayed-release tablet 81 mg  81 mg Oral DAILY    ciprofloxacin HCl (CIPRO) tablet 500 mg  500 mg Oral Q24H    furosemide (LASIX) tablet 20 mg  20 mg Oral BID    simvastatin (ZOCOR) tablet 20 mg  20 mg Oral QHS    tamsulosin (FLOMAX) capsule 0.4 mg  0.4 mg Oral DAILY    tiotropium (SPIRIVA) inhalation capsule 18 mcg  1 Cap Inhalation DAILY    dextrose 5% and 0.9% NaCl infusion 1,000 mL  1,000 mL IntraVENous CONTINUOUS    sodium chloride (NS) flush 5-40 mL  5-40 mL IntraVENous Q8H    sodium chloride (NS) flush 5-40 mL  5-40 mL IntraVENous PRN    acetaminophen (TYLENOL) tablet 650 mg  650 mg Oral Q4H PRN    oxyCODONE-acetaminophen (PERCOCET) 5-325 mg per tablet 1 Tab  1 Tab Oral Q4H PRN    morphine 10 mg/ml injection 5 mg  5 mg IntraVENous Q3H PRN    naloxone (NARCAN) injection 0.4 mg  0.4 mg IntraVENous PRN    ondansetron (ZOFRAN) injection 4 mg  4 mg IntraVENous Q4H PRN    diphenhydrAMINE (BENADRYL) injection 12.5 mg  12.5 mg IntraVENous Q4H PRN    albuterol (PROVENTIL VENTOLIN) nebulizer solution 2.5 mg  2.5 mg Nebulization Q4H PRN     Data Review:   Recent Results (from the past 24 hour(s))   METABOLIC PANEL, BASIC    Collection Time: 03/19/19  3:14 AM   Result Value Ref Range    Sodium 137 136 - 145 mmol/L    Potassium 3.7 3.5 - 5.1 mmol/L    Chloride 100 98 - 107 mmol/L    CO2 29 21 - 32 mmol/L    Anion gap 8 7 - 16 mmol/L    Glucose 106 (H) 65 - 100 mg/dL    BUN 42 (H) 8 - 23 MG/DL    Creatinine 2.55 (H) 0.8 - 1.5 MG/DL    GFR est AA 31 (L) >60 ml/min/1.73m2    GFR est non-AA 26 (L) >60 ml/min/1.73m2    Calcium 8.5 8.3 - 10.4 MG/DL       EKG:  (EKG has been independently visualized by me with interpretation below)  Assessment:     Principal Problem:    Ischemia of left lower extremity (3/13/2019)    Active Problems:    Chronic systolic CHF (congestive heart failure) (HCC) (10/7/2014)      Overview: [place] Class I.  EF 35%. CRI (chronic renal insufficiency) ()      Thrombus (3/13/2019)      Plan:   1.  Ischemia of left lower extremity (3/13/2019)- on eliquis - per vascular surgery     2.  Chronic systolic CHF (congestive heart failure) ([facility] (10/7/2014)Stable. Continue current medical therapy, no ace/arb or beta blocker due to hypotension, palliative care consult over weekend      Overview: [place] Class I.  EF 35%.        3.  atrial fibrillation, uncontrolled: failing a rate control strategy, will consider MIGUEL guided DCCV with initiation of amiodarone today, discussed risks/benefits and alternatives and patient wishes to proceed.      4. CVA protection: liu Davis MD  Cardiology/Electrophysiology

## 2019-03-19 NOTE — PROGRESS NOTES
MIGUEL with Dr Gisella Pandya CVN cancelled ASA II Mallampati II Versed 2mg Fentanyl 25mcg Pt tolerated procedure well Nurse updated Will continue to monitor

## 2019-03-19 NOTE — PROGRESS NOTES
Report received from [facility] in Cath Lab on this patient. Patient back to floor post MIGUEL. Patient stable and now on 5L/NC. Will assume care when patient arrives back to floor.

## 2019-03-19 NOTE — PROGRESS NOTES
Bedside and Verbal shift change report given to Joel Edmondson (oncoming nurse) by self (offgoing nurse). Report included the following information SBAR, Kardex, Procedure Summary, Intake/Output, MAR and Recent Results.

## 2019-03-19 NOTE — PROGRESS NOTES
Patient dozing following cardioversion earlier today, alerts to voice, no complaints. Family at bedside. Left groin incision intact, minimal bruising, soft, no induration / drainage / signs of infection Left foot warm with normal coloration, right foot cooler comparatively Bilateral DP and PT pulses 2+ palpable S/p left femoral thromboembolectomy 3/13/2019 by Dr. Quiroz Kinds No further indication for surgical intervention by vascular. We will gladly be available if necessary but will sign off for now. Xin Bunch PA-C Physician Assistant with [employer] [facility] Vascular Surgery [facility].  Shonda Kumari MD / Yusuf Daigle MD

## 2019-03-19 NOTE — PROGRESS NOTES
[facility] Progress Note    Follow-Up on: JAZZY/CKD    ROS:  Gen - no fever, no chills, appetite unchanged  CV - no chest pain, no palpitation  Lung - no shortness of breath, no cough  Abd - no tenderness, no nausea/vomiting, no diarrhea  Ext - no edema    Exam:  Vitals:    03/19/19 0421 03/19/19 0536 03/19/19 0722 03/19/19 0820   BP: 100/62 106/70  100/71   Pulse:  (!) 115  (!) 112   Resp:  18  18   Temp:  98 °F (36.7 °C)  98.9 °F (37.2 °C)   SpO2:  99% 96% 96%   Weight:  66.6 kg (146 lb 12.8 oz)     Height:             Intake/Output Summary (Last 24 hours) at 3/19/2019 0920  Last data filed at 3/19/2019 0827  Gross per 24 hour   Intake 600 ml   Output 1825 ml   Net -1225 ml       Wt Readings from Last 3 Encounters:   03/19/19 66.6 kg (146 lb 12.8 oz)   02/21/19 68.8 kg (151 lb 9.6 oz)   11/15/18 66.9 kg (147 lb 6.4 oz)       GEN - in no distress  CV - irregular, no murmur, no rub  Lung - clear bilaterally  Abd - soft, nontender  Ext - no edema    Recent Labs     03/16/19  0952   WBC 10.4   HGB 11.8*   HCT 35.3*           Recent Labs     03/19/19  0314 03/18/19  0429 03/17/19  0320    138 137   K 3.7 3.9 3.3*    101 104   CO2 29 26 26   BUN 42* 41* 44*   CREA 2.55* 2.77* 3.13*   CA 8.5 8.8 8.1*   * 94 124*   MG  --  1.8  --    PHOS  --  2.9  --        Assessment / Plan:  Principal Problem:    Ischemia of left lower extremity (3/13/2019)    Active Problems:    Chronic systolic CHF (congestive heart failure) (HCC) (10/7/2014)      Overview: [place] Class I.  EF 35%. CRI (chronic renal insufficiency) ()      Thrombus (3/13/2019)      1. JAZZY/CKD  - Baseline Cr probably low to mid 1's  - Cr improving, low dose diuretics  2. Acute limb ischemia - s/p LE thrombectomy  3.  Afib

## 2019-03-20 NOTE — PROGRESS NOTES
Problem: Falls - Risk of 
Goal: *Absence of Falls Document Magi Primes Fall Risk and appropriate interventions in the flowsheet. Outcome: Progressing Towards Goal 
Fall Risk Interventions: 
Mobility Interventions: Bed/chair exit alarm, Communicate number of staff needed for ambulation/transfer, Patient to call before getting OOB Medication Interventions: Teach patient to arise slowly, Patient to call before getting OOB, Bed/chair exit alarm Elimination Interventions: Call light in reach, Bed/chair exit alarm, Patient to call for help with toileting needs History of Falls Interventions: Bed/chair exit alarm, Room close to nurse's station, Door open when patient unattended Problem: Pressure Injury - Risk of 
Goal: *Prevention of pressure injury Document Chance Scale and appropriate interventions in the flowsheet. Outcome: Progressing Towards Goal 
Pressure Injury Interventions: Activity Interventions: Increase time out of bed, Pressure redistribution bed/mattress(bed type) Mobility Interventions: HOB 30 degrees or less, Pressure redistribution bed/mattress (bed type) Nutrition Interventions: Document food/fluid/supplement intake Friction and Shear Interventions: HOB 30 degrees or less, Lift sheet

## 2019-03-20 NOTE — PROCEDURES
Pre-Procedure Diagnosis 1. Persistent atrial fibrillation 2. Tachy-iliana syndrome Procedure Performed 1. AV node ablation Anesthesia: MAC Estimated Blood Loss: Less than 10 mL Specimens: * No specimens in log * The procedure, indications, risks, benefits, and alternatives were discussed with the patient and family members, who desired to proceed after questions were answered and informed consent was documented. Procedure: After informed consent was obtained, the patient was brought to the Electrophysiology Laboratory in a fasting state and was prepped and draped in sterile fashion. Access x 1 was obtained under ultrasound guidance using a modified Seldinger technique with placement of a short sheath into the right femoral vein. An 3.5 mm Biosense Prado ablation catheter was then advanced to the region of the AV junction and with delivery of RF there was complete heart block. This remained after a 15 minute waiting period. Angie Sanders MD, MS Clinical Cardiac Electrophysiology

## 2019-03-20 NOTE — PROGRESS NOTES
Bedside shift report received from Bela Coreas RN. Heparin verified at bedside. Tele box 0395. Cardiac rhythm A fib 110-130s. L groin site visualized.

## 2019-03-20 NOTE — ANESTHESIA PREPROCEDURE EVALUATION
Anesthetic History No history of anesthetic complications Review of Systems / Medical History Patient summary reviewed and pertinent labs reviewed Pulmonary COPD: moderate Shortness of breath and smoker (Current smoker) Neuro/Psych Psychiatric history (Depression) Cardiovascular Hypertension: well controlled Dysrhythmias : PVC Pacemaker (AICD/BiV pacemaker) and PAD Exercise tolerance: <4 METS Comments: TTE 2015 EF 38% Nonischemic cardiomyopathy GI/Hepatic/Renal 
  
GERD: well controlled Renal disease: CRI Endo/Other Arthritis Other Findings Physical Exam 
 
Airway Mallampati: II 
TM Distance: 4 - 6 cm Neck ROM: normal range of motion Mouth opening: Normal 
 
 Cardiovascular Rhythm: regular Rate: normal 
 
 
 
 Dental 
 
Dentition: Edentulous Pulmonary Decreased breath sounds: bilateral 
 
 
 
 
 Abdominal 
GI exam deferred Other Findings Anesthetic Plan ASA: 4 Anesthesia type: general and total IV anesthesia Induction: Intravenous Anesthetic plan and risks discussed with: Patient and Family

## 2019-03-20 NOTE — PROGRESS NOTES
Report received from Jhonny PRABHAKAR 1711. Procedural findings communicated. Intra procedural  medication administration reviewed. Progression of care discussed. Patient received into [id number] [facility] 5 post sheath removal.  
 
Access site without bleeding or swelling yes Dressing dry and intact yes Patient instructed to limit movement to right lower extremity Routine post procedural vital signs and site assessment initiated yes

## 2019-03-20 NOTE — PROGRESS NOTES
[facility] CARDIOLOGY PROGRESS NOTE 
      
 
3/20/2019 6:37 AM 
 
Admit Date: 3/13/2019 Admit Diagnosis: Thrombus [I82.90] Subjective: No complaints this AM, no chest pain or shortness of breath Interval History: (History of pertinent interval events obtained from nursing staff) Remains in afib with RVR 
 
ROS: 
GEN:  No fever or chills Cardiovascular:  As noted above Pulmonary:  As noted above Neuro:  No new focal motor or sensory loss Objective:  
 
Vitals:  
 03/20/19 9253 03/20/19 0203 03/20/19 4624 03/20/19 5241 BP: 90/62 (!) 86/54 (!) 81/62 100/62 Pulse:  (!) 104 (!) 104 (!) 109 Resp:    19 Temp:    98.4 °F (36.9 °C) SpO2:    91% Weight:    66.5 kg (146 lb 11.2 oz) Height:      
 
 
Physical Exam: 
General- chronically ill appearing, No Acute Distress, Alert & Oriented x 3, appropriate mood. Neck- supple, no JVD 
CV- irreg irreg, tachycardic Lung- coarse BS Abd- soft, nontender, nondistended Ext- trace edema bilaterally. Skin- warm and dry Current Facility-Administered Medications Medication Dose Route Frequency  fentaNYL citrate (PF) injection  mcg   mcg IntraVENous Multiple  midazolam (VERSED) injection 0.5-5 mg  0.5-5 mg IntraVENous Multiple  heparin 25,000 units in dextrose 500 mL infusion  18-36 Units/kg/hr IntraVENous TITRATE  lidocaine (XYLOCAINE) 2 % viscous solution 15 mL  15 mL Mouth/Throat PRN  
 warfarin (COUMADIN) tablet 5 mg  5 mg Oral QPM  
 metoprolol succinate (TOPROL-XL) XL tablet 12.5 mg  12.5 mg Oral QHS  lip protectant (BLISTEX) ointment   Topical PRN  
 metoprolol succinate (TOPROL-XL) XL tablet 25 mg  25 mg Oral DAILY  albuterol (PROVENTIL VENTOLIN) nebulizer solution 2.5 mg  2.5 mg Nebulization BID RT  
 albuterol-ipratropium (DUO-NEB) 2.5 MG-0.5 MG/3 ML  3 mL Nebulization Q4H PRN  
 aspirin delayed-release tablet 81 mg  81 mg Oral DAILY  furosemide (LASIX) tablet 20 mg  20 mg Oral BID  simvastatin (ZOCOR) tablet 20 mg  20 mg Oral QHS  tamsulosin (FLOMAX) capsule 0.4 mg  0.4 mg Oral DAILY  tiotropium (SPIRIVA) inhalation capsule 18 mcg  1 Cap Inhalation DAILY  dextrose 5% and 0.9% NaCl infusion 1,000 mL  1,000 mL IntraVENous CONTINUOUS  
 sodium chloride (NS) flush 5-40 mL  5-40 mL IntraVENous Q8H  
 sodium chloride (NS) flush 5-40 mL  5-40 mL IntraVENous PRN  
 acetaminophen (TYLENOL) tablet 650 mg  650 mg Oral Q4H PRN  
 oxyCODONE-acetaminophen (PERCOCET) 5-325 mg per tablet 1 Tab  1 Tab Oral Q4H PRN  
 morphine 10 mg/ml injection 5 mg  5 mg IntraVENous Q3H PRN  
 naloxone (NARCAN) injection 0.4 mg  0.4 mg IntraVENous PRN  
 ondansetron (ZOFRAN) injection 4 mg  4 mg IntraVENous Q4H PRN  
 diphenhydrAMINE (BENADRYL) injection 12.5 mg  12.5 mg IntraVENous Q4H PRN  
 albuterol (PROVENTIL VENTOLIN) nebulizer solution 2.5 mg  2.5 mg Nebulization Q4H PRN Data Review:  
Recent Results (from the past 24 hour(s)) PTT Collection Time: 03/20/19  2:40 AM  
Result Value Ref Range aPTT 87.4 (H) 24.7 - 39.8 SEC METABOLIC PANEL, BASIC Collection Time: 03/20/19  2:40 AM  
Result Value Ref Range Sodium 139 136 - 145 mmol/L Potassium 4.0 3.5 - 5.1 mmol/L Chloride 102 98 - 107 mmol/L  
 CO2 29 21 - 32 mmol/L Anion gap 8 7 - 16 mmol/L Glucose 103 (H) 65 - 100 mg/dL BUN 50 (H) 8 - 23 MG/DL Creatinine 2.40 (H) 0.8 - 1.5 MG/DL  
 GFR est AA 34 (L) >60 ml/min/1.73m2 GFR est non-AA 28 (L) >60 ml/min/1.73m2 Calcium 8.5 8.3 - 10.4 MG/DL PROTHROMBIN TIME + INR Collection Time: 03/20/19  2:40 AM  
Result Value Ref Range Prothrombin time 19.9 (H) 11.7 - 14.5 sec INR 1.7 EKG:  (EKG has been independently visualized by me with interpretation below) Assessment:  
 
Principal Problem: 
  Ischemia of left lower extremity (3/13/2019) Active Problems: Chronic systolic [facility]est[facility] ([facility] (10/7/2014) Overview: [place] Class I.  EF 35%. CRI (chronic renal insufficiency) () Thrombus (3/13/2019) Plan: 1.  Ischemia of left lower extremity (3/13/2019)- on eliquis - per vascular surgery 
  
2.  Chronic systolic CHF (congestive heart failure) [facility] (10/7/2014)Stable. Continue current medical therapy, no ace/arb or beta blocker due to hypotension, plan for AV node ablation today   
  
3.  atrial fibrillation, uncontrolled: failing a rate control strategy, attempted MIGUEL guided DCCV however, Pt has BONILLA thrombus. Plan for AV node ablation today, discussed risks/benefits and alternatives and patient wishes to proceed, cont heparin and warfarin 4. BONILLA thrombus: MIGUEL with BONILLA thrombus, started heparin and warfarin, cont to monitor 
  
4. CVA protection: on heparin and warfarin Baylee Davis MD 
Cardiology/Electrophysiology

## 2019-03-20 NOTE — PROGRESS NOTES
Warfarin dosing per pharmacist 
 
Jaycee Paniagua is a 82 y.o. male. Height: 5' 4.5\" (163.8 cm)    Weight: 66.5 kg (146 lb 11.2 oz) Indication:  BONILLA thrombus Goal INR:  2-3 Home dose:  New start Risk factors or significant drug interactions:  Cipro (ends 3/19) Other anticoagulants:  Heparin drip bridge Daily Monitoring Date  INR     Warfarin dose HGB              Notes 3/13  1.3  ----  11.7 
---------------------------------------------------------------------------------- 
3/19  ---  5mg  ----   
3/20  1.7  5 mg  ---- Pharmacy consulted to dose warfarin. Patient was started on Eliquis on admission, but today was found to have an BONILLA thrombus. With patient's renal dysfunction, changing to warfarin and heparin drip bridge for anticoagulation. INR 1.7 today, but cipro and Eliquis stopped yesterday which can elevate INR. INR daily. Thank you, Marge Harley, PharmD Clinical Pharmacist 
[phone]

## 2019-03-20 NOTE — PROGRESS NOTES
Bedside shift change report given to Mimi Andrews(oncoming nurse) by self Riley lance). Report included the following information SBAR, Kardex, ED Summary, Procedure Summary, Intake/Output, MAR, Recent Results and Cardiac Rhythm Afib 100s-120s. Heparin gtt verified at bedside at 18 units.

## 2019-03-20 NOTE — PROGRESS NOTES
Pt BP is 82/60. Spoke with Laney Evans NP. She said to hold metoprolol and lasix until post ablation. Pt in bed resting. Informed consent was obtained. Family member at bedside. Call light in reach.

## 2019-03-20 NOTE — PROGRESS NOTES
Bedside and Verbal shift change report given to self, RN (oncoming nurse) by Vianey Armijo RN (offgoing nurse). Report included the following information SBAR, Kardex, Intake/Output, MAR and Recent Results. Heparin gtt verified per MAR. Right and left groin sites visualized with off going RN.

## 2019-03-20 NOTE — ANESTHESIA POSTPROCEDURE EVALUATION
Procedure(s): 
AV NODE ABLATION. general, total IV anesthesia Anesthesia Post Evaluation Multimodal analgesia: multimodal analgesia used between 6 hours prior to anesthesia start to PACU discharge Patient location during evaluation: PACU Patient participation: complete - patient participated Level of consciousness: awake and awake and alert Pain management: adequate Airway patency: patent Anesthetic complications: no 
Cardiovascular status: acceptable Respiratory status: acceptable Hydration status: acceptable Post anesthesia nausea and vomiting:  controlled Vitals Value Taken Time BP 81/54 3/20/2019  2:38 PM  
Temp 98 3/20/2019  2:38 PM  
Pulse 90 3/20/2019  2:38 PM  
Resp 11 3/20/2019  2:38 PM  
SpO2 95 % 3/20/2019  2:38 PM  
Vitals shown include unvalidated device data.

## 2019-03-20 NOTE — PROGRESS NOTES
TRANSFER - OUT REPORT: 
 
Verbal report given to Mikey Zhang RN(name) on Rishi Benson  being transferred to telemetry(unit) for routine progression of care Report consisted of patients Situation, Background, Assessment and  
Recommendations(SBAR). Information from the following report(s) Procedure Summary was reviewed with the receiving nurse. Lines:  
Peripheral IV 03/13/19 Right Antecubital (Active) Site Assessment Clean, dry, & intact 3/20/2019  8:40 AM  
Phlebitis Assessment 0 3/20/2019  8:40 AM  
Infiltration Assessment 0 3/20/2019  8:40 AM  
Dressing Status Clean, dry, & intact 3/20/2019  8:40 AM  
Dressing Type Tape;Transparent 3/20/2019  8:40 AM  
Hub Color/Line Status Infusing 3/20/2019  8:40 AM  
Alcohol Cap Used No 3/20/2019 12:04 AM  
  
 
Opportunity for questions and clarification was provided. Patient transported with: 
 O2 @ 0 liters

## 2019-03-20 NOTE — PROGRESS NOTES
TRANSFER - IN REPORT: 
 
Verbal report received from [facility] on Juanito Cho being received from [address] [place] [address] for routine progression of care. Report consisted of patients Situation, Background, Assessment and Recommendations(SBAR). Information from the following reports was reviewed: Kardex, Procedure Summary, MAR and Recent Results. Opportunity for questions and clarification was provided. Patient received to room 321  and connected to telemetry monitor. Assessment completed and plan of care reviewed. right venous groin siteCDI. BP cycling every 15 minutes. Patient oriented to room and call light. Patient voiced understanding of bedrest. Patient voiced understanding to use call light to communicate needs. Freda Richardson

## 2019-03-21 NOTE — PROGRESS NOTES
IV heparin rate has been adjusted based on the most recent PTT results. Lab Results Component Value Date/Time  
 aPTT 72.6 (H) 03/21/2019 08:36 AM  
 
Bolus 35u/kg given. Increased gtt by 2 units per protocol. Recheck 1700.

## 2019-03-21 NOTE — PROGRESS NOTES
Bedside and Verbal shift change report given to self (oncoming nurse) by Hector Medrano (offgoing nurse). Report included the following information SBAR and Kardex.

## 2019-03-21 NOTE — PROGRESS NOTES
Suture removed from right groin per NP order. Gauze and Tegaderm applied. Dressing is CDI. No distress noted. Will continue to monitor patient.

## 2019-03-21 NOTE — PROGRESS NOTES
Verbal bedside report received from [address] Patient's situation, background, assessment and recommendations were provided. Kardex, Mar, and recent results also reviewed. Opportunity for questions provided. Assumed care of patient.

## 2019-03-21 NOTE — PROGRESS NOTES
Nutrition LOS Note: day 7 Assessment Diet order(s): cardiac Food,Nutrition, and Pertinent History: Pt seen in company of two visitors at bedside. Pt is noted to be edentulous but states that he has not had teeth for 60 years and can chew fine without modifications to diet. Pt states that he cooks for himself at home typically eats twice per day. He states that the portions sent with meals here are definitely larger than what he eats at home so he is not eating 100%. He denies any barriers to intake with exception of various food preferences. He has had visitors bring in some outside foods. H/O CHF, COPD, HTN, and CKD. He is s/p cardioversion on 3/19 and AV node ablation yesterday. Anthropometrics: Height: 5' 4.5\" (163.8 cm), Weight Source: Standing scale (comment), Weight: 66.7 kg (147 lb), Body mass index is 24.84 kg/m². BMI class of normal weight. Macronutrient Needs: 
· EER:  9387-8463 kcal /day (20-25 kcal/kg listed BW) · EPR:  53-80 grams protein/day (0.8-1.2 grams/kg listed BW)(GFR 48)-JAZZY/CKD Intake/Comparative Standards: Average intake for past 7 day(s)/5 recorded meal(s): 39%. This potentially meets ~58% of kcal and ~65% of protein needs. Intake of outside foods are contributing additional kcal and PRO. Nutrition Diagnosis: No nutrition diagnosis at this time Intervention:  
Meals and snacks: Continue current diet. Encouraged intake of outside foods. Discharge nutrition plan: Too soon to determine. Noah Lozada [address], [facility], [address] [place] [address], [phone]

## 2019-03-21 NOTE — PROGRESS NOTES
Problem: Pressure Injury - Risk of 
Goal: *Prevention of pressure injury Description Document Chance Scale and appropriate interventions in the flowsheet. Allevyn in place to prevent skin breakdown.   
Outcome: Progressing Towards Goal

## 2019-03-21 NOTE — PROGRESS NOTES
Problem: Falls - Risk of 
Goal: *Absence of Falls Description Document Stacie Flores Fall Risk and appropriate interventions in the flowsheet. Bed alarm. Bed in lowest position and locked. Call light within reach.   
Outcome: Progressing Towards Goal

## 2019-03-21 NOTE — PROGRESS NOTES
[facility] CARDIOLOGY PROGRESS NOTE 
      
 
3/21/2019 2:12 PM 
 
Admit Date: 3/13/2019 Admit Diagnosis: Thrombus [I82.90] Assessment:  
Principal Problem: 
  Ischemia of left lower extremity (3/13/2019) Active Problems: 
  Chronic systolic CHF (congestive heart failure) ([address] (10/7/2014) Overview: [place] Class I.  EF 35%. CRI (chronic renal insufficiency) () Thrombus (3/13/2019) Plan: 1. Persistent AF - s/p AF ablation, doing well, RV pacing. 2. H/o LL ischemia - s/p arterial thrombectomy by Dr. Herminia Buchanan. Pt doing well, SS is healing well. Stable pulses. 3. Stroke/embolism ppx - on warfarin with goal INR 2.0-3.0. 4. Dispo - today vs tomorrow. Thank you for allowing me to participate in the electrophysiologic care of this most pleasant patient. Please feel free to contact me if there are any questions or concerns. Rafaela Figueroa. Breck Najjar, MD, MS Clinical Cardiac Electrophysiology [facility] Cardiology Subjective: No complaints this AM, no chest pain or shortness of breath Interval History: (History of pertinent interval events obtained from nursing staff) ROS: 
GEN:  No fever or chills Cardiovascular:  As noted above Pulmonary:  As noted above Neuro:  No new focal motor or sensory loss Objective:  
 
Vitals:  
 03/21/19 6371 03/21/19 0809 03/21/19 0906 03/21/19 1320 BP:  104/66 96/62 94/59 Pulse:  80 81 78 Resp:   17 16 Temp:   97.7 °F (36.5 °C) 98 °F (36.7 °C) SpO2: 97%  98% 95% Weight:      
Height:      
 
 
Physical Exam: 
Jodell [place], Well Nourished, No Acute Distress, Alert & Oriented x 3, appropriate mood. Neck- supple, no JVD 
CV- regular rate and rhythm no MRG Lung- clear bilaterally Abd- soft, nontender, nondistended Ext- no edema bilaterally. WWP no c/c/e Skin- warm and dry Current Facility-Administered Medications Medication Dose Route Frequency  heparin 25,000 units in dextrose 500 mL infusion  18-36 Units/kg/hr IntraVENous TITRATE  lidocaine (XYLOCAINE) 2 % viscous solution 15 mL  15 mL Mouth/Throat PRN  
 warfarin (COUMADIN) tablet 5 mg  5 mg Oral QPM  
 lip protectant (BLISTEX) ointment   Topical PRN  
 metoprolol succinate (TOPROL-XL) XL tablet 25 mg  25 mg Oral DAILY  albuterol (PROVENTIL VENTOLIN) nebulizer solution 2.5 mg  2.5 mg Nebulization BID RT  
 albuterol-ipratropium (DUO-NEB) 2.5 MG-0.5 MG/3 ML  3 mL Nebulization Q4H PRN  
 aspirin delayed-release tablet 81 mg  81 mg Oral DAILY  furosemide (LASIX) tablet 20 mg  20 mg Oral BID  simvastatin (ZOCOR) tablet 20 mg  20 mg Oral QHS  tamsulosin (FLOMAX) capsule 0.4 mg  0.4 mg Oral DAILY  tiotropium (SPIRIVA) inhalation capsule 18 mcg  1 Cap Inhalation DAILY  dextrose 5% and 0.9% NaCl infusion 1,000 mL  1,000 mL IntraVENous CONTINUOUS  
 sodium chloride (NS) flush 5-40 mL  5-40 mL IntraVENous Q8H  
 sodium chloride (NS) flush 5-40 mL  5-40 mL IntraVENous PRN  
 acetaminophen (TYLENOL) tablet 650 mg  650 mg Oral Q4H PRN  
 oxyCODONE-acetaminophen (PERCOCET) 5-325 mg per tablet 1 Tab  1 Tab Oral Q4H PRN  
 morphine 10 mg/ml injection 5 mg  5 mg IntraVENous Q3H PRN  
 naloxone (NARCAN) injection 0.4 mg  0.4 mg IntraVENous PRN  
 ondansetron (ZOFRAN) injection 4 mg  4 mg IntraVENous Q4H PRN  
 diphenhydrAMINE (BENADRYL) injection 12.5 mg  12.5 mg IntraVENous Q4H PRN  
 albuterol (PROVENTIL VENTOLIN) nebulizer solution 2.5 mg  2.5 mg Nebulization Q4H PRN Data Review:  
Recent Results (from the past 24 hour(s)) EKG, 12 LEAD, INITIAL Collection Time: 03/20/19  3:37 PM  
Result Value Ref Range Ventricular Rate 91 BPM  
 Atrial Rate 77 BPM  
 QRS Duration 196 ms  
 Q-T Interval 480 ms QTC Calculation (Bezet) 590 ms Calculated R Axis -89 degrees Calculated T Axis 92 degrees Diagnosis Ventricular-paced rhythm with occasional Premature ventricular complexes Abnormal ECG When compared with ECG of 13-MAR-2019 13:13, 
Electronic ventricular pacemaker has replaced Sinus rhythm Confirmed by Danielle Swain MD (), ARNOLD METZGER ([id number]) on 3/21/2019 7:13:43 AM 
  
PTT Collection Time: 03/20/19  6:32 PM  
Result Value Ref Range aPTT 65.3 (H) 24.7 - 39.8 SEC PROTHROMBIN TIME + INR Collection Time: 03/21/19 12:40 AM  
Result Value Ref Range Prothrombin time 22.8 (H) 11.7 - 14.5 sec INR 2.0    
CBC WITH AUTOMATED DIFF Collection Time: 03/21/19 12:40 AM  
Result Value Ref Range WBC 7.5 4.3 - 11.1 K/uL  
 RBC 3.79 (L) 4.23 - 5.6 M/uL  
 HGB 11.9 (L) 13.6 - 17.2 g/dL HCT 36.4 (L) 41.1 - 50.3 % MCV 96.0 79.6 - 97.8 FL  
 MCH 31.4 26.1 - 32.9 PG  
 MCHC 32.7 31.4 - 35.0 g/dL  
 RDW 13.3 11.9 - 14.6 % PLATELET 935 (L) 687 - 450 K/uL MPV 10.6 9.4 - 12.3 FL ABSOLUTE NRBC 0.00 0.0 - 0.2 K/uL NEUTROPHILS 73 43 - 78 % LYMPHOCYTES 15 13 - 44 % MONOCYTES 8 4.0 - 12.0 % EOSINOPHILS 2 0.5 - 7.8 % BASOPHILS 1 0.0 - 2.0 % IMMATURE GRANULOCYTES 1 0.0 - 5.0 %  
 ABS. NEUTROPHILS 5.4 1.7 - 8.2 K/UL  
 ABS. LYMPHOCYTES 1.1 0.5 - 4.6 K/UL  
 ABS. MONOCYTES 0.6 0.1 - 1.3 K/UL  
 ABS. EOSINOPHILS 0.2 0.0 - 0.8 K/UL  
 ABS. BASOPHILS 0.1 0.0 - 0.2 K/UL  
 ABS. IMM. GRANS. 0.1 0.0 - 0.5 K/UL  
 DF AUTOMATED RENAL FUNCTION PANEL Collection Time: 03/21/19 12:40 AM  
Result Value Ref Range Sodium 140 136 - 145 mmol/L Potassium 3.7 3.5 - 5.1 mmol/L Chloride 101 98 - 107 mmol/L  
 CO2 30 21 - 32 mmol/L Anion gap 9 7 - 16 mmol/L Glucose 100 65 - 100 mg/dL BUN 48 (H) 8 - 23 MG/DL Creatinine 2.39 (H) 0.8 - 1.5 MG/DL  
 GFR est AA 34 (L) >60 ml/min/1.73m2 GFR est non-AA 28 (L) >60 ml/min/1.73m2 Calcium 8.2 (L) 8.3 - 10.4 MG/DL Phosphorus 4.1 (H) 2.3 - 3.7 MG/DL Albumin 2.2 (L) 3.2 - 4.6 g/dL PTT  Collection Time: 03/21/19 12:40 AM  
 Result Value Ref Range aPTT >200.0 (HH) 24.7 - 39.8 SEC  
PTT Collection Time: 03/21/19  8:36 AM  
Result Value Ref Range aPTT 72.6 (H) 24.7 - 39.8 SEC  
 
 
EKG:  (EKG has been independently visualized by me with interpretation below): RV apical pacing.

## 2019-03-21 NOTE — PROGRESS NOTES
CM met with pt to discuss DC to [facility]. Pt states he plans to return home. CM unable to reach [address] CM to continue to follow for dc needs. Care Management Interventions PCP Verified by CM: Yes Last Visit to PCP: 02/21/19 Mode of Transport at Discharge: Other (see comment)(Lela Bedoya [phone]) Transition of Care Consult (CM Consult): SNF, Discharge Planning Partner SNF: Yes Discharge Durable Medical Equipment: No 
Physical Therapy Consult: Yes Occupational Therapy Consult: No 
Speech Therapy Consult: No 
Current Support Network: Own Home, Lives Alone Confirm Follow Up Transport: Family Plan discussed with Pt/Family/Caregiver: Yes Freedom of Choice Offered: Yes Discharge Location Discharge Placement: Rehab Unit Subacute

## 2019-03-21 NOTE — PROGRESS NOTES
Problem: Mobility Impaired (Adult and Pediatric) Goal: *Acute Goals and Plan of Care (Insert Text) LTG: 
(1.)Mr. Marbella Leonard will move from supine to sit and sit to supine , scoot up and down and roll side to side with INDEPENDENT within 7 treatment day(s) from flat surface without handrail. (2.)Mr. Marbella Leonard will transfer from bed to chair and chair to bed with MODIFIED INDEPENDENCE using the least restrictive device within 7 treatment day(s). (3.)Mr. Marbella Leonard will ambulate with MODIFIED INDEPENDENCE for 250+ feet with the least restrictive device within 7 treatment day(s) while maintaining normal vital signs. (4.)Mr. Marbella Leonard will perform 1-2 steps with HR and SBA within 7 treatment days for safety ascending and descending stairs for home.  
___________________________________________________________________________________________ PHYSICAL THERAPY: Daily Note and PM 3/21/2019INPATIENT: PT Visit Days : 3 [place] / [place] B / Product Type: Medicare /   
  
NAME/AGE/GENDER: Briana Bonds is a 82 y.o. male PRIMARY DIAGNOSIS: Thrombus [I82.90] Ischemia of left lower extremity Ischemia of left lower extremity Procedure(s) (LRB): 
AV NODE ABLATION (N/A) 1 Day Post-Op ICD-10: Treatment Diagnosis:  
 · Generalized Muscle Weakness (M62.81) · Difficulty in walking, Not elsewhere classified (R26.2) Precaution/Allergies: 
Lortab [hydrocodone-acetaminophen]; Lortab [hydrocodone-acetaminophen]; and Other medication ASSESSMENT:  
Mr. Marbella Leonard was supine in bed upon arrival and agreeable to PT. Per chart pt had recent ablation done. Pt performed bed mobility with supervision and good sitting balance. He progressed STS transfers from [facility] 62 to [facility] [address] with RW. Pt ambulated in pascal on room air with CGA/RW and slow, shuffled gait. Pt performed seated TE from recliner chair with verbal and visual cues. He desaturated slightly (88-89%) and was placed back on supplemental O2. Pt also given several sitting rest breaks and cues for good breathing techniques. He has progressed in ambulation and functional mobility. This section established at most recent assessment PROBLEM LIST (Impairments causing functional limitations): 1. Decreased Strength 2. Decreased ADL/Functional Activities 3. Decreased Transfer Abilities 4. Decreased Ambulation Ability/Technique 5. Decreased Balance 6. Increased Pain 7. Decreased Activity Tolerance 8. Decreased Flexibility/Joint Mobility 9. Decreased [place] with Home Exercise Program 
 INTERVENTIONS PLANNED: (Benefits and precautions of physical therapy have been discussed with the patient.) 1. Balance Exercise 2. Bed Mobility 3. Family Education 4. Gait Training 5. Home Exercise Program (HEP) 6. Therapeutic Activites 7. Therapeutic Exercise/Strengthening 8. Transfer Training TREATMENT PLAN: Frequency/Duration: 3 times a week for duration of hospital stay Rehabilitation Potential For Stated Goals: Good RECOMMENDED REHABILITATION/EQUIPMENT: (at time of discharge pending progress): Due to the probability of continued deficits (see above) this patient will likely need continued skilled physical therapy after discharge. Equipment:  
? None at this time HISTORY:  
History of Present Injury/Illness (Reason for Referral): This patient is a 82 y.o. white male seen at the request of Oh Chappell MD and evaluated for cold, pulseless left foot. PMH with CHF, HTN, AICD in place, COPD, past prostate cancer, chronic renal insufficiency and other medical issues, patient was brought in to ED via EMS with painful L LE. Pedal pulses present in field per paramedic, with pulses lost in transport ~12h00.  
  
Patient states he ambulated around house as normal until ~10h00, then L LE became painful. Patient reports sensation to only noxious stimuli below knee, significant pain above knee.  States he cannot move left foot or toes. Patient takes daily ASA and took one already this AM; he denies other anticoagulants. Cardiac history includes defibrillator placement ~5y ago, [facility] 6/2014 ([place]) with nonischemic cardiomyopathy.  
  
Patient was in ED yesterday with urinary retention, received candelaria catheter and was to follow-up with outpatient urology. He states he last ate Monday and has been nauseated since with some vomiting. He has extensive past smoking history but admits to intermittent tobacco use in recent past, last cigarette was last week. He denies fever or chills.  
  
 
Past Medical History/Comorbidities:  
Mr. Stacie Trinidad  has a past medical history of Abnormal EKG, Acute kidney injury ([address] AICD (automatic cardioverter/defibrillator) present, Alterations of sensations, Anemia, Arthritis, Back pain, Back pain (6/17/2016), Benign prostatic hyperplasia (6/17/2016), Bilateral pubic rami fractures (HCC), CAD (coronary artery disease), CHF (congestive heart failure) ([address] Chronic obstructive pulmonary disease ([address] Chronic systolic CHF (congestive heart failure) ([address] (10/7/2014), COPD (chronic obstructive pulmonary disease) ([address] CRI (chronic renal insufficiency), Depression, Dizziness, Dyspnea, Elevated ferritin, Elevated PSA (6/17/2016), GERD (gastroesophageal reflux disease), Heart failure ([address] Hernia, inguinal, Hernia, inguinal (6/17/2016), Hyperlipidemia, Hyperlipidemia (10/19/2015), Hypertension, Hypokalemia, Hypokalemia (6/17/2016), Ill-defined condition, Keratosis, actinic, Leg cramps, Malaise and fatigue, Malaise and fatigue (6/17/2016), OA (osteoarthritis) of hip (9/16/2016), Orthostasis, Pneumonia, Respiratory failure ([address] (6/17/2016), Sinoatrial node dysfunction ([address] and Sinusitis.   Mr. Stacie Trinidad  has a past surgical history that includes hx pacemaker; hx cataract removal; hx other surgical (4/2006); hx other surgical; hx other surgical; hx orthopaedic; hx carpal tunnel release; hx heart catheterization; hx cataract removal; hx colonoscopy; and hx other surgical. 
Social History/Living Environment:  
Home Environment: Private residence # Steps to Enter: 0 One/Two Story Residence: One story Living Alone: Yes Support Systems: Child(chantelle) Patient Expects to be Discharged to[de-identified] Private residence Current DME Used/Available at Home: Cane, straight, Commode, bedside, Oxygen, portable, Shower chair, Walker, rolling, Wheelchair(independent at [employer]) Prior Level of Function/Work/Activity: 
Lives alone, independent at baseline, drives; has O2 for home use PRN Personal Factors:   
      Sex:  male Age:  82 y.o. Overall Behavior:  agreeable Number of Personal Factors/Comorbidities that affect the Plan of Care: 
CHF, COPD, age 3+: HIGH COMPLEXITY EXAMINATION:  
Most Recent Physical Functioning:  
Gross Assessment: 
  
         
  
Posture: 
  
Balance: 
Sitting: Intact Standing: Impaired Standing - Static: Fair Standing - Dynamic : Fair Bed Mobility: 
Supine to Sit: Supervision Interventions: Verbal cues Wheelchair Mobility: 
  
Transfers: 
Sit to Stand: Stand-by assistance Stand to Sit: Stand-by assistance Bed to Chair: Contact guard assistance Interventions: Verbal cues; Visual cues; Safety awareness training Gait: 
  
Base of Support: Center of gravity altered Speed/Tonya: Slow;Shuffled Step Length: Left shortened;Right shortened Gait Abnormalities: Decreased step clearance;Shuffling gait Distance (ft): 100 Feet (ft) Assistive Device: Walker, rolling Ambulation - Level of Assistance: Contact guard assistance Interventions: Safety awareness training;Verbal cues; Visual/Demos Body Structures Involved: 1. Muscles Body Functions Affected: 1. Movement Related Activities and Participation Affected: 1. General Tasks and Demands 2. Mobility 3. Self Care Number of elements that affect the Plan of Care: 4+: HIGH COMPLEXITY CLINICAL PRESENTATION:  
Presentation: Evolving clinical presentation with changing clinical characteristics: MODERATE COMPLEXITY CLINICAL DECISION MAKING:  
[facility] [employer] AM-PAC 6 Clicks Basic Mobility Inpatient Short Form How much difficulty does the patient currently have. .. Unable A Lot A Little None 1. Turning over in bed (including adjusting bedclothes, sheets and blankets)? [] 1   [] 2   [x] 3   [] 4  
2. Sitting down on and standing up from a chair with arms ( e.g., wheelchair, bedside commode, etc.)   [] 1   [] 2   [x] 3   [] 4  
3. Moving from lying on back to sitting on the side of the bed? [] 1   [] 2   [x] 3   [] 4 How much help from another person does the patient currently need. .. Total A Lot A Little None 4. Moving to and from a bed to a chair (including a wheelchair)? [] 1   [] 2   [x] 3   [] 4  
5. Need to walk in hospital room? [] 1   [] 2   [x] 3   [] 4  
6. Climbing 3-5 steps with a railing? [] 1   [x] 2   [] 3   [] 4  
© 2007, Trustees of [facility] [employer], under license to [employer]. All rights reserved Score:  Initial: 17 Most Recent: X (Date: -- ) Interpretation of Tool:  Represents activities that are increasingly more difficult (i.e. Bed mobility, Transfers, Gait). Medical Necessity:    
· Patient is expected to demonstrate progress in strength, range of motion, balance and coordination to decrease assistance required with overall functional mobility, transfers, ambulation. · Patient demonstrates good rehab potential due to higher previous functional level. Reason for Services/Other Comments: 
· Patient continues to require present interventions due to patient's inability to perform bed mobility, transfers, ambulation safely and effectively.   
Use of outcome tool(s) and clinical judgement create a POC that gives a: Clear prediction of patient's progress: LOW COMPLEXITY  
  
 
 
 
TREATMENT:  
 (In addition to Assessment/Re-Assessment sessions the following treatments were rendered) Pre-treatment Symptoms/Complaints:  \"I can't move that leg\" Pain: Initial:  
Pain Intensity 1: 0  Post Session:  0/10 Therapeutic Activity: (   12 min): Therapeutic activities including Bed transfers, Ambulation on level ground, chair transfers to improve mobility, strength, balance and activity tolerance. Required minimal Safety awareness training;Verbal cues; Visual/Demos to promote static and dynamic balance in standing and activity pacing Date: 
3/21/19 Date: 
 Date: Activity/Exercise Parameters Parameters Parameters STS 1 x 2 Therapeutic Exercise: (11 Minutes):  Exercises per grid below to improve mobility, strength and activity tolerance. Required minimal visual and verbal cues to promote proper body alignment, promote proper body mechanics and promote proper body breathing techniques. Progressed repetitions and complexity of movement as indicated. Date: 
3/21/19 Date: 
 Date: 
  
ACTIVITY/EXERCISE AM PM AM PM AM PM  
Seated LAQ  2 x 15 L 
2 x 20 R Seated marching  2 x 10 L 
2 x 20 R Seated AP  2 x 20 B Seated hip abd/add with knee ext  2 x 20 B      
        
        
        
B = bilateral; AA = active assistive; A = active; P = passive Maxime Trini Braces/Orthotics/Lines/Etc:  
· IV 
· candelaria catheter · O2 Device: Nasal cannula Treatment/Session Assessment:   
· Response to Treatment:  See above. · Interdisciplinary Collaboration:  
o Physical Therapist 
o Registered Nurse · After treatment position/precautions:  
o Up in chair 
o Bed/Chair-wheels locked 
o Bed in low position 
o Call light within reach 
o RN notified 
o Family at bedside · Compliance with Program/Exercises: Will assess as treatment progresses · Recommendations/Intent for next treatment session:   \"Next visit will focus on advancements to more challenging activities\". Total Treatment Duration: PT Patient Time In/Time Out Time In: 4429 Time Out: 1436 Avery Malloy, PT, DPT

## 2019-03-21 NOTE — PROGRESS NOTES
Verbal bedside report given to Sky Howell oncoming RN. Patient's situation, background, assessment and recommendations provided. Opportunity for questions provided. Oncoming RN assumed care of patient. Heparin IV drip verified at bedside with oncoming RN. Right groin site visualized.

## 2019-03-21 NOTE — PROGRESS NOTES
Bedside and Verbal shift change report given to Vanessa Gil RN (oncoming nurse) by self, RN (offgoing nurse). Report included the following information SBAR, Kardex, Intake/Output, MAR and Recent Results. Right and left groin sites visualized with oncoming RN. Both are CDI. Heparin gtt verified per MAR.

## 2019-03-21 NOTE — PROGRESS NOTES
Spoke with Jerica Mason NP about patient's BP of 93/58. Patient has furosemide 20mg PO due. Per Marcos Allred NP it is ok to give patient this medication.

## 2019-03-21 NOTE — PROGRESS NOTES
Verbal bedside report given to Wilder Wynne oncoming RN. Patient's situation, background, assessment and recommendations provided. Kardex, Mar, and recent results also reviewed. Opportunity for questions provided. Oncoming RN assumed care of patient.

## 2019-03-21 NOTE — PROGRESS NOTES
Warfarin dosing per pharmacist 
 
Stephanie De La Rosa is a 82 y.o. male. Height: 5' 4.5\" (163.8 cm)    Weight: 66.7 kg (147 lb) Indication:  BONILLA thrombus Goal INR:  2-3 Home dose:  New start Risk factors or significant drug interactions:  Cipro (ends 3/19) Other anticoagulants:  Heparin drip bridge Daily Monitoring Date  INR     Warfarin dose HGB              Notes 3/13  1.3  ----  11.7 
---------------------------------------------------------------------------------- 
3/19  ---  5mg  ----  Hep Htt 
3/20  1.7  5 mg  ---- 
3/21  2  5 mg  11.9 Pharmacy consulted to dose warfarin. Patient was started on Eliquis on admission, but today was found to have an BONILLA thrombus. With patient's renal dysfunction, changing to warfarin and heparin drip bridge for anticoagulation. INR 2 today, would continue 5 mg tonight. Would continue heparin bridge for at least for 5 days and therapeutic INR for 24 hours INR daily. Thank you, Nasreen Bernardo, PharmD Clinical Pharmacist 
[phone]

## 2019-03-22 PROBLEM — I51.3 THROMBUS OF LEFT ATRIAL APPENDAGE: Status: ACTIVE | Noted: 2019-01-01

## 2019-03-22 PROBLEM — I48.91 ATRIAL FIBRILLATION (HCC): Status: ACTIVE | Noted: 2019-01-01

## 2019-03-22 NOTE — PROGRESS NOTES
03/22/19 4778 Oxygen Therapy O2 Sat (%) 98 % Pulse via Oximetry 80 beats per minute O2 Device Nasal cannula O2 Flow Rate (L/min) 2 l/min 
(placed on Room air)

## 2019-03-22 NOTE — PROGRESS NOTES
Pt plans to dc home with [facility]. INR Monday CM notified [facility]. Referral & order sent to [place], notified of dc home. All parties in agreement. Care Management Interventions PCP Verified by CM: Yes Last Visit to PCP: 02/21/19 Mode of Transport at Discharge: Other (see comment)(Daughter Harjinder Gaytan [phone]) Transition of Care Consult (CM Consult): Discharge Planning, Home Health Partner SNF: Yes Discharge Durable Medical Equipment: No 
Physical Therapy Consult: Yes Occupational Therapy Consult: No 
Speech Therapy Consult: No 
Current Support Network: Own Home, Lives Alone Confirm Follow Up Transport: Family Plan discussed with Pt/Family/Caregiver: Yes Freedom of Choice Offered: Yes Discharge Location Discharge Placement: Home with home health

## 2019-03-22 NOTE — PROGRESS NOTES
Bedside and Verbal shift change report given to self (oncoming nurse) by Francisco Javier Perez RN (offgoing nurse). Report included the following information SBAR, Kardex, MAR and Recent Results.

## 2019-03-22 NOTE — DISCHARGE INSTRUCTIONS
Patient Education   Patient Education   Warfarin (By mouth)   Warfarin (WAR-far-in)  Prevents and treats blood clots. May lower the risk of serious complications after a heart attack. This medicine is a blood thinner. Brand Name(s): Coumadin, [place]   There may be other brand names for this medicine. When This Medicine Should Not Be Used: This medicine is not right for everyone. Do not use it if you had an allergic reaction to warfarin, if you are pregnant, or if you have health problems that could cause bleeding. How to Use This Medicine:   Tablet  · Take your medicine as directed. Your dose may need to be changed several times to find what works best for you. · This medicine should come with a Medication Guide. Ask your pharmacist for a copy if you do not have one. · Missed dose: Take a dose as soon as you remember. If it is almost time for your next dose, wait until then and take a regular dose. Do not take extra medicine to make up for a missed dose. · Store the medicine in a closed container at room temperature, away from heat, moisture, and direct light. Drugs and Foods to Avoid:   Ask your doctor or pharmacist before using any other medicine, including over-the-counter medicines, vitamins, and herbal products. · Many medicines and foods can affect how warfarin works and may affect the PT/INR test results.  Tell your doctor before you start or stop any medicine, especially the following:   ¨ Co-enzyme V89, echinacea, garlic, ginkgo, ginseng, goldenseal, or Bulmaro's wort  ¨ Another blood thinner, including apixaban, argatroban, bivalirudin, cilostazol, clopidogrel, dabigatran, desirudin, dipyridamole, heparin, lepirudin, prasugrel, rivaroxaban, ticlopidine  ¨ Medicine to treat depression or anxiety, including citalopram, desvenlafaxine, duloxetine, escitalopram, fluoxetine, fluvoxamine, milnacipran, paroxetine, sertraline, venlafaxine, vilazodone  ¨ Medicine to treat an infection  ¨ NSAID pain or arthritis medicine, including aspirin, celecoxib, diclofenac, diflunisal, fenoprofen, ibuprofen, indomethacin, ketoprofen, ketorolac, mefenamic acid, naproxen, oxaprozin, piroxicam, sulindac. Check labels for over-the-counter medicines to find out if they contain an NSAID. ¨ Steroid medicine, including dexamethasone, hydrocortisone, methylprednisolone, prednisolone, prednisone  · Warfarin works best if you eat about the same amount of vitamin K every day. Foods high in vitamin K include asparagus, broccoli, brussels sprouts, cabbage, green leafy vegetables, plums, rhubarb, and canola oil. Talk to your doctor before you make changes to your normal diet. · Do not eat grapefruit or drink grapefruit juice while you are using this medicine. Warnings While Using This Medicine:   · It is not safe to take this medicine during pregnancy. It could harm an unborn baby. Tell your doctor right away if you become pregnant. Use an effective form of birth control to keep from getting pregnant during treatment and for at least 1 month after your last dose. · Tell your doctor if you are breastfeeding, or if you have kidney disease, liver disease, heart disease, diabetes, heart failure, high blood pressure, an infection, a stomach ulcer, or protein C deficiency. Also tell your doctor if you had recent surgery or an injury, or a history of stroke, anemia, severe bleeding or bruising, or problems caused by heparin use. · This medicine may cause the following problems:   ¨ Bleeding, which may be life-threatening  ¨ Gangrene (skin or tissue damage)  ¨ Calciphylaxis or calcium uremic arteriolopathy  ¨ Kidney problems, including acute kidney injury  ¨ Purple toes syndrome  · You must have a PT/INR blood test while you use this medicine to check how well your blood is clotting. Your doctor will use the test results to make sure the medicine is working properly. Keep all appointments for the PT/INR blood tests.   · You may bleed or bruise more easily with warfarin. To prevent injury or cuts, do not play rough sports, be careful with sharp objects, and brush and floss your teeth gently. Rufus Favre your nose gently, and do not pick your nose. · Carry an ID card or wear a medical alert bracelet to let emergency caregivers know that you use warfarin. · Tell any doctor or dentist who treats you that you are using this medicine. You may need to stop using this medicine several days before you have surgery or medical tests. · Keep all medicine out of the reach of children. Never share your medicine with anyone. Possible Side Effects While Using This Medicine:   Call your doctor right away if you notice any of these side effects:  · Allergic reaction: Itching or hives, swelling in your face or hands, swelling or tingling in your mouth or throat, chest tightness, trouble breathing  · Bleeding from your gums or nose, coughing up blood, heavy monthly periods  · Bleeding that does not stop, bruising, or weakness  · Dizziness, fainting, lightheadedness  · Pain, brown or black skin, or skin that is cool to the touch  · Purple toes or feet, or new pain in your leg, foot, or toes  · Purplish red, net-like, blotchy spots on the skin  · Red or dark brown urine, or red or black, tarry stools  · Vomiting blood or material that looks like coffee grounds  If you notice other side effects that you think are caused by this medicine, tell your doctor. Call your doctor for medical advice about side effects. You may report side effects to FDA at 5-754-VVG-0215  © 2017 [facility] Information is for End User's use only and may not be sold, redistributed or otherwise used for commercial purposes. The above information is an  only. It is not intended as medical advice for individual conditions or treatments. Talk to your doctor, nurse or pharmacist before following any medical regimen to see if it is safe and effective for you.      Patient Education   Metoprolol (By mouth)   Metoprolol (met-oh-PROE-lol)  Treats high blood pressure, angina (chest pain), and heart failure. May lower the risk of death after a heart attack. This medicine is a beta-blocker. Brand Name(s): Lopressor, Toprol XL   There may be other brand names for this medicine. When This Medicine Should Not Be Used: This medicine is not right for everyone. Do not use if you had an allergic reaction to metoprolol or similar medicines. Do not use this medicine if you have certain blood circulation or heart problems. Ask your doctor about these problems. How to Use This Medicine:   Tablet, Long Acting Tablet  · Take your medicine as directed. Your dose may need to be changed several times to find what works best for you. · Take this medicine with a meal or right after a meal. Take this medicine the same way every day, at the same time. · Swallow the tablet whole with a glass of water. You may break the extended-release tablet in half, but do not chew or crush it. · Missed dose: Take a dose as soon as you remember. If it is almost time for your next dose, wait until then and take a regular dose. Do not take extra medicine to make up for a missed dose. · Store the medicine in a closed container at room temperature, away from heat, moisture, and direct light. Drugs and Foods to Avoid:   Ask your doctor or pharmacist before using any other medicine, including over-the-counter medicines, vitamins, and herbal products. · Some medicines can affect how metoprolol works.  Tell your doctor if you are taking any of the following:   ¨ Digoxin, dipyridamole, hydralazine, hydroxychloroquine, methyldopa, quinidine  ¨ Medicine to treat depression (such as bupropion, clomipramine, desipramine, fluoxetine, fluvoxamine, paroxetine, sertraline), medicine to treat mental illness (such as chlorpromazine, fluphenazine, haloperidol, thioridazine), medicine for heart rhythm problems (such as propafenone), HIV/AIDS medicine (such as ritonavir), medicine to treat a fungus infection (such as terbinafine), a monoamine oxidase inhibitor (MAOI), an ergot medicine for headaches, a calcium channel blocker (such as amlodipine, diltiazem, verapamil), or an alpha blocker (such as clonidine, prazosin, reserpine, guanethidine)  Warnings While Using This Medicine:   · Tell your doctor if you are pregnant or breastfeeding, or if you have blood vessel, heart, or circulation problems (such as heart failure, rhythm problems, or slow heartbeat). Tell your doctor if you have kidney disease, liver disease, diabetes, lung disease (such as asthma), an overactive thyroid, or a history of allergies. · This medicine may cause worse symptoms of heart failure while the dose is being adjusted. · Do not stop using this medicine suddenly. Your doctor will need to slowly decrease your dose before you stop it completely. · Tell any doctor or dentist who treats you that you are using this medicine. You may need to stop using this medicine several days before you have surgery or medical tests. · This medicine could lower your blood pressure too much, especially when you first use it or if you are dehydrated. Stand or sit up slowly if you feel lightheaded or dizzy. · This medicine may make you dizzy or drowsy. Do not drive or do anything else that could be dangerous until you know how this medicine affects you. · Your doctor will check your progress and the effects of this medicine at regular visits. Keep all appointments. · Keep all medicine out of the reach of children. Never share your medicine with anyone.   Possible Side Effects While Using This Medicine:   Call your doctor right away if you notice any of these side effects:  · Allergic reaction: Itching or hives, swelling in your face or hands, swelling or tingling in your mouth or throat, chest tightness, trouble breathing  · Lightheadedness, dizziness, or fainting  · Slow heartbeat  · Swelling in your hands, ankles, or feet, trouble breathing, tiredness  · Worsening chest pain  If you notice these less serious side effects, talk with your doctor:   · Diarrhea  · Mild dizziness or tiredness  If you notice other side effects that you think are caused by this medicine, tell your doctor. Call your doctor for medical advice about side effects. You may report side effects to FDA at 6-068-AAX-9301  © 2017 [address] Information is for End User's use only and may not be sold, redistributed or otherwise used for commercial purposes. The above information is an  only. It is not intended as medical advice for individual conditions or treatments. Talk to your doctor, nurse or pharmacist before following any medical regimen to see if it is safe and effective for you. Electrophysiology Study and Catheter Ablation: What to Expect at [address] had an electrophysiology study for a problem with your heartbeat. You may also have had a catheter ablation to try to correct the problem. You may have swelling, bruising, or a small lump around the site where the catheters went into your body. These should go away in 3 to 4 weeks. Do not exercise hard or lift anything heavy for a week. You may be able to go back to work and to your normal routine in 1 or 2 days. This care sheet gives you a general idea about how long it will take for you to recover. But each person recovers at a different pace. Follow the steps below to get better as quickly as possible. How can you care for yourself at home? Activity    · For 1 week, do not lift anything that would make you strain.  This may include heavy grocery bags and milk containers, a heavy briefcase or backpack, cat litter or dog food bags, a vacuum , or a child.     · For 1 week, do not exercise hard or do any activity that could strain your blood vessels or the site where the catheters went into your body.     · Ask your doctor when it is okay to have sex.     · You may shower 24 to 48 hours after the procedure, if your doctor okays it. Pat the incision dry. Do not take a bath for 1 week, or until your doctor tells you it is okay. Diet   · You can eat your normal diet. If your stomach is upset, try bland, low-fat foods like plain rice, broiled chicken, toast, and yogurt.     · Drink plenty of fluids (unless your doctor tells you not to). Medicines    · Your doctor will tell you if and when you can restart your medicines. He or she will also give you instructions about taking any new medicines.     · If you take blood thinners, such as warfarin (Coumadin), clopidogrel (Plavix), or aspirin, be sure to talk to your doctor. He or she will tell you if and when to start taking those medicines again. Make sure that you understand exactly what your doctor wants you to do.     · Ask your doctor if you can take acetaminophen (Tylenol) for pain. Do not take aspirin for 3 days, unless your doctor says it is okay.     · Check with your doctor before you take aspirin or anti-inflammatory medicines to reduce pain and swelling. These include ibuprofen (Advil, Motrin) and naproxen (Aleve).   · Make sure you know which heart medicines to continue and which ones to stop. Ask your doctor if you are not sure.   Wayne Costello site care    · You can remove your bandages the day after the procedure.     · If you are bleeding, lie down and press on the area for 15 minutes to try to make it stop. If the bleeding does not stop, call your doctor or seek immediate medical care. Follow-up care is a key part of your treatment and safety. Be sure to make and go to all appointments, and call your doctor if you are having problems. It's also a good idea to know your test results and keep a list of the medicines you take. When should you call for help? Call 911 anytime you think you may need emergency care.  For example, call if:    · You passed out (lost consciousness).     · You have symptoms of a heart attack. These may include:  ? Chest pain or pressure, or a strange feeling in the chest.  ? Sweating. ? Shortness of breath. ? Nausea or vomiting. ? Pain, pressure, or a strange feeling in the back, neck, jaw, or upper belly, or in one or both shoulders or arms. ? Lightheadedness or sudden weakness. ? A fast or irregular heartbeat. After you call 911, the  may tell you to chew 1 adult-strength or 2 to 4 low-dose aspirin. Wait for an ambulance. Do not try to drive yourself.     · You have symptoms of a stroke. These may include:  ? Sudden numbness, tingling, weakness, or loss of movement in your face, arm, or leg, especially on only one side of your body. ? Sudden vision changes. ? Sudden trouble speaking. ? Sudden confusion or trouble understanding simple statements. ? Sudden problems with walking or balance. ? A sudden, severe headache that is different from past headaches.    Call your doctor now or seek immediate medical care if:    · You are bleeding from the area where the catheter was put in your artery.     · You have a fast-growing, painful lump at the catheter site.     · You have signs of infection, such as:  ? Increased pain, swelling, warmth, or redness. ? Red streaks leading from the catheter site. ? Pus draining from the catheter site. ? A fever.     · Your leg or arm looks blue or feels cold, numb, or tingly.    Watch closely for any changes in your health, and be sure to contact your doctor if you have any problems. Where can you learn more? Go to http://rachel-amarilys.info/. Enter [phone] in the search box to learn more about \"Electrophysiology Study and Catheter Ablation: What to Expect at Home. \"  Current as of: July 22, 2018  Content Version: 11.9  © 4008-5238 [employer].  Care instructions adapted under license by [employer] (which disclaims liability or warranty for this information). If you have questions about a medical condition or this instruction, always ask your healthcare professional. [place] [address] any warranty or liability for your use of this information.

## 2019-03-22 NOTE — PROGRESS NOTES
[facility] Failing Admission Date: 3/13/2019 Renal Daily Progress Note: 3/22/2019 The patient's chart is reviewed and the patient is discussed with the staff. Follow up JAZZY Subjective:  
 
Patient seen and examined on rounds, family at the bedside, no complaints ROS:  
General: no fever/chills Lung: No SOB, no orthopnea CV: no CP 
GI: No N/V/D Ext: no edema Current Facility-Administered Medications Medication Dose Route Frequency  lidocaine (XYLOCAINE) 2 % viscous solution 15 mL  15 mL Mouth/Throat PRN  
 warfarin (COUMADIN) tablet 5 mg  5 mg Oral QPM  
 lip protectant (BLISTEX) ointment   Topical PRN  
 metoprolol succinate (TOPROL-XL) XL tablet 25 mg  25 mg Oral DAILY  albuterol (PROVENTIL VENTOLIN) nebulizer solution 2.5 mg  2.5 mg Nebulization BID RT  
 albuterol-ipratropium (DUO-NEB) 2.5 MG-0.5 MG/3 ML  3 mL Nebulization Q4H PRN  
 aspirin delayed-release tablet 81 mg  81 mg Oral DAILY  furosemide (LASIX) tablet 20 mg  20 mg Oral BID  simvastatin (ZOCOR) tablet 20 mg  20 mg Oral QHS  tamsulosin (FLOMAX) capsule 0.4 mg  0.4 mg Oral DAILY  tiotropium (SPIRIVA) inhalation capsule 18 mcg  1 Cap Inhalation DAILY  sodium chloride (NS) flush 5-40 mL  5-40 mL IntraVENous Q8H  
 sodium chloride (NS) flush 5-40 mL  5-40 mL IntraVENous PRN  
 acetaminophen (TYLENOL) tablet 650 mg  650 mg Oral Q4H PRN  
 oxyCODONE-acetaminophen (PERCOCET) 5-325 mg per tablet 1 Tab  1 Tab Oral Q4H PRN  
 morphine 10 mg/ml injection 5 mg  5 mg IntraVENous Q3H PRN  
 naloxone (NARCAN) injection 0.4 mg  0.4 mg IntraVENous PRN  
 ondansetron (ZOFRAN) injection 4 mg  4 mg IntraVENous Q4H PRN  
 diphenhydrAMINE (BENADRYL) injection 12.5 mg  12.5 mg IntraVENous Q4H PRN  
 albuterol (PROVENTIL VENTOLIN) nebulizer solution 2.5 mg  2.5 mg Nebulization Q4H PRN Objective:  
 
Vitals:  
 03/22/19 7211 03/22/19 0532 03/22/19 2104 03/22/19 8643 BP: 91/60 92/61  93/54 Pulse: 81 80  80 Resp: 17 18  17 Temp: 98 °F (36.7 °C) 98.4 °F (36.9 °C)  97.5 °F (36.4 °C) SpO2: 98% 98% 98% 94% Weight:  66.7 kg (147 lb) Height:      
 
Intake and Output:  
03/20 1901 - 03/22 0700 In: -  
Out: 8756 Brendon Seth 03/22 0701 - 03/22 1900 In: -  
Out: 250 [Urine:250] Physical Exam:  
Constitutional:  the patient is well developed and in no acute distress HEENT:  Sclera clear, pupils equal, oral mucosa moist 
Lungs: clear bilaterally, no wheezes or crackles Cardiovascular:  RRR without Rub Abd/GI: soft and non-tender; with positive bowel sounds. Ext: warm without cyanosis. There is no lower leg edema. Skin:  no jaundice or rashes Neuro: no gross neuro deficits Psychiatric: Calm. LAB Recent Labs  
  03/22/19 
0403 03/21/19 
0040 03/20/19 
0240 WBC  --  7.5  --   
HGB  --  11.9*  --   
HCT  --  36.4*  --   
PLT  --  141*  --   
INR 2.5 2.0 1.7 Recent Labs  
  03/21/19 
0040 03/20/19 
0240  139  
K 3.7 4.0  
 102 CO2 30 29  103* BUN 48* 50* CREA 2.39* 2.40* PHOS 4.1*  --   
ALB 2.2*  -- No results for input(s): PH, PCO2, PO2, HCO3 in the last 72 hours. Assessment:  (Medical Decision Making) Hospital Problems  Date Reviewed: 3/13/2019 Codes Class Noted POA * (Principal) Ischemia of left lower extremity ICD-10-CM: I99.8 ICD-9-CM: 459.9  3/13/2019 Yes Thrombus ICD-10-CM: I82.90 ICD-9-CM: 453.9  3/13/2019 Unknown CRI (chronic renal insufficiency) ICD-10-CM: N18.9 ICD-9-CM: 902. 9  Unknown Yes Chronic systolic CHF (congestive heart failure) (HCC) ICD-10-CM: L02.44 ICD-9-CM: 428.22, 428.0  10/7/2014 Yes Overview Signed 10/19/2015  9:22 AM by Taylor Galvan Class I.  EF 35%. Plan:  (Medical Decision Making) 1. JAZZY/CKD no lab today.  I am comfortable with outpatient f/u - arranged and discussed with family. Will s/o. Please recall as needed. 2. Acute limb ischemia 
-s/p Left femoralthromboembolectomy 3/13/2019 by Dr. Victor Hugo Moctezuma 3. Afib- s/p MIGUEL yesterday BONILLA thrombus  
-on coumadin Eden Nicholson MD

## 2019-03-22 NOTE — PROGRESS NOTES
Warfarin dosing per pharmacist 
 
Tao Wu is a 82 y.o. male. Height: 5' 4.5\" (163.8 cm)    Weight: 66.7 kg (147 lb) Indication:  BONILLA thrombus Goal INR:  2-3 Home dose:  New start Risk factors or significant drug interactions:  Cipro (ends 3/19) Other anticoagulants:  Heparin drip bridge Daily Monitoring Date  INR     Warfarin dose HGB              Notes 3/13  1.3  ----  11.7 
---------------------------------------------------------------------------------- 
3/19  ---  5 mg  ----   
3/20  1.7  5 mg  ---- 
3/21  2  5 mg  11.9 
3/22  2.5  3 mg  --- Pharmacy consulted to dose warfarin in patient with LA thrombus. INR today continues to trend up and remains therapeutic at 2.5. Noted that heparin drip was stopped yesterday. Due to significant increase in INR from yesterday to today, will reduce warfarin dose this evening to 3 mg. Continue to follow INR daily for now. Thank you, 
Imelda Zavala, PharmD Clinical Pharmacist 
[phone]

## 2019-03-22 NOTE — PROGRESS NOTES
[facility] CARDIOLOGY PROGRESS NOTE 
      
 
3/22/2019 6:32 AM 
 
Admit Date: 3/13/2019 Admit Diagnosis: Thrombus [I82.90] Subjective: No complaints this AM, no chest pain or shortness of breath Interval History: (History of pertinent interval events obtained from nursing staff) ROS: 
GEN:  No fever or chills Cardiovascular:  As noted above Pulmonary:  As noted above Neuro:  No new focal motor or sensory loss Objective:  
 
Vitals:  
 03/21/19 2140 03/21/19 2156 03/22/19 0103 03/22/19 0532 BP: 91/64  91/60 92/61 Pulse: 83  81 80 Resp: 17  17 18 Temp: 98.1 °F (36.7 °C)  98 °F (36.7 °C) 98.4 °F (36.9 °C) SpO2: 91% 92% 98% 98% Weight:    66.7 kg (147 lb) Height:      
 
 
Physical Exam: 
General- chronically ill appearing, No Acute Distress, Alert & Oriented x 3, appropriate mood. Neck- supple, no JVD 
CV- RR, paced Lung- coarse BS Abd- soft, nontender, nondistended Ext- trace edema bilaterally. Skin- warm and dry Current Facility-Administered Medications Medication Dose Route Frequency  lidocaine (XYLOCAINE) 2 % viscous solution 15 mL  15 mL Mouth/Throat PRN  
 warfarin (COUMADIN) tablet 5 mg  5 mg Oral QPM  
 lip protectant (BLISTEX) ointment   Topical PRN  
 metoprolol succinate (TOPROL-XL) XL tablet 25 mg  25 mg Oral DAILY  albuterol (PROVENTIL VENTOLIN) nebulizer solution 2.5 mg  2.5 mg Nebulization BID RT  
 albuterol-ipratropium (DUO-NEB) 2.5 MG-0.5 MG/3 ML  3 mL Nebulization Q4H PRN  
 aspirin delayed-release tablet 81 mg  81 mg Oral DAILY  furosemide (LASIX) tablet 20 mg  20 mg Oral BID  simvastatin (ZOCOR) tablet 20 mg  20 mg Oral QHS  tamsulosin (FLOMAX) capsule 0.4 mg  0.4 mg Oral DAILY  tiotropium (SPIRIVA) inhalation capsule 18 mcg  1 Cap Inhalation DAILY  sodium chloride (NS) flush 5-40 mL  5-40 mL IntraVENous Q8H  
 sodium chloride (NS) flush 5-40 mL  5-40 mL IntraVENous PRN  
  acetaminophen (TYLENOL) tablet 650 mg  650 mg Oral Q4H PRN  
 oxyCODONE-acetaminophen (PERCOCET) 5-325 mg per tablet 1 Tab  1 Tab Oral Q4H PRN  
 morphine 10 mg/ml injection 5 mg  5 mg IntraVENous Q3H PRN  
 naloxone (NARCAN) injection 0.4 mg  0.4 mg IntraVENous PRN  
 ondansetron (ZOFRAN) injection 4 mg  4 mg IntraVENous Q4H PRN  
 diphenhydrAMINE (BENADRYL) injection 12.5 mg  12.5 mg IntraVENous Q4H PRN  
 albuterol (PROVENTIL VENTOLIN) nebulizer solution 2.5 mg  2.5 mg Nebulization Q4H PRN Data Review:  
Recent Results (from the past 24 hour(s)) PTT Collection Time: 03/21/19  8:36 AM  
Result Value Ref Range aPTT 72.6 (H) 24.7 - 39.8 SEC PROTHROMBIN TIME + INR Collection Time: 03/22/19  4:03 AM  
Result Value Ref Range Prothrombin time 26.3 (H) 11.7 - 14.5 sec INR 2.5 EKG:  (EKG has been independently visualized by me with interpretation below) Assessment:  
 
Principal Problem: 
  Ischemia of left lower extremity (3/13/2019) Active Problems: 
  Chronic systolic CHF (congestive heart failure) ([facility] [address] (10/7/2014) Overview: [place] Class I.  EF 35%. CRI (chronic renal insufficiency) () Thrombus (3/13/2019) Plan: 1.  Ischemia of left lower extremity (3/13/2019)- warfarin - per vascular surgery 
  
2.  Chronic systolic CHF (congestive heart failure) ([facility] [address] (10/7/2014)Stable. Continue current medical therapy, no ace/arb or beta blocker due to hypotension 
  
3.  atrial fibrillation, uncontrolled: failing a rate control strategy, attempted MIGUEL guided DCCV however, Pt has BONILLA thrombus, now s/p AV node ablation, cont warfarin currently therapeutic 
  
4. BONILLA thrombus: MIGUEL with BONILLA thrombus, now on therapeutic warfarin 
  
4. CVA protection: warfarin therapeutic 5. Dispo: home today with TC-7 follow up aKi Davis MD 
Cardiology/Electrophysiology

## 2019-03-22 NOTE — PROGRESS NOTES
Verbal bedside report given to oncoming RN, Madan Calhoun and Lady Mckeon. Patient's situation, background, assessment and recommendations provided. Opportunity for questions provided. Oncoming RN assumed care of patient.

## 2019-03-22 NOTE — PROGRESS NOTES
Discharge instructions reviewed with patient anf family. Prescriptions given for see below and med info sheets provided for all new medications. Opportunity for questions provided. Patient voiced understanding of all discharge instructions. IVs removed and monitor off at discharge.

## 2019-04-23 PROBLEM — Z79.01 LONG TERM (CURRENT) USE OF ANTICOAGULANTS: Status: ACTIVE | Noted: 2019-01-01

## 2019-06-24 PROBLEM — Z86.718 HISTORY OF DVT (DEEP VEIN THROMBOSIS): Status: ACTIVE | Noted: 2019-01-01

## 2019-06-24 PROBLEM — N18.9 CKD (CHRONIC KIDNEY DISEASE): Status: ACTIVE | Noted: 2019-01-01

## 2019-06-24 PROBLEM — R07.9 CHEST PAIN: Status: ACTIVE | Noted: 2019-01-01

## 2019-06-24 PROBLEM — I21.4 NSTEMI (NON-ST ELEVATED MYOCARDIAL INFARCTION) (HCC): Status: ACTIVE | Noted: 2019-01-01

## 2019-06-24 NOTE — ED TRIAGE NOTES
Pt reports right sided chest pain for a couple of weeks. Pt states the pain comes and goes, but that it varies in what he is doing when the pain comes. More pain with movement. Has a AICD, no shocks. Has not tried nitro at home.

## 2019-06-24 NOTE — H&P
[facility] CARDIOLOGY History &Physical 
            
 
Primary Cardiologist: Dr Dasia Hong Primary Care Physician: Harriet Zuleta NP Admitting Physician: Dr Vinicio Chappell:  
 
Patient is a 82 y.o. male  With a hx of JAZZY, CAD, COPD, recently stopped smoking, chronic systolic HF s/p ICD, GERD, HLD, HTN, DVT, Left Atrial appendage thrombus, and A Fib. The patient had a recent hospitalization where he had intermittent A Fib and was admitted for a DVT that was operated by Dr Karina Mast. MIGUEL that admission showed he had a left artrial thrombus that kept him from a Cardioversion. He instead had a AV node ablation. Per the family he had multiple ICD shocks prior to his ablation but has not had any since then. Post ablation he was transitioned from Coumadin to Eliquis. He obtained his DVT while on Xarelto. The patient came to the ED today due to SSCP radiating to his right side of his chest.  His symptoms started about 1-2 weeks that are aggravated with walking and relived with rest.  His symptoms have been progressive and are also coming at rest. He is also noted to have pain while talking a deep breath but it cant be recreated with palpation. Per the patient he has not missed any of his does of his Eliquis. He was initially seen at an urgent care then transferred to [facility] ED. Initial trop 0.63 with, Cr of 2.06, and a VQ scan that showed low probability for PE. Echo:  3/14/2019 -  Left ventricle: The ventricle was mildly dilated. Systolic function was moderately to markedly reduced. Ejection fraction was estimated in the range Of 25 % to 30 %. There was severe hypokinesis of the basal-mid anterior, basal 
anteroseptal, basal inferoseptal, basal inferior, basal-mid inferolateral, And basal-mid anterolateral wall(s). There was moderate concentric hypertrophy. The E/e' ratio was 18. There was Grade II Diastolic Dysfunction. -  Left atrium: The atrium was moderately dilated. -  Right atrium: The atrium was moderately dilated. -  Mitral valve: There was mild to moderate regurgitation. [facility]: 2014 CONCLUSIONS: Minimal nonobstructive coronary artery disease with 
nonischemic cardiomyopathy, ejection fraction 35%. 
  
 
Past Medical History:  
Diagnosis Date  Abnormal EKG  Acute kidney injury ([address]  AICD (automatic cardioverter/defibrillator) present  Alterations of sensations  Anemia  Arthritis  Back pain  Back pain 6/17/2016  Benign prostatic hyperplasia 6/17/2016  Bilateral pubic rami fractures ([address]  CAD (coronary artery disease) ? MI 4-2014  CHF (congestive heart failure) ([address]  Chronic obstructive pulmonary disease ([address]  Chronic systolic CHF (congestive heart failure) ([address] 10/7/2014  COPD (chronic obstructive pulmonary disease) ([facility]  CRI (chronic renal insufficiency)  Depression  Dizziness  Dyspnea  Elevated ferritin  Elevated PSA 6/17/2016  GERD (gastroesophageal reflux disease)  Heart failure ([address]  Hernia, inguinal   
 Hernia, inguinal 6/17/2016  Hyperlipidemia  Hyperlipidemia 10/19/2015  Hypertension  Hypokalemia  Hypokalemia 6/17/2016  Ill-defined condition EF 35%  Keratosis, actinic  Leg cramps  Malaise and fatigue  Malaise and fatigue 6/17/2016  OA (osteoarthritis) of hip 9/16/2016  Orthostasis  Pneumonia  Respiratory failure ([address] 6/17/2016 Due to tractor accident  Sinoatrial node dysfunction (HCC)  Sinusitis Past Surgical History:  
Procedure Laterality Date  HX CARPAL TUNNEL RELEASE    
 bilat  HX CATARACT REMOVAL    
 HX CATARACT REMOVAL    
 bilat  HX COLONOSCOPY    
 HX HEART CATHETERIZATION    
 HX ORTHOPAEDIC    
 internal fixation fractured pelvis  HX OTHER SURGICAL  4/2006  
 prostate bx  HX OTHER SURGICAL    
 transiliac screw fixation of L hemipelvis and ORIF of anterior pelvic ring disruption  HX OTHER SURGICAL    
 irrigation and debridment of R hip wound: VAC placement  HX OTHER SURGICAL    
 implanted defibrillator  HX PACEMAKER    
 dual chamber  VASCULAR SURGERY PROCEDURE UNLIST  2019  
 left common femoral artery thrombo-embolectomy Allergies Allergen Reactions  Lortab [Hydrocodone-Acetaminophen] Other (comments) Makes him sick. 1/2 tab doesn't make him sick  Lortab [Hydrocodone-Acetaminophen] Other (comments) Pt states that a whole pill makes him feel sick. 1/2 tab does not.  Other Medication Other (comments) He has a hx of a prolonged QT interval, so precaution with meds that affect that. Social History Tobacco Use  Smoking status: Former Smoker Packs/day: 2.00 Years: 50.00 Pack years: 100.00 Last attempt to quit: 10/17/2013 Years since quittin.6  Smokeless tobacco: Never Used Substance Use Topics  Alcohol use: No  
  
FH:  
Family History Problem Relation Age of Onset  Heart Attack Son 48  
     mi  
 Heart Disease Other Review of Systems Constitution: Negative for chills, fever, malaise/fatigue, weight gain and weight loss. HENT: Negative. Eyes: Negative. Cardiovascular: Positive for chest pain (currently pain free) and dyspnea on exertion. Negative for claudication, cyanosis, irregular heartbeat, leg swelling, near-syncope, orthopnea, palpitations, paroxysmal nocturnal dyspnea and syncope. Respiratory: Positive for shortness of breath. Negative for cough and wheezing. Endocrine: Negative. Skin: Negative. Musculoskeletal: Negative. Gastrointestinal: Negative for nausea and vomiting. Genitourinary: Negative. Neurological: Negative for dizziness. Psychiatric/Behavioral: Negative. Allergic/Immunologic: Negative. @[facility]@ Objective:  
 
 
Visit Vitals /73 Pulse 79 Temp 98.3 °F (36.8 °C) Resp 16 Wt 66.7 kg (147 lb) SpO2 96% BMI 24.84 kg/m² No intake/output data recorded. No intake/output data recorded. Physical Exam: 
General: Well Developed, Well Nourished, No Acute Distress HEENT: pupils equal and round, no abnormalities noted Neck: supple, no JVD, no carotid bruits Heart: S1S2 with RRR without murmurs or gallops Lungs:Wheezing all fields bilaterally Abd: soft, nontender, nondistended, with good bowel sounds Ext: warm, trace edema, calves supple/nontender, pulses 2+ bilaterally Skin: warm and dry Psychiatric: Normal mood and affect Neurologic: Alert and oriented X 3 ECG: Paced no ischemic changes noted Data Review:  
Recent Labs  
  06/24/19 
1826 06/24/19 
1509 NA  --  142 K  --  3.7 BUN  --  31* CREA  --  2.06* GLU  --  101* WBC  --  5.3 HGB  --  11.8* HCT  --  32.5* PLT  --  172 TNIPOC 0.47*  --   
TROIQ  --  0.63* Echo Results  (Last 48 hours) None CXR Results  (Last 48 hours) 06/24/19 1618  XR CHEST PA LAT Final result Impression:  IMPRESSION: Negative for acute change. Narrative:  CHEST X-RAY, 2 views. HISTORY:  Chest pain. TECHNIQUE: PA and lateral views. COMPARISON: 15 and November 2018. FINDINGS: The lungs are clear. The heart size is mildly enlarged. The  
costophrenic angles are sharp. The pulmonary vasculature is unremarkable. Included portion of the upper abdomen is unremarkable. Dual-lead left-sided  
cardiac pacemaker is present. Assessment/Plan:  
Principal Problem: 
  NSTEMI (non-ST elevated myocardial infarction) ([address] (6/24/2019) With elevated Trop I, Right sided chest pain, and negative VQ scan will plan for [facility] tomorrow after overnight hydration. Placed on a heparin drip overnight, continue ASA, changed statin for less drug interactions,  Hold Eliquis overnight, BMP daily, trend trop I, NPO after midnight, on BB, no ACE/ARB due to CKD Active Problems: Hypertension ()  Currently controlled continue to monitor Hyperlipidemia (10/19/2015) Overview: Switching to high intensity statin with less drug interactions Chest pain (6/24/2019) se above  
 
  CKD (chronic kidney disease) (6/24/2019) hydration overnight History of DVT (deep vein thrombosis) (6/24/2019) Hold Eliquis overnight, continue to monitor, will restart ASAP A Fib: Eliquis on hold will restart ASAP. Misty Marshall NP 
6/24/2019 
5:51 PM 
 
 
[facility] CARDIOLOGY  
 
6/25/2019     5:20 AM 
 
I have personally seen and examined Harrie Krabbe with the physician extender listed above. .  I agree and confirm findings in history, physical exam, and assessment/plan as outlined above with following pertinent additions/exceptions:   Elevated troponin with chest pain. Patient is audibly wheezing on exam.  He is on multiple COPD medications at this time. Consult pulm to address home regimen as there appear to be redundant medications on his list. He is receiving IV hydration for potential LHC in AM. VQ scan was negative for PE.   
 
 
Franklyn Coley MD

## 2019-06-24 NOTE — ED PROVIDER NOTES
[facility] Emergency Department Arrival Date/Time: 6/24/2019  4:14 PM   
 
Gabriela Ryan  MRN: [id number] YOB: 1935   82 y.o. male [facility] EMERGENCY DEPT ER08/08  Seen on 6/24/2019 @ 3:04 PM   
TRIAGE Provider NOTE:  47-year-old male presenting to the emergency department complaining of sternal chest pain radiating to the right side of the anterior chest.  He has a history of a CHF, EF 20%, DVT, a fib, atrial thrombus, on xarelto. He notes his pain is worse with deep breaths. Follows with Dr. Ji Blanco. Symptoms began 1-2 weeks ago, but he just told his son over the weekend. Bere Sellers DO; 6/24/2019 @3:04 PM============================ Gabriela Ryan is a 82 y.o. male seen on 6/24/2019 at 3:04 PM in the [facility] EMERGENCY DEPT Chief Complaint Patient presents with  Chest Pain 47-year-old male presenting to the emergency department complaining of sternal chest pain radiating to the right side of the anterior chest.  He has a history of a CHF, EF 20%, DVT, a fib, atrial thrombus, on Eliquis. He notes his pain is worse with deep breaths. Follows with Dr. Ji Blanco. Symptoms began 1-2 weeks ago, but he just told his son over the weekend. Patient states that the pain is fairly constant over the last 2 weeks and he describes it as a intermittent stabbing type pain. Patient went to an urgent care earlier today but was told needed to come to the emergency department for evaluation of possible pulmonary embolis  He states he has been taking his blood thinner and has not missed any doses. Historian:  
 
REVIEW OF SYSTEMS Review of Systems All other systems reviewed and are negative. PAST MEDICAL HISTORY Past Medical History:  
Diagnosis Date  Abnormal EKG  Acute kidney injury ([facility] [address]  AICD (automatic cardioverter/defibrillator) present  Alterations of sensations  Anemia  Arthritis  Back pain  Back pain 6/17/2016  Benign prostatic hyperplasia 6/17/2016  Bilateral pubic rami fractures ([facility] [address]  CAD (coronary artery disease) ? MI 4-2014  CHF (congestive heart failure) ([facility] [address]  Chronic obstructive pulmonary disease ([address]  Chronic systolic CHF (congestive heart failure) ([address] 10/7/2014  COPD (chronic obstructive pulmonary disease) (HCC)  CRI (chronic renal insufficiency)  Depression  Dizziness  Dyspnea  Elevated ferritin  Elevated PSA 6/17/2016  GERD (gastroesophageal reflux disease)  Heart failure ([address]  Hernia, inguinal   
 Hernia, inguinal 6/17/2016  Hyperlipidemia  Hyperlipidemia 10/19/2015  Hypertension  Hypokalemia  Hypokalemia 6/17/2016  Ill-defined condition EF 35%  Keratosis, actinic  Leg cramps  Malaise and fatigue  Malaise and fatigue 6/17/2016  OA (osteoarthritis) of hip 9/16/2016  Orthostasis  Pneumonia  Respiratory failure ([address] 6/17/2016 Due to tractor accident  Sinoatrial node dysfunction (HCC)  Sinusitis Past Surgical History:  
Procedure Laterality Date  HX CARPAL TUNNEL RELEASE    
 bilat  HX CATARACT REMOVAL    
 HX CATARACT REMOVAL    
 bilat  HX COLONOSCOPY    
 HX HEART CATHETERIZATION    
 HX ORTHOPAEDIC    
 internal fixation fractured pelvis  HX OTHER SURGICAL  4/2006  
 prostate bx  HX OTHER SURGICAL    
 transiliac screw fixation of L hemipelvis and ORIF of anterior pelvic ring disruption  HX OTHER SURGICAL    
 irrigation and debridment of R hip wound: VAC placement  HX OTHER SURGICAL    
 implanted defibrillator  HX PACEMAKER    
 dual chamber  VASCULAR SURGERY PROCEDURE UNLIST  03/13/2019  
 left common femoral artery thrombo-embolectomy Social History Socioeconomic History  Marital status:  Spouse name: Not on file  Number of children: Not on file  Years of education: Not on file  Highest education level: Not on file Tobacco Use  Smoking status: Former Smoker Packs/day: 2.00 Years: 50.00 Pack years: 100.00 Last attempt to quit: 10/17/2013 Years since quittin.6  Smokeless tobacco: Never Used Substance and Sexual Activity  Alcohol use: No  
 Drug use: No  
Social History Narrative ** Merged History Encounter **  
    
 
Cannot display prior to admission medications because the patient has not been admitted in this contact. Allergies Allergen Reactions  Lortab [Hydrocodone-Acetaminophen] Other (comments) Makes him sick. 1/2 tab doesn't make him sick  Lortab [Hydrocodone-Acetaminophen] Other (comments) Pt states that a whole pill makes him feel sick. 1/2 tab does not.  Other Medication Other (comments) He has a hx of a prolonged QT interval, so precaution with meds that affect that. PHYSICAL EXAM    
 
Vitals:  
 19 1506 BP: 112/73 Pulse: 81 Resp: 16 Temp: 98.3 °F (36.8 °C) SpO2: 100% Vital signs were reviewed. Physical Exam  
 
GENERAL:The patient is well nourished, and well-hydrated. No acute distress VITAL SIGNS: Heart rate, blood pressure, respiratory rate reviewed as recorded in 
nurse's notes EYES: Pupils reactive. Extraocular motion intact. No conjunctival redness or drainage. MOUTH/THROAT: Pharynx clear; airway patent. NECK: Supple, no meningeal signs. Trachea midline. No masses or thyromegaly. LUNGS: Breath sounds clear and equal bilaterally no accessory muscle use CHEST: No deformity CARDIOVASCULAR: Regular rate and rhythm EXTREMITIES: No clubbing or cyanosis. No joint swelling. Normal muscle tone. No 
restricted range of motion appreciated. NEUROLOGIC: Sensation is grossly intact. Cranial nerve exam reveals face is 
symmetrical, tongue is midline speech is clear. SKIN: No rash or petechiae. Good skin turgor palpated. PSYCHIATRIC: Alert and oriented. Appropriate behavior and judgment. MEDICAL DECISION MAKING Differential Diagnosis: MDM Number of Diagnoses or Management Options Diagnosis management comments: CHF, COPD, pneumonia, PE, 
 
MI, coronary artery disease, unstable angina, coronary artery disease, Atrial fibrillation, cardiac arrhythmia, PVC, medication induced palpitations, heart block, 
electrolyte induced palpitations, Aortic dissection, aortic aneurysm, GERD, musculoskeletal pain, costochondritis, rib fracture, pleurisy, Amount and/or Complexity of Data Reviewed Clinical lab tests: ordered and reviewed Tests in the radiology section of CPT®: ordered and reviewed Tests in the medicine section of CPT®: reviewed and ordered Review and summarize past medical records: yes Discuss the patient with other providers: yes Independent visualization of images, tracings, or specimens: yes Procedures ED Course:   
 
Disposition:   
Diagnosis:

## 2019-06-24 NOTE — ROUTINE PROCESS
TRANSFER - OUT REPORT: 
 
Verbal report given to ALOK(name) on Himanshu Valderrama  being transferred to [facility](unit) for routine progression of care Report consisted of patients Situation, Background, Assessment and  
Recommendations(SBAR). Information from the following report(s) SBAR was reviewed with the receiving nurse. Lines:  
Peripheral IV 06/24/19 Right Antecubital (Active) Site Assessment Clean, dry, & intact 6/24/2019  3:09 PM  
Phlebitis Assessment 0 6/24/2019  3:09 PM  
Infiltration Assessment 0 6/24/2019  3:09 PM  
Dressing Status Clean, dry, & intact 6/24/2019  3:09 PM  
Hub Color/Line Status Pink 6/24/2019  3:09 PM  
  
 
Opportunity for questions and clarification was provided.    
 
Patient transported with: 
  
Cameron Aguero

## 2019-06-24 NOTE — ED NOTES
PT REPORTS CHEST PAINS THAT RADIATE FROM CENTER CHEST TO THE RIGHT . PT REPORTS SX FOR OVER A WEEK .  PT REPORT PAIN WORST WHEN MOVING OR UNDER EXERTION

## 2019-06-25 PROBLEM — Z86.718 HISTORY OF DVT (DEEP VEIN THROMBOSIS): Chronic | Status: ACTIVE | Noted: 2019-01-01

## 2019-06-25 PROBLEM — N18.9 CKD (CHRONIC KIDNEY DISEASE): Chronic | Status: ACTIVE | Noted: 2019-01-01

## 2019-06-25 NOTE — PROCEDURES
Brief Cardiac Procedure Note Patient: Ana Arce MRN: [id number]  SSN: [id number] YOB: 1935  Age: 82 y.o. Sex: male Date of Procedure: 6/25/2019 Pre-procedure Diagnosis: NSTEMI Post-procedure Diagnosis: Mild CAD. Severe LV dysfunction consistent with non-ischemic cardiomyopathy. Procedure: Left Heart Catheterization Brief Description of Procedure: As above Performed By: Kristie Duverney, MD  
 
Assistants: None Anesthesia: Moderate Sedation Estimated Blood Loss: Less than 10 mL Specimens: None Implants: None Findings: Mild CAD. Severe LV dysfunction. Complications: None Recommendations: Continue medical therapy. Signed By: Kristie Duverney, MD   
 June 25, 2019

## 2019-06-25 NOTE — PROGRESS NOTES
TRANSFER - OUT REPORT: 
 
Verbal report given to Shawna Zepeda RN on [employer]  being transferred to [facility](unit) for routine progression of care Report consisted of patients Situation, Background, Assessment and  
Recommendations(SBAR). Information from the following report(s) Procedure Summary was reviewed with the receiving nurse. Lines:  
Peripheral IV 06/24/19 Right Antecubital (Active) Site Assessment Clean, dry, & intact 6/25/2019 12:14 PM  
Phlebitis Assessment 0 6/25/2019 12:14 PM  
Infiltration Assessment 0 6/25/2019 12:14 PM  
Dressing Status Clean, dry, & intact 6/25/2019 12:14 PM  
Dressing Type Transparent;Tape 6/25/2019 12:14 PM  
Hub Color/Line Status Patent 6/25/2019 12:14 PM  
Alcohol Cap Used No 6/25/2019 12:14 PM  
   
Peripheral IV 06/25/19 Anterior; Left Forearm (Active) Site Assessment Clean, dry, & intact 6/25/2019 12:14 PM  
Phlebitis Assessment 0 6/25/2019 12:14 PM  
Infiltration Assessment 0 6/25/2019 12:14 PM  
Dressing Status Clean, dry, & intact 6/25/2019 12:14 PM  
Dressing Type Transparent;Tape 6/25/2019 12:14 PM  
Hub Color/Line Status Patent 6/25/2019 12:14 PM  
Action Taken Blood drawn 6/25/2019  3:36 AM  
Alcohol Cap Used No 6/25/2019 12:14 PM  
  
 
Opportunity for questions and clarification was provided. Patient transported with: 
 O2 @ 3 liters

## 2019-06-25 NOTE — CONSULTS
PULMONARY/CCM CONSULT :  6/25/2019 Date of Admission:  6/24/2019 The patient's chart has been reviewed and the chart has been discussed with nursing staff. Subjective: This patient has been seen and evaluated at the request of Dr. Bret Wynne for clarification of COPD medications. Patient is a 82 y.o.  male presents with chest pain. He was admitted with NSTEMI with chest pain. He has known JAZZY, CAD, documented COPD, recently stopped smoking, chronic systolic HF s/p ICD, GERD, HLD, HTN, DVT, Left Atrial appendage thrombus, and A Fib on eliquis. He was admitted to the floor and had a VQ scan showing low probability of PTE. He is on eliquis chronically. He is on room air with oxygen saturations of 93%. CXR without acute process. He has not been seen by us in the past or by pulmonology at [facility]. Cardiology plans for [facility]. There are no chest CTs or pulmonary function tests to review in [facility] care. He is on multiple inhalers (noted below) and we were asked to see to clarify his COPD medications. PTA inhalers are as follows: 
 tiotropium (SPIRIVA) 18 mcg inhalation capsule     1 Cap, Inhalation, DAILY     
 mometasone-formoterol (DULERA) 100-5 mcg/actuation HFA inhaler     Inhalation, As directed      
 fluticasone furoate-vilanterol (BREO ELLIPTA) 100-25 mcg/dose inhaler     1 Puff, Inhalation, DAILY     
 albuterol-ipratropium (DUO-NEB) 2.5 mg-0.5 mg/3 ml solution for nebulization     3 mL, Nebulization, Q4H PRN  2/27/2019   
 albuterol (PROVENTIL HFA, VENTOLIN HFA, PROAIR HFA) 90 mcg/actuation inhaler He tells me he only takes: albuterol rescue, breo and albuterol nebulizer. He uses oxygen PRN and at night. Oxygen and medications have been managed by his family physician. He has not seen a pulmonologist. He smoked 2 ppd for over 50 years but quit ~ 5 years ago. He has no known abnormal CXR or admissions for COPD. Past Medical History:  
Diagnosis Date  Abnormal EKG   
  Acute kidney injury ([facility]  AICD (automatic cardioverter/defibrillator) present  Alterations of sensations  Anemia  Arthritis  Back pain  Back pain 6/17/2016  Benign prostatic hyperplasia 6/17/2016  Bilateral pubic rami fractures ([facility] [address]  CAD (coronary artery disease) ? MI 4-2014  CHF (congestive heart failure) ([facility] [address] [facility]  Chronic obstructive pulmonary disease ([facility] [facility]  Chronic systolic CHF (congestive heart failure) ([facility] [address] 10/7/2014  COPD (chronic obstructive pulmonary disease) ([facility])  CRI (chronic renal insufficiency)  Depression  Dizziness  Dyspnea  Elevated ferritin  Elevated PSA 6/17/2016  GERD (gastroesophageal reflux disease)  Heart failure ([facility] [facility]  Hernia, inguinal   
 Hernia, inguinal 6/17/2016  Hyperlipidemia  Hyperlipidemia 10/19/2015  Hypertension  Hypokalemia  Hypokalemia 6/17/2016  Ill-defined condition EF 35%  Keratosis, actinic  Leg cramps  Malaise and fatigue  Malaise and fatigue 6/17/2016  OA (osteoarthritis) of hip 9/16/2016  Orthostasis  Pneumonia  Respiratory failure ([address] 6/17/2016 Due to tractor accident  Sinoatrial node dysfunction (HCC)  Sinusitis Past Surgical History:  
Procedure Laterality Date  HX CARPAL TUNNEL RELEASE    
 bilat  HX CATARACT REMOVAL    
 HX CATARACT REMOVAL    
 bilat  HX COLONOSCOPY    
 HX HEART CATHETERIZATION    
 HX ORTHOPAEDIC    
 internal fixation fractured pelvis  HX OTHER SURGICAL  4/2006  
 prostate bx  HX OTHER SURGICAL    
 transiliac screw fixation of L hemipelvis and ORIF of anterior pelvic ring disruption  HX OTHER SURGICAL    
 irrigation and debridment of R hip wound: VAC placement  HX OTHER SURGICAL    
 implanted defibrillator  HX PACEMAKER    
 dual chamber  VASCULAR SURGERY PROCEDURE UNLIST  03/13/2019  
 left common femoral artery thrombo-embolectomy Social History Tobacco Use  Smoking status: Former Smoker Packs/day: 2.00 Years: 50.00 Pack years: 100.00 Last attempt to quit: 10/17/2013 Years since quittin.6  Smokeless tobacco: Never Used Substance Use Topics  Alcohol use: No  
  
Family History Problem Relation Age of Onset  Heart Attack Son 48  
     mi  
 Heart Disease Other Allergies Allergen Reactions  Lortab [Hydrocodone-Acetaminophen] Other (comments) Makes him sick. 1/2 tab doesn't make him sick  Lortab [Hydrocodone-Acetaminophen] Other (comments) Pt states that a whole pill makes him feel sick. 1/2 tab does not.  Other Medication Other (comments) He has a hx of a prolonged QT interval, so precaution with meds that affect that. Prior to Admission Medications Prescriptions Last Dose Informant Patient Reported? Taking? albuterol (PROVENTIL HFA, VENTOLIN HFA, PROAIR HFA) 90 mcg/actuation inhaler Unknown at Unknown time  No No  
Sig: Take 2 Puffs by inhalation every four (4) hours as needed for Wheezing. albuterol-ipratropium (DUO-NEB) 2.5 mg-0.5 mg/3 ml nebu Unknown at Unknown time  No No  
Sig: 3 mL by Nebulization route every four (4) hours as needed for Cough. apixaban (ELIQUIS) 5 mg tablet 2019 at Unknown time  No Yes Sig: Take 1 Tab by mouth two (2) times a day. aspirin delayed-release 81 mg tablet 2019 at Unknown time  Yes Yes Sig: Take 81 mg by mouth daily. cholecalciferol (VITAMIN D3) 1,000 unit tablet 2019 at Unknown time  Yes Yes Sig: Take 1,000 Units by mouth daily. cyanocobalamin (VITAMIN B-12) 1,000 mcg sublingual tablet 2019 at Unknown time  Yes Yes Sig: Take 1,000 mcg by mouth daily. fluticasone-vilanterol (BREO ELLIPTA) 100-25 mcg/dose inhaler 2019 at Unknown time  Yes Yes Sig: Take 1 Puff by inhalation daily. furosemide (LASIX) 20 mg tablet 2019 at Unknown time  No Yes Sig: Take 1 Tab by mouth two (2) times a day. metoprolol succinate (TOPROL-XL) 25 mg XL tablet 6/23/2019 at Unknown time  No Yes Sig: Take 1 Tab by mouth daily. mometasone-formoterol (DULERA) 100-5 mcg/actuation HFA inhaler   Yes No  
Sig: Take  by inhalation. As directed  
omeprazole (PRILOSEC) 20 mg capsule 6/23/2019 at Unknown time  No Yes Sig: Take 1 Cap by mouth daily. simvastatin (ZOCOR) 20 mg tablet 6/23/2019 at Unknown time  No Yes Sig: Take 1 Tab by mouth nightly. tamsulosin (FLOMAX) 0.4 mg capsule 6/23/2019 at Unknown time  No Yes Sig: Take 1 Cap by mouth daily. tiotropium (SPIRIVA WITH HANDIHALER) 18 mcg inhalation capsule 6/23/2019 at Unknown time  Yes Yes Sig: Take 1 Cap by inhalation daily. Facility-Administered Medications: None MEDS SCHEDULED:   
Current Facility-Administered Medications Medication Dose Route Frequency  albuterol-ipratropium (DUO-NEB) 2.5 MG-0.5 MG/3 ML  3 mL Nebulization Q4H RT  
 ondansetron (ZOFRAN ODT) tablet 8 mg  8 mg Oral Q8H PRN  
 rosuvastatin (CRESTOR) tablet 20 mg  20 mg Oral QHS  tamsulosin (FLOMAX) capsule 0.4 mg  0.4 mg Oral DAILY  metoprolol succinate (TOPROL-XL) XL tablet 25 mg  25 mg Oral DAILY  furosemide (LASIX) tablet 20 mg  20 mg Oral BID  aspirin delayed-release tablet 81 mg  81 mg Oral DAILY  pantoprazole (PROTONIX) tablet 40 mg  40 mg Oral ACB  sodium chloride (NS) flush 5-40 mL  5-40 mL IntraVENous Q8H  
 sodium chloride (NS) flush 5-40 mL  5-40 mL IntraVENous PRN  
 nitroglycerin (NITROBID) 2 % ointment 1 Inch  1 Inch Topical Q6H  
 nitroglycerin (NITROSTAT) tablet 0.4 mg  0.4 mg SubLINGual Q5MIN PRN  
 morphine injection 2 mg  2 mg IntraVENous Q4H PRN  
 heparin 25,000 units in dextrose 500 mL infusion  12-25 Units/kg/hr IntraVENous TITRATE  
 0.9% sodium chloride infusion  50 mL/hr IntraVENous CONTINUOUS Review of Systems Constitutional: negative for fevers, chills, sweats, fatigue and malaise Respiratory: positive for dyspnea on exertion Cardiovascular: positive for chest pressure/discomfort, negative for palpitations, irregular heart beats, near-syncope, paroxysmal nocturnal dyspnea Gastrointestinal: negative for dyspepsia, reflux symptoms, nausea, vomiting, change in bowel habits, melena, diarrhea, constipation and abdominal pain Objective:  
 
Vitals:  
 06/25/19 6834 06/25/19 5180 06/25/19 4597 06/25/19 5385 BP:  108/65  100/65 Pulse:  68  80 Resp:  18  20 Temp:  97.8 °F (36.6 °C)  97.8 °F (36.6 °C) SpO2:  97% 95% 93% Weight: 136 lb 9.6 oz (62 kg) Height:      
 
No intake/output data recorded. 06/23 1901 - 06/25 0700 In: 100 [P.O.:100] Out: - PHYSICAL EXAM  
 
Physical Exam:  
General:  Alert, cooperative, no acute distress, elderly Eyes:  Conjunctivae/corneas clear. Nose: Nares patent and moist.   
Mouth/Throat: Lips, mucosa, and tongue pink and intact. Neck: Supple, symmetrical.  
Respiratory:   Late expiratory wheezes to auscultation bilaterally on RA Cardiovascular:  IRRegular rate and rhythm, S1, S2, no murmur, click, rub or gallop. GI:   Abdomen soft, non-tender. Bowel sounds active X 4 Q. Musculoskeletal: Extremities symmetrical, atraumatic, no cyanosis, no edema. Pulses: 2+ and symmetric all extremities. Skin: Skin color, texture, turgor normal. No rashes or lesions Neurologic: 2+ strength bilateral upper and lower extremities, sensation throughout appropriate. Alert and oriented. Activity: limited Nutrition:NPO for [facility] 
 
CHEST X-RAYS: 
 
 
CULTURES: urine no growth LABS Recent Labs  
  06/25/19 
0334 06/24/19 2000 06/24/19 
1509 WBC 5.3 7.1 5.3 HGB 11.5* 11.1* 11.8* HCT 31.8* 30.3* 32.5*  
 197 172 Recent Labs  
  06/25/19 
0334 06/24/19 2000 06/24/19 
1509   --  142  
K 3.1*  --  3.7   --  103 GLU 87  --  101* CO2 28  --  28 BUN 30*  --  31* CREA 1.89*  --  2.06* TROIQ 0.66* 0.65* 0.63* Assessment:  
 
Hospital Problems  Date Reviewed: 4/11/2019 Codes Class Noted POA Chest pain ICD-10-CM: R07.9 ICD-9-CM: 786.50  6/24/2019 Yes CKD (chronic kidney disease) (Chronic) ICD-10-CM: N18.9 ICD-9-CM: 585.9  6/24/2019 Yes * (Principal) NSTEMI (non-ST elevated myocardial infarction) ([facility] [address] ICD-10-CM: I21.4 ICD-9-CM: 410.70  6/24/2019 Yes History of DVT (deep vein thrombosis) (Chronic) ICD-10-CM: P17.105 ICD-9-CM: V12.51  6/24/2019 Yes Hypertension (Chronic) ICD-10-CM: I10 
ICD-9-CM: 401.9  Unknown Yes Overview Signed 10/19/2015  9:20 AM by Cal Mahan  
  BP readings have been in target range. No complications noted from the medication presently being used. Hyperlipidemia (Chronic) ICD-10-CM: M07.2 ICD-9-CM: 272.4  10/19/2015 Yes Overview Signed 10/19/2015  9:20 AM by Cal Mahan Stable. Medication well tolerated. Plan: -- needs O/P pulmonary follow up in our office with PFTs. Looks to have advanced disease given the medications he is on outpatient. -- continue CHRISTIANNE rescue (albuterol inhaler), change albuterol neb to duoneb, LABA/ steroid (Breo) He is not taking dulera or spiriva  
-- assess oxygen need PTD MAULIK Giraldo More than 50% of time documented was spent in face-to-face contact with the patient and in the care of the patient on the floor/unit where the patient is located. The patient has been seen and examined by me personally, the chart,labs, and radiographic studies have been reviewed. Chest: CTA Extremities: 1+ edema I agree with the above assessment and plan. Will see him in the office as OP Call as needed. Ok to use BB as needed/indicated.  
Angel Salazar MD.

## 2019-06-25 NOTE — PROGRESS NOTES
[facility] CARDIOLOGY PROGRESS NOTE 
      
 
6/25/2019 9:12 AM 
 
Admit Date: 6/24/2019 Subjective:  
 
No o/e; still with right sided CP-reproducible/pleuritic component. Ongoing for 2 weeks with increased AWAD as well. ROS: 
Cardiovascular:  As noted above Objective:  
  
Vitals:  
 06/25/19 6290 06/25/19 9951 06/25/19 4157 06/25/19 7740 BP:  108/65  100/65 Pulse:  68  80 Resp:  18  20 Temp:  97.8 °F (36.6 °C)  97.8 °F (36.6 °C) SpO2:  97% 95% 93% Weight: 62 kg (136 lb 9.6 oz) Height:      
 
 
Physical Exam: 
General-No Acute Distress Neck- supple, no JVD 
CV- regular rate and rhythm no MRG Lung- clear bilaterally Abd- soft, nontender, nondistended Ext- no edema bilaterally. Skin- warm and dry Data Review:  
Recent Labs  
  06/25/19 
0334 06/24/19 2000 06/24/19 
1509   --  142  
K 3.1*  --  3.7 BUN 30*  --  31* CREA 1.89*  --  2.06* GLU 87  --  101* WBC 5.3 7.1 5.3 HGB 11.5* 11.1* 11.8* HCT 31.8* 30.3* 32.5*  
 197 172 TROIQ 0.66* 0.65* 0.63* CHOL 95  --   --   
LDLC 31.2  --   --   
HDL 45  --   --   
 
 
Assessment/Plan:  
 
Principal Problem: 
  NSTEMI (non-ST elevated myocardial infarction) ([facility] [address] (6/24/2019) Active Problems: Hypertension () Hyperlipidemia (10/19/2015) Chest pain (6/24/2019) CKD (chronic kidney disease) (6/24/2019) History of DVT (deep vein thrombosis) (6/24/2019) Hx of NICM (cath in 2014); subsequently had dual chamber ICD placed. S/p AVN ablation 3/20/19. Mildly elevated troponins with no significant trend of uncertain etiology. Atypical reproducible CP with inspirational component. Low prob V/Q scan and on eliquis prior to admission; on heparin currently. Check echo; likely cath later today. Replete KFito Underlying CKD with stable/improved renal function. Address LV dysfunction meds; currently on toprol.  Some lower BPs noted and reassess regimen during hospital course Kaylynn Cueva MD 
6/25/2019 9:12 AM

## 2019-06-25 NOTE — PROCEDURES
[facility] 
CARDIAC CATH Name:  Jerry Mayes 
MR#:  [id number] :  1935 ACCOUNT #:  [de-identified] DATE OF SERVICE:  2019 PREOPERATIVE DIAGNOSIS:  Elevated cardiac biomarkers in the setting of chest pain concerning for non-ST elevation myocardial infarction. POSTOPERATIVE DIAGNOSIS:  Mild nonobstructive coronary artery disease with severe LV dysfunction consistent with nonischemic cardiomyopathy. PROCEDURES PERFORMED: 1. Left heart catheterization, selective coronary arteriography, and left ventriculogram via the right femoral approach. 2.  Perclose vascular closure of the right femoral arteriotomy. SURGEON:  Fco Mejia MD 
 
ASSISTANT:  None. ANESTHESIA:  The patient received moderate supervised conscious sedation. Total of 2 mg Versed and 25 mcg of fentanyl. Sedation start time was 1538 with a procedure completion of 6. Sedation was administered by Seven Agarwal RN under my supervision. COMPLICATIONS:  None. FINDINGS:  As below. SPECIMENS REMOVED:  None. ESTIMATED BLOOD LOSS:  5 mL. IMPLANTS:  None. TECHNIQUE AND FINDINGS:  After informed consent was obtained, the patient was brought to the catheterization laboratory. Right femoral artery was prepped and draped in the usual sterile fashion. Utilizing [facility] ultrasound, the right common femoral artery was identified. Under direct ultrasound visualization, the right common femoral artery was entered. A 6-Urdu arterial sheath was then placed via e Seldinger technique. At this point, a JL-4 catheter was used to select and engage the ostium of the left main coronary artery and a 3DRC catheter was used to select and engage the ostium of the right coronary artery. Selective injection verification was performed. Pigtail catheter was used to cross the aortic valve into the left ventricle.   Hemodynamic measurements and left ventriculogram were obtained. Left ventricular aortic pressure gradient was obtained by pullback technique. At the conclusion of the diagnostic procedure, the right common femoral artery was imaged and showed the sheath was contained in the right common femoral artery. A Perclose closure device was deployed by standard technique with good hemostasis. CONTRAST:  Isovue 55. HEMODYNAMIC RESULTS: 
1. Aortic pressure 105/60 with mean of 78. 
2.  Left ventricular end-diastolic pressure was 17. 
3.  There was no significant gradient across the aortic valve. ANGIOGRAPHIC RESULT: 
1. Left main coronary artery:  Large-caliber vessel. Normal. 
2.  LAD:  It is a medium-caliber vessel. Contains 30% mid stenosis just after the origin of the first septal artery and 30% stenosis in the mid to distal transition. No high-grade stenosis is seen. 3.  First diagonal artery:  Small-caliber vessel. Patent. 4.  Ramus intermedius:  Medium-caliber vessel. Patent. 5.  Left circumflex:  Small-caliber vessel. Patent. Nondominant. 6.  Right coronary artery: It is a medium- to large-caliber dominant vessel. Angiographically normal. 
7.  Right PDA:  Medium-caliber vessel. Normal. 
8.  Posterolateral branch:  Medium-caliber vessel. Normal. 
9.  Left ventricular performed in GAY projection shows dilated left ventricle. Severely reduced LV systolic function. EF of 20-25%. Aortic root is nondilated. CONCLUSIONS: 
1.  Mild nonobstructive coronary artery disease. 2.  Severely reduced LV systolic function. 3.  Successful deployment of 6-Azeri Perclose vascular closure device. PLAN:  Ongoing medical therapy. Further evaluation for recurrent chest pain. MD ALEX [email or web address] 
D:  06/25/2019 16:06 
T:  06/25/2019 16:11 
JOB #:  [id number]

## 2019-06-25 NOTE — PROGRESS NOTES
TRANSFER - OUT REPORT: 
 
Verbal report given to Brandon Jo RN on [place]  being transferred to [address] [place] [address] for routine progression of care Report consisted of patients Situation, Background, Assessment and Recommendations(SBAR). Information from the following report(s) SBAR, Kardex, Procedure Summary and MAR was reviewed with the receiving nurse. Opportunity for questions and clarification was provided. [facility] with Dr Chiqui Alford No intervention 2 versed 25 fentanyl 6F RFA perclose

## 2019-06-25 NOTE — PROGRESS NOTES
Problem: Falls - Risk of 
Goal: *Absence of Falls Description Document Clide Failing Fall Risk and appropriate interventions in the flowsheet. Outcome: Progressing Towards Goal 
  
Problem: Patient Education: Go to Patient Education Activity Goal: Patient/Family Education Outcome: Progressing Towards Goal 
  
Problem: Patient Education: Go to Patient Education Activity Goal: Patient/Family Education Outcome: Progressing Towards Goal 
  
Problem: Unstable angina/NSTEMI: Day of Admission/Day 1 Goal: Activity/Safety Outcome: Progressing Towards Goal 
Goal: Diagnostic Test/Procedures Outcome: Progressing Towards Goal 
Goal: Medications Outcome: Progressing Towards Goal 
Goal: Treatments/Interventions/Procedures Outcome: Progressing Towards Goal 
Goal: *Hemodynamically stable Outcome: Progressing Towards Goal 
Goal: *Optimal pain control at patient's stated goal 
Outcome: Progressing Towards Goal 
Goal: *Lungs clear or at baseline Outcome: Progressing Towards Goal

## 2019-06-25 NOTE — PROGRESS NOTES
Bedside and Verbal shift change report given to self (oncoming nurse) by Andrei Doyle RN (offgoing nurse). Report included the following information SBAR, Kardex, ED Summary, Procedure Summary, Intake/Output, MAR and Recent Results.   
 
[facility] c/d/i

## 2019-06-25 NOTE — PROGRESS NOTES
Report received from Josefina John Cath Lab RN. Procedural findings communicated. Intra procedural  medication administration reviewed. Progression of care discussed. Patient received into [id number] [facility] 6 post sheath removal.  
 
Access site without bleeding or swelling yes Dressing dry and intact yes Patient instructed to limit movement to right lower extremity Routine post procedural vital signs and site assessment initiated yes

## 2019-06-25 NOTE — PROGRESS NOTES
Care Management Interventions PCP Verified by CM: Yes(last seen 5/15/19) Palliative Care Criteria Met (RRAT>21 & CHF Dx)?: No(RRAT 33 Dx NSTEMI ) Transition of Care Consult (CM Consult): Discharge Planning Discharge Durable Medical Equipment: No(O2 and portable with [employer] [facility]) Physical Therapy Consult: No 
Occupational Therapy Consult: No 
Speech Therapy Consult: No 
Current Support Network: Lives Alone(daughter Fay Duarte [phone]) Confirm Follow Up Transport: Family Plan discussed with Pt/Family/Caregiver: Yes Freedom of Choice Offered: Yes Discharge Location Discharge Placement: Home Met with patient for d/c planning. Patient alert and oriented x 3, independent of ADL's prior to admission and lives alone. He has a daughter Fay Duarte [phone]. DME includes O2 with portable tanks from [facility] He had [facility] in March 2019. He has Medicare and [employer] for medications and is able to obtain his medications. Discharge plan is home when medically stable. CM following.

## 2019-06-25 NOTE — PROGRESS NOTES
TRANSFER - IN REPORT: 
 
Verbal report received from Mirta Shelby [facility] (name) on Mesfin Ludwig  being received from ER(unit) for routine progression of care Report consisted of patients Situation, Background, Assessment and  
Recommendations(SBAR). Information from the following report(s) SBAR, Kardex, ED Summary, MAR, Recent Results and Cardiac Rhythm Paced  was reviewed with the receiving nurse. Opportunity for questions and clarification was provided. Assessment completed upon patients arrival to unit and care assumed.

## 2019-06-25 NOTE — PROGRESS NOTES
Bedside and Verbal shift change report given to Judy Garcia RN (oncoming nurse) by self (offgoing nurse). Report included the following information SBAR, Kardex, Intake/Output, MAR, Recent Results and Cardiac Rhythm Paced. Heparin gtt infusing and verified at beside and on MAR.

## 2019-06-26 NOTE — PROGRESS NOTES
Care Management Interventions PCP Verified by CM: Yes(last seen 5/15/19) Palliative Care Criteria Met (RRAT>21 & CHF Dx)?: No(RRAT 33 Dx NSTEMI ) Mode of Transport at Discharge: Other (see comment)(family) Transition of Care Consult (CM Consult): Discharge Planning Discharge Durable Medical Equipment: No(O2 and portable with [employer] [facility]) Physical Therapy Consult: No 
Occupational Therapy Consult: No 
Speech Therapy Consult: No 
Current Support Network: Lives Alone(daughter Jose Mccall [phone]) Confirm Follow Up Transport: Family Plan discussed with Pt/Family/Caregiver: Yes Freedom of Choice Offered: Yes Discharge Location Discharge Placement: Home Patient d/c home with family.

## 2019-06-26 NOTE — DISCHARGE SUMMARY
[facility] Cardiology Discharge Summary Patient ID: Mary Cabrera [id number] 
9 y.o. 
1935 Admit date: 6/24/2019 Discharge date:  06/26/2019 Admitting Physician: Adolm Cooks, MD  
 
Discharge Physician: Lexis Lloyd NP/Dr. Coral Pruitt Admission Diagnoses: Chest pain [R07.9] NSTEMI (non-ST elevated myocardial infarction) ([facility] [address] [I21.4] CKD (chronic kidney disease) [N18.9] History of DVT (deep vein thrombosis) [Z86.718] Discharge Diagnoses:  
 Diagnosis  Chest pain  CKD (chronic kidney disease)  NSTEMI (non-ST elevated myocardial infarction) ([facility] [address]  History of DVT (deep vein thrombosis)  Long term (current) use of anticoagulants  Atrial fibrillation ([facility] [address]  History of respiratory failure  Hyperlipidemia  Hypertension  Arthritis  GERD (gastroesophageal reflux disease)  CAD (coronary artery disease), atherosclerotic of the native vessel  Chronic obstructive pulmonary disease ([facility] [address]  History of sinoatrial node dysfunction  AICD (automatic cardioverter/defibrillator) present  Chronic systolic CHF (congestive heart failure) ([facility] [address] Cardiology Procedures this admission:  Diagnostic left heart catheterization EchoCardiogram 
Consults: Pulmonary/Intensive care Hospital Course: Patient presented to the ER with c/o SSCP radiating to his right side of his chest.  His symptoms started about 1-2 weeks that are aggravated with walking and relived with rest.  His symptoms have been progressive and are also coming at rest. He is also noted to have pain while talking a deep breath but it cant be recreated with palpation. Patient underwent cardiac catheterization by Dr. Kevin Low. Patient was found to have mild CAD. Patient tolerated the procedure well and was taken to the telemetry floor for recovery. Pulmonary was consulted and patient will follow up in the office. Plan for PFTs as OP. For maximum medical therapy for CHF, EF of 25%-30%,  will continue BB and ACE. Echo results: 
 
Left ventricle: Systolic function was severely reduced. Ejection fraction 
was estimated in the range of 25 % to 30 %. There was moderate concentric 
hypertrophy. Doppler parameters were consistent with grade 2 diastolic 
dysfunction with elevated left atrial pressure. Avg E/e': 25.55. 
 
-  Left atrium: The atrium was moderately dilated. The morning of discharge, patient was up feeling well without any complaints of chest pain or shortness of breath. Patient's right 1.84 cath site was clean, dry and intact without hematoma or bruit. Patient's labs were WNL. Patient was seen and examined by Dr. Galdino Hensley and determined stable and ready for discharge. The patient will follow up with [facility] Cardiology -- Dr. Galdino Hensley in 4 weeks. Patient has been referred to cardiac rehab. Patient will have BMP prior to follow up. DISPOSITION: The patient is being discharged home in stable condition on a low saturated fat, low cholesterol and low salt diet. The patient is instructed to advance activities as tolerated to the limit of fatigue or shortness of breath. The patient is instructed to avoid all heavy lifting, straining, stooping or squatting for 3-5 days. The patient is instructed to watch the cath site for bleeding/oozing; if seen, the patient is instructed to apply firm pressure with a clean cloth and call [facility] Cardiology at [phone]. The patient is instructed to watch for signs of infection which include: increasing area of redness, fever/hot to touch or purulent drainage at the catheterization site. The patient is instructed not to soak in a bathtub for 7-10 days, but is cleared to shower. The patient is instructed to call the office or return to the ER for immediate evaluation for any shortness of breath or chest pain not relieved by NTG. Discharge Exam:  
Visit Vitals /53 Pulse 74 Temp 98.3 °F (36.8 °C) Resp 18 Ht 5' 7\" (1.702 m) Wt 62 kg (136 lb 9.6 oz) SpO2 96% BMI 21.39 kg/m² Patient has been seen by Dr. Nikunj Gupta: see his progress note for exam details. Recent Results (from the past 24 hour(s)) PTT Collection Time: 06/25/19 12:17 PM  
Result Value Ref Range aPTT 87.2 (H) 24.7 - 39.8 SEC METABOLIC PANEL, BASIC Collection Time: 06/26/19  4:48 AM  
Result Value Ref Range Sodium 142 136 - 145 mmol/L Potassium 3.2 (L) 3.5 - 5.1 mmol/L Chloride 106 98 - 107 mmol/L  
 CO2 26 21 - 32 mmol/L Anion gap 10 7 - 16 mmol/L Glucose 108 (H) 65 - 100 mg/dL BUN 28 (H) 8 - 23 MG/DL Creatinine 1.84 (H) 0.8 - 1.5 MG/DL  
 GFR est AA 46 (L) >60 ml/min/1.73m2 GFR est non-AA 38 (L) >60 ml/min/1.73m2 Calcium 8.7 8.3 - 10.4 MG/DL Patient Instructions:  
 
Current Discharge Medication List  
  
START taking these medications Details  
rosuvastatin (CRESTOR) 20 mg tablet Take 1 Tab by mouth nightly. Qty: 30 Tab, Refills: 11  
  
lisinopril (PRINIVIL, ZESTRIL) 2.5 mg tablet Take 1 Tab by mouth daily. Qty: 30 Tab, Refills: 11 CONTINUE these medications which have CHANGED Details  
apixaban (ELIQUIS) 2.5 mg tablet Take 1 Tab by mouth every twelve (12) hours. Qty: 60 Tab, Refills: 11 CONTINUE these medications which have NOT CHANGED Details  
metoprolol succinate (TOPROL-XL) 25 mg XL tablet Take 1 Tab by mouth daily. Qty: 30 Tab, Refills: 5  
  
tamsulosin (FLOMAX) 0.4 mg capsule Take 1 Cap by mouth daily. Qty: 90 Cap, Refills: 1 Associated Diagnoses: Benign prostatic hyperplasia without lower urinary tract symptoms  
  
omeprazole (PRILOSEC) 20 mg capsule Take 1 Cap by mouth daily. Qty: 90 Cap, Refills: 3 Associated Diagnoses: Gastroesophageal reflux disease without esophagitis  
  
furosemide (LASIX) 20 mg tablet Take 1 Tab by mouth two (2) times a day. Qty: 180 Tab, Refills: 3 Comments: Please inform pt needs to schedule a follow up with Dr. Nino Faye before this refill runs out. Associated Diagnoses: Congestive heart failure, unspecified HF chronicity, unspecified heart failure type (HCC)  
  
tiotropium (SPIRIVA WITH HANDIHALER) 18 mcg inhalation capsule Take 1 Cap by inhalation daily. fluticasone-vilanterol (BREO ELLIPTA) 100-25 mcg/dose inhaler Take 1 Puff by inhalation daily. cholecalciferol (VITAMIN D3) 1,000 unit tablet Take 1,000 Units by mouth daily. aspirin delayed-release 81 mg tablet Take 81 mg by mouth daily. cyanocobalamin (VITAMIN B-12) 1,000 mcg sublingual tablet Take 1,000 mcg by mouth daily. Associated Diagnoses: Weakness  
  
albuterol-ipratropium (DUO-NEB) 2.5 mg-0.5 mg/3 ml nebu 3 mL by Nebulization route every four (4) hours as needed for Cough. Qty: 30 Nebule, Refills: 11  
 Associated Diagnoses: Chronic bronchitis, unspecified chronic bronchitis type (HCC)  
  
albuterol (PROVENTIL HFA, VENTOLIN HFA, PROAIR HFA) 90 mcg/actuation inhaler Take 2 Puffs by inhalation every four (4) hours as needed for Wheezing. Qty: 1 Inhaler, Refills: 3 Associated Diagnoses: Chronic obstructive pulmonary disease, unspecified COPD type ([facility] STOP taking these medications  
  
 simvastatin (ZOCOR) 20 mg tablet Comments:  
Reason for Stopping:   
   
 ondansetron (ZOFRAN ODT) 8 mg disintegrating tablet Comments:  
Reason for Stopping:   
   
  
 
 
Signed: 
Jose Sotelo NP 
6/26/2019 
7:43 AM

## 2019-06-26 NOTE — DISCHARGE INSTRUCTIONS
DISPOSITION: The patient is being discharged home in stable condition on a low saturated fat, low cholesterol and low salt diet. The patient is instructed to advance activities as tolerated to the limit of fatigue or shortness of breath. The patient is instructed to avoid all heavy lifting, straining, stooping or squatting for 3-5 days. The patient is instructed to watch the cath site for bleeding/oozing; if seen, the patient is instructed to apply firm pressure with a clean cloth and call [facility] at [phone]. The patient is instructed to watch for signs of infection which include: increasing area of redness, fever/hot to touch or purulent drainage at the catheterization site. The patient is instructed not to soak in a bathtub for 7-10 days, but is cleared to shower. The patient is instructed to call the office or return to the ER for immediate evaluation for any shortness of breath or chest pain not relieved by NTG. Left Heart Catheterization: About This Test  What is it? Cardiac catheterization is a test to check the left side of your heart. Your doctor might look at the shape of your heart, the motion of your heart, or the blood pressure inside the chambers. Why is this test done? This test gives information about how your heart is working. It can:  · Check blood flow and blood pressure in the chambers of the heart. · Check the pumping action of the heart. · Find out if a heart defect is present and how severe it is. · Find out how well the heart valves work. What happens during the test?  · You will get medicine to help you relax. · A thin tube called a catheter is put into a blood vessel in the groin or the arm. The doctor moves the catheter through the blood vessel into your heart. · You will get a shot to numb the skin where the catheter goes in. You may feel pressure when the doctor moves the catheter through your blood vessel into your heart.   · Dye may be injected into your heart. Your doctor can watch on special monitors as the dye moves in your heart. The dye helps your doctor see blood flow in your heart. · You may feel hot or flushed for several seconds when the dye is put in.  · If a heart defect is found, cardiac catheterization sometimes is used to correct it during the test.  How long does it take? · The test will take about 30 minutes. If a problem is found and the doctor treats it, it can take a few hours longer. What happens after the test?  · You will stay in a room for at least a few hours to make sure the catheter site starts to heal. You may have a bandage or a compression device on your groin or arm to prevent bleeding. · If the catheter was placed in your groin, you may lie in bed for a few hours. If the catheter was put in your arm, you will need to keep your arm still for at least 1 hour. · You may or may not need to stay in the hospital overnight. You will get more instructions for what to do at home. · Drink plenty of fluids for several hours after the test.  Follow-up care is a key part of your treatment and safety. Be sure to make and go to all appointments, and call your doctor if you are having problems. It's also a good idea to know your test results and keep a list of the medicines you take. Where can you learn more? Go to http://rachel-amarilys.info/. Enter W306 in the search box to learn more about \"Left Heart Catheterization: About This Test.\"  Current as of: July 22, 2018  Content Version: 11.9  © 9054-0348 [employer]. Care instructions adapted under license by [employer] (which disclaims liability or warranty for this information). If you have questions about a medical condition or this instruction, always ask your healthcare professional. [place] [address] any warranty or liability for your use of this information.        Learning About Coronary Artery Disease (CAD)  What is coronary artery disease? Coronary artery disease (CAD) occurs when plaque builds up in the arteries that bring oxygen-rich blood to your heart. Plaque is a fatty substance made of cholesterol, calcium, and other substances in the blood. This process is called hardening of the arteries, or atherosclerosis. What happens when you have coronary artery disease? · Plaque may narrow the coronary arteries. Narrowed arteries cause poor blood flow. This can lead to angina symptoms such as chest pain or discomfort. If blood flow is completely blocked, you could have a heart attack. · You can slow CAD and reduce the risk of future problems by making changes in your lifestyle. These include quitting smoking and eating heart-healthy foods. · Treatments for CAD, along with changes in your lifestyle, can help you live a longer and healthier life. How can you prevent coronary artery disease? · Do not smoke. It may be the best thing you can do to prevent heart disease. If you need help quitting, talk to your doctor about stop-smoking programs and medicines. These can increase your chances of quitting for good. · Be active. Get at least 30 minutes of exercise on most days of the week. Walking is a good choice. You also may want to do other activities, such as running, swimming, cycling, or playing tennis or team sports. · Eat heart-healthy foods. Eat more fruits and vegetables and less foods that contain saturated and trans fats. Limit alcohol, sodium, and sweets. · Stay at a healthy weight. Lose weight if you need to. · Manage other health problems such as diabetes, high blood pressure, and high cholesterol. · Manage stress. Stress can hurt your heart. To keep stress low, talk about your problems and feelings. Don't keep your feelings hidden. · If you have talked about it with your doctor, take a low-dose aspirin every day. Aspirin can help certain people lower their risk of a heart attack or stroke.  But taking aspirin isn't right for everyone, because it can cause serious bleeding. Do not start taking daily aspirin unless your doctor knows about it. How is coronary artery disease treated? · Your doctor will suggest that you make lifestyle changes. For example, your doctor may ask you to eat healthy foods, quit smoking, lose extra weight, and be more active. · You will have to take medicines. · Your doctor may suggest a procedure to open narrowed or blocked arteries. This is called angioplasty. Or your doctor may suggest using healthy blood vessels to create detours around narrowed or blocked arteries. This is called bypass surgery. Follow-up care is a key part of your treatment and safety. Be sure to make and go to all appointments, and call your doctor if you are having problems. It's also a good idea to know your test results and keep a list of the medicines you take. Where can you learn more? Go to http://rachelProvenderamarilys.info/. Enter [phone] in the search box to learn more about \"Learning About Coronary Artery Disease (CAD). \"  Current as of: July 22, 2018  Content Version: 11.9  © 3663-7033 [employer]. Care instructions adapted under license by [employer] (which disclaims liability or warranty for this information). If you have questions about a medical condition or this instruction, always ask your healthcare professional. [address] any warranty or liability for your use of this information. Cardiac Catheterization/Angiography Discharge Instructions    *Check the puncture site frequently for swelling or bleeding. If you see any bleeding, lie down and apply pressure over the area with a clean town or washcloth. Notify your doctor for any redness, swelling, drainage or oozing from the puncture site. Notify your doctor for any fever or chills.     *If the leg or arm with the puncture becomes cold, numb or painful, call [facility] at [phone]. *Activity should be limited for the next 48 hours. Climb stairs as little as possible and avoid any stooping, bending or strenuous activity for 48 hours. No heavy lifting (anything over 10 pounds) for three days. *Do not drive for 48 hours. *You may resume your usual diet. Drink more fluids than usual.    *Have a responsible person drive you home and stay with you for at least 24 hours after your heart catheterization/angiography. *You may remove the bandage from your Right Arm in 24 hours. You may shower in 24 hours. No tub baths, hot tubs or swimming for one week. Do not place any lotions, creams, powders, ointments over the puncture site for one week. You may place a clean band-aid over the puncture site each day for 5 days. Change this daily. DISCHARGE SUMMARY from Nurse    PATIENT INSTRUCTIONS:    After general anesthesia or intravenous sedation, for 24 hours or while taking prescription Narcotics:  · Limit your activities  · Do not drive and operate hazardous machinery  · Do not make important personal or business decisions  · Do  not drink alcoholic beverages  · If you have not urinated within 8 hours after discharge, please contact your surgeon on call. Report the following to your surgeon:  · Excessive pain, swelling, redness or odor of or around the surgical area  · Temperature over 100.5  · Nausea and vomiting lasting longer than 4 hours or if unable to take medications  · Any signs of decreased circulation or nerve impairment to extremity: change in color, persistent  numbness, tingling, coldness or increase pain  · Any questions    What to do at Home:    *  Please give a list of your current medications to your Primary Care Provider. *  Please update this list whenever your medications are discontinued, doses are      changed, or new medications (including over-the-counter products) are added.     *  Please carry medication information at all times in case of emergency situations. These are general instructions for a healthy lifestyle:    No smoking/ No tobacco products/ Avoid exposure to second hand smoke  Surgeon General's Warning:  Quitting smoking now greatly reduces serious risk to your health. Obesity, smoking, and sedentary lifestyle greatly increases your risk for illness    A healthy diet, regular physical exercise & weight monitoring are important for maintaining a healthy lifestyle    You may be retaining fluid if you have a history of heart failure or if you experience any of the following symptoms:  Weight gain of 3 pounds or more overnight or 5 pounds in a week, increased swelling in our hands or feet or shortness of breath while lying flat in bed. Please call your doctor as soon as you notice any of these symptoms; do not wait until your next office visit. The discharge information has been reviewed with the patient. The patient verbalized understanding. Discharge medications reviewed with the patient and appropriate educational materials and side effects teaching were provided. ___________________________________________________________________________________________________________________________________    Apixaban (By mouth)   Apixaban (a-PIX-a-ban)  Treats and prevents blood clots. This medicine is a blood thinner. Brand Name(s): Eliquis   There may be other brand names for this medicine. When This Medicine Should Not Be Used: This medicine is not right for everyone. Do not use it if you had an allergic reaction to apixaban or you have active bleeding. How to Use This Medicine:   Tablet  · Your doctor will tell you how much medicine to use. Do not use more than directed. · If you are not able to swallow the tablets whole, they may be crushed and mixed in water, 5% dextrose in water (D5W), apple juice, or applesauce.  The crushed tablets may be mixed with 60 mL of water or D5W dose and given through a nasogastric tube (NGT). · This medicine should come with a Medication Guide. Ask your pharmacist for a copy if you do not have one. · Missed dose: Take a dose as soon as you remember. If it is almost time for your next dose, wait until then and take a regular dose. Do not take extra medicine to make up for a missed dose. · Store the medicine in a closed container at room temperature, away from heat, moisture, and direct light. Drugs and Foods to Avoid:   Ask your doctor or pharmacist before using any other medicine, including over-the-counter medicines, vitamins, and herbal products. · Some medicines can affect how apixaban works. Tell your doctor if you are using any of the following:   ¨ Carbamazepine, clarithromycin, itraconazole, ketoconazole, phenytoin, rifampin, ritonavir, Bulmaro's wort  ¨ Blood thinner (including clopidogrel, heparin, prasugrel, warfarin)  ¨ Medicine to treat depression  ¨ NSAID pain or arthritis medicine (including aspirin, celecoxib, diclofenac, ibuprofen, naproxen)  Warnings While Using This Medicine:   · Tell your doctor if you are pregnant or breastfeeding, or if you have kidney disease, liver disease, bleeding problems, or an artificial heart valve. · Do not stop using this medicine suddenly without asking your doctor. You might have a higher risk of stroke for a short time after you stop using this medicine. · This medicine increases your risk for bleeding that can become serious if not controlled. You may also bruise easily, and it may take longer than usual for bleeding to stop. · This medicine may increase your risk for blood clots in your spine or back if you undergo an epidural or spinal puncture. This could lead to paralysis. Tell your doctor if you ever had spine problems or back surgery. · Tell any doctor or dentist who treats you that you are using this medicine.  With your doctor's supervision, you may need to stop using this medicine several days before you have surgery or medical tests.  · Your doctor will do lab tests at regular visits to check on the effects of this medicine. Keep all appointments. · Keep all medicine out of the reach of children. Never share your medicine with anyone. Possible Side Effects While Using This Medicine:   Call your doctor right away if you notice any of these side effects:  · Allergic reaction: Itching or hives, swelling in your face or hands, swelling or tingling in your mouth or throat, chest tightness, trouble breathing  · Change in how much or how often you urinate, red or pink urine  · Chest pain, trouble breathing  · Coughing up blood, vomiting blood or material that looks like coffee grounds  · Numbness, tingling, or muscle weakness in your legs or feet  · Red or black, tarry stools  · Unusual bleeding, bruising, or weakness  If you notice other side effects that you think are caused by this medicine, tell your doctor. Call your doctor for medical advice about side effects. You may report side effects to FDA at 1-685-FDA-3172  © 2017 [facility] Information is for End User's use only and may not be sold, redistributed or otherwise used for commercial purposes. The above information is an  only. It is not intended as medical advice for individual conditions or treatments. Talk to your doctor, nurse or pharmacist before following any medical regimen to see if it is safe and effective for you. Rosuvastatin (By mouth)   Rosuvastatin (eai-wvt-ql-STAT-in)  Treats high cholesterol and triglyceride levels. May reduce the risk of heart attack, stroke, and related health conditions. This medicine is a statin. Brand Name(s): Crestor   There may be other brand names for this medicine. When This Medicine Should Not Be Used: This medicine is not right for everyone. Do not use it if you had an allergic reaction to rosuvastatin, you have active liver disease, or you are pregnant or breastfeeding.   How to Use This Medicine: Tablet  · Take your medicine as directed. Your dose may need to be changed several times to find what works best for you. · Swallow the tablet whole. Do not crush, break, or chew it. · Read and follow the patient instructions that come with this medicine. Talk to your doctor or pharmacist if you have any questions. · Missed dose: Take a dose as soon as you remember. If it is almost time for your next dose, wait until then and take a regular dose. Do not take extra medicine to make up for a missed dose. Do not take 2 doses within 12 hours. · Store the medicine in a closed container at room temperature, away from heat, moisture, and direct light. Drugs and Foods to Avoid:   Ask your doctor or pharmacist before using any other medicine, including over-the-counter medicines, vitamins, and herbal products. · Some medicines can affect how rosuvastatin works. Tell your doctor if you are taking any of the following:   ¨ A blood thinner, such as warfarin  ¨ Cimetidine  ¨ Cyclosporine  ¨ Medicine to treat an infection, such as erythromycin, fluconazole, itraconazole, ketoconazole, atazanavir/ritonavir, or lopinavir/ritonavir  ¨ Niacin (Vitamin B3)  ¨ Spironolactone  · If you need to take an antacid that contains aluminum and magnesium, take the antacid at least 2 hours after you take rosuvastatin. Warnings While Using This Medicine:   · It is not safe to take this medicine during pregnancy. It could harm an unborn baby. Tell your doctor right away if you become pregnant. · Tell your doctor if you have kidney disease, diabetes, an underactive thyroid, a history of liver disease, or muscle pain or weakness. Tell your doctor if you usually have more than 2 drinks of alcohol per day. · Tell any doctor or dentist who treats you that you are using this medicine. You may need to stop using this medicine several days before you have surgery or medical tests.   · Your doctor will do lab tests at regular visits to check on the effects of this medicine. Keep all appointments. · Keep all medicine out of the reach of children. Never share your medicine with anyone. Possible Side Effects While Using This Medicine:   Call your doctor right away if you notice any of these side effects:  · Allergic reaction: Itching or hives, swelling in your face or hands, swelling or tingling in your mouth or throat, chest tightness, trouble breathing  · Change in how much or how often you urinate, cloudy urine, painful urination  · Dark urine or pale stools, nausea, vomiting, loss of appetite, stomach pain, yellow skin or eyes  · Muscle pain, tenderness, or weakness  · Swelling in your hands, ankles, or feet  · Unusual tiredness  If you notice other side effects that you think are caused by this medicine, tell your doctor. Call your doctor for medical advice about side effects. You may report side effects to FDA at 0-815-FDA-4273  © 2017 [facility] Information is for End User's use only and may not be sold, redistributed or otherwise used for commercial purposes. The above information is an  only. It is not intended as medical advice for individual conditions or treatments. Talk to your doctor, nurse or pharmacist before following any medical regimen to see if it is safe and effective for you. Lisinopril (By mouth)   Lisinopril (lye-SIN-oh-pril)  Treats high blood pressure and heart failure. Also given to reduce the risk of death after a heart attack. This medicine is an ACE inhibitor. Brand Name(s): Prinivil, Qbrelis, Zestril   There may be other brand names for this medicine. When This Medicine Should Not Be Used: This medicine is not right for everyone. Do not use it if you had an allergic reaction to lisinopril or another ACE inhibitor, or if you are pregnant. How to Use This Medicine:   Liquid, Tablet  · Take your medicine as directed.  Your dose may need to be changed several times to find what works best for you. · Oral liquid: Measure the oral liquid medicine with a marked measuring spoon, oral syringe, or medicine cup. · Missed dose: Take a dose as soon as you remember. If it is almost time for your next dose, wait until then and take a regular dose. Do not take extra medicine to make up for a missed dose. · Store the medicine in a closed container at room temperature, away from heat, moisture, and direct light. Drugs and Foods to Avoid:   Ask your doctor or pharmacist before using any other medicine, including over-the-counter medicines, vitamins, and herbal products. · Do not use this medicine together with aliskiren if you have diabetes. · Some foods and medicines may affect how lisinopril works. Tell your doctor if you are using any of the following:   ¨ Aliskiren, everolimus, lithium, sirolimus, temsirolimus  ¨ Another blood pressure medicine, including an angiotensin receptor blocker (ARB)  ¨ Diuretic (water pill, including amiloride, spironolactone, triamterene)  ¨ Insulin or diabetes medicine  ¨ NSAID pain or arthritis medicine (including aspirin, celecoxib, diclofenac, ibuprofen, naproxen)  · Ask your doctor before you use any medicine, supplement, or salt substitute that contains potassium. Warnings While Using This Medicine:   · It is not safe to take this medicine during pregnancy. It could harm an unborn baby. Tell your doctor right away if you become pregnant. · Tell your doctor if you are breastfeeding, or if you have kidney disease, liver disease, diabetes, or heart or blood vessel disease. · This medicine may cause the following problems:  ¨ Angioedema (severe swelling)  ¨ Kidney problems  ¨ Serious liver problems  · This medicine could lower your blood pressure too much, especially when you first use it or if you are dehydrated. Stand or sit up slowly if you feel lightheaded or dizzy. · Do not stop using this medicine without asking your doctor, even if you feel well.  This medicine will not cure your high blood pressure, but it will help keep it in a normal range. You may have to take blood pressure medicine for the rest of your life. · Tell any doctor or dentist who treats you that you are using this medicine. · Your doctor will do lab tests at regular visits to check on the effects of this medicine. Keep all appointments. · Keep all medicine out of the reach of children. Never share your medicine with anyone. Possible Side Effects While Using This Medicine:   Call your doctor right away if you notice any of these side effects:  · Allergic reaction: Itching or hives, swelling in your face or hands, swelling or tingling in your mouth or throat, chest tightness, trouble breathing  · Blistering, peeling, or red skin rash  · Change in how much or how often you urinate  · Confusion, weakness, uneven heartbeat, trouble breathing, numbness or tingling in your hands, feet, or lips  · Dark urine or pale stools, nausea, vomiting, loss of appetite, stomach pain, yellow skin or eyes  · Fever, chills, sore throat, body aches  · Lightheadedness, dizziness, fainting  · Severe stomach pain (with or without nausea or vomiting)  If you notice these less serious side effects, talk with your doctor:   · Dry cough  If you notice other side effects that you think are caused by this medicine, tell your doctor. Call your doctor for medical advice about side effects. You may report side effects to FDA at 2-610-FDA-6835  © 2017 [facility] Information is for End User's use only and may not be sold, redistributed or otherwise used for commercial purposes. The above information is an  only. It is not intended as medical advice for individual conditions or treatments. Talk to your doctor, nurse or pharmacist before following any medical regimen to see if it is safe and effective for you.

## 2019-06-26 NOTE — PROGRESS NOTES
Verbal and bedside [facility]. Rt groin visualized

## 2019-06-26 NOTE — PROGRESS NOTES
[facility] CARDIOLOGY PROGRESS NOTE 
      
 
6/26/2019 6:32 AM 
 
Admit Date: 6/24/2019 Subjective:  
Patient states SOB has improved, denies chest pain. S/p LHC yesterday. ICD in place. Pulmonary following. No ACE/ARB in the past due to BP issues. Will start low dose ACE and monitor for tolerance. ROS: 
Cardiovascular:  As noted above Objective:  
  
Vitals:  
 06/25/19 3894 06/26/19 0111 06/26/19 8905 06/26/19 0533 BP:  97/56  108/53 Pulse:  90  74 Resp:  18  18 Temp:  98.8 °F (37.1 °C)  98.3 °F (36.8 °C) SpO2: 95% 92% 95% 95% Weight:    136 lb 9.6 oz (62 kg) Height:      
 
 
Physical Exam: 
General-No Acute Distress Neck- supple, no JVD 
CV- regular rate and rhythm no MRG Lung- clear bilaterally Abd- soft, nontender, nondistended Ext- trace edema bilaterally. Skin- warm and dry Data Review:  
Recent Labs  
  06/26/19 
0448 06/25/19 
0334 06/24/19 2000  143  --   
K 3.2* 3.1*  --   
BUN 28* 30*  --   
CREA 1.84* 1.89*  --   
* 87  --   
WBC  --  5.3 7.1 HGB  --  11.5* 11.1* HCT  --  31.8* 30.3* PLT  --  157 197 TROIQ  --  0.66* 0.65* CHOL  --  95  --   
LDLC  --  31.2  --   
HDL  --  45  -- Assessment/Plan:  
 
Principal Problem: 
  NSTEMI (non-ST elevated myocardial infarction) ([facility] [address] (6/24/2019) S/p LHC no intervention. The current medical regimen is effective;  continue present plan and medications. Active Problems: Hypertension () 
  BP remains  systolic. The current medical regimen is effective;  continue present plan and medications. Add low dose ACE for HFrEF, monitor for tolerance. Hyperlipidemia (10/19/2015) The current medical regimen is effective;  continue present plan and medications. Chest pain (6/24/2019) No further chest pain CKD (chronic kidney disease) (6/24/2019) Renal function improving History of DVT (deep vein thrombosis) (6/24/2019) The current medical regimen is effective;  continue present plan and medications. On eliquis Can go home today with a follow up in the office with BMP in a week. Start 2.5mg lisinopril. Debo Palacios RN 
6/26/2019 6:32 AM 
[facility] DNP Student

## 2019-06-26 NOTE — PROGRESS NOTES
Bedside and Verbal shift change report given to Betty Dudley RN (oncoming nurse) by self (offgoing nurse). Report included the following information SBAR, Kardex, MAR, Recent Results and Cardiac Rhythm SR, Paced. R groin site remains intact. No hematoma.

## 2019-06-26 NOTE — PROGRESS NOTES
Cardiac Rehab: Spoke with patient regarding referral to cardiac rehab. Patient meets admission criteria based on NSTEMI/CHF (06/25/19). Written information about Cardiac Rehab given and reviewed with patient. Discussed lifestyle modifications to promote cardiac wellness. Patient indicated that he can not  participate in the cardiac rehab program due to transportation and time requirements. Pt states he will only drive to areas right around his home and depends on his grandchildren for other transportation. He was encouraged to tour our facility when in for his follow up appt. His Cardiologist is Dr. Joe Ferrell. Thank you, JOSEPH CarreroN, RN Cardiopulmonary Rehabilitation Nurse Liaison [facility]

## 2019-06-26 NOTE — PROGRESS NOTES
Discharge instructions were reviewed with patient. An opportunity was given for questions. All medications were reviewed, and information was given on the new medications - crestor and lisinopril. Patient verbalized understanding, and has no questions at this time. IV and telemetry monitor removed by primary RN.

## 2019-06-26 NOTE — PROGRESS NOTES
Problem: Falls - Risk of 
Goal: *Absence of Falls Description Document [place] Ambrose Fall Risk and appropriate interventions in the flowsheet. Outcome: Progressing Towards Goal 
 Per pt and family member, no history of Falls. Talha of 2. Explained to pt bedrest is up at 2200 post heart cath. Problem: Patient Education: Go to Patient Education Activity Goal: Patient/Family Education Outcome: Progressing Towards Goal 
  
Problem: Unstable angina/NSTEMI: Day 2 Goal: Activity/Safety Outcome: Progressing Towards Goal 
Pt up with 1 assist after bedrest ends at 2200. Instructed to call if he needs to get up. Goal: Diagnostic Test/Procedures Outcome: Progressing Towards Goal 
Heart cath today was clean. Medical management. Goal: Nutrition/Diet Outcome: Progressing Towards Goal 
Goal: Discharge Planning Outcome: Progressing Towards Goal 
Goal: Medications Outcome: Progressing Towards Goal 
Goal: *Optimal pain control at patient's stated goal 
Outcome: Progressing Towards Goal 
Per pt no pain at assessment. Goal: *Lungs clear or at baseline Outcome: Progressing Towards Goal 
 Lung sounds coarse, diminished but no complaints of shortness of breath.

## 2019-07-01 PROBLEM — R91.8 LUNG MASS: Status: ACTIVE | Noted: 2019-01-01

## 2019-07-01 PROBLEM — J96.11 CHRONIC RESPIRATORY FAILURE WITH HYPOXIA (HCC): Chronic | Status: ACTIVE | Noted: 2019-01-01

## 2019-07-01 PROBLEM — I48.91 ATRIAL FIBRILLATION (HCC): Chronic | Status: ACTIVE | Noted: 2019-01-01

## 2019-07-01 PROBLEM — R59.0 MEDIASTINAL ADENOPATHY: Status: ACTIVE | Noted: 2019-01-01

## 2019-07-01 PROBLEM — R07.81 RIB PAIN: Status: ACTIVE | Noted: 2019-01-01

## 2019-07-01 PROBLEM — D64.9 ANEMIA: Chronic | Status: ACTIVE | Noted: 2019-01-01

## 2019-07-01 PROBLEM — R59.0 HILAR ADENOPATHY: Status: ACTIVE | Noted: 2019-01-01

## 2019-07-01 PROBLEM — Z79.01 LONG TERM (CURRENT) USE OF ANTICOAGULANTS: Chronic | Status: ACTIVE | Noted: 2019-01-01

## 2019-07-01 NOTE — H&P
Hospitalist H&P Note Admit Date:  2019  4:57 AM  
Name:  Carlos Barros Age:  82 y.o. 
:  1935 MRN:  [id number] PCP:  Cesar Baer, NP Treatment Team: Primary Nurse: Jeffery Ramesh RN 
 
HPI:  
 
CC:  Left sided chest and abd pain Mr. Dread Rodriguez is an 81 yo male with PMH of CAD, sCHF (EF 35-30%, ICD), chronic hypoxic respiratory failure on 2 L NC, COPD, AFIB on eliquis, CKD 3 who is evaluated with persistent left sided chest pain. He was admitted to cardiology  to 19 s/p [facility] showing mild CAD. He is on medical management for CAD/sCHF. V/Q scan low probability of PE. ECHO showed EF 25-30%. He was seen by pulmonary and has pending outpatient visit. He reports persistent left sided chest pain and abd pain, no rash, no trauma, pain worse with movement/ deep breaths and cough. He took nitro and asa today and felt improved. He is being hydrated for CTA chest. He reports compliance with eliquis. He uses home nebs, denies sputum, some cough, not smoking, no fever. 10 systems reviewed and negative except as noted in HPI. - some weight loss Past Medical History:  
Diagnosis Date  Abnormal EKG  Acute kidney injury ([address]  AICD (automatic cardioverter/defibrillator) present  Alterations of sensations  Anemia  Arthritis  Back pain  Back pain 2016  Benign prostatic hyperplasia 2016  Bilateral pubic rami fractures ([address]  CAD (coronary artery disease) ? MI -  CHF (congestive heart failure) ([address]  Chronic obstructive pulmonary disease ([address]  Chronic systolic CHF (congestive heart failure) ([address] 10/7/2014  COPD (chronic obstructive pulmonary disease) (HCC)  CRI (chronic renal insufficiency)  Depression  Dizziness  Dyspnea  Elevated ferritin  Elevated PSA 2016  GERD (gastroesophageal reflux disease)  Heart failure ([address]  Hernia, inguinal   
  Hernia, inguinal 2016  Hyperlipidemia  Hyperlipidemia 10/19/2015  Hypertension  Hypokalemia  Hypokalemia 2016  Ill-defined condition EF 35%  Keratosis, actinic  Leg cramps  Malaise and fatigue  Malaise and fatigue 2016  OA (osteoarthritis) of hip 2016  Orthostasis  Pneumonia  Respiratory failure ([address] 2016 Due to tractor accident  Sinoatrial node dysfunction (HCC)  Sinusitis Past Surgical History:  
Procedure Laterality Date  HX CARPAL TUNNEL RELEASE    
 bilat  HX CATARACT REMOVAL    
 HX CATARACT REMOVAL    
 bilat  HX COLONOSCOPY    
 HX HEART CATHETERIZATION    
 HX ORTHOPAEDIC    
 internal fixation fractured pelvis  HX OTHER SURGICAL  2006  
 prostate bx  HX OTHER SURGICAL    
 transiliac screw fixation of L hemipelvis and ORIF of anterior pelvic ring disruption  HX OTHER SURGICAL    
 irrigation and debridment of R hip wound: VAC placement  HX OTHER SURGICAL    
 implanted defibrillator  HX PACEMAKER    
 dual chamber  VASCULAR SURGERY PROCEDURE UNLIST  2019  
 left common femoral artery thrombo-embolectomy Allergies Allergen Reactions  Lortab [Hydrocodone-Acetaminophen] Other (comments) Makes him sick. 1/2 tab doesn't make him sick  Lortab [Hydrocodone-Acetaminophen] Other (comments) Pt states that a whole pill makes him feel sick. 1/2 tab does not.  Other Medication Other (comments) He has a hx of a prolonged QT interval, so precaution with meds that affect that. Social History Tobacco Use  Smoking status: Former Smoker Packs/day: 2.00 Years: 50.00 Pack years: 100.00 Last attempt to quit: 10/17/2013 Years since quittin.7  Smokeless tobacco: Never Used Substance Use Topics  Alcohol use: No  
  
Family History Problem Relation Age of Onset  Heart Attack Son 48  
     mi  Heart Disease Other Immunization History Administered Date(s) Administered  Influenza High Dose Vaccine PF 09/16/2016, 09/19/2017, 11/15/2018  Influenza Vaccine 09/16/2015  Pneumococcal Conjugate (PCV-13) 01/20/2015  Pneumococcal Vaccine (Unspecified Type) 08/23/2002, 05/02/2013  TB Skin Test (PPD) Intradermal 03/14/2019  Td 11/22/2002  Tdap 10/20/2014  Zoster Vaccine, Live 07/26/2013 PTA Medications: 
Prior to Admission Medications Prescriptions Last Dose Informant Patient Reported? Taking? albuterol (PROVENTIL HFA, VENTOLIN HFA, PROAIR HFA) 90 mcg/actuation inhaler   No No  
Sig: Take 2 Puffs by inhalation every four (4) hours as needed for Wheezing. albuterol-ipratropium (DUO-NEB) 2.5 mg-0.5 mg/3 ml nebu   No No  
Sig: 3 mL by Nebulization route every four (4) hours as needed for Cough. apixaban (ELIQUIS) 2.5 mg tablet   No No  
Sig: Take 1 Tab by mouth every twelve (12) hours. aspirin delayed-release 81 mg tablet   Yes No  
Sig: Take 81 mg by mouth daily. cholecalciferol (VITAMIN D3) 1,000 unit tablet   Yes No  
Sig: Take 1,000 Units by mouth daily. cyanocobalamin (VITAMIN B-12) 1,000 mcg sublingual tablet   Yes No  
Sig: Take 1,000 mcg by mouth daily. fluticasone-vilanterol (BREO ELLIPTA) 100-25 mcg/dose inhaler   Yes No  
Sig: Take 1 Puff by inhalation daily. furosemide (LASIX) 20 mg tablet   No No  
Sig: Take 1 Tab by mouth two (2) times a day. lisinopril (PRINIVIL, ZESTRIL) 2.5 mg tablet   No No  
Sig: Take 1 Tab by mouth daily. metoprolol succinate (TOPROL-XL) 25 mg XL tablet   No No  
Sig: Take 1 Tab by mouth daily. omeprazole (PRILOSEC) 20 mg capsule   No No  
Sig: Take 1 Cap by mouth daily. rosuvastatin (CRESTOR) 20 mg tablet   No No  
Sig: Take 1 Tab by mouth nightly. tamsulosin (FLOMAX) 0.4 mg capsule   No No  
Sig: Take 1 Cap by mouth daily.   
tiotropium (SPIRIVA WITH HANDIHALER) 18 mcg inhalation capsule   Yes No  
 Sig: Take 1 Cap by inhalation daily. Facility-Administered Medications: None Objective:  
 
Patient Vitals for the past 24 hrs: 
 Temp Pulse Resp BP SpO2  
07/01/19 0640  78 19 105/69 99 % 07/01/19 0620  80 21 104/66 99 % 07/01/19 0601  80 14 99/58 99 % 07/01/19 0516  69 18 94/60 99 % 07/01/19 0508     98 % 07/01/19 0502  72 22 98/59 94 % 07/01/19 0500 97.5 °F (36.4 °C) 74 16 98/59 95 % Oxygen Therapy O2 Sat (%): 99 % (07/01/19 0640) Pulse via Oximetry: 81 beats per minute (07/01/19 0640) O2 Device: Nasal cannula (07/01/19 0508) O2 Flow Rate (L/min): 2 l/min (07/01/19 0508) No intake or output data in the 24 hours ending 07/01/19 0733 Physical Exam: 
General:    Alert. No distress, elderly Eyes:   Normal sclera. Extraocular movements intact. PERRLA 
ENT:  Normocephalic, atraumatic. dry mucous membranes CV:   irregular No edema Lungs:  CTAB. No wheezing, rhonchi, or rales. Abdomen: Soft, nontender, nondistended. Present BS Extremities: Warm and dry. . 
Neurologic:  grossly intact. Skin:     No rashes or jaundice. Normal coloration Psych:  Normal mood and affect. I reviewed the labs, imaging, EKGs, telemetry, and other studies done this admission. EKG: tracing personally reviewed as AFIB Data Review:  
Recent Results (from the past 24 hour(s)) CBC WITH AUTOMATED DIFF Collection Time: 07/01/19  5:06 AM  
Result Value Ref Range WBC 5.4 4.3 - 11.1 K/uL  
 RBC 3.02 (L) 4.23 - 5.6 M/uL HGB 9.9 (L) 13.6 - 17.2 g/dL HCT 29.9 (L) 41.1 - 50.3 % MCV 99.0 (H) 79.6 - 97.8 FL  
 MCH 32.8 26.1 - 32.9 PG  
 MCHC 33.1 31.4 - 35.0 g/dL  
 RDW 15.1 (H) 11.9 - 14.6 % PLATELET 256 464 - 864 K/uL MPV 10.3 9.4 - 12.3 FL ABSOLUTE NRBC 0.00 0.0 - 0.2 K/uL  
 DF AUTOMATED NEUTROPHILS 70 43 - 78 % LYMPHOCYTES 17 13 - 44 % MONOCYTES 9 4.0 - 12.0 % EOSINOPHILS 4 0.5 - 7.8 % BASOPHILS 1 0.0 - 2.0 % IMMATURE GRANULOCYTES 1 0.0 - 5.0 % ABS. NEUTROPHILS 3.8 1.7 - 8.2 K/UL  
 ABS. LYMPHOCYTES 0.9 0.5 - 4.6 K/UL  
 ABS. MONOCYTES 0.5 0.1 - 1.3 K/UL  
 ABS. EOSINOPHILS 0.2 0.0 - 0.8 K/UL  
 ABS. BASOPHILS 0.1 0.0 - 0.2 K/UL  
 ABS. IMM. GRANS. 0.0 0.0 - 0.5 K/UL METABOLIC PANEL, COMPREHENSIVE Collection Time: 07/01/19  5:06 AM  
Result Value Ref Range Sodium 141 136 - 145 mmol/L Potassium 3.2 (L) 3.5 - 5.1 mmol/L Chloride 105 98 - 107 mmol/L  
 CO2 27 21 - 32 mmol/L Anion gap 9 7 - 16 mmol/L Glucose 96 65 - 100 mg/dL BUN 29 (H) 8 - 23 MG/DL Creatinine 1.66 (H) 0.8 - 1.5 MG/DL  
 GFR est AA 51 (L) >60 ml/min/1.73m2 GFR est non-AA 42 (L) >60 ml/min/1.73m2 Calcium 8.9 8.3 - 10.4 MG/DL Bilirubin, total 0.4 0.2 - 1.1 MG/DL  
 ALT (SGPT) 21 12 - 65 U/L  
 AST (SGOT) 38 (H) 15 - 37 U/L Alk. phosphatase 118 50 - 136 U/L Protein, total 6.7 6.3 - 8.2 g/dL Albumin 2.7 (L) 3.2 - 4.6 g/dL Globulin 4.0 (H) 2.3 - 3.5 g/dL A-G Ratio 0.7 (L) 1.2 - 3.5 POC TROPONIN-I Collection Time: 07/01/19  5:24 AM  
Result Value Ref Range Troponin-I (POC) 0.67 (H) 0.02 - 0.05 ng/ml POC FECAL OCCULT BLOOD Collection Time: 07/01/19  6:29 AM  
Result Value Ref Range Occult blood, stool (POC) NEGATIVE  NEG All Micro Results None Other Studies: Xr Chest [facility] Result Date: 7/1/2019 EXAM: Chest x-ray. INDICATION: Chest pain. COMPARISON: June 24, 2019. TECHNIQUE: Single frontal view. FINDINGS: The lungs are clear. Again noted is cardiomegaly and a left chest wall defibrillator. No pneumothorax, vascular congestion or pleural effusion is seen. IMPRESSION: 1. No acute process. 2. Cardiomegaly and an indwelling defibrillator. Assessment and Plan:  
 
Hospital Problems as of 7/1/2019 Date Reviewed: 4/11/2019 Codes Class Noted - Resolved POA Rib pain ICD-10-CM: R07.81 ICD-9-CM: 786.50  7/1/2019 - Present Yes Anemia (Chronic) ICD-10-CM: D64.9 ICD-9-CM: 285.9  7/1/2019 - Present Yes Chronic respiratory failure with hypoxia (HCC) (Chronic) ICD-10-CM: J96.11 
ICD-9-CM: 518.83, 799.02  7/1/2019 - Present Yes * (Principal) Chest pain ICD-10-CM: R07.9 ICD-9-CM: 786.50  6/24/2019 - Present Yes  
   
 CKD (chronic kidney disease) (Chronic) ICD-10-CM: N18.9 ICD-9-CM: 585.9  6/24/2019 - Present Yes Long term (current) use of anticoagulants ICD-10-CM: Z79.01 
ICD-9-CM: V58.61  4/23/2019 - Present Yes Atrial fibrillation ([facility] [address] ICD-10-CM: I48.91 
ICD-9-CM: 427.31  3/22/2019 - Present Yes Hypokalemia ICD-10-CM: E87.6 ICD-9-CM: 276.8  6/17/2016 - Present Yes Benign prostatic hyperplasia, ICD-10-CM: N40.0 ICD-9-CM: 600.00  6/17/2016 - Present Yes Overview Signed 9/16/2016 10:55 AM by Jo Ann Church  
  MRI abnormal 9/6/16 of concern for cancer. Hypertension (Chronic) ICD-10-CM: I10 
ICD-9-CM: 401.9  Unknown - Present Yes Overview Signed 10/19/2015  9:20 AM by Krissy Rodriguez  
  BP readings have been in target range. No complications noted from the medication presently being used. CAD (coronary artery disease), atherosclerotic of the native vessel ICD-10-CM: I25.10 ICD-9-CM: 414.00  Unknown - Present Yes Overview Addendum 10/19/2015  9:21 AM by Krissy Rodriguez ? MI 4-2014 Angina has improved with no episodes of chest pain since the last visit. Mild CAD by cath 6/2014. Chronic obstructive pulmonary disease (HCC) ICD-10-CM: J44.9 ICD-9-CM: 452  Unknown - Present Yes AICD (automatic cardioverter/defibrillator) present ICD-10-CM: Z95.810 ICD-9-CM: V45.02  Unknown - Present Yes Overview Signed 10/19/2015  9:23 AM by Krissy Rodriguez [employer] DDD ICD Chronic systolic CHF (congestive heart failure) (HCC) ICD-10-CM: O19.22 ICD-9-CM: 428.22, 428.0  10/7/2014 - Present Yes Overview Signed 10/19/2015  9:22 AM by Krissy Rodriguez [place] Class I.  EF 35%. · Pleuritic and atypical left sided chest pain: admit to remote tele, hydrate gently for prep for CTA chest, taking eliquis and had recent low probability V/Q scan, will add scheduled tylenol and lidocaine patch · CAD: s/p [facility] 6-25-19 showing mild, nonobstructive CAD, continue asa/ crestor · COPD/ chronic hypoxic respiratory failure on 2 L NC continuously: stable, continue albuterol/spiriva, O2 
· AFIB: continue eliquis and metoprolol · Chronic, stable SCHF: compensated, continue metoprolol, lasix and lisinopril · CKD3: gentle IVF due to cardiomyopathy and reassess BMP · Anemia: followup CBC · Hypokalemia: replace and repeat lab Discharge planning:  Pending to home DVT ppx: eliquis Code status:  Full Estimated LOS:  Greater than 2 midnights Risk:  high Care plan: zacarias Negron Signed: Lashonda Rogers MD

## 2019-07-01 NOTE — PROGRESS NOTES
TRANSFER - IN REPORT: 
 
Verbal report received from YANI Rapp on Ada Vang being received from ER for routine progression of care Report consisted of patients Situation, Background, Assessment and Recommendations(SBAR). Information from the following report(s) SBAR, Kardex, ED Summary, MAR, Recent Results, Med Rec Status and Cardiac Rhythm SR was reviewed with the receiving nurse. Opportunity for questions and clarification was provided. Assessment completed upon patients arrival to unit and care assumed.

## 2019-07-01 NOTE — ED NOTES
Verbal report given to Marysol Larson RN for continuation of patient care upon shift change. Patient care transferred at this time.

## 2019-07-01 NOTE — ED NOTES
Hydration started at [address] [facility]. CT can be performed after 3 hours of hydration. CT can be run at [place] [address]
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
## 2019-07-01 NOTE — ED TRIAGE NOTES
EMS: PT arriving home via [id number] CAlled out for chest pain. Intermittent for past 3 days and getting gradually worse. Pain worsened on arm movement, coughing, and on palpations. Pt had heart cath on Tuesday and was discharged 3 days ago. Pt has been taking meds as prescribed. Pain 4/10, after 1 nitro decreased to 2/10. 324 asa also given. 88/56. Pt denies N/V/weakness. HR , paced. . 20g LFA.

## 2019-07-01 NOTE — PROGRESS NOTES
Problem: Mobility Impaired (Adult and Pediatric) Goal: *Acute Goals and Plan of Care (Insert Text) Description STG: 
(1.)Mr. Vitaliy Herrera will move from supine to sit and sit to supine , scoot up and down and roll side to side with MODIFIED INDEPENDENCE within 3 treatment day(s). (2.)Mr. Vitaliy Herrera will transfer from bed to chair and chair to bed with SUPERVISION using the least restrictive device within 3 treatment day(s). (3.)Mr. Vitaliy Herrera will ambulate with SUPERVISION for 250 feet with the least restrictive device within 3 treatment day(s). (4.)Mr. Vitaliy Herrera will perform standing static and dynamic balance activities x 15 minutes with SUPERVISION to improve safety within 3 day(s). (5.)Mr. Vitaliy Herrera will maintain stable vital signs throughout all functional mobility within 3 days. LTG: 
(1.)Mr. Vitaliy Herrera will move from supine to sit and sit to supine , scoot up and down and roll side to side in bed with INDEPENDENT within 7 treatment day(s). (2.)Mr. Vitaliy Herrera will transfer from bed to chair and chair to bed with MODIFIED INDEPENDENCE using the least restrictive device within 7 treatment day(s). (3.)Mr. Vitaliy Herrera will ambulate with MODIFIED INDEPENDENCE for 250+ feet with the least restrictive device within 7 treatment day(s). (4.)Mr. Vitaliy Herrera will perform standing static and dynamic balance activities x 15 minutes with MODIFIED INDEPENDENCE to improve safety within 7 day(s). ________________________________________________________________________________________________ Outcome: Progressing Towards Goal 
  
PHYSICAL THERAPY: Initial Assessment, Daily Note and PM 7/1/2019 INPATIENT: PT Visit Days : 1 [place] / [place] B / Product Type: Medicare /   
  
NAME/AGE/GENDER: Franco Coto is a 82 y.o. male PRIMARY DIAGNOSIS: Chest pain [R07.9] Chest pain [R07.9] Chest pain Chest pain Procedure(s) (LRB): ENDOSCOPIC BRONCHOSCOPY ULTRASOUND (EBUS) (N/A) BRONCHOSCOPY (N/A) ICD-10: Treatment Diagnosis:  
 Difficulty in walking, Not elsewhere classified (R26.2) Precaution/Allergies: 
Lortab [hydrocodone-acetaminophen]; Lortab [hydrocodone-acetaminophen]; and Other medication ASSESSMENT:  
 
Mr. Hoang Canas is a 82 y.o. Male admitted for chest pain. He lives alone in a single story home, typically ambulates/performs ADLs independently. Has 2 L/min O2 PRN and has multiple DME he does not need to use. Reports 0 falls in the past 6 months. Supine on contact, agreeable to physical therapy evaluation. SpO2 97% on 2 L/min at rest. He transferred to sitting with supervision, stood with stand by assist. Required CGA for ambulation within room and patient able to manage O2 tank during mobility. Treatment initiated to include ambulation 250' while pushing O2 tank and progressed to requiring stand by assist for ambulation. SpO2 dropped to 88% on 2 L/min, educated patient on pursed lip breathing and improved to 97% within 30 seconds of rest and pursed lip breathing. He returned to room and sat edge of bed with zacarias at bedside at the end of the session. Martin Montero is currently functioning below his baseline and would benefit from skilled PT during acute care stay to maximize safety and independence with functional mobility. This section established at most recent assessment PROBLEM LIST (Impairments causing functional limitations): 
Decreased Strength Decreased ADL/Functional Activities Decreased Transfer Abilities Decreased Ambulation Ability/Technique Decreased Balance Increased Pain Decreased Activity Tolerance Decreased Pacing Skills Increased Shortness of Breath Decreased Knowledge of Precautions Decreased Dania with Home Exercise Program 
 INTERVENTIONS PLANNED: (Benefits and precautions of physical therapy have been discussed with the patient.) Balance Exercise Bed Mobility Family Education Gait Training Home Exercise Program (HEP) Therapeutic Activites Therapeutic Exercise/Strengthening Transfer Training TREATMENT PLAN: Frequency/Duration: 3 times a week for duration of hospital stay Rehabilitation Potential For Stated Goals: Excellent REHAB RECOMMENDATIONS (at time of discharge pending progress):   
Placement: It is my opinion, based on this patient's performance to date, that Mr. Lisa King may benefit from participating in 1-2 additional therapy sessions in order to continue to assess for rehab potential and then make recommendation for disposition at discharge. Equipment:  
None at this time HISTORY:  
History of Present Injury/Illness (Reason for Referral): Mr. Lisa King is an 83 yo male with PMH of CAD, sCHF (EF 35-30%, ICD), chronic hypoxic respiratory failure on 2 L NC, COPD, AFIB on eliquis, CKD 3 who is evaluated with persistent left sided chest pain. He was admitted to cardiology 6-24 to 6-26-19 s/p [facility] showing mild CAD. He is on medical management for CAD/sCHF. V/Q scan low probability of PE. ECHO showed EF 25-30%. He was seen by pulmonary and has pending outpatient visit. He reports persistent left sided chest pain and abd pain, no rash, no trauma, pain worse with movement/ deep breaths and cough. He took nitro and asa today and felt improved. He is being hydrated for CTA chest. He reports compliance with eliquis. He uses home nebs, denies sputum, some cough, not smoking, no fever.   
 
Past Medical History/Comorbidities:  
Mr. Lisa King  has a past medical history of Abnormal EKG, Acute kidney injury ([address] AICD (automatic cardioverter/defibrillator) present, Alterations of sensations, Anemia, Arthritis, Back pain (6/17/2016), Benign prostatic hyperplasia (6/17/2016), Bilateral pubic rami fractures ([address] CAD (coronary artery disease), CHF (congestive heart failure) ([address] Chronic obstructive pulmonary disease ([address] Chronic systolic CHF (congestive heart failure) ([address] (10/7/2014), CRI (chronic renal insufficiency), Depression, Dizziness, Dyspnea, Elevated ferritin, Elevated PSA (6/17/2016), GERD (gastroesophageal reflux disease), Heart failure ([facility] Hernia, inguinal (6/17/2016), Hyperlipidemia (10/19/2015), Hypertension, Hypokalemia (6/17/2016), Ischemia of left lower extremity (3/13/2019), Keratosis, actinic, Leg cramps, Malaise and fatigue (6/17/2016), NSTEMI (non-ST elevated myocardial infarction) ([facility] (6/24/2019), OA (osteoarthritis) of hip (9/16/2016), Orthostasis, Pneumonia, Respiratory failure ([facility] (6/17/2016), Sinoatrial node dysfunction ([facility] and Sinusitis. Mr. Lisa King  has a past surgical history that includes hx pacemaker; hx cataract removal; hx other surgical (4/2006); hx other surgical; hx other surgical; hx orthopaedic; hx carpal tunnel release; hx heart catheterization; hx cataract removal; hx colonoscopy; hx other surgical; and vascular surgery procedure unlist (03/13/2019). Social History/Living Environment:  
Home Environment: Private residence # Steps to Enter: 0 One/Two Story Residence: One story Living Alone: Yes Support Systems: Family member(s) Patient Expects to be Discharged to[de-identified] Private residence Current DME Used/Available at Home: Oxygen, portable, Shower chair, Walker, rolling, Commode, bedside Prior Level of Function/Work/Activity: 
He lives alone in a single story home, typically ambulates/performs ADLs independently. Has 2 L/min O2 PRN and has multiple DME he does not need to use. Reports 0 falls in the past 6 months. Number of Personal Factors/Comorbidities that affect the Plan of Care: 3+: HIGH COMPLEXITY EXAMINATION:  
Most Recent Physical Functioning:  
Gross Assessment: 
AROM: Within functional limits PROM: Within functional limits Strength: Within functional limits Posture: 
Posture (WDL): Exceptions to [facility] Posture Assessment: Forward head, Rounded shoulders Balance: 
Sitting: Intact Standing: Impaired Standing - Static: Good Standing - Dynamic : Fair Bed Mobility: 
Rolling: Supervision Supine to Sit: Supervision Scooting: Supervision Wheelchair Mobility: 
  
Transfers: 
Sit to Stand: Contact guard assistance Stand to Sit: Contact guard assistance Gait: 
  
Base of Support: Center of gravity altered Speed/Tonya: Slow Gait Abnormalities: Decreased step clearance Distance (ft): 250 Feet (ft) Assistive Device: (pushing O2 tank) Ambulation - Level of Assistance: Stand-by assistance;Contact guard assistance Interventions: Manual cues; Safety awareness training;Verbal cues Body Structures Involved: Eyes and Ears Heart Lungs Muscles Body Functions Affected: 
Sensory/Pain Cardio Respiratory Neuromusculoskeletal 
Movement Related Activities and Participation Affected: Mobility Self Care Domestic Life Interpersonal Interactions and Relationships Community, Social and [place] Number of elements that affect the Plan of Care: 4+: HIGH COMPLEXITY CLINICAL PRESENTATION:  
Presentation: Stable and uncomplicated: LOW COMPLEXITY CLINICAL DECISION MAKIN[address] [place] [id number] AM-PAC? ?6 Clicks? Basic Mobility Inpatient Short Form How much difficulty does the patient currently have. .. Unable A Lot A Little None 1. Turning over in bed (including adjusting bedclothes, sheets and blankets)? ? 1   ? 2   ? 3   ? 4  
2. Sitting down on and standing up from a chair with arms ( e.g., wheelchair, bedside commode, etc.)   ? 1   ? 2   ? 3   ? 4  
3. Moving from lying on back to sitting on the side of the bed?   ? 1   ? 2   ? 3   ? 4 How much help from another person does the patient currently need. .. Total A Lot A Little None 4. Moving to and from a bed to a chair (including a wheelchair)? ? 1   ? 2   ? 3   ? 4  
5. Need to walk in hospital room? ? 1   ? 2   ? 3   ? 4  
6. Climbing 3-5 steps with a railing? ? 1   ? 2   ? 3   ? 4  
© 2007, Trustees of [address] [place] [address] [address] [address], under license to [employer].  All rights reserved Score:  Initial: 17 Most Recent: X (Date: -- ) Interpretation of Tool:  Represents activities that are increasingly more difficult (i.e. Bed mobility, Transfers, Gait). Medical Necessity:    
Patient demonstrates excellent 
 rehab potential due to higher previous functional level. Reason for Services/Other Comments: 
Patient continues to require modification of therapeutic interventions to increase complexity of exercises Lalo Aldana Use of outcome tool(s) and clinical judgement create a POC that gives a: Clear prediction of patient's progress: LOW COMPLEXITY  
  
 
 
 
TREATMENT:  
(In addition to Assessment/Re-Assessment sessions the following treatments were rendered) Pre-treatment Symptoms/Complaints:  L flank pain. Pain: Initial:  
Pain Intensity 1: 3 Pain Location 1: Flank Pain Orientation 1: Left  Post Session:  3/10 Therapeutic Activity: (    8 minutes): Therapeutic activities including Ambulation on level ground to improve mobility, strength and balance. Required minimal Manual cues; Safety awareness training;Verbal cues to promote static and dynamic balance in standing. Braces/Orthotics/Lines/Etc:  
O2 Device: Nasal cannula Treatment/Session Assessment:   
Response to Treatment:  Patient SpO2 dropped to 88% on 2 L/min, improved with rest and pursed lip breathing. Interdisciplinary Collaboration:  
Physical Therapist 
Registered Nurse After treatment position/precautions:  
Bed/Chair-wheels locked Bed in low position Call light within reach RN notified Family at bedside Sitting edge of bed Compliance with Program/Exercises: Will assess as treatment progresses Recommendations/Intent for next treatment session: \"Next visit will focus on advancements to more challenging activities and reduction in assistance provided\". Total Treatment Duration: PT Patient Time In/Time Out Time In: 1473 Time Out: 9011 Agueda Fonseca PT, DPT

## 2019-07-01 NOTE — PROGRESS NOTES
Patient with recent admit and discharge from [facility] ([id number] to 6-26-19). PCP - COURT Dasilva NP ([address] [facility] last office visit noted on 5-15-19).

## 2019-07-01 NOTE — PROGRESS NOTES
Physical Therapy Note: 
 
Orders received, chart reviewed and initial evaluation attempted. Patient off the floor when initial evaluation attempted. Will attempt later as patient is available and schedule allows. Thank you, Rico Mcdermott, PT, DPT

## 2019-07-01 NOTE — PROGRESS NOTES
Bedside and Verbal shift change report given to [address] (oncoming nurse) by self (offgoing nurse). Report included the following information SBAR, Kardex, Intake/Output, MAR, Accordion, Recent Results, Med Rec Status and Cardiac Rhythm paced.

## 2019-07-01 NOTE — ED NOTES
TRANSFER - OUT REPORT: 
 
Verbal report given to Yulisa Haddad RN on Gearline Pelt  being transferred to [facility] for routine progression of care Report consisted of patients Situation, Background, Assessment and  
Recommendations(SBAR). Information from the following report(s) SBAR was reviewed with the receiving nurse. Lines:  
Peripheral IV 07/01/19 Left Forearm (Active) Site Assessment Clean, dry, & intact 7/1/2019  5:05 AM  
Phlebitis Assessment 0 7/1/2019  5:05 AM  
Infiltration Assessment 0 7/1/2019  5:05 AM  
Dressing Status Clean, dry, & intact 7/1/2019  5:05 AM  
Dressing Type Transparent 7/1/2019  5:05 AM  
Hub Color/Line Status Pink 7/1/2019  5:05 AM  
Action Taken Blood drawn 7/1/2019  5:05 AM  
  
 
Opportunity for questions and clarification was provided. Patient transported with: 
 Monitor Registered Nurse

## 2019-07-01 NOTE — PROGRESS NOTES
Skin assessment completed per protocol. Sacrum intact. Some scattered bruising noted. Dressing to right groin site removed and skin is intact. Encouraged frequent repositioning.

## 2019-07-01 NOTE — PROGRESS NOTES
Patient is being admitted to floor from ER Patient is calm Receptive to sumaya presence Was recently here with similar symptoms per patient Demonstrates confidence in his care team 
Coping well Will follow upon transfer to floor Gerardo Ferreira,  Staff  C: [phone]  /  [email or web address]
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
## 2019-07-01 NOTE — PROGRESS NOTES
Problem: Self Care Deficits Care Plan (Adult) Goal: *Acute Goals and Plan of Care (Insert Text) Description 1. Patient will perform bathing with modified independence within 7 days with equipment as needed. 2.  Patient will perform upper body dressing and lower body dressing with modified independence within 7 days with equipment as needed. 3.  Patient will perform toileting with modified independence within 7 days with equipment as needed. 4.   Patient will perform toilet transfer with modified independence within 7 days with equipment as needed. 5.   Pt will participate in B UE therapeutic exercises for 8 minutes with 3 rest breaks within 7 days. 6.  Patient will verbalize 3 energy conservation techniques for home environment within 7 days. 7.  Pt and or caregiver to demonstrate and verbalize good understanding of recommendations for increasing safety with functional tasks within 7 days. Outcome: Progressing Towards Goal 
  
OCCUPATIONAL THERAPY: Initial Assessment and PM 7/1/2019 INPATIENT: OT Visit Days: 1 [place] / [place] / Product Type: Medicare /  
  
NAME/AGE/GENDER: Renee Duenas is a 82 y.o. male PRIMARY DIAGNOSIS:  Chest pain [R07.9] Chest pain [R07.9] Chest pain Chest pain Procedure(s) (LRB): ENDOSCOPIC BRONCHOSCOPY ULTRASOUND (EBUS) (N/A) BRONCHOSCOPY (N/A) ICD-10: Treatment Diagnosis:  
 Generalized Muscle Weakness (M62.81) Precautions/Allergies: 
   Lortab [hydrocodone-acetaminophen]; Lortab [hydrocodone-acetaminophen]; and Other medication ASSESSMENT:  
Mr. Jeanette Shukla presents supine in bed with his grandson present. Patient A & O x 3. Patient is [place] bilaterally. Patient lives alone, and he was modified independent/independent with ADLs, and he ambulated independently prior to admit. He was also driving. Patient has portable O2 that he uses after he has exerted himself.   He owns a RW, shower chair, & TTB, though he does not use them. Patient's O2 sats were 98% supine, and he sat on edge of bed with Supervision. Patient stood with CGA, and he ambulated in hallway using RW. Note: O2 sats dropped to 92%, though sats quickly rebounded to 98% after resting. Patient functioning below baseline, and patient to benefit from Occupational Therapy to maximize ADL performance. Cont OT per tx plan. This section established at most recent assessment PROBLEM LIST (Impairments causing functional limitations): 
Decreased Strength Decreased ADL/Functional Activities Decreased Transfer Abilities Decreased Ambulation Ability/Technique Decreased Balance Increased Pain Decreased Activity Tolerance Decreased Work Simplification/Energy Conservation Techniques Increased Fatigue Increased Shortness of Breath INTERVENTIONS PLANNED: (Benefits and precautions of occupational therapy have been discussed with the patient.) Activities of daily living training Adaptive equipment training Balance training Clothing management Cognitive training Donning&doffing training Hygiene training Medication management training Neuromuscular re-eduation Re-evaluation Sensory reintegration training Therapeutic activity Therapeutic exercise TREATMENT PLAN: Frequency/Duration: Follow patient 3x's/wk to address above goals. Rehabilitation Potential For Stated Goals: Good REHAB RECOMMENDATIONS (at time of discharge pending progress):   
Placement: It is my opinion, based on this patient's performance to date, that Mr. Jeny Canas may benefit from participating in 1-2 additional therapy sessions in order to continue to assess for rehab potential and then make recommendation for disposition at discharge. HH?? Equipment:  
None at this time OCCUPATIONAL PROFILE AND HISTORY:  
History of Present Injury/Illness (Reason for Referral): Mr. Jeny Canas is an 83 yo male with PMH of CAD, sCHF (EF 35-30%, ICD), chronic hypoxic respiratory failure on 2 L NC, COPD, AFIB on eliquis, CKD 3 who is evaluated with persistent left sided chest pain. He was admitted to cardiology 6-24 to 6-26-19 s/p [facility] showing mild CAD. He is on medical management for CAD/sCHF. V/Q scan low probability of PE. ECHO showed EF 25-30%. He was seen by pulmonary and has pending outpatient visit. He reports persistent left sided chest pain and abd pain, no rash, no trauma, pain worse with movement/ deep breaths and cough. He took nitro and asa today and felt improved. He is being hydrated for CTA chest. He reports compliance with eliquis. He uses home nebs, denies sputum, some cough, not smoking, no fever. Past Medical History/Comorbidities:  
Mr. Ralph Fleming  has a past medical history of Abnormal EKG, Acute kidney injury ([address] AICD (automatic cardioverter/defibrillator) present, Alterations of sensations, Anemia, Arthritis, Back pain (6/17/2016), Benign prostatic hyperplasia (6/17/2016), Bilateral pubic rami fractures (HCC), CAD (coronary artery disease), CHF (congestive heart failure) ([address] Chronic obstructive pulmonary disease ([address] Chronic systolic CHF (congestive heart failure) ([address] (10/7/2014), CRI (chronic renal insufficiency), Depression, Dizziness, Dyspnea, Elevated ferritin, Elevated PSA (6/17/2016), GERD (gastroesophageal reflux disease), Heart failure ([address] Hernia, inguinal (6/17/2016), Hyperlipidemia (10/19/2015), Hypertension, Hypokalemia (6/17/2016), Ischemia of left lower extremity (3/13/2019), Keratosis, actinic, Leg cramps, Malaise and fatigue (6/17/2016), NSTEMI (non-ST elevated myocardial infarction) ([address] (6/24/2019), OA (osteoarthritis) of hip (9/16/2016), Orthostasis, Pneumonia, Respiratory failure ([address] (6/17/2016), Sinoatrial node dysfunction ([address] and Sinusitis.   Mr. Ralph Fleming  has a past surgical history that includes hx pacemaker; hx cataract removal; hx other surgical (4/2006); hx other surgical; hx other surgical; hx orthopaedic; hx carpal tunnel release; hx heart catheterization; hx cataract removal; hx colonoscopy; hx other surgical; and vascular surgery procedure unlist (03/13/2019). Social History/Living Environment:  
Home Environment: Private residence Living Alone: Yes Support Systems: Family member(s) Patient Expects to be Discharged to[de-identified] Private residence Current DME Used/Available at Home: Oxygen, portable, Shower chair, Transfer bench, Walker, rolling(Pt uses portable O2 PRN; does not use other equipment listed) Prior Level of Function/Work/Activity: 
Patient lives alone, and he was modified independent/independent with ADLs, and he ambulated independently prior to admit. He was also driving. Patient has portable O2 that he uses after he has exerted himself. He owns a RW, shower chair, & TTB, though he does not use them. Number of Personal Factors/Comorbidities that affect the Plan of Care: Brief history (0):  LOW COMPLEXITY ASSESSMENT OF OCCUPATIONAL PERFORMANCE[de-identified]  
Activities of Daily Living:  
Basic ADLs (From Assessment) Complex ADLs (From Assessment) Feeding: Setup Oral Facial Hygiene/Grooming: Setup Bathing: Contact guard assistance Upper Body Dressing: Setup Lower Body Dressing: Contact guard assistance Toileting: Contact guard assistance Instrumental ADL Meal Preparation: Minimum assistance Homemaking: Maximum assistance Medication Management: Minimum assistance Financial Management: Minimum assistance Grooming/Bathing/Dressing Activities of Daily Living Cognitive Retraining Safety/Judgement: Awareness of environment; Fall prevention Bed/Mat Mobility Sit to Stand: Contact guard assistance Stand to Sit: Contact guard assistance Bed to Chair: Contact guard assistance Most Recent Physical Functioning:  
Gross Assessment: 
  
         
  
Posture: 
  
Balance: 
Sitting: Intact Standing: Impaired Standing - Static: Good Standing - Dynamic : Fair Bed Mobility: 
  
Wheelchair Mobility: 
  
Transfers: 
Sit to Stand: Contact guard assistance Stand to Sit: Contact guard assistance Bed to Chair: Contact guard assistance Patient Vitals for the past 6 hrs: 
 BP SpO2 O2 Flow Rate (L/min) Pulse 19 0820   2 l/min   
19 0839 122/81 98 % 2 l/min 80  
19 0859  96 % 2 l/min   
19 1200   2 l/min   
19 1350  97 % 2 l/min   
19 1351  93 % 2 l/min Mental Status Neurologic State: Alert Orientation Level: Oriented to person, Oriented to place, Oriented to situation Cognition: Follows commands Perception: Appears intact Perseveration: No perseveration noted Safety/Judgement: Awareness of environment, Fall prevention Physical Skills Involved: 
Balance Strength Activity Tolerance Fine Motor Control Pain (acute) Cognitive Skills Affected (resulting in the inability to perform in a timely and safe manner): Executive Function Divided Attention Psychosocial Skills Affected: 
Habits/Routines Social Interaction Number of elements that affect the Plan of Care: 3-5:  MODERATE COMPLEXITY CLINICAL DECISION MAKIN[address] [place] [id number] AM-PAC? ?6 Clicks? Daily Activity Inpatient Short Form How much help from another person does the patient currently need. .. Total A Lot A Little None 1. Putting on and taking off regular lower body clothing? ? 1   ? 2   ? 3   ? 4  
2. Bathing (including washing, rinsing, drying)? ? 1   ? 2   ? 3   ? 4  
3. Toileting, which includes using toilet, bedpan or urinal?   ? 1   ? 2   ? 3   ? 4  
4. Putting on and taking off regular upper body clothing? ? 1   ? 2   ? 3   ? 4  
5. Taking care of personal grooming such as brushing teeth? ? 1   ? 2   ? 3   ? 4  
6. Eating meals? ? 1   ? 2   ? 3   ? 4  
© 2007, Trustees of [address] [place] [address] [address] [address], under license to "[employer]. All rights reserved Score:  Initial: 18 Most Recent: X (Date: -- ) Interpretation of Tool:  Represents activities that are increasingly more difficult (i.e. Bed mobility, Transfers, Gait). Medical Necessity:    
Patient demonstrates good 
 rehab potential due to higher previous functional level. Reason for Services/Other Comments: 
Patient continues to require skilled intervention due to patient's inability to take care of himself Joe Mandel Use of outcome tool(s) and clinical judgement create a POC that gives a: LOW COMPLEXITY  
 
 
 
TREATMENT:  
(In addition to Assessment/Re-Assessment sessions the following treatments were rendered) Pre-treatment Symptoms/Complaints:   
Pain: Initial:  
Pain Intensity 1: 3 Pain Location 1: Flank Pain Orientation 1: Left Pain Intervention(s) 1: Rest 3 Post Session:  3 Therapeutic Activity: (    10 minutes): Therapeutic activities including Bed transfers, Chair transfers and Ambulation on level ground to improve mobility, strength and balance. Required minimal   to promote static and dynamic balance in standing. Braces/Orthotics/Lines/Etc:  
IV 
O2 Device: Nasal cannula Treatment/Session Assessment:   
Response to Treatment:  positive Interdisciplinary Collaboration:  
Registered Nurse Certified Nursing Assistant/Patient Care Technician After treatment position/precautions:  
Up in chair Bed in low position Call light within reach RN notified Family at bedside Compliance with Program/Exercises: Compliant all of the time, Will assess as treatment progresses. Recommendations/Intent for next treatment session: \"Next visit will focus on advancements to more challenging activities and reduction in assistance provided\". Total Treatment Duration: OT Patient Time In/Time Out Time In: 7189 Time Out: 1350 Mariea Mean, OT
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
## 2019-07-01 NOTE — PROGRESS NOTES
Care Management Interventions PCP Verified by CM: Yes(last seen 5/15/19) Palliative Care Criteria Met (RRAT>21 & CHF Dx)?: No(RRAT 33 Dx Chest pain) Transition of Care Consult (CM Consult): Discharge Planning Discharge Durable Medical Equipment: No(O2 with portable tanks with [facility] ) Physical Therapy Consult: Yes Occupational Therapy Consult: Yes Speech Therapy Consult: No 
Current Support Network: Lives Alone Confirm Follow Up Transport: Self Plan discussed with Pt/Family/Caregiver: Yes Freedom of Choice Offered: Yes Discharge Location Discharge Placement: Home Met with patient for d/c planning. Patient alert and oriented x 3, independent of ADL's and lives alone. Has a daughter Lacho Newsome [phone] that assist as needed. DME includes O2 with portables serviced through [facility]. He is able to drive. He has Medicare and [employer] and is able to obtain his [place] [facility] He voices no concerns or needs for d/c. Discussed home health and he states he does not need that at d/c. Will f/u with PT/OT evals. Current d/c plan is home when medically stable. CM following.

## 2019-07-02 NOTE — PROGRESS NOTES
Problem: Mobility Impaired (Adult and Pediatric) Goal: *Acute Goals and Plan of Care (Insert Text) Description STG: 
(1.)Mr. Dago Diaz will move from supine to sit and sit to supine , scoot up and down and roll side to side with MODIFIED INDEPENDENCE within 3 treatment day(s). (2.)Mr. Dago Diaz will transfer from bed to chair and chair to bed with SUPERVISION using the least restrictive device within 3 treatment day(s). (3.)Mr. Dago Diaz will ambulate with SUPERVISION for 250 feet with the least restrictive device within 3 treatment day(s). (4.)Mr. Dago Diaz will perform standing static and dynamic balance activities x 15 minutes with SUPERVISION to improve safety within 3 day(s). (5.)Mr. Dago Diaz will maintain stable vital signs throughout all functional mobility within 3 days. LTG: 
(1.)Mr. Dago Diaz will move from supine to sit and sit to supine , scoot up and down and roll side to side in bed with INDEPENDENT within 7 treatment day(s). (2.)Mr. Dago Diaz will transfer from bed to chair and chair to bed with MODIFIED INDEPENDENCE using the least restrictive device within 7 treatment day(s). (3.)Mr. Dago Diaz will ambulate with MODIFIED INDEPENDENCE for 250+ feet with the least restrictive device within 7 treatment day(s). (4.)Mr. Dago Diaz will perform standing static and dynamic balance activities x 15 minutes with MODIFIED INDEPENDENCE to improve safety within 7 day(s). ________________________________________________________________________________________________ Outcome: Progressing Towards Goal 
  
PHYSICAL THERAPY: Daily Note and PM 7/2/2019 INPATIENT: PT Visit Days : 2 [place] / [place] / Product Type: Medicare /   
  
NAME/AGE/GENDER: Alta Redmond is a 82 y.o. male PRIMARY DIAGNOSIS: Chest pain [R07.9] Chest pain [R07.9] Chest pain Chest pain Procedure(s) (LRB): ENDOSCOPIC BRONCHOSCOPY ULTRASOUND (EBUS) (N/A) BRONCHOSCOPY (N/A) ICD-10: Treatment Diagnosis: · Difficulty in walking, Not elsewhere classified (R26.2) Precaution/Allergies: 
Lortab [hydrocodone-acetaminophen]; Lortab [hydrocodone-acetaminophen]; and Other medication ASSESSMENT:  
Mr. Candelaria Matthew is a 82 y.o. Male admitted for chest pain. He lives alone in a single story home, typically ambulates/performs ADLs independently. Has 2 L/min O2 PRN and has multiple DME he does not need to use. Reports 0 falls in the past 6 months. Supine on contact, agreeable to physical therapy treatment. Now on room air with SpO2 >90%. He transferred to sitting with supervision, stood with stand by assist. He increased ambulation distance to 500' total this afternoon with no assistive device, but close stand by assist to occasional CGA with minor loss of balance. No SOB throughout treatment and patient tolerated well. Great progress towards ambulation goals requiring less assist and ambulating further this session. He returned to room and sat edge of bed with family at bedside at the end of the session. Humaira Cadena is currently functioning below his baseline and would benefit from skilled PT during acute care stay to maximize safety and independence with functional mobility. This section established at most recent assessment PROBLEM LIST (Impairments causing functional limitations): 1. Decreased Strength 2. Decreased ADL/Functional Activities 3. Decreased Transfer Abilities 4. Decreased Ambulation Ability/Technique 5. Decreased Balance 6. Increased Pain 7. Decreased Activity Tolerance 8. Decreased Pacing Skills 9. Increased Shortness of Breath 10. Decreased Knowledge of Precautions 11. Decreased [place] with Home Exercise Program 
 INTERVENTIONS PLANNED: (Benefits and precautions of physical therapy have been discussed with the patient.) 1. Balance Exercise 2. Bed Mobility 3. Family Education 4. Gait Training 5. Home Exercise Program (HEP) 6. Therapeutic Activites 7. Therapeutic Exercise/Strengthening 8. Transfer Training TREATMENT PLAN: Frequency/Duration: 3 times a week for duration of hospital stay Rehabilitation Potential For Stated Goals: Excellent REHAB RECOMMENDATIONS (at time of discharge pending progress):   
Placement: It is my opinion, based on this patient's performance to date, that Mr. Jeanette Shukla may benefit from participating in 1-2 additional therapy sessions in order to continue to assess for rehab potential and then make recommendation for disposition at discharge. Equipment:  
? None at this time HISTORY:  
History of Present Injury/Illness (Reason for Referral): Mr. Jeanette Shukla is an 81 yo male with PMH of CAD, sCHF (EF 35-30%, ICD), chronic hypoxic respiratory failure on 2 L NC, COPD, AFIB on eliquis, CKD 3 who is evaluated with persistent left sided chest pain. He was admitted to cardiology 6-24 to 6-26-19 s/p [facility] showing mild CAD. He is on medical management for CAD/sCHF. V/Q scan low probability of PE. ECHO showed EF 25-30%. He was seen by pulmonary and has pending outpatient visit. He reports persistent left sided chest pain and abd pain, no rash, no trauma, pain worse with movement/ deep breaths and cough. He took nitro and asa today and felt improved. He is being hydrated for CTA chest. He reports compliance with eliquis. He uses home nebs, denies sputum, some cough, not smoking, no fever.   
 
Past Medical History/Comorbidities:  
Mr. Jeanette Shukla  has a past medical history of Abnormal EKG, Acute kidney injury ([address] AICD (automatic cardioverter/defibrillator) present, Alterations of sensations, Anemia, Arthritis, Back pain (6/17/2016), Benign prostatic hyperplasia (6/17/2016), Bilateral pubic rami fractures ([address] CAD (coronary artery disease), CHF (congestive heart failure) ([address] Chronic obstructive pulmonary disease ([address] Chronic systolic CHF (congestive heart failure) ([address] (10/7/2014), CRI (chronic renal insufficiency), Depression, Dizziness, Dyspnea, Elevated ferritin, Elevated PSA (6/17/2016), GERD (gastroesophageal reflux disease), Heart failure ([facility] Hernia, inguinal (6/17/2016), Hyperlipidemia (10/19/2015), Hypertension, Hypokalemia (6/17/2016), Ischemia of left lower extremity (3/13/2019), Keratosis, actinic, Leg cramps, Malaise and fatigue (6/17/2016), NSTEMI (non-ST elevated myocardial infarction) ([facility] (6/24/2019), OA (osteoarthritis) of hip (9/16/2016), Orthostasis, Pneumonia, Respiratory failure ([facility] (6/17/2016), Sinoatrial node dysfunction ([facility] and Sinusitis. Mr. Cleola Baumgarten  has a past surgical history that includes hx pacemaker; hx cataract removal; hx other surgical (4/2006); hx other surgical; hx other surgical; hx orthopaedic; hx carpal tunnel release; hx heart catheterization; hx cataract removal; hx colonoscopy; hx other surgical; and vascular surgery procedure unlist (03/13/2019). Social History/Living Environment:  
Home Environment: Private residence # Steps to Enter: 0 One/Two Story Residence: One story Living Alone: Yes Support Systems: Family member(s) Patient Expects to be Discharged to[de-identified] Private residence Current DME Used/Available at Home: Oxygen, portable, Shower chair, Walker, rolling, Commode, bedside Prior Level of Function/Work/Activity: 
He lives alone in a single story home, typically ambulates/performs ADLs independently. Has 2 L/min O2 PRN and has multiple DME he does not need to use. Reports 0 falls in the past 6 months. Number of Personal Factors/Comorbidities that affect the Plan of Care: 3+: HIGH COMPLEXITY EXAMINATION:  
Most Recent Physical Functioning:  
Gross Assessment: 
AROM: Within functional limits PROM: Within functional limits Strength: Within functional limits Posture: 
Posture (WDL): Exceptions to [facility] Posture Assessment: Forward head, Rounded shoulders Balance: 
Sitting: Intact Standing: Impaired Standing - Static: Good Standing - Dynamic : Fair Bed Mobility: 
Rolling: Supervision Supine to Sit: Supervision Sit to Supine: Supervision Scooting: Supervision Wheelchair Mobility: 
  
Transfers: 
Sit to Stand: Stand-by assistance Stand to Sit: Stand-by assistance Gait: 
  
Base of Support: Center of gravity altered Speed/Tonya: Slow Gait Abnormalities: Decreased step clearance; Path deviations Distance (ft): 500 Feet (ft) Assistive Device: (none) Ambulation - Level of Assistance: Contact guard assistance;Stand-by assistance Interventions: Manual cues; Safety awareness training Body Structures Involved: 1. Eyes and Ears 2. Heart 3. Lungs 4. Muscles Body Functions Affected: 1. Sensory/Pain 2. Cardio 3. Respiratory 4. Neuromusculoskeletal 
5. Movement Related Activities and Participation Affected: 1. Mobility 2. Self Care 3. Domestic Life 4. Interpersonal Interactions and Relationships 5. Community, Social and [place] Number of elements that affect the Plan of Care: 4+: HIGH COMPLEXITY CLINICAL PRESENTATION:  
Presentation: Stable and uncomplicated: LOW COMPLEXITY CLINICAL DECISION MAKIN [facility] [address] [id number] AM-PAC 6 Clicks Basic Mobility Inpatient Short Form How much difficulty does the patient currently have. .. Unable A Lot A Little None 1. Turning over in bed (including adjusting bedclothes, sheets and blankets)? ? 1   ? 2   ? 3   ? 4  
2. Sitting down on and standing up from a chair with arms ( e.g., wheelchair, bedside commode, etc.)   ? 1   ? 2   ? 3   ? 4  
3. Moving from lying on back to sitting on the side of the bed?   ? 1   ? 2   ? 3   ? 4 How much help from another person does the patient currently need. .. Total A Lot A Little None 4. Moving to and from a bed to a chair (including a wheelchair)? ? 1   ? 2   ? 3   ? 4  
5. Need to walk in hospital room? ? 1   ? 2   ? 3   ? 4  
6. Climbing 3-5 steps with a railing?    ? 1   ? 2   ? 3   ? 4  
 © 2007, Trustees of [address] [place] [address] [address] [address], under license to [employer]. All rights reserved Score:  Initial: 17 Most Recent: X (Date: -- ) Interpretation of Tool:  Represents activities that are increasingly more difficult (i.e. Bed mobility, Transfers, Gait). Medical Necessity:    
· Patient demonstrates excellent ·  rehab potential due to higher previous functional level. Reason for Services/Other Comments: 
· Patient continues to require modification of therapeutic interventions to increase complexity of exercises · . Use of outcome tool(s) and clinical judgement create a POC that gives a: Clear prediction of patient's progress: LOW COMPLEXITY  
  
 
 
 
TREATMENT:  
(In addition to Assessment/Re-Assessment sessions the following treatments were rendered) Pre-treatment Symptoms/Complaints:  L flank pain. Pain: Initial:  
Pain Intensity 1: 0 Pain Location 1: Flank Pain Orientation 1: Left  Post Session:  3/10 Therapeutic Activity: (    10 minutes): Therapeutic activities including bed mobiity, Ambulation on level ground to improve mobility, strength and balance. Required minimal Manual cues; Safety awareness training to promote static and dynamic balance in standing. Braces/Orthotics/Lines/Etc:  
· O2 Device: Nasal cannula Treatment/Session Assessment:   
· Response to Treatment:  Patient ambulated 500' on room air with no increase in SOB this PM. · Interdisciplinary Collaboration:  
o Physical Therapist 
o Registered Nurse · After treatment position/precautions:  
o Bed/Chair-wheels locked 
o Bed in low position 
o Call light within reach 
o RN notified 
o Family at bedside 
o Sitting edge of bed · Compliance with Program/Exercises: Will assess as treatment progresses · Recommendations/Intent for next treatment session: \"Next visit will focus on advancements to more challenging activities and reduction in assistance provided\". Total Treatment Duration: PT Patient Time In/Time Out Time In: 4039 Time Out: 1346 Sami Lau, PT, DPT

## 2019-07-02 NOTE — PROGRESS NOTES
Ask to review the patients chart for MRI compatibility with a hx of ICD implant. The patient currently has Biotronic Inforia DR-T  Device serial [id number]  placed on 10/7/2014 by Dr Taya Painter. Biotronik MRI compatible right atrial lead, model Setrox S 53 cm and Linox SD 65/18 was placed at the RV apex as the ICD lead. Review by [address] [place] [address] also confirmed an MRI safe sytem. Call if any questions.  
 
Saranya Walters NP 
07/02/19 
10:16 AM
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
## 2019-07-02 NOTE — PROGRESS NOTES
Hospitalist Note Admit Date:  2019  4:57 AM  
Name:  Hellen Mabry Age:  82 y.o. 
:  1935 MRN:  [id number] PCP:  Jeremy Majano NP Treatment Team: Attending Provider: Jacob Black MD; Care Manager: Jeanette Murphy, RN; Consulting Provider: Lucas Glez MD; Consulting Provider: Kristopher Rios MD; Utilization Review: Mumtaz Pritchard RN; Physical Therapist: Reagan Santiago, PT, DPT 
 
HPI/Subjective:  
Mr. Nikita Lepe is an 81 y/o WM with ah /o CAD, sCHF (LVEF 30-35%) with ICD, chronic hypoxic resp failure, CKD 3, AFib on Eliquis who was evaluated in the ER on  for persistent left sided chest pain. Given IVFs for chest CTA which was negative for PE but did show a left hilar mass and extensive mediastinal LAD. Pulmonology and Heme/Onc consulted. : Resting in bed on RA, no complaints. Family present. Bronch/EBUS planned tomorrow afternoon. Good appetite. No SOB. Left sided chest pain is a little bit better. ROS otherwise negative. Objective:  
 
Patient Vitals for the past 24 hrs: 
 Temp Pulse Resp BP SpO2  
19 0824 97.8 °F (36.6 °C) 86 20 136/58 95 % 19 0723     96 % 19 0449 97.8 °F (36.6 °C) 70 20 96/56 96 % 19 0059 97.7 °F (36.5 °C) 73 20 95/53 98 % 199 97.6 °F (36.4 °C) 81 20 96/54 98 % 19 1914     97 % 19 1827 97.7 °F (36.5 °C) 81 20 92/64 98 % 19 1426 (!) 100.8 °F (38.2 °C) 79 20 95/61 100 % 19 1351     93 % 19 1350     97 % Oxygen Therapy O2 Sat (%): 95 % (19 0824) Pulse via Oximetry: 80 beats per minute (19 0723) O2 Device: Room air (19 08) O2 Flow Rate (L/min): 2 l/min (19) Intake/Output Summary (Last 24 hours) at 2019 1331 Last data filed at 2019 1252 Gross per 24 hour Intake 1020 ml Output 600 ml Net 420 ml  
   
*Note that automatically entered I/Os may not be accurate; dependent on patient compliance with collection and accurate  by [facility] [employer] [facility]. General:    Well nourished. Alert. CV:   RRR. No murmur, rub, or gallop. Lungs:   CTAB. No wheezing, rhonchi, or rales. Abdomen:   Soft, nontender, nondistended. Extremities: Warm and dry. No cyanosis or edema. Skin:     No rashes or jaundice. ICD left upper chest. 
Neuro:  No gross focal deficits Data Review: 
I have reviewed all labs, meds, and studies from the last 24 hours: 
 
Recent Results (from the past 24 hour(s)) METABOLIC PANEL, BASIC Collection Time: 07/02/19  4:41 AM  
Result Value Ref Range Sodium 143 136 - 145 mmol/L Potassium 3.5 3.5 - 5.1 mmol/L Chloride 106 98 - 107 mmol/L  
 CO2 28 21 - 32 mmol/L Anion gap 9 7 - 16 mmol/L Glucose 95 65 - 100 mg/dL BUN 24 (H) 8 - 23 MG/DL Creatinine 1.79 (H) 0.8 - 1.5 MG/DL  
 GFR est AA 47 (L) >60 ml/min/1.73m2 GFR est non-AA 39 (L) >60 ml/min/1.73m2 Calcium 9.0 8.3 - 10.4 MG/DL  
CBC WITH AUTOMATED DIFF Collection Time: 07/02/19  4:41 AM  
Result Value Ref Range WBC 5.1 4.3 - 11.1 K/uL  
 RBC 2.69 (L) 4.23 - 5.6 M/uL HGB 9.9 (L) 13.6 - 17.2 g/dL HCT 27.2 (L) 41.1 - 50.3 % .1 (H) 79.6 - 97.8 FL  
 MCH 36.8 (H) 26.1 - 32.9 PG  
 MCHC 36.4 (H) 31.4 - 35.0 g/dL  
 RDW 13.7 11.9 - 14.6 % PLATELET 791 490 - 747 K/uL MPV 10.6 9.4 - 12.3 FL ABSOLUTE NRBC 0.00 0.0 - 0.2 K/uL  
 DF AUTOMATED NEUTROPHILS 72 43 - 78 % LYMPHOCYTES 14 13 - 44 % MONOCYTES 11 4.0 - 12.0 % EOSINOPHILS 2 0.5 - 7.8 % BASOPHILS 1 0.0 - 2.0 % IMMATURE GRANULOCYTES 1 0.0 - 5.0 %  
 ABS. NEUTROPHILS 3.7 1.7 - 8.2 K/UL  
 ABS. LYMPHOCYTES 0.7 0.5 - 4.6 K/UL  
 ABS. MONOCYTES 0.5 0.1 - 1.3 K/UL  
 ABS. EOSINOPHILS 0.1 0.0 - 0.8 K/UL  
 ABS. BASOPHILS 0.1 0.0 - 0.2 K/UL  
 ABS. IMM. GRANS. 0.0 0.0 - 0.5 K/UL All Micro Results None No results found for this visit on 07/01/19. Current Meds: Current Facility-Administered Medications Medication Dose Route Frequency  albuterol-ipratropium (DUO-NEB) 2.5 MG-0.5 MG/3 ML  3 mL Nebulization Q4H PRN  
 aspirin delayed-release tablet 81 mg  81 mg Oral DAILY  cholecalciferol (VITAMIN D3) tablet 1,000 Units  1,000 Units Oral DAILY  cyanocobalamin tablet 1,000 mcg  1,000 mcg Oral DAILY  furosemide (LASIX) tablet 20 mg  20 mg Oral BID  lisinopril (PRINIVIL, ZESTRIL) tablet 2.5 mg  2.5 mg Oral DAILY  metoprolol succinate (TOPROL-XL) XL tablet 25 mg  25 mg Oral DAILY  pantoprazole (PROTONIX) tablet 40 mg  40 mg Oral ACB  rosuvastatin (CRESTOR) tablet 20 mg  20 mg Oral QHS  tamsulosin (FLOMAX) capsule 0.4 mg  0.4 mg Oral DAILY  tiotropium (SPIRIVA) inhalation capsule 18 mcg  1 Cap Inhalation DAILY  sodium chloride (NS) flush 5-40 mL  5-40 mL IntraVENous Q8H  
 sodium chloride (NS) flush 5-40 mL  5-40 mL IntraVENous PRN  
 acetaminophen (TYLENOL) tablet 650 mg  650 mg Oral Q8H  
 ondansetron (ZOFRAN) injection 4 mg  4 mg IntraVENous Q4H PRN  
 budesonide (PULMICORT) 500 mcg/2 ml nebulizer suspension  500 mcg Nebulization BID RT And  
 albuterol (PROVENTIL VENTOLIN) nebulizer solution 2.5 mg  2.5 mg Nebulization Q6HWA RT  
 lidocaine 4 % patch 1 Patch  1 Patch TransDERmal Q24H Other Studies (last 24 hours): No results found. Assessment and Plan:  
 
Hospital Problems as of 7/2/2019 Date Reviewed: 4/11/2019 Codes Class Noted - Resolved POA Rib pain ICD-10-CM: R07.81 ICD-9-CM: 786.50  7/1/2019 - Present Yes Anemia (Chronic) ICD-10-CM: D64.9 ICD-9-CM: 285.9  7/1/2019 - Present Yes Chronic respiratory failure with hypoxia (HCC) (Chronic) ICD-10-CM: J96.11 
ICD-9-CM: 518.83, 799.02  7/1/2019 - Present Yes Hilar adenopathy ICD-10-CM: R59.0 ICD-9-CM: 785.6  7/1/2019 - Present Yes Mediastinal adenopathy ICD-10-CM: R59.0 ICD-9-CM: 785.6  7/1/2019 - Present Unknown * (Principal) Chest pain ICD-10-CM: R07.9 ICD-9-CM: 786.50  6/24/2019 - Present Yes  
   
 CKD (chronic kidney disease) (Chronic) ICD-10-CM: N18.9 ICD-9-CM: 585.9  6/24/2019 - Present Yes Long term (current) use of anticoagulants (Chronic) ICD-10-CM: Z79.01 
ICD-9-CM: V58.61  4/23/2019 - Present Yes Atrial fibrillation (HCC) (Chronic) ICD-10-CM: I48.91 
ICD-9-CM: 427.31  3/22/2019 - Present Yes Hypokalemia ICD-10-CM: E87.6 ICD-9-CM: 276.8  6/17/2016 - Present Yes Benign prostatic hyperplasia, (Chronic) ICD-10-CM: N40.0 ICD-9-CM: 600.00  6/17/2016 - Present Yes Overview Signed 9/16/2016 10:55 AM by Leandra Carty  
  MRI abnormal 9/6/16 of concern for cancer. Hypertension (Chronic) ICD-10-CM: I10 
ICD-9-CM: 401.9  Unknown - Present Yes Overview Signed 10/19/2015  9:20 AM by Bharat Aguila  
  BP readings have been in target range. No complications noted from the medication presently being used. CAD (coronary artery disease), atherosclerotic of the native vessel (Chronic) ICD-10-CM: I25.10 ICD-9-CM: 414.00  Unknown - Present Yes Overview Addendum 10/19/2015  9:21 AM by Bharat Aguila ? MI 4-2014 Angina has improved with no episodes of chest pain since the last visit. Mild CAD by cath 6/2014. Chronic obstructive pulmonary disease (HCC) (Chronic) ICD-10-CM: J44.9 ICD-9-CM: 155  Unknown - Present Yes AICD (automatic cardioverter/defibrillator) present ICD-10-CM: Z95.810 ICD-9-CM: V45.02  Unknown - Present Yes Overview Signed 10/19/2015  9:23 AM by Bharat Aguila [employer] DDD ICD Chronic systolic CHF (congestive heart failure) (HCC) (Chronic) ICD-10-CM: A52.72 ICD-9-CM: 428.22, 428.0  10/7/2014 - Present Yes Overview Signed 10/19/2015  9:22 AM by Bharat Galvan Class I.  EF 35%. Plan: # Left hilar mass and mediastinal LAD 
 - ~120 pack year smoking history, quit a few months back - Pulm/oncology consulted, bronch/EBUS 7/3 
 - CT chest neg for PE # CAD 
 - LHC last month with non-obs CAD 
 - ASA, statin # COPD 
 - compensated # CKD 
 - stable. BMPs. # AFib 
 - Eliquis held pending bronch 
 - BB # Chronic sCHF 
 - euvolemic. Home meds. DC planning/Dispo: Likely home when able. Diet:  DIET CARDIAC 
DVT ppx: Ambulation.  
 
Signed: 
Fredi Salazar MD

## 2019-07-02 NOTE — PROGRESS NOTES
Nicolette Simmons Admission Date: 7/1/2019 Daily Progress Note: 7/2/2019 The patient's chart is reviewed and the patient is discussed with the staff. Emergent status not filed in the log. is an 84yoM with nonischemic cardiomyopathy (EF 25-30%), weight loss, 50pkyr smoking history, possible COPD, on home oxygen withl L-sided mediastinal 5.6 x 5.7cm mass with mediastinal/hilar adenopathy. Subjective:  
Oxygen saturation was 95% on room air. Has some left lateral chest wall pain which is mild. Current Facility-Administered Medications Medication Dose Route Frequency  albuterol-ipratropium (DUO-NEB) 2.5 MG-0.5 MG/3 ML  3 mL Nebulization Q4H PRN  
 aspirin delayed-release tablet 81 mg  81 mg Oral DAILY  cholecalciferol (VITAMIN D3) tablet 1,000 Units  1,000 Units Oral DAILY  cyanocobalamin tablet 1,000 mcg  1,000 mcg Oral DAILY  furosemide (LASIX) tablet 20 mg  20 mg Oral BID  lisinopril (PRINIVIL, ZESTRIL) tablet 2.5 mg  2.5 mg Oral DAILY  metoprolol succinate (TOPROL-XL) XL tablet 25 mg  25 mg Oral DAILY  pantoprazole (PROTONIX) tablet 40 mg  40 mg Oral ACB  rosuvastatin (CRESTOR) tablet 20 mg  20 mg Oral QHS  tamsulosin (FLOMAX) capsule 0.4 mg  0.4 mg Oral DAILY  tiotropium (SPIRIVA) inhalation capsule 18 mcg  1 Cap Inhalation DAILY  sodium chloride (NS) flush 5-40 mL  5-40 mL IntraVENous Q8H  
 sodium chloride (NS) flush 5-40 mL  5-40 mL IntraVENous PRN  
 acetaminophen (TYLENOL) tablet 650 mg  650 mg Oral Q8H  
 ondansetron (ZOFRAN) injection 4 mg  4 mg IntraVENous Q4H PRN  
 budesonide (PULMICORT) 500 mcg/2 ml nebulizer suspension  500 mcg Nebulization BID RT And  
 albuterol (PROVENTIL VENTOLIN) nebulizer solution 2.5 mg  2.5 mg Nebulization Q6HWA RT  
 lidocaine 4 % patch 1 Patch  1 Patch TransDERmal Q24H Review of Systems Constitutional: negative for fever, chills, sweats Cardiovascular: negative for chest pain, palpitations, syncope, edema Gastrointestinal:  negative for dysphagia, reflux, vomiting, diarrhea, abdominal pain, or melena Neurologic:  negative for focal weakness, numbness, headache Objective:  
 
Vitals:  
 07/02/19 0916 07/02/19 4074 07/02/19 9670 07/02/19 9465 BP: 95/53 96/56  136/58 Pulse: 73 70  86 Resp: 20 20  20 Temp: 97.7 °F (36.5 °C) 97.8 °F (36.6 °C)  97.8 °F (36.6 °C) SpO2: 98% 96% 96% 95% Weight:  138 lb (62.6 kg) Height:      
 
Intake and Output:  
06/30 1901 - 07/02 0700 In: 480 [P.O.:480] Out: 400 [Urine:400] 07/02 0701 - 07/02 1900 In: 240 [P.O.:240] Out: 200 [Urine:200] Physical Exam:  
Constitution:  the patient is well developed and in no acute distress EENMT:  Sclera clear, pupils equal, oral mucosa moist, no cervical adenopathy palpable Respiratory: decreased bilaterally, no crackles or wheezes Cardiovascular:  RRR without M,G,R 
Gastrointestinal: soft and non-tender; with positive bowel sounds. Musculoskeletal: warm without cyanosis. There is no lower extremity edema. Skin:  no jaundice or rashes, no wounds Neurologic: no gross neuro deficits Psychiatric:  alert and oriented x 3 CT 
 
 
 
 
LAB No results for input(s): GLUCPOC in the last 72 hours. No lab exists for component: [place] Recent Labs  
  07/02/19 0441 07/01/19 
7548 WBC 5.1 5.4 HGB 9.9* 9.9*  
HCT 27.2* 29.9*  
 170 Recent Labs  
  07/02/19 0441 07/01/19 
6281  141  
K 3.5 3.2*  
 105 CO2 28 27 GLU 95 96 BUN 24* 29* CREA 1.79* 1.66* MG  --  2.0  
CA 9.0 8.9 ALB  --  2.7* TBILI  --  0.4 ALT  --  21 SGOT  --  38* No results for input(s): PH, PCO2, PO2, HCO3, PHI, PCO2I, PO2I, HCO3I in the last 72 hours. No results for input(s): LCAD, LAC in the last 72 hours. Assessment:  (Medical Decision Making) Hospital Problems  Date Reviewed: 4/11/2019 Codes Class Noted POA Rib pain ICD-10-CM: R07.81 ICD-9-CM: 786.50  7/1/2019 Yes Anemia (Chronic) ICD-10-CM: D64.9 ICD-9-CM: 285.9  7/1/2019 Yes Chronic respiratory failure with hypoxia (HCC) (Chronic) ICD-10-CM: J96.11 
ICD-9-CM: 518.83, 799.02  7/1/2019 Yes Hilar adenopathy ICD-10-CM: R59.0 ICD-9-CM: 785.6  7/1/2019 Yes Mediastinal adenopathy ICD-10-CM: R59.0 ICD-9-CM: 785.6  7/1/2019 Unknown * (Principal) Chest pain ICD-10-CM: R07.9 ICD-9-CM: 786.50  6/24/2019 Yes CKD (chronic kidney disease) (Chronic) ICD-10-CM: N18.9 ICD-9-CM: 585.9  6/24/2019 Yes Long term (current) use of anticoagulants (Chronic) ICD-10-CM: Z79.01 
ICD-9-CM: V58.61  4/23/2019 Yes Atrial fibrillation (HCC) (Chronic) ICD-10-CM: I48.91 
ICD-9-CM: 427.31  3/22/2019 Yes Hypokalemia ICD-10-CM: E87.6 ICD-9-CM: 276.8  6/17/2016 Yes Benign prostatic hyperplasia, (Chronic) ICD-10-CM: N40.0 ICD-9-CM: 600.00  6/17/2016 Yes Overview Signed 9/16/2016 10:55 AM by Leandra Carty  
  MRI abnormal 9/6/16 of concern for cancer. Hypertension (Chronic) ICD-10-CM: I10 
ICD-9-CM: 401.9  Unknown Yes Overview Signed 10/19/2015  9:20 AM by Bharat Aguila  
  BP readings have been in target range. No complications noted from the medication presently being used. CAD (coronary artery disease), atherosclerotic of the native vessel (Chronic) ICD-10-CM: I25.10 ICD-9-CM: 414.00  Unknown Yes Overview Addendum 10/19/2015  9:21 AM by Bharat Aguila ? MI 4-2014 Angina has improved with no episodes of chest pain since the last visit. Mild CAD by cath 6/2014. Chronic obstructive pulmonary disease (HCC) (Chronic) ICD-10-CM: J44.9 ICD-9-CM: 094  Unknown Yes AICD (automatic cardioverter/defibrillator) present ICD-10-CM: Z95.810 ICD-9-CM: V45.02  Unknown Yes Overview Signed 10/19/2015  9:23 AM by Bharat Vasquez DDD ICD  
  
  
   
 Chronic systolic CHF (congestive heart failure) (HCC) (Chronic) ICD-10-CM: W97.53 ICD-9-CM: 428.22, 428.0  10/7/2014 Yes Overview Signed 10/19/2015  9:22 AM by Aydee Galvan Class I.  EF 35%. Plan:  (Medical Decision Making) --check ICD to see if MRI-safe. If cytology positive may need MRI brain 
--NPO after midnight for EBUS tomorrow --complete PFTs if possible today. --pleural effusion appears too small to tap on CT. 
--eliquis held. I don't see that it has been administered this admission (7/1) and needs to be held at least 2 days prior to procedure. More than 50% of the time documented was spent in face-to-face contact with the patient and in the care of the patient on the floor/unit where the patient is located.  
 
Manisha Martinez MD

## 2019-07-03 PROBLEM — C34.02 MALIGNANT NEOPLASM OF HILUS OF LEFT LUNG (HCC): Status: ACTIVE | Noted: 2019-01-01

## 2019-07-03 PROBLEM — R91.8 HILAR MASS: Status: ACTIVE | Noted: 2019-01-01

## 2019-07-03 NOTE — INTERVAL H&P NOTE
H&P Update: 
Lelia Kraus was seen and examined. History and physical has been reviewed. The patient has been examined.  There have been no significant clinical changes since the completion of the originally dated History and Physical.

## 2019-07-03 NOTE — PROGRESS NOTES
TRANSFER - OUT REPORT: 
 
Verbal report given to Jim Pavon RN(name) on Rojelio Mae  being transferred to [facility](unit) for routine post - op bronchoscopy and EBUS. Report consisted of patients Situation, Background, Assessment and  
Recommendations(SBAR). Information from the following report(s) SBAR, Procedure Summary and Recent Results was reviewed with the receiving nurse. Lines:  
Peripheral IV 07/01/19 Left Forearm (Active) Site Assessment Clean, dry, & intact 7/3/2019 12:05 PM  
Phlebitis Assessment 0 7/3/2019 12:05 PM  
Infiltration Assessment 0 7/3/2019 12:05 PM  
Dressing Status Clean, dry, & intact 7/3/2019 12:05 PM  
Dressing Type Tape;Transparent 7/3/2019 12:05 PM  
Hub Color/Line Status Patent; Flushed 7/3/2019  7:45 AM  
Action Taken Blood drawn 7/1/2019  5:05 AM  
 Opportunity for questions and clarification was provided. Patient transported with: 
 O2 @ 2 liters

## 2019-07-03 NOTE — PROGRESS NOTES
Bedside and Verbal shift change report given to Jessica Leon RN (oncoming nurse) by self Tristan Collado nurse). Report included the following information SBAR, Kardex, MAR and Recent Results.

## 2019-07-03 NOTE — PROCEDURES
PROCEDURE Bronchoscopy with endobronchial ultrasound guided fine needle aspiration of hilar/mediastinal lymph nodes and airway inspection. INDICATION Diagnosis of Mediastinal Lymphadenopathy/Staging of Lung Cancer IMAGING 
CT Chest 7/1/19 POST OP DIAGNOSIS: 
Station 11Rs and the L hilar mass itself were biopsied and were Positive for malignancy on TESSIE. Based on imaging and EBUS pt is at least a STAGE T3N3Mx (IIIC) non-small cell lung cancer. It is unknown if he has any distant sites of disease or if the RLL nodule is a contralateral met which would make him stage IV. ANESTHESIA Concious sedation with: Fentanyl 100 mcg IV; Versed 2 mg IV; Lidocaine 200 mg to tracheo-bronchial tree and vocal cords. AIRWAY INSPECTION After obtaining informed consent, using a bite block, an Olympus Q 180 video bronchoscope was  introduced into the trachea through the vocal cords without complications. RIGHT 
LOCATION NORM/ABNORMAL DESCRIPTION  
VOCAL CORDS NL   
TRACHEA NL   
PARKER NL   
RMSB NL   
RUL NL   
BI NL   
RML NL   
RLL NL   
SUP SEGM RLL NL   
MED BASAL NL   
ANTERIOR BASAL NL   
LATERAL BASAL NL   
POSTERIOR BASAL NL   
      
  
      LEFT 
LOCATION NORM/ABNORMAL DESCRIPTION  
LMSB ABNL Minimal possible endobronchial tumor invasion MARY ABNL Endobronchial tumor invasion LINGULA NL   
LLL NL   
SUPERIOR SEG LLL NL   
RIO-MEDIAL LLL NL   
LATERAL LLL NL   
POSTERIOR LLL NL   
 
 
     
EBUS After completing the airway inspection an Olympus  F EBUS bronchoscope was introduced into the trachea through the vocal chords without complication. The balloon was inflated with saline and a mediastinal inspection commenced: STATION SIZE IN CM  
11R inf No target 11R sup 1cm 10R No target 4R No target 2R No target 7 1.5cm 2L No target 4L 1.5cm 10L Mass 4cm 11L Mass 2cm After identifying targets the following samples were obtained: STATION PASS# LYMPHOCYTES ATYPIA GRANULOMA DIAGNOSIS  
11Rs 1    Malignant  
 2 blood 3 blood L hilar mass 1    Malignant 2 Cell block 3 Cell block 4 Cell block 5 Cell block 6 Cell block 7 Cell block The procedure was completed without complication and the patient tolerated the procedure well. EBL: <30cc Diagnosis: At least stage IIIC likely non small cell lung cancer. Plan: 
Follow up final pathology report. Heme/onc already on board. Will need MRI brain and PET scan to complete staging.   
 
Tegan Moore MD

## 2019-07-03 NOTE — PROGRESS NOTES
Hospitalist Note Admit Date:  2019  4:57 AM  
Name:  Ignacio Alexander Age:  82 y.o. 
:  1935 MRN:  [id number] PCP:  Marianela Nguyen NP Treatment Team: Attending Provider: Trevon Petit MD; Care Manager: Charu Llamas RN; Consulting Provider: Roselia Duval MD; Consulting Provider: Jody Pope MD; Utilization Review: Rich Conde RN; Physical Therapist: Shwetha Ontiveros, PT, DPT 
 
HPI/Subjective:  
Mr. Celia Mabry is an 81 y/o WM with ah /o CAD, sCHF (LVEF 30-35%) with ICD, chronic hypoxic resp failure, CKD 3, AFib on Eliquis who was evaluated in the ER on  for persistent left sided chest pain. Given IVFs for chest CTA which was negative for PE but did show a left hilar mass and extensive mediastinal LAD. Pulmonology and Heme/Onc consulted. 7/3: No change. Still on RA. No cough, SOB, chest pain or other complaints. Bronch this afternoon. Objective:  
 
Patient Vitals for the past 24 hrs: 
 Temp Pulse Resp BP SpO2  
19 97.4 °F (36.3 °C) 90 20 107/53 90 % 19 0909  78  118/58   
19 0713     96 % 19 0457 97.9 °F (36.6 °C) 82 18 106/64 98 % 19 0041 97.7 °F (36.5 °C) 76 18 101/62 97 % 19 2107 98.3 °F (36.8 °C) 80 20 93/50 91 % 19 1955     98 % 19 1930 99.9 °F (37.7 °C) 79 20 99/65 100 % 19 1400 95.3 °F (35.2 °C) 79 20 95/52 99 % Oxygen Therapy O2 Sat (%): 90 % (19) Pulse via Oximetry: 60 beats per minute (19) O2 Device: Room air (19) O2 Flow Rate (L/min): 2 l/min (19) Intake/Output Summary (Last 24 hours) at 7/3/2019 1110 Last data filed at 2019 1931 Gross per 24 hour Intake 930 ml Output 600 ml Net 330 ml  
   
*Note that automatically entered I/Os may not be accurate; dependent on patient compliance with collection and accurate  by techs. General:    Well nourished. Alert. CV: RRR. No murmur, rub, or gallop. Lungs:   CTAB. No wheezing, rhonchi, or rales. Abdomen:   Soft, nontender, nondistended. Extremities: Warm and dry. No cyanosis or edema. Skin:     No rashes or jaundice. ICD left upper chest. 
Neuro:  No gross focal deficits Data Review: 
I have reviewed all labs, meds, and studies from the last 24 hours: 
 
Recent Results (from the past 24 hour(s)) METABOLIC PANEL, BASIC Collection Time: 07/03/19  5:20 AM  
Result Value Ref Range Sodium 142 136 - 145 mmol/L Potassium 3.5 3.5 - 5.1 mmol/L Chloride 106 98 - 107 mmol/L  
 CO2 26 21 - 32 mmol/L Anion gap 10 7 - 16 mmol/L Glucose 101 (H) 65 - 100 mg/dL BUN 24 (H) 8 - 23 MG/DL Creatinine 1.69 (H) 0.8 - 1.5 MG/DL  
 GFR est AA 50 (L) >60 ml/min/1.73m2 GFR est non-AA 42 (L) >60 ml/min/1.73m2 Calcium 8.9 8.3 - 10.4 MG/DL  
CBC WITH AUTOMATED DIFF Collection Time: 07/03/19  5:20 AM  
Result Value Ref Range WBC 4.8 4.3 - 11.1 K/uL  
 RBC 3.70 (L) 4.23 - 5.6 M/uL  
 HGB 11.5 (L) 13.6 - 17.2 g/dL HCT 34.2 (L) 41.1 - 50.3 % MCV 92.4 79.6 - 97.8 FL  
 MCH 31.1 26.1 - 32.9 PG  
 MCHC 33.6 31.4 - 35.0 g/dL  
 RDW 13.8 11.9 - 14.6 % PLATELET 688 (L) 578 - 450 K/uL MPV 10.5 9.4 - 12.3 FL ABSOLUTE NRBC 0.00 0.0 - 0.2 K/uL  
 DF AUTOMATED NEUTROPHILS 74 43 - 78 % LYMPHOCYTES 13 13 - 44 % MONOCYTES 8 4.0 - 12.0 % EOSINOPHILS 4 0.5 - 7.8 % BASOPHILS 1 0.0 - 2.0 % IMMATURE GRANULOCYTES 1 0.0 - 5.0 %  
 ABS. NEUTROPHILS 3.5 1.7 - 8.2 K/UL  
 ABS. LYMPHOCYTES 0.6 0.5 - 4.6 K/UL  
 ABS. MONOCYTES 0.4 0.1 - 1.3 K/UL  
 ABS. EOSINOPHILS 0.2 0.0 - 0.8 K/UL  
 ABS. BASOPHILS 0.1 0.0 - 0.2 K/UL  
 ABS. IMM. GRANS. 0.0 0.0 - 0.5 K/UL All Micro Results None No results found for this visit on 07/01/19. Current Meds: 
Current Facility-Administered Medications Medication Dose Route Frequency  albuterol-ipratropium (DUO-NEB) 2.5 MG-0.5 MG/3 ML  3 mL Nebulization Q4H PRN  
 aspirin delayed-release tablet 81 mg  81 mg Oral DAILY  cholecalciferol (VITAMIN D3) tablet 1,000 Units  1,000 Units Oral DAILY  cyanocobalamin tablet 1,000 mcg  1,000 mcg Oral DAILY  furosemide (LASIX) tablet 20 mg  20 mg Oral BID  lisinopril (PRINIVIL, ZESTRIL) tablet 2.5 mg  2.5 mg Oral DAILY  metoprolol succinate (TOPROL-XL) XL tablet 25 mg  25 mg Oral DAILY  pantoprazole (PROTONIX) tablet 40 mg  40 mg Oral ACB  rosuvastatin (CRESTOR) tablet 20 mg  20 mg Oral QHS  tamsulosin (FLOMAX) capsule 0.4 mg  0.4 mg Oral DAILY  tiotropium (SPIRIVA) inhalation capsule 18 mcg  1 Cap Inhalation DAILY  sodium chloride (NS) flush 5-40 mL  5-40 mL IntraVENous Q8H  
 sodium chloride (NS) flush 5-40 mL  5-40 mL IntraVENous PRN  
 acetaminophen (TYLENOL) tablet 650 mg  650 mg Oral Q8H  
 ondansetron (ZOFRAN) injection 4 mg  4 mg IntraVENous Q4H PRN  
 budesonide (PULMICORT) 500 mcg/2 ml nebulizer suspension  500 mcg Nebulization BID RT And  
 albuterol (PROVENTIL VENTOLIN) nebulizer solution 2.5 mg  2.5 mg Nebulization Q6HWA RT  
 lidocaine 4 % patch 1 Patch  1 Patch TransDERmal Q24H Other Studies (last 24 hours): No results found. Assessment and Plan:  
 
Hospital Problems as of 7/3/2019 Date Reviewed: 4/11/2019 Codes Class Noted - Resolved POA Rib pain ICD-10-CM: R07.81 ICD-9-CM: 786.50  7/1/2019 - Present Yes Anemia (Chronic) ICD-10-CM: D64.9 ICD-9-CM: 285.9  7/1/2019 - Present Yes Chronic respiratory failure with hypoxia (HCC) (Chronic) ICD-10-CM: J96.11 
ICD-9-CM: 518.83, 799.02  7/1/2019 - Present Yes Hilar adenopathy ICD-10-CM: R59.0 ICD-9-CM: 785.6  7/1/2019 - Present Yes Mediastinal adenopathy ICD-10-CM: R59.0 ICD-9-CM: 785.6  7/1/2019 - Present Unknown * (Principal) Chest pain ICD-10-CM: R07.9 ICD-9-CM: 786.50  6/24/2019 - Present Yes CKD (chronic kidney disease) (Chronic) ICD-10-CM: N18.9 ICD-9-CM: 585.9  6/24/2019 - Present Yes Long term (current) use of anticoagulants (Chronic) ICD-10-CM: Z79.01 
ICD-9-CM: V58.61  4/23/2019 - Present Yes Atrial fibrillation (HCC) (Chronic) ICD-10-CM: I48.91 
ICD-9-CM: 427.31  3/22/2019 - Present Yes Hypokalemia ICD-10-CM: E87.6 ICD-9-CM: 276.8  6/17/2016 - Present Yes Benign prostatic hyperplasia, (Chronic) ICD-10-CM: N40.0 ICD-9-CM: 600.00  6/17/2016 - Present Yes Overview Signed 9/16/2016 10:55 AM by Emerson Rubinstein  
  MRI abnormal 9/6/16 of concern for cancer. Hypertension (Chronic) ICD-10-CM: I10 
ICD-9-CM: 401.9  Unknown - Present Yes Overview Signed 10/19/2015  9:20 AM by Rachna Malik  
  BP readings have been in target range. No complications noted from the medication presently being used. CAD (coronary artery disease), atherosclerotic of the native vessel (Chronic) ICD-10-CM: I25.10 ICD-9-CM: 414.00  Unknown - Present Yes Overview Addendum 10/19/2015  9:21 AM by Rachna Malik ? MI 4-2014 Angina has improved with no episodes of chest pain since the last visit. Mild CAD by cath 6/2014. Chronic obstructive pulmonary disease (HCC) (Chronic) ICD-10-CM: J44.9 ICD-9-CM: 523  Unknown - Present Yes AICD (automatic cardioverter/defibrillator) present ICD-10-CM: Z95.810 ICD-9-CM: V45.02  Unknown - Present Yes Overview Signed 10/19/2015  9:23 AM by Rachna Malik [employer] DDD ICD Chronic systolic CHF (congestive heart failure) (HCC) (Chronic) ICD-10-CM: P31.36 ICD-9-CM: 428.22, 428.0  10/7/2014 - Present Yes Overview Signed 10/19/2015  9:22 AM by Rachna Malik [place] Class I.  EF 35%. Plan: # Left hilar mass and mediastinal LAD - ~120 pack year smoking history, quit a few months back - Pulm/oncology consulted - Bronch this afternoon - CT chest neg for PE 
  
# CAD 
            - LHC last month with non-obs CAD 
            - ASA, statin 
  
# COPD 
            - compensated 
  
# CKD 
            - stable. 
  
# AFib 
            - Eliquis held for bronch today - BB 
 
DC planning/Dispo: Bronch today, home after or tomorrow if ok with pulm. Diet:  DIET NPO 
DVT ppx: ambulation Signed: 
Oskar Gary MD

## 2019-07-03 NOTE — PROGRESS NOTES
Patient has an [employer] ICD pacemaker and a attending EP lab / IR nurse and pacemaker representative will be required before MRI can be completed; earliest time to be completed will be Friday, july 5

## 2019-07-03 NOTE — PROGRESS NOTES
[facility] Inpatient Hematology / Oncology Progress NoteAdmission Date: 2019  4:57 AM 
Reason for Admission/Hospital Course: Chest pain [R07.9] Chest pain [R07.9] 24 Hour Events: EBUS scheduled for today at 2 pm 
Doing well, chest pain has decreased ROS: 
Constitutional: Positive for weight loss; negative for fever, chills, weakness, malaise, fatigue. CV: Positive for chest pain (resolving); negative for palpitations, edema. Respiratory: Positive for chronic productive cough, home O2; negative for dyspnea, wheezing. GI: Negative for nausea, abdominal pain, diarrhea. 10 point review of systems is otherwise negative with the exception of the elements mentioned above in the HPI. Allergies Allergen Reactions  Lortab [Hydrocodone-Acetaminophen] Other (comments) Makes him sick. 1/2 tab doesn't make him sick  Lortab [Hydrocodone-Acetaminophen] Other (comments) Pt states that a whole pill makes him feel sick. 1/2 tab does not.  Other Medication Other (comments) He has a hx of a prolonged QT interval, so precaution with meds that affect that. OBJECTIVE: 
Patient Vitals for the past 8 hrs: 
 BP Temp Pulse Resp SpO2 Weight 19 0915 107/53 97.4 °F (36.3 °C) 90 20 90 %   
19 0909 118/58  78     
19 0713     96 %   
19 0457 106/64 97.9 °F (36.6 °C) 82 18 98 % 139 lb 3.2 oz (63.1 kg) Temp (24hrs), Av.8 °F (36.6 °C), Min:95.3 °F (35.2 °C), Max:99.9 °F (37.7 °C) No intake/output data recorded. Physical Exam: 
Constitutional: Thin male in no acute distress, sitting comfortably in the hospital bed. HEENT: Normocephalic and atraumatic. Oropharynx is clear, mucous membranes are moist.  Neck supple. Lymph node Deferred Skin Warm and dry. No bruising and no rash noted. No erythema. No pallor.    
Respiratory Lungs are clear today to auscultation bilaterally without wheezes, rales or rhonchi, normal air exchange without accessory muscle use. CVS Normal rate, regular rhythm and normal S1 and S2. No murmurs, gallops, or rubs. Abdomen Soft, nontender and nondistended, normoactive bowel sounds. Neuro Grossly nonfocal with no obvious sensory or motor deficits. MSK Normal range of motion in general.  No edema and no tenderness. Psych Appropriate mood and affect. Labs: 
   
Recent Labs  
  07/03/19 
0520 07/02/19 
0441 07/01/19 
7910 WBC 4.8 5.1 5.4  
RBC 3.70* 2.69* 3.02* HGB 11.5* 9.9* 9.9*  
HCT 34.2* 27.2* 29.9*  
MCV 92.4 101.1* 99.0*  
MCH 31.1 36.8* 32.8 MCHC 33.6 36.4* 33.1 RDW 13.8 13.7 15.1*  
* 165 170 GRANS 74 72 70 LYMPH 13 14 17 MONOS 8 11 9 EOS 4 2 4 BASOS 1 1 1 IG 1 1 1 DF AUTOMATED AUTOMATED AUTOMATED ANEU 3.5 3.7 3.8 ABL 0.6 0.7 0.9 ABM 0.4 0.5 0.5 CHAITANYA 0.2 0.1 0.2 ABB 0.1 0.1 0.1 AIG 0.0 0.0 0.0 Recent Labs  
  07/03/19 
0520 07/02/19 0441 07/01/19 
6501  143 141  
K 3.5 3.5 3.2*  
 106 105 CO2 26 28 27 AGAP 10 9 9 * 95 96 BUN 24* 24* 29* CREA 1.69* 1.79* 1.66* GFRAA 50* 47* 51* GFRNA 42* 39* 42*  
CA 8.9 9.0 8.9 SGOT  --   --  38* AP  --   --  118 TP  --   --  6.7 ALB  --   --  2.7*  
GLOB  --   --  4.0* AGRAT  --   --  0.7* MG  --   --  2.0 Imaging: 
CT CHEST W CONT [[id number]] Collected: 07/01/19 1027 Order Status: Completed Updated: 07/01/19 1034 Narrative:    
CT chest for pulmonary embolus done with  IV contrast July 1, 2019 Reference exam: Length film same day Indication: Left-sided chest pain and elevated troponin Technique: Radiation dose reduction techniques were used for this study using 
one or more of the following: automated exposure control; adjustment of mA 
and/or kV (according to patient size);  iterative reconstruction. Thin section 
axial images were taken through the chest using 100 cc Isovue-370 dynamic contrast enhancement.   
 
Findings: There is no filling defect seen to suggest a pulmonary embolus. However, there is extensive mediastinal adenopathy, with a anterior mediastinal 
node measuring up to 1.8 x 1.1 cm, aortopulmonary window nodes up to 1.5 x 1 cm. Azygous node measures 1.4 x 1 cm, with right hilar nodes up to 2.3 x 1.4 cm. Large mass is seen in the left hilum extending into the supra and infrahilar 
regions and into the mediastinum along the pulmonary artery, measuring up to 5.6 
x 5.9 cm, the adrenals are only partially visualized but no masses are seen. Respiratory motion decreases sensitivity for small lesions, there are 
emphysematous changes noted in both lungs with dependent edema seen, with what 
may be a pulmonary nodule or mass posteromedially pleural-based in the right 
lung base measuring up to 1.9 x 1.3 cm. Small left-sided effusion is seen. Impression:    
IMPRESSION: Probable malignancy in the left hilum and mediastinum as described 
above with some cyst anterior mediastinal nodes, small left effusion. What may 
be an enhancing mass pleural-based is seen in the right lung base medially although could be focal atelectasis. XR CHEST PORT [[id number]] Collected: 07/01/19 9580 Order Status: Completed Updated: 07/01/19 9270 Narrative:    
EXAM: Chest x-ray. INDICATION: Chest pain. COMPARISON: June 24, 2019. TECHNIQUE: Single frontal view. FINDINGS: The lungs are clear. Again noted is cardiomegaly and a left chest wall 
defibrillator. No pneumothorax, vascular congestion or pleural effusion is seen. Impression:    
IMPRESSION:  
1. No acute process. 2. Cardiomegaly and an indwelling defibrillator. Medications: 
Current Facility-Administered Medications Medication Dose Route Frequency  albuterol-ipratropium (DUO-NEB) 2.5 MG-0.5 MG/3 ML  3 mL Nebulization Q4H PRN  
 aspirin delayed-release tablet 81 mg  81 mg Oral DAILY  cholecalciferol (VITAMIN D3) tablet 1,000 Units  1,000 Units Oral DAILY  cyanocobalamin tablet 1,000 mcg  1,000 mcg Oral DAILY  furosemide (LASIX) tablet 20 mg  20 mg Oral BID  lisinopril (PRINIVIL, ZESTRIL) tablet 2.5 mg  2.5 mg Oral DAILY  metoprolol succinate (TOPROL-XL) XL tablet 25 mg  25 mg Oral DAILY  pantoprazole (PROTONIX) tablet 40 mg  40 mg Oral ACB  rosuvastatin (CRESTOR) tablet 20 mg  20 mg Oral QHS  tamsulosin (FLOMAX) capsule 0.4 mg  0.4 mg Oral DAILY  tiotropium (SPIRIVA) inhalation capsule 18 mcg  1 Cap Inhalation DAILY  sodium chloride (NS) flush 5-40 mL  5-40 mL IntraVENous Q8H  
 sodium chloride (NS) flush 5-40 mL  5-40 mL IntraVENous PRN  
 acetaminophen (TYLENOL) tablet 650 mg  650 mg Oral Q8H  
 ondansetron (ZOFRAN) injection 4 mg  4 mg IntraVENous Q4H PRN  
 budesonide (PULMICORT) 500 mcg/2 ml nebulizer suspension  500 mcg Nebulization BID RT And  
 albuterol (PROVENTIL VENTOLIN) nebulizer solution 2.5 mg  2.5 mg Nebulization Q6HWA RT  
 lidocaine 4 % patch 1 Patch  1 Patch TransDERmal Q24H  
 
 
ASSESSMENT: 
 
Problem List  Date Reviewed: 4/11/2019 Codes Class Noted Rib pain ICD-10-CM: R07.81 ICD-9-CM: 786.50  7/1/2019 Anemia (Chronic) ICD-10-CM: D64.9 ICD-9-CM: 285.9  7/1/2019 Chronic respiratory failure with hypoxia (HCC) (Chronic) ICD-10-CM: J96.11 
ICD-9-CM: 518.83, 799.02  7/1/2019 Hilar adenopathy ICD-10-CM: R59.0 ICD-9-CM: 785.6  7/1/2019 Mediastinal adenopathy ICD-10-CM: R59.0 ICD-9-CM: 785.6  7/1/2019 * (Principal) Chest pain ICD-10-CM: R07.9 ICD-9-CM: 786.50  6/24/2019 CKD (chronic kidney disease) (Chronic) ICD-10-CM: N18.9 ICD-9-CM: 585.9  6/24/2019 History of DVT (deep vein thrombosis) (Chronic) ICD-10-CM: G22.309 ICD-9-CM: V12.51  6/24/2019 Long term (current) use of anticoagulants (Chronic) ICD-10-CM: Z79.01 
ICD-9-CM: V58.61  4/23/2019 Atrial fibrillation (HCC) (Chronic) ICD-10-CM: I48.91 
ICD-9-CM: 427.31  3/22/2019 Thrombus of left atrial appendage ICD-10-CM: I51.3 ICD-9-CM: 429.89  3/22/2019 Prostate cancer [facility]) ICD-10-CM: V85 ICD-9-CM: 185  11/1/2016 Overview Addendum 9/18/2017  8:43 PM by Kristi Ureña Last Assessment & Plan:  
Despite the patient's age and his substantial comorbidities, nontreatment is not a very attractive option for him. His adjusted PSA off Proscar would be greater than 20 and he has Wil[place] 9 disease as his highest Willy score. This would be considered very high risk prostate carcinoma. His staging workup shows no evidence of metastatic disease. His underlying LUTS already on Proscar and Flomax. Radiation therapy will present some challenges in terms of his urinary function, but I believe we can get him through it with acceptable toxicity. Surgery is not a viable option. I believe his 2 reasonable choices would be androgen deprivation alone, which of course would not be curative option that may provide disease control for a substantial period of time. Unfortunately, with his [place] 8 and 9 histology he may become androgen independent very quickly. Second option would be 2 years of androgen suppression in combination with definitive intensity modulated, image guided radiation therapy. We discussed that extensively today including both the short-term and long-term toxicities. It offer some a chance for cure, and even better chance for disease control which, given his health, could easily exceed his expected lifespan. I believe that his life expectancy, given his comorbidities, is in the 3 to 5 year range. Therefore, a definitive course of radiation therapy and androgen deprivation would be considered curative. After prolonged and thorough discussion he would like to proceed with the second option.  We will arrange Casodex and Lupron with radiation therapy to start right around the first of the year. We'll plan 8123-4621 centigrays at 200 cGy per day using intensity modulated, image guided technique. Last Assessment & Plan:  
Despite the patient's age and his substantial comorbidities, nontreatment is not a very attractive option for him. His adjusted PSA off Proscar would be greater than 20 and he has [place] 9 disease as his highest [place] score. This would be considered very high risk prostate carcinoma. His staging workup shows no evidence of metastatic disease. His underlying LUTS already on Proscar and Flomax. Radiation therapy will present some challenges in terms of his urinary function, but I believe we can get him through it with acceptable toxicity. Surgery is not a viable option. I believe his 2 reasonable choices would be androgen deprivation alone, which of course would not be curative option that may provide disease control for a substantial period of time. Unfortunately, with his [place] 8 and 9 histology he may become androgen independent very quickly. Second option would be 2 years of androgen suppression in combination with definitive intensity modulated, image guided radiation therapy. We discussed that extensively today including both the short-term and long-term toxicities. It offer some a chance for cure, and even better chance for disease control which, given his health, could easily exceed his expected lifespan. I believe that his life expectancy, given his comorbidities, is in the 3 to 5 year range. Therefore, a definitive course of radiation therapy and androgen deprivation would be considered curative. After prolonged and thorough discussion he would like to proceed with the second option. We will arrange Casodex and Lupron with radiation therapy to start right around the first of the year. We'll plan 5922-6799 centigrays at 200 cGy per day using intensity modulated, image guided technique. Last Assessment & Plan: Despite the patient's age and his substantial comorbidities, nontreatment is not a very attractive option for him. His adjusted PSA off Proscar would be greater than 20 and he has [place] 9 disease as his highest [place] score. This would be considered very high risk prostate carcinoma. His staging workup shows no evidence of metastatic disease. His underlying LUTS already on Proscar and Flomax. Radiation therapy will present some challenges in terms of his urinary function, but I believe we can get him through it with acceptable toxicity. Surgery is not a viable option. I believe his 2 reasonable choices would be androgen deprivation alone, which of course would not be curative option that may provide disease control for a substantial period of time. Unfortunately, with his [place] 8 and 9 histology he may become androgen independent very quickly. Second option would be 2 years of androgen suppression in combination with definitive intensity modulated, image guided radiation therapy. We discussed that extensively today including both the short-term and long-term toxicities. It offer some a chance for cure, and even better chance for disease control which, given his health, could easily exceed his expected lifespan. I believe that his life expectancy, given his comorbidities, is in the 3 to 5 year range. Therefore, a definitive course of radiation therapy and androgen deprivation would be considered curative. After prolonged and thorough discussion he would like to proceed with the second option. We will arrange Casodex and Lupron with radiation therapy to start right around the first of the year. We'll plan 3687-7652 centigrays at 200 cGy per day using intensity modulated, image guided technique. OA (osteoarthritis) of hip (Chronic) ICD-10-CM: M16.9 ICD-9-CM: 715.95  9/16/2016 Overview Addendum 9/16/2016 10:57 AM by Deejay De La Cruz Right hip, advanced on prostate MRI 9/2016 Back pain ICD-10-CM: M54.9 ICD-9-CM: 724.5  6/17/2016 Hernia, inguinal ICD-10-CM: K40.90 ICD-9-CM: 550.90  6/17/2016 Overview Addendum 9/16/2016 10:56 AM by Laurieon Rajeev Bilateral on MRI 9/6/16 Hypokalemia ICD-10-CM: E87.6 ICD-9-CM: 276.8  6/17/2016 Benign prostatic hyperplasia, (Chronic) ICD-10-CM: N40.0 ICD-9-CM: 600.00  6/17/2016 Overview Signed 9/16/2016 10:55 AM by Laurieon Rajeev  
  MRI abnormal 9/6/16 of concern for cancer. CRI (chronic renal insufficiency) (Chronic) ICD-10-CM: N18.9 ICD-9-CM: 457. 9  Unknown Hypertension (Chronic) ICD-10-CM: I10 
ICD-9-CM: 401.9  Unknown Overview Signed 10/19/2015  9:20 AM by Juan House  
  BP readings have been in target range. No complications noted from the medication presently being used. Arthritis (Chronic) ICD-10-CM: M19.90 ICD-9-CM: 716.90  Unknown GERD (gastroesophageal reflux disease) (Chronic) ICD-10-CM: K21.9 ICD-9-CM: 530.81  Unknown CAD (coronary artery disease), atherosclerotic of the native vessel (Chronic) ICD-10-CM: I25.10 ICD-9-CM: 414.00  Unknown Overview Addendum 10/19/2015  9:21 AM by Juan House ? [place] 4-2014 Angina has improved with no episodes of chest pain since the last visit. Mild CAD by cath 6/2014. Chronic obstructive pulmonary disease (HCC) (Chronic) ICD-10-CM: J44.9 ICD-9-CM: 496  Unknown History of sinoatrial node dysfunction (Chronic) ICD-10-CM: Z86.79 
ICD-9-CM: V12.59  Unknown Hyperlipidemia (Chronic) ICD-10-CM: C75.8 ICD-9-CM: 272.4  10/19/2015 Overview Signed 10/19/2015  9:20 AM by Juan House Stable. Medication well tolerated. AICD (automatic cardioverter/defibrillator) present ICD-10-CM: Z95.810 ICD-9-CM: V45.02  Unknown Overview Signed 10/19/2015  9:23 AM by Juan House   [facility] DDD ICD  
  
  
   
 Chronic systolic CHF (congestive heart failure) (HCC) (Chronic) ICD-10-CM: X86.00 ICD-9-CM: 428.22, 428.0  10/7/2014 Overview Signed 10/19/2015  9:22 AM by Carlitos Galvan Class I.  EF 35%. Mr. Geronimo Herrera is an 82year old male admitted on 7/1/2019 with left sided chest pain. He was in the hospital recently and had a heart cath performed on 6/24, which showed CAD and LV dysfunction. Last echo 6/25 showed an EF of 25-30%. He has a PMH of prostate cancer, urinary retention, CAD, CHF, chronic respiratory failure with home O2 of 2 L NC, COPD, A fib (on Eliquis) and CKD 3. He quit smoking about 4 months ago but has a 50 year history of smoking. He has a chronic productive cough and uses nebulizers at home. He has prostate cancer which has been treated with hydrogel, goal seed markers, and Lupron. Per urolgoy note from June OV, last PSA 0.04 in February 2019, stable. Chest x-ray completed 7/1 showed no acute process. CT scan of the chest completed on 7/1 showed a probable malignancy of the left hilum and mediastinum, with some anterior mediastinal nodes. We were consulted to evaluate the results of the CT scan and possible malignancy. PLAN: 
Diffuse hilar and mediastinal adenoapthy 
-7/2 outpatient PET scan scheduled for 7/16, pulmonary following and planning for EBUS Wednesday afternoon 
-7/3 EBUS scheduled for today at 2 pm  
  
Left sided chest pain 
-7/2 Scheduled tylenol and Lidocaine patches 
-7/3 chest pain improved greatly 
  
COPD/Chronic respiratory failure 
-7/2 On 2 L NC, continue nebulizer treatments  
-7/3 On room air 
  
A fib 
-7/2 Eliquis on hold for possible EBUS, continues on Metoprolol 
  
CHF 
-7/2 Continues on metoprolol, lasix, and lisinopril 
  
CAD 
-7/2 On ASA, Crestor 
  
CKD3 
-7/2 Slightly increased Cr to 1.79 (1.66), K 3.5 
-7/3 Cr decreased to 1.69, K 3.5 
  
Anemia 
-7/2 Stable at 9.9 today 
  
History of prostate ca/urinary retention -7/2 on Flomax, last PSA stable at 0.04 February 2019 (per urology note), voided 400 of urine yesterday 
  
 
Okay from our standpoint if patient goes home after EBUS today. Will f/u results outpatient. PET scan scheduled for 7/16. Will need a f/u with Dr. Jolie Tan about 1 week after discharge--request placed for appointment today. 
  
Nothing further to add from oncology standpoint while inpatient. We will sign off. Please contact us for further questions as needed. Thank you for allowing us to participate in the care of Mr. Isiah Osman. Mayito Conde NP [facility] Oncology [address] [place] [address] Office : [phone] Fax : [phone] Attending Addendum: 
I have personally performed a face to face diagnostic evaluation on this patient. I have reviewed and agree with the care plan as documented by Mayito Conde, N.HERNANDEZ. My findings are as follows:  He has mediastinal adenopathy, appears weak, heart rate regular without murmurs, abdomen is non-tender, bowel sounds are positive, we will arrange for him to undergo a PET scan as an outpatient and follow-up with Dr. Jolie Tan in clinic. Shazia Cowart MD 
 
 
[facility] Hematology/Oncology [address] [place] [address] Office : [phone] Fax : [phone]

## 2019-07-03 NOTE — PROGRESS NOTES
Reagan Deleon Admission Date: 7/1/2019 Daily Progress Note: 7/3/2019 The patient's chart is reviewed and the patient is discussed with the staff. This  is an 82yoM with nonischemic cardiomyopathy (EF 25-30%), weight loss, 50pkyr smoking history, possible COPD, on home oxygen withl L-sided mediastinal 5.6 x 5.7cm mass with mediastinal/hilar adenopathy. Subjective:  
Awaiting EBUS today at 2 PM. Current Facility-Administered Medications Medication Dose Route Frequency  albuterol-ipratropium (DUO-NEB) 2.5 MG-0.5 MG/3 ML  3 mL Nebulization Q4H PRN  
 aspirin delayed-release tablet 81 mg  81 mg Oral DAILY  cholecalciferol (VITAMIN D3) tablet 1,000 Units  1,000 Units Oral DAILY  cyanocobalamin tablet 1,000 mcg  1,000 mcg Oral DAILY  furosemide (LASIX) tablet 20 mg  20 mg Oral BID  lisinopril (PRINIVIL, ZESTRIL) tablet 2.5 mg  2.5 mg Oral DAILY  metoprolol succinate (TOPROL-XL) XL tablet 25 mg  25 mg Oral DAILY  pantoprazole (PROTONIX) tablet 40 mg  40 mg Oral ACB  rosuvastatin (CRESTOR) tablet 20 mg  20 mg Oral QHS  tamsulosin (FLOMAX) capsule 0.4 mg  0.4 mg Oral DAILY  tiotropium (SPIRIVA) inhalation capsule 18 mcg  1 Cap Inhalation DAILY  sodium chloride (NS) flush 5-40 mL  5-40 mL IntraVENous Q8H  
 sodium chloride (NS) flush 5-40 mL  5-40 mL IntraVENous PRN  
 acetaminophen (TYLENOL) tablet 650 mg  650 mg Oral Q8H  
 ondansetron (ZOFRAN) injection 4 mg  4 mg IntraVENous Q4H PRN  
 budesonide (PULMICORT) 500 mcg/2 ml nebulizer suspension  500 mcg Nebulization BID RT And  
 albuterol (PROVENTIL VENTOLIN) nebulizer solution 2.5 mg  2.5 mg Nebulization Q6HWA RT  
 lidocaine 4 % patch 1 Patch  1 Patch TransDERmal Q24H Review of Systems Constitutional: negative for fever, chills, sweats Cardiovascular: negative for chest pain, palpitations, syncope, edema Gastrointestinal:  negative for dysphagia, reflux, vomiting, diarrhea, abdominal pain, or melena Neurologic:  negative for focal weakness, numbness, headache Objective:  
 
Vitals:  
 07/03/19 7952 07/03/19 1012 07/03/19 1613 07/03/19 0915 BP: 106/64  118/58 107/53 Pulse: 82  78 90 Resp: 18   20 Temp: 97.9 °F (36.6 °C)   97.4 °F (36.3 °C) SpO2: 98% 96%  90% Weight: 139 lb 3.2 oz (63.1 kg) Height:      
 
Intake and Output:  
07/01 1901 - 07/03 0700 In: 9040 [P.O.:1170] Out: 800 [Urine:800] No intake/output data recorded. Physical Exam:  
Constitution:  the patient is well developed and in no acute distress on RA 
EENMT:  Sclera clear, pupils equal, oral mucosa moist, no cervical adenopathy palpable Respiratory: decreased bilaterally, no crackles or wheezes Cardiovascular:  RRR without M,G,R 
Gastrointestinal: soft and non-tender; with positive bowel sounds. Musculoskeletal: warm without cyanosis. There is no lower extremity edema. Skin:  no jaundice or rashes, no wounds Neurologic: no gross neuro deficits Psychiatric:  alert and oriented x 3 CT 
 
 
 
 
LAB No results for input(s): GLUCPOC in the last 72 hours. No lab exists for component: [place] Recent Labs  
  07/03/19 
0520 07/02/19 
0441 07/01/19 
2788 WBC 4.8 5.1 5.4 HGB 11.5* 9.9* 9.9*  
HCT 34.2* 27.2* 29.9*  
* 165 170 Recent Labs  
  07/03/19 
0520 07/02/19 
0441 07/01/19 
4291  143 141  
K 3.5 3.5 3.2*  
 106 105 CO2 26 28 27 * 95 96 BUN 24* 24* 29* CREA 1.69* 1.79* 1.66* MG  --   --  2.0  
CA 8.9 9.0 8.9 ALB  --   --  2.7* TBILI  --   --  0.4 ALT  --   --  21 SGOT  --   --  38* No results for input(s): PH, PCO2, PO2, HCO3, PHI, PCO2I, PO2I, HCO3I in the last 72 hours. No results for input(s): LCAD, LAC in the last 72 hours. Assessment:  (Medical Decision Making) Hospital Problems  Date Reviewed: 4/11/2019 Codes Class Noted POA Rib pain ICD-10-CM: R07.81 ICD-9-CM: 786.50  7/1/2019 Yes Anemia (Chronic) ICD-10-CM: D64.9 ICD-9-CM: 285.9  7/1/2019 Yes Chronic respiratory failure with hypoxia (HCC) (Chronic) ICD-10-CM: J96.11 
ICD-9-CM: 518.83, 799.02  7/1/2019 Yes Hilar adenopathy ICD-10-CM: R59.0 ICD-9-CM: 785.6  7/1/2019 Yes Mediastinal adenopathy ICD-10-CM: R59.0 ICD-9-CM: 785.6  7/1/2019 Unknown * (Principal) Chest pain ICD-10-CM: R07.9 ICD-9-CM: 786.50  6/24/2019 Yes CKD (chronic kidney disease) (Chronic) ICD-10-CM: N18.9 ICD-9-CM: 585.9  6/24/2019 Yes Long term (current) use of anticoagulants (Chronic) ICD-10-CM: Z79.01 
ICD-9-CM: V58.61  4/23/2019 Yes Atrial fibrillation (HCC) (Chronic) ICD-10-CM: I48.91 
ICD-9-CM: 427.31  3/22/2019 Yes Hypokalemia ICD-10-CM: E87.6 ICD-9-CM: 276.8  6/17/2016 Yes Benign prostatic hyperplasia, (Chronic) ICD-10-CM: N40.0 ICD-9-CM: 600.00  6/17/2016 Yes Overview Signed 9/16/2016 10:55 AM by [facility]  
  MRI abnormal 9/6/16 of concern for cancer. Hypertension (Chronic) ICD-10-CM: I10 
ICD-9-CM: 401.9  Unknown Yes Overview Signed 10/19/2015  9:20 AM by [facility]  
  BP readings have been in target range. No complications noted from the medication presently being used. CAD (coronary artery disease), atherosclerotic of the native vessel (Chronic) ICD-10-CM: I25.10 ICD-9-CM: 414.00  Unknown Yes Overview Addendum 10/19/2015  9:21 AM by [facility] Level ? MI 4-2014 Angina has improved with no episodes of chest pain since the last visit. Mild CAD by cath 6/2014. Chronic obstructive pulmonary disease (HCC) (Chronic) ICD-10-CM: J44.9 ICD-9-CM: 282  Unknown Yes AICD (automatic cardioverter/defibrillator) present ICD-10-CM: Z95.810 ICD-9-CM: V45.02  Unknown Yes Overview Signed 10/19/2015  9:23 AM by [facility] [place] [facility] [employer] DDD ICD  Chronic systolic CHF (congestive heart failure) (HCC) (Chronic) ICD-10-CM: C07.60 
 ICD-9-CM: 428.22, 428.0  10/7/2014 Yes Overview Signed 10/19/2015  9:22 AM by Avinash Galvan Class I.  EF 35%. Plan:  (Medical Decision Making) --EBUS today for diagnosis --complete PFTs if possible today. --pleural effusion appears too small to tap on CT. 
--eliquis held. More than 50% of the time documented was spent in face-to-face contact with the patient and in the care of the patient on the floor/unit where the patient is located.  
 
Carmen Mooney MD

## 2019-07-03 NOTE — PROGRESS NOTES
spoke with the floor and patient needs MRI before discharge; nurse notified us that it will be a while before MRI consent form is completed

## 2019-07-03 NOTE — PROGRESS NOTES
Health  Outreach to introduce the [employer] program and provide information should the patients final coding place him in the CHF bundle. Informational paperwork given and program explained. The patient and grandson voiced understanding. [facility]. will follow by phone following d/c.

## 2019-07-03 NOTE — PROGRESS NOTES
Physical Therapy Note: Attempted to see patient for physical therapy treatment, patient about to go for a procedure and requested to wait until tomorrow. Will attempt as patient is able and schedule permits. Thank you, Beatriz Palacios, PT, DPT

## 2019-07-04 PROBLEM — R07.9 CHEST PAIN: Status: RESOLVED | Noted: 2019-01-01 | Resolved: 2019-01-01

## 2019-07-04 NOTE — DISCHARGE INSTRUCTIONS
Patient Education        DISCHARGE SUMMARY from Nurse    PATIENT INSTRUCTIONS:    After general anesthesia or intravenous sedation, for 24 hours or while taking prescription Narcotics:  · Limit your activities  · Do not drive and operate hazardous machinery  · Do not make important personal or business decisions  · Do  not drink alcoholic beverages  · If you have not urinated within 8 hours after discharge, please contact your surgeon on call. Report the following to your surgeon:  · Excessive pain, swelling, redness or odor of or around the surgical area  · Temperature over 100.5  · Nausea and vomiting lasting longer than 4 hours or if unable to take medications  · Any signs of decreased circulation or nerve impairment to extremity: change in color, persistent  numbness, tingling, coldness or increase pain  · Any questions    What to do at Home:  Recommended activity: Activity as tolerated    If you experience any of the following symptoms Shortness of breath, please follow up with [facility]. *  Please give a list of your current medications to your Primary Care Provider. *  Please update this list whenever your medications are discontinued, doses are      changed, or new medications (including over-the-counter products) are added. *  Please carry medication information at all times in case of emergency situations. These are general instructions for a healthy lifestyle:    No smoking/ No tobacco products/ Avoid exposure to second hand smoke  Surgeon General's Warning:  Quitting smoking now greatly reduces serious risk to your health.     Obesity, smoking, and sedentary lifestyle greatly increases your risk for illness    A healthy diet, regular physical exercise & weight monitoring are important for maintaining a healthy lifestyle    You may be retaining fluid if you have a history of heart failure or if you experience any of the following symptoms:  Weight gain of 3 pounds or more overnight or 5 pounds in a week, increased swelling in our hands or feet or shortness of breath while lying flat in bed. Please call your doctor as soon as you notice any of these symptoms; do not wait until your next office visit. The discharge information has been reviewed with the patient and son. The patient and son verbalized understanding. Discharge medications reviewed with the patient and son and appropriate educational materials and side effects teaching were provided. ___________________________________________________________________________________________________________________________________  Chronic Obstructive Pulmonary Disease (COPD): Care Instructions  Your Care Instructions    Chronic obstructive pulmonary disease (COPD) is a general term for a group of lung diseases, including emphysema and chronic bronchitis. People with COPD have decreased airflow in and out of the lungs, which makes it hard to breathe. The airways also can get clogged with thick mucus. Cigarette smoking is a major cause of COPD. Although there is no cure for COPD, you can slow its progress. Following your treatment plan and taking care of yourself can help you feel better and live longer. Follow-up care is a key part of your treatment and safety. Be sure to make and go to all appointments, and call your doctor if you are having problems. It's also a good idea to know your test results and keep a list of the medicines you take. How can you care for yourself at home?   Staying healthy    · Do not smoke. This is the most important step you can take to prevent more damage to your lungs. If you need help quitting, talk to your doctor about stop-smoking programs and medicines. These can increase your chances of quitting for good.     · Avoid colds and flu. Get a pneumococcal vaccine shot. If you have had one before, ask your doctor whether you need a second dose. Get the flu vaccine every fall.  If you must be around people with colds or the flu, wash your hands often.     · Avoid secondhand smoke, air pollution, and high altitudes. Also avoid cold, dry air and hot, humid air. Stay at home with your windows closed when air pollution is bad.    Medicines and oxygen therapy    · Take your medicines exactly as prescribed. Call your doctor if you think you are having a problem with your medicine.     · You may be taking medicines such as:  ? Bronchodilators. These help open your airways and make breathing easier. Bronchodilators are either short-acting (work for 6 to 9 hours) or long-acting (work for 24 hours). You inhale most bronchodilators, so they start to act quickly. Always carry your quick-relief inhaler with you in case you need it while you are away from home. ? Corticosteroids (prednisone, budesonide). These reduce airway inflammation. They come in pill or inhaled form. You must take these medicines every day for them to work well.     · A spacer may help you get more inhaled medicine to your lungs. Ask your doctor or pharmacist if a spacer is right for you. If it is, ask how to use it properly.     · Do not take any vitamins, over-the-counter medicine, or herbal products without talking to your doctor first.     · If your doctor prescribed antibiotics, take them as directed. Do not stop taking them just because you feel better. You need to take the full course of antibiotics.     · Oxygen therapy boosts the amount of oxygen in your blood and helps you breathe easier. Use the flow rate your doctor has recommended, and do not change it without talking to your doctor first.   Activity    · Get regular exercise. Walking is an easy way to get exercise. Start out slowly, and walk a little more each day.     · Pay attention to your breathing.  You are exercising too hard if you cannot talk while you are exercising.     · Take short rest breaks when doing household chores and other activities.     · Learn breathing [employer] as breathing through pursed lips--to help you become less short of breath.     · If your doctor has not set you up with a pulmonary rehabilitation program, talk to him or her about whether rehab is right for you. Rehab includes exercise programs, education about your disease and how to manage it, help with diet and other changes, and emotional support. Diet    · Eat regular, healthy meals. Use bronchodilators about 1 hour before you eat to make it easier to eat. Eat several small meals instead of three large ones. Drink beverages at the end of the meal. Avoid foods that are hard to chew.     · Eat foods that contain protein so that you do not lose muscle mass.     · Talk with your doctor if you gain too much weight or if you lose weight without trying.    Mental health    · Talk to your family, friends, or a therapist about your feelings. It is normal to feel frightened, angry, hopeless, helpless, and even guilty. Talking openly about bad feelings can help you cope. If these feelings last, talk to your doctor. When should you call for help? Call 911 anytime you think you may need emergency care. For example, call if:    · You have severe trouble breathing.    Call your doctor now or seek immediate medical care if:    · You have new or worse trouble breathing.     · You cough up blood.     · You have a fever.    Watch closely for changes in your health, and be sure to contact your doctor if:    · You cough more deeply or more often, especially if you notice more mucus or a change in the color of your mucus.     · You have new or worse swelling in your legs or belly.     · You are not getting better as expected. Where can you learn more? Go to http://rachel-amarilys.info/. Chiqui Ramos in the search box to learn more about \"Chronic Obstructive Pulmonary Disease (COPD): Care Instructions. \"  Current as of: September 5, 2018  Content Version: 11.9  © 7301-2030 [employer], [facility].  Care instructions adapted under license by [employer] (which disclaims liability or warranty for this information). If you have questions about a medical condition or this instruction, always ask your healthcare professional. [address] any warranty or liability for your use of this information. Patient Education        Lung Cancer: Care Instructions  Your Care Instructions    Lung cancer occurs when abnormal cells grow out of control in the lung. Lung cancer can start anywhere in the lungs and spread to other parts of the body. Treatment for lung cancer depends on what type of lung cancer you have and how advanced it is. Treatment may include surgery to remove the cancer. It could also include medicines (chemotherapy) or radiation to destroy cancer cells. Being treated for cancer can weaken your body, and you may feel very tired. Home treatment and certain medicines can help you feel better. Finding out that you have cancer is scary. You may feel many emotions and may need some help coping. Seek out family, friends, and counselors for support. You also can do things at home to make yourself feel better while you go through treatment. Call the [employer] (3-668.619.7578) or visit its website at [address] for more information. Follow-up care is a key part of your treatment and safety. Be sure to make and go to all appointments, and call your doctor if you are having problems. It's also a good idea to know your test results and keep a list of the medicines you take. How can you care for yourself at home? · Take your medicines exactly as prescribed. Call your doctor if you think you are having a problem with your medicine. You will get more details on the specific medicines your doctor prescribes. · Follow your doctor's instructions to relieve pain. Use pain medicine when you first feel pain, before it becomes severe.  Taking pain medicines regularly is often the best way to keep pain under control. · Eat healthy food. If you do not feel like eating, try to eat food that has protein and extra calories to keep up your strength and prevent weight loss. Drink liquid meal replacements for extra calories and protein. Try to eat your main meal early. Eating smaller portions more often may help as well. · Get some physical activity every day, but do not get too tired. Keep doing the hobbies you enjoy as your energy allows. · Do not smoke. Smoking can make your cancer symptoms worse. If you need help quitting, talk to your doctor about stop-smoking programs and medicines. These can increase your chances of quitting for good. · If you use oxygen, do not smoke, light a cigarette, or use a flame while your oxygen is on. Smoking while using oxygen can lead to fire and even explosion. · If you have nausea, try to eat several small meals a day. When you feel better, eat clear soups and mild foods until all symptoms are gone for 12 to 48 hours. Other good choices include dry toast, crackers, cooked cereal, and gelatin dessert, such as Jell-O.  · If you are vomiting or have diarrhea:  ? Drink plenty of fluids (enough so that your urine is light yellow or clear like water) to prevent dehydration. Choose water and other caffeine-free clear liquids. If you have kidney, heart, or liver disease and have to limit fluids, talk with your doctor before you increase the amount of fluids you drink. ? When you are able to eat, try clear soups, mild foods, and liquids until all symptoms are gone for 12 to 48 hours. Other good choices include dry toast, crackers, cooked cereal, and gelatin dessert, such as Jell-O.  · Take steps to control your stress and workload. Learn relaxation techniques. ? Share your feelings. Stress and tension affect our emotions. By expressing your feelings to others, you may be able to understand and cope with them. ? Consider joining a support group.  Talking about a problem with your spouse, a good friend, or other people with similar problems is a good way to reduce tension and stress. ? Express yourself with art. Try writing, crafts, dance, or art to relieve stress. Some dance, writing, or art groups may be available just for people who have cancer. ? Be kind to your body and mind. Getting enough sleep, eating a healthy diet, and taking time to do things you enjoy can contribute to an overall feeling of balance in your life and help reduce stress. ? Get help if you need it. Discuss your concerns with your doctor or counselor. · If you have not already done so, prepare a list of advance directives. Advance directives are instructions to your doctor and family members about what kind of care you want if you become unable to speak or express yourself. When should you call for help? Call 911 anytime you think you may need emergency care. For example, call if:    · You passed out (lost consciousness).     · You have severe trouble breathing.     · You cough up a lot of blood.    Call your doctor now or seek immediate medical care if:    · You have a fever.     · You are short of breath.     · You have new or worse pain.     · You have a new or worse cough.     · You think you have an infection.    Watch closely for changes in your health, and be sure to contact your doctor if:    · You do not get better as expected. Where can you learn more? Go to http://rachel-amarilys.info/. Enter K122 in the search box to learn more about \"Lung Cancer: Care Instructions. \"  Current as of: March 27, 2018  Content Version: 11.9  © 2171-8850 [employer]. Care instructions adapted under license by [employer] (which disclaims liability or warranty for this information).  If you have questions about a medical condition or this instruction, always ask your healthcare professional. [place] [address] any warranty or liability for your use of this information.

## 2019-07-04 NOTE — PROGRESS NOTES
Care Management Interventions PCP Verified by CM: Yes(last seen 5/15/19) Palliative Care Criteria Met (RRAT>21 & CHF Dx)?: No(RRAT 33 Dx Chest pain) Mode of Transport at Discharge: Other (see comment)(family) Transition of Care Consult (CM Consult): Discharge Planning Discharge Durable Medical Equipment: No(O2 with portable tanks with [facility] ) Physical Therapy Consult: Yes Occupational Therapy Consult: Yes Speech Therapy Consult: No 
Current Support Network: Lives Alone Confirm Follow Up Transport: Self Plan discussed with Pt/Family/Caregiver: Yes Freedom of Choice Offered: Yes Discharge Location Discharge Placement: Home Patient ready for d/c today. He voices no concerns or needs for d/c. Patient to d/c home declined home health.

## 2019-07-04 NOTE — DISCHARGE SUMMARY
Hospitalist Discharge Summary Admit Date:  2019  4:57 AM  
Name:  Misti Alexander Age:  82 y.o. 
:  1935 MRN:  [id number] PCP:  Ann Mata NP Treatment Team: Attending Provider: Saul Smith MD; Care Manager: Rick Patricia RN; Consulting Provider: Papo Pearson MD; Utilization Review: Leny Singer RN; Physical Therapist: Leonardo Terry, PT, DPT Problem List for this Hospitalization: 
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 
ICD-9-CM: 162.2  7/3/2019 - Present Yes Rib pain ICD-10-CM: R07.81 ICD-9-CM: 786.50  2019 - Present Yes Anemia (Chronic) ICD-10-CM: D64.9 ICD-9-CM: 285.9  2019 - Present Yes Chronic respiratory failure with hypoxia (HCC) (Chronic) ICD-10-CM: J96.11 
ICD-9-CM: 518.83, 799.02  2019 - Present Yes Hilar adenopathy ICD-10-CM: R59.0 ICD-9-CM: 785.6  2019 - Present Yes Mediastinal adenopathy ICD-10-CM: R59.0 ICD-9-CM: 785.6  2019 - Present Yes  
   
 CKD (chronic kidney disease) (Chronic) ICD-10-CM: N18.9 ICD-9-CM: 585.9  2019 - Present Yes Long term (current) use of anticoagulants (Chronic) ICD-10-CM: Z79.01 
ICD-9-CM: V58.61  2019 - Present Yes Atrial fibrillation (HCC) (Chronic) ICD-10-CM: I48.91 
ICD-9-CM: 427.31  3/22/2019 - Present Yes Benign prostatic hyperplasia, (Chronic) ICD-10-CM: N40.0 ICD-9-CM: 600.00  2016 - Present Yes Overview Signed 2016 10:55 AM by Sancho Ochoa  
  MRI abnormal 16 of concern for cancer. Hypertension (Chronic) ICD-10-CM: I10 
ICD-9-CM: 401.9  Unknown - Present Yes Overview Signed 10/19/2015  9:20 AM by Aydee Miner BP readings have been in target range. No complications noted from the medication presently being used. CAD (coronary artery disease), atherosclerotic of the native vessel (Chronic) ICD-10-CM: I25.10 ICD-9-CM: 414.00  Unknown - Present Yes Overview Addendum 10/19/2015  9:21 AM by Nasim Sellers ? MI 4-2014 Angina has improved with no episodes of chest pain since the last visit. Mild CAD by cath 6/2014. Chronic obstructive pulmonary disease (HCC) (Chronic) ICD-10-CM: J44.9 ICD-9-CM: 428  Unknown - Present Yes AICD (automatic cardioverter/defibrillator) present ICD-10-CM: Z95.810 ICD-9-CM: V45.02  Unknown - Present Yes Overview Signed 10/19/2015  9:23 AM by Nasim Sellers [place] DDD ICD Chronic systolic CHF (congestive heart failure) (HCC) (Chronic) ICD-10-CM: F05.24 ICD-9-CM: 428.22, 428.0  10/7/2014 - Present Yes Overview Signed 10/19/2015  9:22 AM by Nasim Sellers [place] Class I.  EF 35%. * (Principal) RESOLVED: Chest pain ICD-10-CM: R07.9 ICD-9-CM: 786.50  6/24/2019 - 7/4/2019 Yes RESOLVED: Hypokalemia ICD-10-CM: E87.6 ICD-9-CM: 276.8  6/17/2016 - 7/4/2019 Yes Hospital Course: Mr. Mariza Strauss is a very nice 83 y/o WM with a h/o CAD, chronic sCHF with ICD, COPD, AFib on Eliquis, CKD 3 who presented to the hospital on 7/1 with persistent left sided chest pain. He recently had LHC last month which showed non-obstructive CAD. VQ scan was low probability for PE. CTA on admission showed a left hilar mass and extensive mediastinal LAD. Pulmonology and Heme/onc were consulted. He underwent bronchoscopy with EBUS on 7/3. He will need outpatient PET and brain MRI. Should follow up with oncology within 1 week of discharge. Will also recommend pulmonary follow up for lung issues and COPD. Hospital course otherwise unremarkable and he is stable at the time of discharge. Disposition: Home or Self Care Activity: Activity as tolerated Diet: DIET CARDIAC Regular Follow up instructions, discharge meds at bottom of this note. Plan was discussed with patient, son, CM, nursing. All questions answered. Patient was stable at time of discharge. Patient will call a physician or return if any concerns. Diagnostic Imaging/Tests:  
Ct Chest W Cont Result Date: 7/1/2019 CT chest for pulmonary embolus done with  IV contrast July 1, 2019 Reference exam: Length film same day Indication: Left-sided chest pain and elevated troponin Technique: Radiation dose reduction techniques were used for this study using one or more of the following: automated exposure control; adjustment of mA and/or kV (according to patient size);  iterative reconstruction. Thin section axial images were taken through the chest using 100 cc Isovue-370 dynamic contrast enhancement. Findings: There is no filling defect seen to suggest a pulmonary embolus. However, there is extensive mediastinal adenopathy, with a anterior mediastinal node measuring up to 1.8 x 1.1 cm, aortopulmonary window nodes up to 1.5 x 1 cm. Azygous node measures 1.4 x 1 cm, with right hilar nodes up to 2.3 x 1.4 cm. Large mass is seen in the left hilum extending into the supra and infrahilar regions and into the mediastinum along the pulmonary artery, measuring up to 5.6 x 5.9 cm, the adrenals are only partially visualized but no masses are seen. Respiratory motion decreases sensitivity for small lesions, there are emphysematous changes noted in both lungs with dependent edema seen, with what may be a pulmonary nodule or mass posteromedially pleural-based in the right lung base measuring up to 1.9 x 1.3 cm. Small left-sided effusion is seen. IMPRESSION: Probable malignancy in the left hilum and mediastinum as described above with some cyst anterior mediastinal nodes, small left effusion.  What may be an enhancing mass pleural-based is seen in the right lung base medially although could be focal atelectasis. Xr Chest [place] Result Date: 7/1/2019 EXAM: Chest x-ray. INDICATION: Chest pain. COMPARISON: June 24, 2019. TECHNIQUE: Single frontal view. FINDINGS: The lungs are clear. Again noted is cardiomegaly and a left chest wall defibrillator. No pneumothorax, vascular congestion or pleural effusion is seen. IMPRESSION: 1. No acute process. 2. Cardiomegaly and an indwelling defibrillator. Echocardiogram results: No results found for this visit on 07/01/19. Procedures done this admission: 
Procedure(s) with comments: 
ENDOSCOPIC BRONCHOSCOPY ULTRASOUND (EBUS) BRONCHOSCOPY 
FINE NEEDLE ASPIRATION - lymph node bx's All Micro Results None Labs: Results:  
   
BMP, Mg, Phos Recent Labs  
  07/03/19 
0520 07/02/19 
0441  143  
K 3.5 3.5  106 CO2 26 28 AGAP 10 9 BUN 24* 24* CREA 1.69* 1.79* CA 8.9 9.0  
* 95  
  
CBC Recent Labs  
  07/03/19 0520 07/02/19 
0441 WBC 4.8 5.1  
RBC 3.70* 2.69* HGB 11.5* 9.9*  
HCT 34.2* 27.2*  
* 165 GRANS 74 72 LYMPH 13 14 EOS 4 2 MONOS 8 11 BASOS 1 1 IG 1 1 ANEU 3.5 3.7 ABL 0.6 0.7 CHAITANYA 0.2 0.1 ABM 0.4 0.5 ABB 0.1 0.1 AIG 0.0 0.0  
  
LFT No results for input(s): SGOT, ALT, TBIL, AP, TP, ALB, GLOB, AGRAT, GPT in the last 72 hours. Cardiac Testing Lab Results Component Value Date/Time  (H) 03/11/2019 10:32 PM  
 Troponin-I, Qt. 0.66 (HH) 06/25/2019 03:34 AM  
 Troponin-I, Qt. 0.65 (HH) 06/24/2019 08:00 PM  
 Troponin-I, Qt. 0.63 (HH) 06/24/2019 03:09 PM  
  
Coagulation Tests Lab Results Component Value Date/Time  Prothrombin time 26.7 (H) 05/21/2019 10:21 AM  
 Prothrombin time 19.7 (H) 05/07/2019 01:18 PM  
 Prothrombin time 52.3 (H) 04/23/2019 10:52 AM  
 INR 2.2 05/21/2019 10:21 AM  
 INR 1.6 05/07/2019 01:18 PM  
 INR 4.4 (HH) 04/23/2019 10:52 AM  
 aPTT 87.2 (H) 06/25/2019 12:17 PM  
 aPTT 45.5 (H) 06/25/2019 03:34 AM  
 aPTT 72.6 (H) 03/21/2019 08:36 AM  
  
A1c No results found for: HBA1C, HGBE8, VWH4IEHB, HEK2BAVQ Lipid Panel Lab Results Component Value Date/Time Cholesterol, total 95 06/25/2019 03:34 AM  
 HDL Cholesterol 45 06/25/2019 03:34 AM  
 LDL, calculated 31.2 06/25/2019 03:34 AM  
 VLDL, calculated 18.8 06/25/2019 03:34 AM  
 Triglyceride 94 06/25/2019 03:34 AM  
 CHOL/HDL Ratio 2.1 06/25/2019 03:34 AM  
  
Thyroid Panel Lab Results Component Value Date/Time TSH 1.570 06/26/2017 01:21 PM  
    
Most Recent UA Lab Results Component Value Date/Time Color YELLOW 03/15/2019 10:43 AM  
 Appearance CLOUDY 03/15/2019 10:43 AM  
 Specific gravity 1.009 03/15/2019 10:43 AM  
 pH (UA) 5.0 03/15/2019 10:43 AM  
 Protein 30 (A) 03/15/2019 10:43 AM  
 Glucose NEGATIVE  03/15/2019 10:43 AM  
 Ketone NEGATIVE  03/15/2019 10:43 AM  
 Bilirubin NEGATIVE  03/15/2019 10:43 AM  
 Blood LARGE (A) 03/15/2019 10:43 AM  
 Urobilinogen 0.2 03/15/2019 10:43 AM  
 Nitrites NEGATIVE  03/15/2019 10:43 AM  
 Leukocyte Esterase TRACE (A) 03/15/2019 10:43 AM  
 WBC 0-3 03/11/2019 11:13 PM  
 RBC 3-5 03/11/2019 11:13 PM  
 Epithelial cells 0-3 03/15/2019 10:43 AM  
 Bacteria 0 03/15/2019 10:43 AM  
 Casts 0-3 03/11/2019 11:13 PM  
  
 
Allergies Allergen Reactions  Lortab [Hydrocodone-Acetaminophen] Other (comments) Makes him sick. 1/2 tab doesn't make him sick  Lortab [Hydrocodone-Acetaminophen] Other (comments) Pt states that a whole pill makes him feel sick. 1/2 tab does not.  Other Medication Other (comments) He has a hx of a prolonged QT interval, so precaution with meds that affect that. Immunization History Administered Date(s) Administered  Influenza High Dose Vaccine PF 09/16/2016, 09/19/2017, 11/15/2018  Influenza Vaccine 09/16/2015  Pneumococcal Conjugate (PCV-13) 01/20/2015  Pneumococcal Vaccine (Unspecified Type) 08/23/2002, 05/02/2013  TB Skin Test (PPD) Intradermal 03/14/2019  Td 11/22/2002  Tdap 10/20/2014  Zoster Vaccine, Live 07/26/2013 All Labs from Last 24 Hrs: 
No results found for this or any previous visit (from the past 24 hour(s)). Current Med List in Hospital:  
Current Facility-Administered Medications Medication Dose Route Frequency  0.9% sodium chloride infusion  100 mL/hr IntraVENous CONTINUOUS  
 albuterol-ipratropium (DUO-NEB) 2.5 MG-0.5 MG/3 ML  3 mL Nebulization Q4H PRN  
 aspirin delayed-release tablet 81 mg  81 mg Oral DAILY  cholecalciferol (VITAMIN D3) tablet 1,000 Units  1,000 Units Oral DAILY  cyanocobalamin tablet 1,000 mcg  1,000 mcg Oral DAILY  furosemide (LASIX) tablet 20 mg  20 mg Oral BID  lisinopril (PRINIVIL, ZESTRIL) tablet 2.5 mg  2.5 mg Oral DAILY  metoprolol succinate (TOPROL-XL) XL tablet 25 mg  25 mg Oral DAILY  pantoprazole (PROTONIX) tablet 40 mg  40 mg Oral ACB  rosuvastatin (CRESTOR) tablet 20 mg  20 mg Oral QHS  tamsulosin (FLOMAX) capsule 0.4 mg  0.4 mg Oral DAILY  tiotropium (SPIRIVA) inhalation capsule 18 mcg  1 Cap Inhalation DAILY  sodium chloride (NS) flush 5-40 mL  5-40 mL IntraVENous Q8H  
 sodium chloride (NS) flush 5-40 mL  5-40 mL IntraVENous PRN  
 acetaminophen (TYLENOL) tablet 650 mg  650 mg Oral Q8H  
 ondansetron (ZOFRAN) injection 4 mg  4 mg IntraVENous Q4H PRN  
 budesonide (PULMICORT) 500 mcg/2 ml nebulizer suspension  500 mcg Nebulization BID RT And  
 albuterol (PROVENTIL VENTOLIN) nebulizer solution 2.5 mg  2.5 mg Nebulization Q6HWA RT  
 lidocaine 4 % patch 1 Patch  1 Patch TransDERmal Q24H Discharge Exam: 
Patient Vitals for the past 24 hrs: 
 Temp Pulse Resp BP SpO2  
07/04/19 0941  89  104/67   
07/04/19 0805     95 % 07/04/19 0433 97.8 °F (36.6 °C) 82 18 99/64 96 % 07/04/19 0033 97.6 °F (36.4 °C) 83 18 91/54 95 % 07/03/19 2347    97/54   
07/03/19 2149    (!) 89/55  07/03/19 2049    (!) 83/52   
07/03/19 2045 98.1 °F (36.7 °C) 80 18 (!) 84/54 93 % 07/03/19 1904     98 % 07/03/19 1851 96.8 °F (36 °C) 88 20 101/66 98 % 07/03/19 1627  80  106/60   
07/03/19 1616  80 18 97/63 94 % 07/03/19 1607  81 20 95/61 94 % 07/03/19 1557  80 20 96/63 92 % 07/03/19 1547  71 20 94/60 94 % 07/03/19 1546  77 20 94/59 94 % 07/03/19 1536  82 18 100/57 100 % 07/03/19 1533  98 11 109/65 100 % 07/03/19 1528  80 18 94/54 92 % 07/03/19 1524  82 13 99/54 94 % 07/03/19 1520  78 16 91/59 95 % 07/03/19 1516  82 13 91/56 96 % 07/03/19 1512  80 14 96/53 96 % 07/03/19 1510  80 17 98/57 97 % 07/03/19 1506  80 16 100/65 95 % 07/03/19 1501  80 18 100/66 100 % 07/03/19 1500  82 21 101/60 96 % 07/03/19 1459  80 26  100 % 07/03/19 1406  80 18 103/61 95 % Oxygen Therapy O2 Sat (%): 95 % (07/04/19 0805) Pulse via Oximetry: 81 beats per minute (07/04/19 0805) O2 Device: Room air (07/04/19 2906) O2 Flow Rate (L/min): 2 l/min (07/04/19 0805) Intake/Output Summary (Last 24 hours) at 7/4/2019 1041 Last data filed at 7/4/2019 0005 Gross per 24 hour Intake 540 ml Output 350 ml Net 190 ml *Note that automatically entered I/Os may not be accurate; dependent on patient compliance with collection and accurate  by assistants. General:    Well nourished. Alert. Eyes:   Normal sclerae. Extraocular movements intact. ENT:  Normocephalic, atraumatic. Moist mucous membranes CV:   Regular rate and rhythm. No murmur, rub, or gallop. Lungs:  Clear to auscultation bilaterally. No wheezing, rhonchi, or rales. Abdomen: Soft, nontender, nondistended. Bowel sounds normal.  
Extremities: Warm and dry. No cyanosis or edema. Neurologic: CN II-XII grossly intact. Sensation intact. Skin:     No rashes or jaundice. ICD left upper chest. 
Psych:  Normal mood and affect.  
 
 
Discharge Info:  
Current Discharge Medication List  
 CONTINUE these medications which have NOT CHANGED Details  
rosuvastatin (CRESTOR) 20 mg tablet Take 1 Tab by mouth nightly. Qty: 30 Tab, Refills: 11  
  
lisinopril (PRINIVIL, ZESTRIL) 2.5 mg tablet Take 1 Tab by mouth daily. Qty: 30 Tab, Refills: 11  
  
apixaban (ELIQUIS) 2.5 mg tablet Take 1 Tab by mouth every twelve (12) hours. Qty: 60 Tab, Refills: 11  
  
metoprolol succinate (TOPROL-XL) 25 mg XL tablet Take 1 Tab by mouth daily. Qty: 30 Tab, Refills: 5  
  
albuterol-ipratropium (DUO-NEB) 2.5 mg-0.5 mg/3 ml nebu 3 mL by Nebulization route every four (4) hours as needed for Cough. Qty: 30 Nebule, Refills: 11  
 Associated Diagnoses: Chronic bronchitis, unspecified chronic bronchitis type (HCC)  
  
tamsulosin (FLOMAX) 0.4 mg capsule Take 1 Cap by mouth daily. Qty: 90 Cap, Refills: 1 Associated Diagnoses: Benign prostatic hyperplasia without lower urinary tract symptoms  
  
omeprazole (PRILOSEC) 20 mg capsule Take 1 Cap by mouth daily. Qty: 90 Cap, Refills: 3 Associated Diagnoses: Gastroesophageal reflux disease without esophagitis  
  
albuterol (PROVENTIL HFA, VENTOLIN HFA, PROAIR HFA) 90 mcg/actuation inhaler Take 2 Puffs by inhalation every four (4) hours as needed for Wheezing. Qty: 1 Inhaler, Refills: 3 Associated Diagnoses: Chronic obstructive pulmonary disease, unspecified COPD type ([facility] [address] furosemide (LASIX) 20 mg tablet Take 1 Tab by mouth two (2) times a day. Qty: 180 Tab, Refills: 3 Comments: Please inform pt needs to schedule a follow up with Dr. Nathaniel Hankins before this refill runs out. Associated Diagnoses: Congestive heart failure, unspecified HF chronicity, unspecified heart failure type (HCC)  
  
tiotropium (SPIRIVA WITH HANDIHALER) 18 mcg inhalation capsule Take 1 Cap by inhalation daily. fluticasone-vilanterol (BREO ELLIPTA) 100-25 mcg/dose inhaler Take 1 Puff by inhalation daily.   
  
cholecalciferol (VITAMIN D3) 1,000 unit tablet Take 1,000 Units by mouth daily.  
  
aspirin delayed-release 81 mg tablet Take 81 mg by mouth daily. cyanocobalamin (VITAMIN B-12) 1,000 mcg sublingual tablet Take 1,000 mcg by mouth daily. Associated Diagnoses: Weakness Follow Up Orders: Follow-up Appointments Procedures  FOLLOW UP VISIT Appointment in: One Week Will need a f/u appointment with Dr. Esperanza Snow within 1 week of d/c date Will need a f/u appointment with Dr. Esperanza Snow within 1 week of d/c date Standing Status:   Standing Number of Occurrences:   1 Order Specific Question:   Appointment in Answer: One Week  FOLLOW UP VISIT Appointment in: Two Weeks With [facility] Standing Status:   Standing Number of Occurrences:   1 Order Specific Question:   Appointment in Answer: Two Weeks Follow-up Information Follow up With Specialties Details Why Contact Info Katie Kerr NP Nurse Practitioner  [facility] f/u. Lung cancer. [address] 
[phone] Migdalia Perales MD Oncology, Internal Medicine Schedule an appointment as soon as possible for a visit in 1 week Hospital follow up. Lung cancer. [id number] [facility] [address] [facility] [id number] [phone] [facility]  In 2 weeks Hospital follow up. COPD, lung cancer. [place] [address] [address] [facility] [address] [id number] [phone] Time spent in patient discharge planning and coordination 35 minutes.  
 
Signed: 
Lucina Steven MD

## 2019-07-04 NOTE — PROGRESS NOTES
Patient identified as High Risk for Readmission RRAT 28 Admitted 7/1 - 7/4 for Chest Pain Potential for CHF Bundle follow up Patient will discharge today - has declined [facility] Medical History includes NSTEMI - 6/24 Chronic sCHF JAZZY AICD 
CAD 
COPD Plan - link with Radha Patterson - Gurvinder Molina -  to support through Transitions of Care and coordinate with CHF [address] if necessary

## 2019-07-04 NOTE — PROGRESS NOTES
Bedside and Verbal shift change report given to Jennifer Trevino RN (oncoming nurse) by Jessy Yee RN (offgoing nurse). Report included the following information SBAR, Kardex, Accordion and Recent Results.

## 2019-07-04 NOTE — PROGRESS NOTES
Problem: Mobility Impaired (Adult and Pediatric) Goal: *Acute Goals and Plan of Care (Insert Text) Description STG: 
(1.)Mr. Yoli Malone will move from supine to sit and sit to supine , scoot up and down and roll side to side with MODIFIED INDEPENDENCE within 3 treatment day(s). (2.)Mr. Yoli Malone will transfer from bed to chair and chair to bed with SUPERVISION using the least restrictive device within 3 treatment day(s). (3.)Mr. Yloi aMlone will ambulate with SUPERVISION for 250 feet with the least restrictive device within 3 treatment day(s). (4.)Mr. Yoli Malone will perform standing static and dynamic balance activities x 15 minutes with SUPERVISION to improve safety within 3 day(s). (5.)Mr. Yoli Malone will maintain stable vital signs throughout all functional mobility within 3 days. LTG: 
(1.)Mr. Yoli Malone will move from supine to sit and sit to supine , scoot up and down and roll side to side in bed with INDEPENDENT within 7 treatment day(s). (2.)Mr. Yoli Malone will transfer from bed to chair and chair to bed with MODIFIED INDEPENDENCE using the least restrictive device within 7 treatment day(s). (3.)Mr. Yoli Malone will ambulate with MODIFIED INDEPENDENCE for 250+ feet with the least restrictive device within 7 treatment day(s). (4.)Mr. Yoli Malone will perform standing static and dynamic balance activities x 15 minutes with MODIFIED INDEPENDENCE to improve safety within 7 day(s). ________________________________________________________________________________________________ Outcome: Progressing Towards Goal 
  
PHYSICAL THERAPY: Daily Note and AM 7/4/2019 INPATIENT: PT Visit Days : 3 [place] / [place] B / Product Type: Medicare /   
  
NAME/AGE/GENDER: Lazarus Ovens is a 82 y.o. male PRIMARY DIAGNOSIS: Chest pain [R07.9] Chest pain [R07.9] Chest pain Chest pain Procedure(s) (LRB): ENDOSCOPIC BRONCHOSCOPY ULTRASOUND (EBUS) (N/A) BRONCHOSCOPY (N/A) FINE NEEDLE ASPIRATION (N/A) 1 Day Post-Op ICD-10: Treatment Diagnosis: · Difficulty in walking, Not elsewhere classified (R26.2) Precaution/Allergies: 
Lortab [hydrocodone-acetaminophen]; Lortab [hydrocodone-acetaminophen]; and Other medication ASSESSMENT:  
Mr. Tam Abdi is a 82 y.o. Male admitted for chest pain. He lives alone in a single story home, typically ambulates/performs ADLs independently. Has 2 L/min O2 PRN and has multiple DME he does not need to use. Reports 0 falls in the past 6 months. Supine on contact, agreeable to physical therapy treatment. Now on room air with SpO2 >90%. He transferred to sitting independent, stood with stand by assist. He ambulated 250' total this morning with no assistive device, stand by assist with no losses of balance this AM. No SOB throughout treatment and patient tolerated well. Great progress towards ambulation goals requiring less assist. He is frustrated and wants to go home, as he reports he will receive outpatient treatment. Shirley Villalta is currently functioning below his baseline and would benefit from skilled PT during acute care stay to maximize safety and independence with functional mobility. This section established at most recent assessment PROBLEM LIST (Impairments causing functional limitations): 1. Decreased Strength 2. Decreased ADL/Functional Activities 3. Decreased Transfer Abilities 4. Decreased Ambulation Ability/Technique 5. Decreased Balance 6. Increased Pain 7. Decreased Activity Tolerance 8. Decreased Pacing Skills 9. Increased Shortness of Breath 10. Decreased Knowledge of Precautions 11. Decreased [place] with Home Exercise Program 
 INTERVENTIONS PLANNED: (Benefits and precautions of physical therapy have been discussed with the patient.) 1. Balance Exercise 2. Bed Mobility 3. Family Education 4. Gait Training 5. Home Exercise Program (HEP) 6. Therapeutic Activites 7. Therapeutic Exercise/Strengthening 8. Transfer Training TREATMENT PLAN: Frequency/Duration: 3 times a week for duration of hospital stay Rehabilitation Potential For Stated Goals: Excellent REHAB RECOMMENDATIONS (at time of discharge pending progress):   
Placement: It is my opinion, based on this patient's performance to date, that Mr. Darshan Crawford may benefit from participating in 1-2 additional therapy sessions in order to continue to assess for rehab potential and then make recommendation for disposition at discharge. Equipment:  
? None at this time HISTORY:  
History of Present Injury/Illness (Reason for Referral): Mr. Darshan Crawford is an 81 yo male with PMH of CAD, sCHF (EF 35-30%, ICD), chronic hypoxic respiratory failure on 2 L NC, COPD, AFIB on eliquis, CKD 3 who is evaluated with persistent left sided chest pain. He was admitted to cardiology 6-24 to 6-26-19 s/p [facility] showing mild CAD. He is on medical management for CAD/sCHF. V/Q scan low probability of PE. ECHO showed EF 25-30%. He was seen by pulmonary and has pending outpatient visit. He reports persistent left sided chest pain and abd pain, no rash, no trauma, pain worse with movement/ deep breaths and cough. He took nitro and asa today and felt improved. He is being hydrated for CTA chest. He reports compliance with eliquis. He uses home nebs, denies sputum, some cough, not smoking, no fever.   
 
Past Medical History/Comorbidities:  
Mr. Darshan Crawford  has a past medical history of Abnormal EKG, Acute kidney injury ([address] AICD (automatic cardioverter/defibrillator) present, Alterations of sensations, Anemia, Arthritis, Back pain (6/17/2016), Benign prostatic hyperplasia (6/17/2016), Bilateral pubic rami fractures ([address] CAD (coronary artery disease), CHF (congestive heart failure) ([address] Chronic obstructive pulmonary disease ([address] Chronic systolic CHF (congestive heart failure) ([address] (10/7/2014), CRI (chronic renal insufficiency), Depression, Dizziness, Dyspnea, Elevated ferritin, Elevated PSA (6/17/2016), GERD (gastroesophageal reflux disease), Heart failure ([facility] Hernia, inguinal (6/17/2016), Hyperlipidemia (10/19/2015), Hypertension, Hypokalemia (6/17/2016), Ischemia of left lower extremity (3/13/2019), Keratosis, actinic, Leg cramps, Malaise and fatigue (6/17/2016), NSTEMI (non-ST elevated myocardial infarction) ([facility] (6/24/2019), OA (osteoarthritis) of hip (9/16/2016), Orthostasis, Pneumonia, Respiratory failure ([facility] (6/17/2016), Sinoatrial node dysfunction ([facility] and Sinusitis. Mr. Sylvia Alba  has a past surgical history that includes hx pacemaker; hx cataract removal; hx other surgical (4/2006); hx other surgical; hx other surgical; hx orthopaedic; hx carpal tunnel release; hx heart catheterization; hx cataract removal; hx colonoscopy; hx other surgical; and vascular surgery procedure unlist (03/13/2019). Social History/Living Environment:  
Home Environment: Private residence # Steps to Enter: 0 One/Two Story Residence: One story Living Alone: Yes Support Systems: Child(chantelle), Family member(s) Patient Expects to be Discharged to[de-identified] Private residence Current DME Used/Available at Home: None Prior Level of Function/Work/Activity: 
He lives alone in a single story home, typically ambulates/performs ADLs independently. Has 2 L/min O2 PRN and has multiple DME he does not need to use. Reports 0 falls in the past 6 months. Number of Personal Factors/Comorbidities that affect the Plan of Care: 3+: HIGH COMPLEXITY EXAMINATION:  
Most Recent Physical Functioning:  
Gross Assessment: 
AROM: Within functional limits PROM: Within functional limits Strength: Within functional limits Posture: 
Posture (WDL): Exceptions to [facility] Posture Assessment: Forward head, Rounded shoulders Balance: 
Sitting: Intact Standing: Impaired Standing - Static: Good Standing - Dynamic : Fair Bed Mobility: 
Rolling: Independent Supine to Sit: Independent Sit to Supine: Independent Scooting: Independent Wheelchair Mobility: 
  
Transfers: 
Sit to Stand: Stand-by assistance Stand to Sit: Supervision Gait: 
  
Base of Support: Center of gravity altered Speed/Tonya: Slow Gait Abnormalities: Decreased step clearance Distance (ft): 250 Feet (ft) Assistive Device: (none) Ambulation - Level of Assistance: Stand-by assistance Interventions: Safety awareness training Body Structures Involved: 1. Eyes and Ears 2. Heart 3. Lungs 4. Muscles Body Functions Affected: 1. Sensory/Pain 2. Cardio 3. Respiratory 4. Neuromusculoskeletal 
5. Movement Related Activities and Participation Affected: 1. Mobility 2. Self Care 3. Domestic Life 4. Interpersonal Interactions and Relationships 5. Community, Social and [place] Number of elements that affect the Plan of Care: 4+: HIGH COMPLEXITY CLINICAL PRESENTATION:  
Presentation: Stable and uncomplicated: LOW COMPLEXITY CLINICAL DECISION MAKIN[address] [place] [id number] AM-PAC 6 Clicks Basic Mobility Inpatient Short Form How much difficulty does the patient currently have. .. Unable A Lot A Little None 1. Turning over in bed (including adjusting bedclothes, sheets and blankets)? ? 1   ? 2   ? 3   ? 4  
2. Sitting down on and standing up from a chair with arms ( e.g., wheelchair, bedside commode, etc.)   ? 1   ? 2   ? 3   ? 4  
3. Moving from lying on back to sitting on the side of the bed?   ? 1   ? 2   ? 3   ? 4 How much help from another person does the patient currently need. .. Total A Lot A Little None 4. Moving to and from a bed to a chair (including a wheelchair)? ? 1   ? 2   ? 3   ? 4  
5. Need to walk in hospital room? ? 1   ? 2   ? 3   ? 4  
6. Climbing 3-5 steps with a railing? ? 1   ? 2   ? 3   ? 4  
© 2007, Trustees of [address] [place] [id number], under license to [employer]. All rights reserved Score:  Initial: 17 Most Recent: X (Date: -- ) Interpretation of Tool:  Represents activities that are increasingly more difficult (i.e. Bed mobility, Transfers, Gait). Medical Necessity:    
· Patient demonstrates excellent ·  rehab potential due to higher previous functional level. Reason for Services/Other Comments: 
· Patient continues to require modification of therapeutic interventions to increase complexity of exercises · . Use of outcome tool(s) and clinical judgement create a POC that gives a: Clear prediction of patient's progress: LOW COMPLEXITY  
  
 
 
 
TREATMENT:  
(In addition to Assessment/Re-Assessment sessions the following treatments were rendered) Pre-treatment Symptoms/Complaints:  L flank pain. Pain: Initial:  
Pain Intensity 1: 0 Pain Location 1: Flank Pain Orientation 1: Left  Post Session:  3/10 Therapeutic Activity: (    8 minutes): Therapeutic activities including bed mobiity, Ambulation on level ground to improve mobility, strength and balance. Required minimal Safety awareness training to promote static and dynamic balance in standing. Braces/Orthotics/Lines/Etc:  
· O2 Device: Nasal cannula Treatment/Session Assessment:   
· Response to Treatment:  Patient ambulated 250' on room air with no increase in SOB this AM. · Interdisciplinary Collaboration:  
o Physical Therapist 
o Registered Nurse · After treatment position/precautions:  
o Bed/Chair-wheels locked 
o Bed in low position 
o Call light within reach 
o RN notified 
o sitting edge of bed · Compliance with Program/Exercises: Will assess as treatment progresses · Recommendations/Intent for next treatment session: \"Next visit will focus on advancements to more challenging activities and reduction in assistance provided\". Total Treatment Duration: PT Patient Time In/Time Out Time In: 7602 Time Out: 0906 Cindy Lord, PT, DPT

## 2019-07-04 NOTE — PROCEDURES
[address] [facility] 
PROCEDURE NOTE Name:  Ronnie Briscoe 
MR#:  [id number] :  1935 ACCOUNT #:  [de-identified] DATE OF SERVICE:  2019 INTERPRETATION OF COMPLETE PFTs SPIROMETRY:  Forced vital capacity was normal at 3.14 which is 90% of predicted and FEV1 was normal at 1.93 which is 88% of predicted. FEV1% though was decreased at 62%. Flow-volume loop does demonstrate expiratory flow obstruction. LUNG VOLUMES:  Total lung capacity was normal at 5.66 which is 105% of predicted. FRC and RV were also normal. 
 
DIFFUSION:  DLCO reduced at 7.5, which is 54% of predicted. DLCO/VA was also reduced. IMPRESSION:  Abnormal spirometry consistent with a moderate obstructive defect. Lung volumes were normal, but there was a significant decrease in diffusion. Study is consistent with the diagnosis of emphysema. Radha Goldman MD 
 
 
[id number] 
D:  2019 11:46 
T:  2019 12:26 
JOB #:  [id number]

## 2019-07-04 NOTE — PROGRESS NOTES
Patients SBP in the 80's, asymptomatic. Spoke with Bro Arguelles. Ordered to give a 250 ml bolus, if patients BP does not improve give another 250 ml bolus. Also hold night dose of lasix. Will continue to monitor.

## 2019-07-04 NOTE — PROGRESS NOTES
Discharge instructions reviewed with patient and son. Opportunity for questions provided. Patient and son voiced understanding of all discharge instructions.

## 2019-07-04 NOTE — PROGRESS NOTES
Follow up with patient for d/c plans. Patient sitting up on side of the bed and states he feels better this morning. He states he plans to return home when d/c. Patient declined home health states he does not need it. D/C plan is home when medically stable.

## 2019-07-04 NOTE — PROGRESS NOTES
[facility]er [facility] Admission Date: 7/1/2019 Daily Progress Note: 7/4/2019 The patient's chart is reviewed and the patient is discussed with the staff. 
 
 
 
  
The patient's chart is reviewed and the patient is discussed with the staff. This  is an 82yoM with nonischemic cardiomyopathy (EF 25-30%), weight loss, 50pkyr smoking history,  COPD-FEV1 62 %, with DLCO 57 % , on home oxygen withl L-sided mediastinal 5.6 x 5.7cm mass with mediastinal/hilar adenopathy. Had EBUS on 7/3- preliminary - at least STAGE T3N3Mx (IIIC) non-small cell lung Ca, still need MRI and PET Subjective:  
 
Had EBUS yesterday On RA Wants to go home Current Facility-Administered Medications Medication Dose Route Frequency  0.9% sodium chloride infusion  100 mL/hr IntraVENous CONTINUOUS  
 albuterol-ipratropium (DUO-NEB) 2.5 MG-0.5 MG/3 ML  3 mL Nebulization Q4H PRN  
 aspirin delayed-release tablet 81 mg  81 mg Oral DAILY  cholecalciferol (VITAMIN D3) tablet 1,000 Units  1,000 Units Oral DAILY  cyanocobalamin tablet 1,000 mcg  1,000 mcg Oral DAILY  furosemide (LASIX) tablet 20 mg  20 mg Oral BID  lisinopril (PRINIVIL, ZESTRIL) tablet 2.5 mg  2.5 mg Oral DAILY  metoprolol succinate (TOPROL-XL) XL tablet 25 mg  25 mg Oral DAILY  pantoprazole (PROTONIX) tablet 40 mg  40 mg Oral ACB  rosuvastatin (CRESTOR) tablet 20 mg  20 mg Oral QHS  tamsulosin (FLOMAX) capsule 0.4 mg  0.4 mg Oral DAILY  tiotropium (SPIRIVA) inhalation capsule 18 mcg  1 Cap Inhalation DAILY  sodium chloride (NS) flush 5-40 mL  5-40 mL IntraVENous Q8H  
 sodium chloride (NS) flush 5-40 mL  5-40 mL IntraVENous PRN  
 acetaminophen (TYLENOL) tablet 650 mg  650 mg Oral Q8H  
 ondansetron (ZOFRAN) injection 4 mg  4 mg IntraVENous Q4H PRN  
 budesonide (PULMICORT) 500 mcg/2 ml nebulizer suspension  500 mcg Nebulization BID RT  And  
  albuterol (PROVENTIL VENTOLIN) nebulizer solution 2.5 mg  2.5 mg Nebulization Q6HWA RT  
 lidocaine 4 % patch 1 Patch  1 Patch TransDERmal Q24H Review of Systems Constitutional: negative for fever, chills, sweats Cardiovascular: negative for chest pain, palpitations, syncope, edema Gastrointestinal:  negative for dysphagia, reflux, vomiting, diarrhea, abdominal pain, or melena Neurologic:  negative for focal weakness, numbness, headache Objective:  
 
Vitals:  
 07/03/19 1446 07/04/19 0033 07/04/19 4139 07/04/19 5687 BP: 97/54 91/54 99/64 Pulse:  83 82 Resp:  18 18 Temp:  97.6 °F (36.4 °C) 97.8 °F (36.6 °C) SpO2:  95% 96% 95% Weight:   138 lb 3.2 oz (62.7 kg) Height:      
 
Intake and Output:  
07/02 1901 - 07/04 0700 In: 80 [P.O.:780] Out: 350 [Urine:350] No intake/output data recorded. Physical Exam:  
Constitution:  the patient is well developed and in no acute distress EENMT:  Sclera clear, pupils equal, oral mucosa moist 
Respiratory: diminished Cardiovascular:  RRR without M,G,R 
Gastrointestinal: soft and non-tender; with positive bowel sounds. Musculoskeletal: warm without cyanosis. There is no lower extremity edema. Skin:  no jaundice or rashes, no wounds Neurologic: no gross neuro deficits Psychiatric:  alert and oriented x 3 CXR:  
 
  
Recent Labs  
  07/03/19 
0520 07/02/19 
0441 WBC 4.8 5.1 HGB 11.5* 9.9*  
HCT 34.2* 27.2*  
* 165 Recent Labs  
  07/03/19 
0520 07/02/19 
0441  143  
K 3.5 3.5  106 CO2 26 28 * 95 BUN 24* 24* CREA 1.69* 1.79* CA 8.9 9.0 Assessment:  (Medical Decision Making) Hospital Problems  Date Reviewed: 4/11/2019 Codes Class Noted POA Hilar mass ICD-10-CM: R91.8 ICD-9-CM: 786.6  7/3/2019 Unknown Malignant neoplasm of hilus of left lung ([address] Had EBUS 7/3- preliminary STAGE T3N3Mx (IIIC) non-small cell lung CA  ICD-10-CM: C34.02 
 ICD-9-CM: 162.2  7/3/2019 Unknown Chronic respiratory failure with hypoxia (HCC) (Chronic) ICD-10-CM: J96.11 
ICD-9-CM: 518.83, 799.02  7/1/2019 Yes Hilar adenopathy due to lung Ca ICD-10-CM: R59.0 ICD-9-CM: 785.6  7/1/2019 Yes CKD (chronic kidney disease) (Chronic) ICD-10-CM: N18.9 ICD-9-CM: 585.9  6/24/2019 Yes Long term (current) use of anticoagulants (Chronic) ICD-10-CM: Z79.01 
ICD-9-CM: V58.61  4/23/2019 Yes Atrial fibrillation (HCC) (Chronic) ICD-10-CM: I48.91 
ICD-9-CM: 427.31  3/22/2019 Yes Benign prostatic hyperplasia, (Chronic) ICD-10-CM: N40.0 ICD-9-CM: 600.00  6/17/2016 Yes Overview Signed 9/16/2016 10:55 AM by Wendy Olivo  
  MRI abnormal 9/6/16 of concern for cancer. Hypertension (Chronic) ICD-10-CM: I10 
ICD-9-CM: 401.9  Unknown Yes Overview Signed 10/19/2015  9:20 AM by Kelsie Gross  
  BP readings have been in target range. No complications noted from the medication presently being used. CAD (coronary artery disease), atherosclerotic of the native vessel (Chronic) ICD-10-CM: I25.10 ICD-9-CM: 414.00  Unknown Yes Overview Addendum 10/19/2015  9:21 AM by Kelsie Gross ? MI 4-2014 Angina has improved with no episodes of chest pain since the last visit. Mild CAD by cath 6/2014. Chronic obstructive pulmonary disease (HCC) (Chronic) moderate with decreased DLCO On spiriva and nebs ICD-10-CM: J44.9 ICD-9-CM: 765  Unknown Yes AICD (automatic cardioverter/defibrillator) present ICD-10-CM: Z95.810 ICD-9-CM: V45.02  Unknown Yes Overview Signed 10/19/2015  9:23 AM by Kelsie Gross [employer] DDD ICD Chronic systolic CHF (congestive heart failure) (HCC) (Chronic) ICD-10-CM: O57.87 ICD-9-CM: 428.22, 428.0  10/7/2014 Yes Overview Signed 10/19/2015  9:22 AM by Kelsie Galvan Class I.  EF 35%. Plan:  (Medical Decision Making) --await final pathology 
-needs PET as outpatient  
-MRI ordered 
- already seen by oncology  
-needs follow up with us as outpatient 
- he could go home from our stand point More than 50% of the time documented was spent in face-to-face contact with the patient and in the care of the patient on the floor/unit where the patient is located.  
 
Oliver Murray MD

## 2019-07-05 NOTE — PROGRESS NOTES
Transition of Care Discharge Follow-up Questionnaire Date/Time of Call: 
 7/5/19 1124 What was the patient hospitalized for? SFD 7/1-7/4/19, Left sided Chest pain Does the patient understand his/her diagnosis and/or treatment and what happened during the hospitalization? Agreeable to [facility] call No questions Did the patient receive discharge instructions? Yes  
CM Assessed Risk for Readmission:  
 
 
Patient stated Risk for Readmission: Mod-High due to age, new CA dx, co-morbidities None stated Review any discharge instructions (see discharge instructions/AVS in [facility]). Ask patient if they understand these. Do they have any questions? No questions Were home services ordered (nursing, PT, OT, ST, etc.)? Declined HH services If so, has the first visit occurred? If not, why? (Assist with coordination of services if necessary. ) 
 NA Was any DME ordered? No 
  
If so, has it been received? If not, why?  (Assist patient in obtaining DME orders &/or equipment if necessary. )  
NA Complete a review of all medications (new, continued and discontinued meds per the D/C instructions and medication tab in [facility]). Pt verbalizes understanding of meds, no med changes Were all new prescriptions filled? If not, why?  (Assist patient in obtaining medications if necessary  escalate for CCM &/or SW if ongoing issues are verbalized by pt or anticipated) NA Does the patient understand the purpose and dosing instructions for all medications? (If patient has questions, provide explanation and education.) No questions Does the patient have any problems in performing ADLs? (If patient is unable to perform ADLs  what is the limiting factor(s)? Do they have a support system that can assist? If no support system is present, discuss possible assistance that they may be able to obtain.  Escalate for CCM/SW if ongoing issues are verbalized by pt or anticipated) Family can assist as needed Does the patient have all follow-up appointments scheduled? 7 day f/up with PCP?  
(f/up with PCP may be w/in 14 days if patient has a f/up with their specialist w/in 7 days) 7-14 day f/up with specialist?  
(or per discharge instructions) If f/up has not been made  what actions has the care coordinator made to accomplish this? Has transportation been arranged? 7/8 Cardiology, Johnathan CHEW 
 
7/8 PCP, Triny Bond, NP States Alejandra Hutton will make f/u appmts w/ [facility] Family will transport Any other questions or concerns expressed by the patient? None Schedule next appointment with [facility] Coordinator or refer to RN Case Manager/ per the workflow guidelines. When is care coordinators next follow-up call scheduled? If referred for CCM  what RN care manager was the referral assigned? Agreeable to CCM outreach, plan to f/u next week Kevin Rowell RN  
HEBERT Call Completed By:  
Kevin Rowell RN This note will not be viewable in [address]

## 2019-07-07 PROBLEM — I10 ESSENTIAL HYPERTENSION: Status: ACTIVE | Noted: 2018-01-01

## 2019-07-07 PROBLEM — I25.2 HISTORY OF MI (MYOCARDIAL INFARCTION): Status: ACTIVE | Noted: 2018-01-01

## 2019-07-07 PROBLEM — E04.1 THYROID NODULE: Status: ACTIVE | Noted: 2019-01-01

## 2019-07-07 PROBLEM — K21.9 GERD (GASTROESOPHAGEAL REFLUX DISEASE): Status: ACTIVE | Noted: 2018-01-01

## 2019-07-07 PROBLEM — Z85.46 HISTORY OF PROSTATE CANCER: Status: ACTIVE | Noted: 2018-01-01

## 2019-07-07 PROBLEM — R19.4 CHANGE IN BOWEL HABITS: Status: ACTIVE | Noted: 2018-01-01

## 2019-07-07 PROBLEM — K59.00 CONSTIPATION: Status: ACTIVE | Noted: 2018-01-01

## 2019-07-07 PROBLEM — Z95.810 CARDIAC DEFIBRILLATOR IN PLACE: Status: ACTIVE | Noted: 2018-01-01

## 2019-07-09 NOTE — PROCEDURES
[address] [facility] 
PROCEDURE NOTE Name:  Isamar Cerna 
MR#:  [id number] :  1935 ACCOUNT #:  [de-identified] DATE OF SERVICE:  2019 PROCEDURE PERFORMED:  Complete pulmonary function test. 
 
INTERPRETATION:  The flow volume loop is consistent with obstruction. Spirometry reveals mild obstruction. Lung volumes are normal. 
 
The unadjusted diffusion capacity is reduced. Lexii Levy MD 
 
 
[id number]/ 
D:  2019 16:59 
T:  2019 2:00 
JOB #:  [id number]

## 2019-07-09 NOTE — ED NOTES
I have reviewed discharge instructions with the patient. The patient verbalized understanding. Patient left ED via Discharge Method: wheelchair to Home with daughter. Opportunity for questions and clarification provided. Patient given 1 scripts. To continue your aftercare when you leave the hospital, you may receive an automated call from our care team to check in on how you are doing. This is a free service and part of our promise to provide the best care and service to meet your aftercare needs.  If you have questions, or wish to unsubscribe from this service please call [phone]. Thank you for Choosing our [facility] Emergency Department.

## 2019-07-09 NOTE — ED PROVIDER NOTES
Valarie Arellano is a 82 y.o. male who presents to the ED with a chief complaint of Vomiting. He has history of newly diagnosed lung cancer. He has seen oncology while he was admitted as not had full workup done. He is coming in with recurrent vomiting and generalized weakness. He denies any pain to me. He has not started any chemotherapy or radiation. He has no fevers or chills. Past Medical History:  
Diagnosis Date  Abnormal EKG  Acute kidney injury ([address]  AICD (automatic cardioverter/defibrillator) present  Alterations of sensations  Anemia  Arthritis  Back pain 6/17/2016  Benign prostatic hyperplasia 6/17/2016  Bilateral pubic rami fractures ([address]  CAD (coronary artery disease) ? MI 4-2014  CHF (congestive heart failure) ([address]  Chronic obstructive pulmonary disease ([address]  Chronic systolic CHF (congestive heart failure) ([address] 10/7/2014  CRI (chronic renal insufficiency)  Depression  Dizziness  Dyspnea  Elevated ferritin  Elevated PSA 6/17/2016  GERD (gastroesophageal reflux disease)  Heart failure ([address]  Hernia, inguinal 6/17/2016  Hyperlipidemia 10/19/2015  Hypertension  Hypokalemia 6/17/2016  Ischemia of left lower extremity 3/13/2019  Keratosis, actinic  Leg cramps  Malaise and fatigue 6/17/2016  
 NSTEMI (non-ST elevated myocardial infarction) ([address] 6/24/2019  OA (osteoarthritis) of hip 9/16/2016  Orthostasis  Pneumonia  Respiratory failure ([address] 6/17/2016 Due to tractor accident  Sinoatrial node dysfunction (HCC)  Sinusitis Past Surgical History:  
Procedure Laterality Date  HX CARPAL TUNNEL RELEASE    
 bilat  HX CATARACT REMOVAL    
 HX CATARACT REMOVAL    
 bilat  HX COLONOSCOPY    
 HX HEART CATHETERIZATION    
 HX ORTHOPAEDIC    
 internal fixation fractured pelvis  HX OTHER SURGICAL  4/2006  
 prostate bx   
  HX OTHER SURGICAL    
 transiliac screw fixation of L hemipelvis and ORIF of anterior pelvic ring disruption  HX OTHER SURGICAL    
 irrigation and debridment of R hip wound: VAC placement  HX OTHER SURGICAL    
 implanted defibrillator  HX PACEMAKER    
 dual chamber  VASCULAR SURGERY PROCEDURE UNLIST  2019  
 left common femoral artery thrombo-embolectomy Family History:  
Problem Relation Age of Onset  No Known Problems Mother  No Known Problems Father  Heart Attack Son 48  
     [place]  
 Heart Disease Other  Lung Cancer Son   
     also had pulmonary fibrosis Social History Socioeconomic History  Marital status:  Spouse name: Not on file  Number of children: Not on file  Years of education: Not on file  Highest education level: Not on file Occupational History  Occupation: retired hay bailer Social Needs  Financial resource strain: Not on file  Food insecurity:  
  Worry: Not on file Inability: Not on file  Transportation needs:  
  Medical: Not on file Non-medical: Not on file Tobacco Use  Smoking status: Former Smoker Packs/day: 2.00 Years: 50.00 Pack years: 100.00 Last attempt to quit: 10/17/2013 Years since quittin.7  Smokeless tobacco: Never Used  Tobacco comment: quit spring 2019 Substance and Sexual Activity  Alcohol use: No  
 Drug use: No  
 Sexual activity: Not on file Lifestyle  Physical activity:  
  Days per week: Not on file Minutes per session: Not on file  Stress: Not on file Relationships  Social connections:  
  Talks on phone: Not on file Gets together: Not on file Attends Yazdanism service: Not on file Active member of club or organization: Not on file Attends meetings of clubs or organizations: Not on file Relationship status: Not on file  Intimate partner violence: Fear of current or ex partner: Not on file Emotionally abused: Not on file Physically abused: Not on file Forced sexual activity: Not on file Other Topics Concern  Not on file Social History Narrative Lives alone ALLERGIES: Lortab [hydrocodone-acetaminophen]; Lortab [hydrocodone-acetaminophen]; and Other medication Review of Systems Constitutional: Negative for chills, fatigue, fever and unexpected weight change. Respiratory: Negative for cough, chest tightness, shortness of breath, wheezing and stridor. Cardiovascular: Negative for chest pain and palpitations. Gastrointestinal: Negative for abdominal pain, constipation, diarrhea, nausea and vomiting. Musculoskeletal: Negative for arthralgias, neck pain and neck stiffness. Skin: Negative. All other systems reviewed and are negative. Vitals:  
 07/09/19 1151 07/09/19 1216 BP: 136/88 Pulse: 85 Resp: 18 Temp:  96.8 °F (36 °C) SpO2: 94% Weight: 61.7 kg (136 lb) Height: 5' 6\" (1.676 m) Physical Exam  
Constitutional: He is oriented to person, place, and time. He appears well-developed and well-nourished. No distress. HENT:  
Head: Normocephalic and atraumatic. Right Ear: External ear normal.  
Left Ear: External ear normal.  
Mouth/Throat: No oropharyngeal exudate. Eyes: Pupils are equal, round, and reactive to light. Conjunctivae and EOM are normal. Right eye exhibits no discharge. Left eye exhibits no discharge. No scleral icterus. Neck: Normal range of motion. Neck supple. Cardiovascular: Normal rate, regular rhythm and normal heart sounds. Pulmonary/Chest: Effort normal. No stridor. No respiratory distress. He has no wheezes. He has no rales. He exhibits no tenderness. Abdominal: Soft. He exhibits no mass. There is no tenderness. There is no guarding. Musculoskeletal: Normal range of motion. He exhibits no edema or tenderness. Neurological: He is alert and oriented to person, place, and time. No cranial nerve deficit. No focal weakness speech normal  
Skin: Skin is warm and dry. Capillary refill takes less than 2 seconds. No rash noted. He is not diaphoretic. No erythema. No pallor. Psychiatric: He has a normal mood and affect. His behavior is normal.  
Nursing note and vitals reviewed. MDM Number of Diagnoses or Management Options Non-intractable vomiting with nausea, unspecified vomiting type:  
Diagnosis management comments: Patient looks much better after IV hydration. The family is comfortable going home and they were given return precautions and will follow up with oncology. Nathalie Dominique MD; 7/9/2019 @5:12 PM Voice dictation software was used during the making of this note. This software is not perfect and grammatical and other typographical errors may be present. This note has not been proofread for errors. 
=================================================================== Amount and/or Complexity of Data Reviewed Clinical lab tests: ordered and reviewed (Results for orders placed or performed during the hospital encounter of 80/36/50 
-METABOLIC PANEL, COMPREHENSIVE Result                      Value             Ref Range Sodium                      136               136 - 145 mm* Potassium                   4.7               3.5 - 5.1 mm* Chloride                    102               98 - 107 mmo* CO2                         25                21 - 32 mmol* Anion gap                   9                 7 - 16 mmol/L Glucose                     134 (H)           65 - 100 mg/* BUN                         29 (H)            8 - 23 MG/DL Creatinine                  1.83 (H)          0.8 - 1.5 MG* 
     GFR est AA                  46 (L)            >60 ml/min/1* GFR est non-AA              38 (L)            >60 ml/min/1* Calcium                     9.7               8.3 - 10.4 M* Bilirubin, total            0.6               0.2 - 1.1 MG* ALT (SGPT)                  30                12 - 65 U/L   
     AST (SGOT)                  69 (H)            15 - 37 U/L Alk. phosphatase            131               50 - 136 U/L Protein, total              7.7               6.3 - 8.2 g/* Albumin                     3.0 (L)           3.2 - 4.6 g/* Globulin                    4.7 (H)           2.3 - 3.5 g/* A-G Ratio                   0.6 (L)           1.2 - 3.5     
-LIPASE Result                      Value             Ref Range Lipase                      139               73 - 393 U/L  
-CBC WITH AUTOMATED DIFF Result                      Value             Ref Range WBC                         7.6               4.3 - 11.1 K* 
     RBC                         3.28 (L)          4.23 - 5.6 M* HGB                         10.9 (L)          13.6 - 17.2 * HCT                         31.5 (L)          41.1 - 50.3 % MCV                         96.0              79.6 - 97.8 * MCH                         33.2 (H)          26.1 - 32.9 * MCHC                        34.6              31.4 - 35.0 * RDW                         15.8 (H)          11.9 - 14.6 % PLATELET                    166               150 - 450 K/* MPV                         11.1              9.4 - 12.3 FL ABSOLUTE NRBC               0.00              0.0 - 0.2 K/* DF                          AUTOMATED NEUTROPHILS                 84 (H)            43 - 78 % LYMPHOCYTES                 8 (L)             13 - 44 % MONOCYTES                   6                 4.0 - 12.0 % EOSINOPHILS                 1                 0.5 - 7.8 %      BASOPHILS                   1                 0.0 - 2.0 %   
 IMMATURE GRANULOCYTES       1                 0.0 - 5.0 %   
     ABS. NEUTROPHILS            6.4               1.7 - 8.2 K/* ABS. LYMPHOCYTES            0.6               0.5 - 4.6 K/* ABS. MONOCYTES              0.5               0.1 - 1.3 K/* ABS. EOSINOPHILS            0.1               0.0 - 0.8 K/* ABS. BASOPHILS              0.1               0.0 - 0.2 K/* ABS. IMM. GRANS.            0.1               0.0 - 0.5 K/* 
-POC LACTIC ACID Result                      Value             Ref Range Lactic Acid (POC)           1.80              0.5 - 1.9 mm* ) Tests in the radiology section of CPT®: ordered and reviewed (Xr Abd Acute W 1 V Chest 
 
Result Date: 7/9/2019 Acute abdomen series July 9, 2019 Reference exam: July 1, 2019 INDICATION: Acute moderate nausea and vomiting FINDINGS: Chest x-ray shows clear lungs with left-sided electronic device again seen, cardiac silhouette measures enlarged, pulmonary vascularity appear normal. There is no free air noted on the upright imaging. Bowel gas pattern appears normal. There is no suspicious mass or suspicious calcification seen. Orthopedic hardware is again seen in the pelvis with apparent anchor points from a mesh. IMPRESSION: Bowel gas pattern appears normal. Cardiac silhouette measures enlarged without evidence of active failure. Ct Head Wo Cont Result Date: 7/9/2019 CT Brain Without Contrast Dated  July 9, 2019 Reference Exam: None Indication: Awoke this a.m. with nausea and vomiting Technique: Radiation dose reduction techniques were used for this study using one or more of the following: automated exposure control; adjustment of mA and/or kV (according to patient size);  iterative reconstruction. Routine CT of the brain was performed using 5 mm axial images obtained. Images are presumed to have been obtained less than 24 hours from presentation. Findings:   The ventricular system, basilar cisterns, and cortical sulci all are normal in size and position for the patient's age. There are no abnormal intracranial masses, mass effect, nor extra-axial collections seen. There are no abnormal areas of attenuation that would indicate a recent intracranial hemorrhage or infarction. Bone windows show no fractures nor foreign bodies. Impression:  Normal CT of the brain done without contrast. ) Procedures

## 2019-07-09 NOTE — PROGRESS NOTES
RNCM received call from pt's brother-in-law that he was \"sick\". Spoke w/ pt and he reports vomiting X 1, dizzy when rises from couch. Denies any chest pain, SOB, stroke-like symptoms, etc.  Encouraged to drink when able and report continued symptoms to MD or seek emergency care if indicated. Verbalizes understanding. Plan to f/u tomorrow. This note will not be viewable in [address]

## 2019-07-09 NOTE — ED TRIAGE NOTES
Pt was recently 4 days ago diagnosed with lung cancer. Supposed to see the oncologist Thursday. Woke up this morning with uncontrollable nausea and vomiting. Vomit is yellow. Feels very weak and appears dehydrated.

## 2019-07-09 NOTE — DISCHARGE INSTRUCTIONS
Patient Education        Nausea and Vomiting: Care Instructions  Your Care Instructions    When you are nauseated, you may feel weak and sweaty and notice a lot of saliva in your mouth. Nausea often leads to vomiting. Most of the time you do not need to worry about nausea and vomiting, but they can be signs of other illnesses. Two common causes of nausea and vomiting are stomach flu and food poisoning. Nausea and vomiting from viral stomach flu will usually start to improve within 24 hours. Nausea and vomiting from food poisoning may last from 12 to 48 hours. The doctor has checked you carefully, but problems can develop later. If you notice any problems or new symptoms, get medical treatment right away. Follow-up care is a key part of your treatment and safety. Be sure to make and go to all appointments, and call your doctor if you are having problems. It's also a good idea to know your test results and keep a list of the medicines you take. How can you care for yourself at home? · To prevent dehydration, drink plenty of fluids, enough so that your urine is light yellow or clear like water. Choose water and other caffeine-free clear liquids until you feel better. If you have kidney, heart, or liver disease and have to limit fluids, talk with your doctor before you increase the amount of fluids you drink. · Rest in bed until you feel better. · When you are able to eat, try clear soups, mild foods, and liquids until all symptoms are gone for 12 to 48 hours. Other good choices include dry toast, crackers, cooked cereal, and gelatin dessert, such as Jell-O. When should you call for help? Call 911 anytime you think you may need emergency care. For example, call if:    · You passed out (lost consciousness).    Call your doctor now or seek immediate medical care if:    · You have symptoms of dehydration, such as:  ? Dry eyes and a dry mouth. ? Passing only a little dark urine. ?  Feeling thirstier than usual.   · You have new or worsening belly pain.     · You have a new or higher fever.     · You vomit blood or what looks like coffee grounds.    Watch closely for changes in your health, and be sure to contact your doctor if:    · You have ongoing nausea and vomiting.     · Your vomiting is getting worse.     · Your vomiting lasts longer than 2 days.     · You are not getting better as expected. Where can you learn more? Go to http://rachel-amarilys.info/. Enter [phone] [id number] in the search box to learn more about \"Nausea and Vomiting: Care Instructions. \"  Current as of: September 23, 2018  Content Version: 11.9  © 0823-0898 [employer]. Care instructions adapted under license by [employer] (which disclaims liability or warranty for this information). If you have questions about a medical condition or this instruction, always ask your healthcare professional. [address] any warranty or liability for your use of this information.

## 2019-07-10 NOTE — PROGRESS NOTES
RNCM call to pt. He reports feeling much better this am, no more nausea/ vomiting, tolerating POs. Aware of Hem/Onc labs and appmt tomorrow. No needs/ issues. Plan to f/u next week or so, agreeable, appreciative. This note will not be viewable in [address]

## 2019-07-11 NOTE — PROGRESS NOTES
New Patient Consult  New Patient Abstract  Alejandro Iba        Reason for Referral: Sarah Sung     Referring Provider: Chary Mott     Primary Care Provider: Bobbi Montiel MD     Family History of Cancer/Hematologic Disorders:  Family history significant for son  of lung cancer.      Presenting Symptoms:  Left sided pleuritic chest pain     Narrative with recent with Results/Procedures/Biopsies and Dates completed: Mr. Duke Mi is an 82year old white male with a history of tobacco use (former 2 pack per day smoker x 50+ years; quit approximately 4 months ago), prostate cancer, sinoatrial node dysfunction, OA, NSTEMI, ischemia of LLE, hypokalemia, HTN, HLD, inguinal hernia, heart failure, GERD, elevated PSA, elevated ferritin, depression, stage 3 CKD, CHF, COPD, chronic respiratory failure with home O2 of 2 L NC, CAD, BPH, A-Fib, arthritis, anemia, AICD placement, left common femoral artery thrombo-embolectomy, prostate biopsy, heart catheterization, bilateral cataract removal and multiple orthopedic surgeries. He was diagnosed with prostate cancer in 2016. Prostate biopsy on 10/20/16 revealed highest grade [place] 4+5 = 9 adenocarcinoma with 11 out of 14 cores involved, one of which showed fat adjacent to the tumor suspicious retroprostatic extension. His pre-biopsy PSA was 11 corrected to 22 for Proscar use. His prostate size on MRI was 43 g, and there was concern of early capsular invasion on the left. He was staged with TIIb disease. His prostate cancer was treated with hydrogel, goal seed markers, and Lupron.  Per most recent urology note from Dr. Mera Ba on 3/29/19, his last PSA was stable at 0.04 in 2019.  His last  6 month injection of Lupron was in 2018.      He presented to the [facility] ED on 19 reporting sternal pain radiating to his right side of his chest with symptoms having started approximately 1-2 weeks prior.  Chest pain was initially aggravated with walking and relived with rest, but symptoms had progressed with patient experiencing chest pain at rest as well. He was also noted to have pain with deep breath that could not be recreated with palpation. A VQ scan performed on 6/24/19 showed low probability for PE. He was admitted and underwent cardiac catheterization by Dr. Choi that revealed mild, non-obstructive CAD.  He was discharged on 6/26/19 with outpatient cardiology and pulmonology follow-up arranged.      On 7/1/19, he returned to the [facility] ED reporting left sided pleuritic chest pain which he noted was different from the pain he was having prior to his heart cath. A CT of the chest to rule out PE was completed on admission showing a left hilar mass and extensive mediastinal LAD. Pulmonology and Heme/Onc were consulted. He underwent bronchoscopy with endobronchial ultrasound guided fine needle aspiration of hilar/mediastinal lymph nodes and airway inspection on 7/3. Station 11Rs and the L hilar mass itself were biopsied and were positive for malignancy on TESSIE. Final cytology from FNA of 11R superior lymph node and left hilar mass revealed the presence of malignant cells. Immunohistochemistry from the FNA of the left hilar mass was positive for cytokeratin 7, but negative for the other tested markers. Pathologist noted that the absence of staining for TTF-1, napsin, and p40 does not exclude the possibility of lung origin in the tumor. However, the absence of NKX3.1 staining makes metastatic prostate carcinoma far less likely.      Patient was discharged on 7/4/19 with instructions for outpatient oncology follow-up within one week of discharge. CT of the brain, performed on 7/9/19 was unremarkable. Patient is scheduled for PET CT on 7/16/19.  He now presents to [facility] for oncology evaluation and treatment of what is presumed to be at least stage IIIC, likely non-small cell, lung cancer.      MRI PROSTATE W WO CONTRAST 9/6/2016  FINDINGS: Estimated gland size 43 grams. There are 2 areas of intermediate to high concern for clinically significant malignancy identified within the prostate:  1.  There is a discrete focus of high suspicion for clinically significant malignancy identified in the left posterolateral peripheral zone in the middle third of the prostate.  This measures 13 mm in maximal diameter.  This is of high signal on the diffusion weighted images and heterogeneous diminished signal on the ADC map, portions of which are quite low signal. This is associated with indistinctness of the capsular margin and some spiculation in the contiguous fat raising concern of capsular invasion and early extracapsular extension. Moderate hyperemia.  This is best appreciated on axial T2 image 17, DWI image 112, ADC map 108. PI-RADS 4.  2. There is a 1.2 cm focus of diminished T2 signal intensity in the there are posterior medial peripheral zone just to the right of midline in the apex of the prostate. This is of mildly increased signal on the diffusion weighted images, mildly decreased signal on the ADC map. Mild hyperemia. This is best seen on images 99 20 of the axial T2-weighted images, image 136 DWI and image 132 ADC. PI-RADS 3. Other than the potential early extracapsular extension along the left posterolateral margin, no abnormal periprostatic signal. Seminal vesicles are normal in appearance pill No suspicious enlargement of pelvic nodes. Orthopedic hardware bridging the SI joints superiorly.  Healed fractures of the superior and inferior pubic rami.  Bilateral inguinal hernias. Advanced arthritic changes right hip. IMPRESSION:  1. 2 FOCI OF INTERMEDIATE TO HIGH SUSPICION FOR CLINICALLY SIGNIFICANT MALIGNANCY ARE IDENTIFIED IN THE PERIPHERAL ZONE, 1 IN THE LEFT POSTEROLATERAL PERIPHERAL ZONE MIDDLE THIRD OF THE PROSTATE (PI-RADS 4) AND THE OTHER IN THE RIGHT POSTERIOR MEDIAL PERIPHERAL ZONE AT THE APEX (PI-RADS 3).   2. MILD IRREGULARITY OF THE CAPSULE ABUTTING THE LESION ON THE LEFT. SUSPECT CAPSULAR INVASION WITH CONE POTENTIAL EARLY EXTRACAPSULAR EXTENSION. 3. ADDITIONAL POST-TRAUMATIC AND ARTHRITIC CHANGES AS WELL AS BILATERAL INGUINAL HERNIAS AS ABOVE.     SURGICAL PATHOLOGY REPORT 10/20/2016  FINAL PATHOLOGIC DIAGNOSIS:  A. PROSTATE, LEFT LATERAL BASE, CORE BIOPSY:   PROSTATIC ADENOCARCINOMA, RAUL SCORE 3 + 3 = 6 (GRADE GROUP 1).  TUMOR INVOLVES LESS THAN 5% OF CORE (LINEAR EXTENT LESS THAN 1 MM).  PERINEURAL INVASION PRESENT. B. PROSTATE, LEFT LATERAL MID, CORE BIOPSY:   PROSTATIC ADENOCARCINOMA, RAUL SCORE 3 + 3 = 6 (GRADE GROUP 1).  TUMOR INVOLVES 25% OF CORE (LINEAR EXTENT 4 MM).  PERINEURAL INVASION PRESENT. C. PROSTATE, LEFT LATERAL APEX, CORE BIOPSY:   PROSTATIC ADENOCARCINOMA, RAUL SCORE 3 + 4 = 7 (GRADE GROUP 2).  TUMOR INVOLVES 40% OF CORE (LINEAR EXTENT 6 MM).  PERINEURAL INVASION PRESENT. D. PROSTATE, LEFT MEDIAL BASE, CORE BIOPSY:   BENIGN SEMINAL VESICLE TISSUE. E. PROSTATE, LEFT MEDIAL MID, CORE BIOPSY:   PROSTATIC ADENOCARCINOMA, RAUL SCORE 3 + 3 = 6 (GRADE GROUP 1).  TUMOR INVOLVES 10% OF CORE (LINEAR EXTENT 1.5 MM).  PERINEURAL INVASION PRESENT. F. PROSTATE, LEFT MEDIAL APEX, CORE BIOPSY:   PROSTATIC ADENOCARCINOMA, RAUL SCORE 3 + 3 = 6 (GRADE GROUP 1).  TUMOR INVOLVES 25% OF CORE (LINEAR EXTENT 3 MM). G. PROSTATE, RIGHT MEDIAL BASE, CORE BIOPSY:   PROSTATIC ADENOCARCINOMA, RAUL SCORE 3 + 3 = 6 (GRADE GROUP 1).  TUMOR INVOLVES LESS THAN 10% OF CORE (LINEAR EXTENT 1 MM).  PERINEURAL INVASION PRESENT. H. PROSTATE, RIGHT MEDIAL MID, CORE BIOPSY:   PROSTATIC ADENOCARCINOMA, RAUL SCORE 3 + 3 = 6 (GRADE GROUP 1).  TUMOR INVOLVES 45% OF CORE (LINEAR EXTENT 5 MM).  PERINEURAL INVASION PRESENT. [address] [facility], RIGHT MEDIAL APEX, RIGHT LATERAL BASE, CORE BIOPSIES:   BENIGN PROSTATE TISSUE.  K.  PROSTATE, RIGHT LATERAL MID, CORE BIOPSY:   PROSTATIC ADENOCARCINOMA, RAUL SCORE 3 + 5 = 8 (GRADE GROUP 4).  TUMOR INVOLVES 25% OF CORE (LINEAR EXTENT 4 MM).  PERINEURAL INVASION PRESENT.  TUMOR ADJACENT TO FAT SUSPICIOUS FOR EXTRAPROSTATIC EXTENSION. L. PROSTATE, RIGHT LATERAL APEX, CORE BIOPSY:   PROSTATIC ADENOCARCINOMA, RAUL SCORE 4 + 5 = 9 (GRADE GROUP 5).  TUMOR INVOLVES 30% OF CORE (LINEAR EXTENT 6 MM).  PERINEURAL INVASION PRESENT. M. PROSTATE, \"LEFT POSTERIOR LATERAL PZ MIDDLE THIRD PIRAD 4\", CORE BIOPSIES:   PROSTATIC ADENOCARCINOMA, RAUL SCORE 4 + 3 = 7 (GRADE GROUP 3).  TUMOR INVOLVES THREE OF FOUR CORE PIECES, APPROXIMATELY 20% OF TISSUE (GREATEST  LINEAR EXTENT 6 MM).  PERINEURAL INVASION PRESENT. N. PROSTATE, \"RIGHT POSTERIOR MEDIAL PZ APEX PIRAD 3\", CORE BIOPSIES:   PROSTATIC ADENOCARCINOMA, RAUL SCORE 3 + 5 = 8 (GRADE GROUP 4).  TUMOR INVOLVES THREE OF THREE CORE PIECES, APPROXIMATELYI 60% OF TISSUE (GREATEST  LINEAR EXTENT 12 MM). +   PERINEURAL INVASION PRESENT.     CT ABDOMEN PELVIS W WO CONTRAST 10/27/2016  FINDINGS:  LOWER CHEST: There is a triangular soft tissue density in the posteromedial right lung base measuring 1.5 x 1.4 cm in size (image 27). Although indeterminate on the basis of current CT appearance, a triangular configuration leads me to suspect that this is an area focal infarct or scarring and I will say that this is all that remains of a much larger area of consolidation in this region on 10/17/2013. Mild peripheral and basilar fibrotic changes. No pleural or pericardial effusion. Cardiomegaly with disproportionate enlargement of the left-sided cardiac chambers. LIVER: Mild hepatic steatosis.  No discrete mass. No bile duct dilatation. Tiny stone in the dependent gallbladder. No pericholecystic inflammatory changes. SPLEEN: Negative. ADRENAL GLANDS: Negative. PANCREAS: Negative. KIDNEYS: Symmetric enhancement without evidence of significant renal parenchymal lesion.   LYMPH NODES: No enlarged retroperitoneal lymph nodes are observed.  No mesenteric adenopathy. PELVIC CT: Patchy enhancement peripheral zone bilaterally in the apical and middle thirds prostate consistent with patient's known prostate cancer.  No evidence of suspicious pelvic adenopathy. Evidence of previous ORIF pelvic fractures.  No superimposed acute osseous abnormalities observed. Bilateral inguinal hernias. Moderate degenerative changes throughout what is seen of the spine, commensurate with age. IMPRESSION:  1. PATCHY ENHANCEMENT PERIPHERAL ZONE MIDDLE AND APICAL THIRDS OF THE PROSTATE BILATERALLY, NONSPECIFIC BUT CONSISTENT WITH CLINICAL HISTORY OF PROSTATE CANCER. 2. NO EVIDENCE OF ABDOMINAL OR PELVIC ADENOPATHY. 3. POST-TRAUMATIC CHANGES IN THE PELVIS.  NO EVIDENCE OF BONE METASTASES. 4. CARDIOMEGALY. 5. TRIANGULAR SOFT TISSUE DENSITY RIGHT LUNG BASE, LIKELY THE RESIDUA OF FAR MORE EXTENSIVE PROCESS DOCUMENTED THIS REGION 11/17/2013.  6. CHOLELITHIASIS.     CT CHEST FOR PULMONARY EMBOLUS DONE WITH IV CONTRAST 7/1/2019  FINDINGS: There is no filling defect seen to suggest a pulmonary embolus. However, there is extensive mediastinal adenopathy, with a anterior mediastinal node measuring up to 1.8 x 1.1 cm, aortopulmonary window nodes up to 1.5 x 1 cm. Azygous node measures 1.4 x 1 cm, with right hilar nodes up to 2.3 x 1.4 cm. Large mass is seen in the left hilum extending into the supra and infrahilar regions and into the mediastinum along the pulmonary artery, measuring up to 5.6 x 5.9 cm, the adrenals are only partially visualized but no masses are seen. Respiratory motion decreases sensitivity for small lesions, there are  emphysematous changes noted in both lungs with dependent edema seen, with what may be a pulmonary nodule or mass posteromedially pleural-based in the right lung base measuring up to 1.9 x 1.3 cm. Small left-sided effusion is seen.   IMPRESSION: Probable malignancy in the left hilum and mediastinum as described above with some cyst anterior mediastinal nodes, small left effusion. What may be an enhancing mass pleural-based is seen in the right lung base medially although could be focal atelectasis.     NM BONE SCAN 10/27/2016  FINDINGS: Increased periarticular localization is documented in the shoulders, sternoclavicular joints, wrists, and knees. There is a mild increase in localization of the cervical spine which is not uncommon this age group. Linear focus of increased localization is documented at T8-9. This is likely discogenic in etiology as marked disc space narrowing and endplate irregularity are evident in this location on the CT scan. Similarly there is increased localization in the distribution of the facets bilaterally at L5-S1, another site where relatively severe arthritic changes are documented on this morning's CT scan. No additional findings to suggest bone metastases. Incidental note is made of photopenic defect left upper chest related to pacemaker. IMPRESSION: No evidence of bone metastases.     [facility] CYTOLOGY REPORT 7/3/2019  DIAGNOSIS   11R Superior Lymph Node, Fine Needle Aspiration:   MALIGNANT CELLS PRESENT. See concurrent case [id number].     [facility] CYTOLOGY REPORT 7/3/2019  DIAGNOSIS   Left Hilar Mass, Fine Needle Aspiration:   MALIGNANT CELLS PRESENT. Cell block: Malignant cells present. See concurrent case [id number]. [facility]-IMMUNOHISTOCHEMISTRY   Immunohistochemical Stain Panel:   INTERPRETATION: Cytokeratin 7 positive carcinoma. COMMENT: The tumor appears positive for cytokeratin 7, but is negative for the other tested markers. The absence of staining for TTF-1, napsin, and p40 does not exclude the possibility of lung origin in the tumor. However, the absence of NKX3.1 staining makes metastatic prostate carcinoma far less likely.    ANTIBODY/TEST       MARKER FOR                                                           RESULT   Cytokeratin 7               Lung, breast, upper GE, serous ovary Positive   Cytokeratin 20             Colon, mucinous ovary, urothelium                            Negative   Napsin                         Lung adenocarcinoma                                                Negative   TTF-1                          Lung and thyroid adenocarcinoma                             Negative   P40                              Useful in diagnosis of squamous cell carcinoma       Negative   NKX3. 1                        Prostatic carcinoma                                                    Negative     CT BRAIN WITHOUT CONTRAST DATED 7/9/19  FINDINGS:  The ventricular system, basilar cisterns, and cortical sulci all are normal in size and position for the patient's age. Margert Andre are no abnormal intracranial masses, mass effect, nor extra-axial collections seen. Margert Andre are no abnormal areas of attenuation that would indicate a recent intracranial hemorrhage or infarction.  Bone windows show no frahilar mctures nor foreign bodies.   IMPRESSION:  Normal CT of the brain done without contrast.     Notes from Referring Provider: N/A     Other Pertinent Information: None     Presented at Tumor Board:  No           Electronically signed by Ssakia Barry RN at 07/10/19 [id number]

## 2019-07-12 NOTE — PROGRESS NOTES
Spoke with Fina Crisostomo, patient's daughter, who states he is finally resting after taking medication. Pt has experienced nausea and vomiting this morning but has not attempted Zofran. Instructed to try Zofran sublingual. Also advised to push plenty of fluids but it sound like the family is doing an excellent job getting fluids and small meals into patient. Pt c/o pain 9 out of 10. Tramadol prescription has been phoned in to [facility]. Pt has been experiencing moderately low Blood pressure but saw Cardiologist yesterday who stopped Lasix and Lisinopril. We offered fluids. Family states they will call back if needed. Encouraged to see Urgent Care or ED if conditions worsens over the weekend or if BP continues to decline. Also encouraged to call back on Monday if condition worsens or does not improve. Encouraged to call with questions. Navigation will continue to follow.

## 2019-07-15 PROBLEM — R11.2 NAUSEA WITH VOMITING: Status: ACTIVE | Noted: 2019-01-01

## 2019-07-15 PROBLEM — N17.9 AKI (ACUTE KIDNEY INJURY) (HCC): Status: ACTIVE | Noted: 2019-01-01

## 2019-07-15 NOTE — PROGRESS NOTES
Problem: Mobility Impaired (Adult and Pediatric) Goal: *Acute Goals and Plan of Care Description Acute PT Goals: (1.)Ac Shukla  will move from supine to sit and sit to supine , scoot up and down and roll side to side with MODIFIED INDEPENDENCE within 7 treatment day(s). (2.)Ac Shukla will transfer from bed to chair and chair to bed with MODIFIED INDEPENDENCE using the least restrictive device within 7 treatment day(s). (3.)Ac Shukla will ambulate with MODIFIED INDEPENDENCE for 250+ feet with the least restrictive device within 7 treatment day(s). (4.)Ac Shukla will perform standing static and dynamic balance activities x 23 minutes with MODIFIED INDEPENDENCE to improve safety and activity tolerance within 7 treatment day(s). (5.)Ac Shukla will ascend and descend 2 stairs using bilateral hand rail(s) with MODIFIED INDEPENDENCE to improve functional mobility and safety within 7 treatment day(s). (6.)Ac Shukla will perform bilateral lower extremity exercises x 15 min for HEP with MODIFIED INDEPENDENCE to improve strength, endurance, and functional mobility within 7 treatment day(s). PHYSICAL THERAPY: Initial Assessment, Daily Note and AM 7/15/2019 INPATIENT: PT Visit Days : 1 [place] / [place] B / Product Type: Medicare /   
  
NAME/AGE/GENDER: Douglas Baeza is a 82 y.o. male PRIMARY DIAGNOSIS: JAZZY (acute kidney injury) ([facility] [N17.9] Nausea with vomiting [R11.2] JAZZY (acute kidney injury) ([facility] [address] JAZZY (acute kidney injury) ([facility] [address] ICD-10: Treatment Diagnosis: 
 Generalized Muscle Weakness (M62.81) Difficulty in walking, Not elsewhere classified (R26.2) Other abnormalities of gait and mobility (R26.89) Precaution/Allergies: 
Lortab [hydrocodone-acetaminophen]; Lortab [hydrocodone-acetaminophen]; and Other medication ASSESSMENT:  
 
Mr. Jeanette Shukla is an 82year old M who presents to hospital with nausea, vomiting, SOB and weakness. Pt recently diagnosed with stage 3 lung cancer, hx of prostate cancer. Prior to hospital admission pt lives alone in a one story home with no step(s) to enter. Pt endorses no falls in past 6 months. Prior to admission Mr. Candelaria Matthew uses usually no DME for mobility, but recently due to onset of weakness he has been using a walker for mobility. He also reports using \"a little bit\" of oxygen at baseline. Upon entering, pt supine in bed, agreeable to PT evaluation. he reports 4/10 pain in his abdominal region at rest. BLE assessment indicates sensation to light touch intact, AROM WFL, and strength grossly diminished but functional. Pt performed supine > sit with SBA, sitting EOB with good sitting balance control. Sit > stand with CGA/Min A hand held assist, no assistive device. Pt transferred from EOB to bedside chair with Min A hand held assist, only fair standing balance control with noted unsteadiness. Treatment initiated to include BLE strengthening exercises to promote strength and activity tolerance. Gait x15 ft in room to access bathroom, Min A hand held assist. Cues for sequencing and safety. At end of session pt sitting in bedside chair with all needs within reach, alarm activated for safety, RN notified. Pt presents as functioning below his baseline, with deficits in mobility including transfers, gait, balance, and activity tolerance. Pt will benefit from skilled therapy services to address stated deficits to promote return to highest level of function, independence, and safety. Will continue to follow. This section established at most recent assessment PROBLEM LIST (Impairments causing functional limitations): 
Decreased Strength Decreased Transfer Abilities Decreased Ambulation Ability/Technique Decreased Balance Increased Pain Decreased Activity Tolerance INTERVENTIONS PLANNED: (Benefits and precautions of physical therapy have been discussed with the patient.) Balance Exercise Bed Mobility Family Education Gait Training Home Exercise Program (HEP) Manual Therapy Neuromuscular Re-education/Strengthening Range of Motion (ROM) Therapeutic Activites Therapeutic Exercise/Strengthening Transfer Training TREATMENT PLAN: Frequency/Duration: 3 times a week for duration of hospital stay Rehabilitation Potential For Stated Goals: Good REHAB RECOMMENDATIONS (at time of discharge pending progress):   
Placement: It is my opinion, based on this patient's performance to date, that Mr. Candelaria Matthew may benefit from intensive therapy at a [address] [place] [address] after discharge due to the functional deficits listed above that are likely to improve with skilled rehabilitation and concerns that he/she may be unsafe to be unsupervised at home due to medical complications and mobility deficits which impact his level of function and put him at increased risk of falling and/or functional decline. Equipment:  
None at this time HISTORY:  
History of Present Injury/Illness (Reason for Referral): 
Per H&P: \"Mr. Candelaria Matthew is an 83 y/o male with hx prostate cancer, CAD, afib, CHF, GERD,  HTN, HLD, AICD who was recently diagnosed with stage 3 lung cancer earlier this month. This week he has had nausea and vomiting and tonight is the second ER visit this week for same. He is unable to keep food or liquids down. No abdominal pain. No constipation or diarrhea. Denies fever or chills. He has worsening JAZZY with a Bun 56/Cr2.20. Will admit for further treatment. \" 
Past Medical History/Comorbidities:  
Mr. Candelaria Matthew  has a past medical history of Abnormal EKG, Acute kidney injury ([address] AICD (automatic cardioverter/defibrillator) present, Alterations of sensations, Anemia, Arthritis, Back pain (6/17/2016), Benign prostatic hyperplasia (6/17/2016), Bilateral pubic rami fractures ([address] CAD (coronary artery disease), CHF (congestive heart failure) ([address] Chronic obstructive pulmonary disease ([facility] Chronic systolic CHF (congestive heart failure) ([facility] [address] (10/7/2014), CRI (chronic renal insufficiency), Depression, Dizziness, Dyspnea, Elevated ferritin, Elevated PSA (6/17/2016), GERD (gastroesophageal reflux disease), Heart failure ([facility] [address] Hernia, inguinal (6/17/2016), Hyperlipidemia (10/19/2015), Hypertension, Hypokalemia (6/17/2016), Ischemia of left lower extremity (3/13/2019), Keratosis, actinic, Leg cramps, Malaise and fatigue (6/17/2016), NSTEMI (non-ST elevated myocardial infarction) ([facility] [address] (6/24/2019), OA (osteoarthritis) of hip (9/16/2016), Orthostasis, Pneumonia, Respiratory failure ([facility] [address] [facility] (6/17/2016), Sinoatrial node dysfunction ([facility] and Sinusitis. Mr. Marti Almazan  has a past surgical history that includes hx pacemaker; hx cataract removal; hx other surgical (4/2006); hx other surgical; hx other surgical; hx orthopaedic; hx carpal tunnel release; hx heart catheterization; hx cataract removal; hx colonoscopy; hx other surgical; and vascular surgery procedure unlist (03/13/2019). Social History/Living Environment:  
Home Environment: Private residence # Steps to Enter: 0 One/Two Story Residence: One story Living Alone: Yes Support Systems: Family member(s) Patient Expects to be Discharged to[de-identified] Unknown Current DME Used/Available at Home: Anthony Galeas, amandeep, Koby Mensah, froylan Prior Level of Function/Work/Activity: 
Independent, lives alone. Number of Personal Factors/Comorbidities that affect the Plan of Care: 1-2: MODERATE COMPLEXITY EXAMINATION:  
Most Recent Physical Functioning:  
Gross Assessment: 
AROM: Within functional limits Strength: Generally decreased, functional 
Sensation: Intact Posture: 
  
Balance: 
Sitting: Intact Standing: Impaired Standing - Static: Fair Standing - Dynamic : Fair Bed Mobility: 
Rolling: Stand-by assistance Supine to Sit: Stand-by assistance Sit to Supine: Stand-by assistance Scooting: Stand-by assistance Wheelchair Mobility: 
  
Transfers: 
Sit to Stand: Minimum assistance Stand to Sit: Contact guard assistance Bed to Chair: Minimum assistance Interventions: Safety awareness training; Tactile cues; Verbal cues Gait: 
  
Base of Support: Center of gravity altered;Narrowed Speed/Tonya: Pace decreased (<100 feet/min); Shuffled; Slow Gait Abnormalities: Circumduction;Path deviations;Trunk sway increased; Shuffling gait Distance (ft): 15 Feet (ft) Assistive Device: Gait belt(hand held) Ambulation - Level of Assistance: Minimal assistance(hand held) Body Structures Involved: 
Lungs Muscles Body Functions Affected: 
Respiratory Neuromusculoskeletal 
Movement Related Activities and Participation Affected: 
General Tasks and Demands Mobility Number of elements that affect the Plan of Care: 3: MODERATE COMPLEXITY CLINICAL PRESENTATION:  
Presentation: Stable and uncomplicated: LOW COMPLEXITY CLINICAL DECISION MAKIN[address] [place] [id number] AM-PAC? ?6 Clicks? Basic Mobility Inpatient Short Form How much difficulty does the patient currently have. .. Unable A Lot A Little None 1. Turning over in bed (including adjusting bedclothes, sheets and blankets)? ? 1   ? 2   ? 3   ? 4  
2. Sitting down on and standing up from a chair with arms ( e.g., wheelchair, bedside commode, etc.)   ? 1   ? 2   ? 3   ? 4  
3. Moving from lying on back to sitting on the side of the bed?   ? 1   ? 2   ? 3   ? 4 How much help from another person does the patient currently need. .. Total A Lot A Little None 4. Moving to and from a bed to a chair (including a wheelchair)? ? 1   ? 2   ? 3   ? 4  
5. Need to walk in hospital room? ? 1   ? 2   ? 3   ? 4  
6. Climbing 3-5 steps with a railing? ? 1   ? 2   ? 3   ? 4  
© 2007, Trustees of [address] [place] [id number], under license to [employer]. All rights reserved Score:  Initial: 20 Most Recent: X (Date: -- ) Interpretation of Tool:  Represents activities that are increasingly more difficult (i.e. Bed mobility, Transfers, Gait). Medical Necessity:    
Patient is expected to demonstrate progress in strength, balance, coordination, functional technique and activity tolerance  
 to improve safety during all mobility. . 
Reason for Services/Other Comments: 
Patient continues to require skilled intervention due to medical complications and mobility deficits which impact his level of function, safety, and independence as indicated above. .  
Use of outcome tool(s) and clinical judgement create a POC that gives a: Clear prediction of patient's progress: LOW COMPLEXITY  
  
 
TREATMENT:  
(In addition to Assessment/Re-Assessment sessions the following treatments were rendered) Pre-treatment Symptoms/Complaints:  No complaints, agreeable and pleasant Pain: Initial:  
Pain Intensity 1: 4 Pain Location 1: Abdomen  Post Session:  Unchanged. Therapeutic Activity: (   15 min): Therapeutic activities including Bed transfers, Chair transfers, Toilet transfers, Ambulation on level ground and BLE exercises  to improve mobility, strength, balance and coordination. Required minimal   cues and hand held assistance to promote static and dynamic balance in standing and promote coordination of bilateral, lower extremity(s). Date: 
7/15/19 Date: 
 Date: Activity/Exercise Parameters Parameters Parameters Seated Ankle Pumps 1 x 20 BLE Seated LAQ 1 x 10 BLE Seated Marches 1 x 10 BLE Sit to Stands 2 x 5 repetitions Braces/Orthotics/Lines/Etc:  
IV 
O2 Device: Nasal cannula Treatment/Session Assessment:   
Response to Treatment:  See above Interdisciplinary Collaboration:  
Physical Therapist 
Registered Nurse After treatment position/precautions:  
Up in chair Bed alarm/tab alert on Bed/Chair-wheels locked Bed in low position Call light within reach RN notified Compliance with Program/Exercises: Will assess as treatment progresses Recommendations/Intent for next treatment session: \"Next visit will focus on advancements to more challenging activities and reduction in assistance provided\". Total Treatment Duration: PT Patient Time In/Time Out Time In: 1121 Time Out: 1146 Ta Cam, PT, DPT

## 2019-07-15 NOTE — PROGRESS NOTES
Problem: Falls - Risk of 
Goal: *Absence of Falls Description Document Nitin Nicholson Fall Risk and appropriate interventions in the flowsheet. Outcome: Progressing Towards Goal 
  
Problem: Pressure Injury - Risk of 
Goal: *Prevention of pressure injury Description Document Chance Scale and appropriate interventions in the flowsheet.  
Outcome: Progressing Towards Goal

## 2019-07-15 NOTE — ED NOTES
TRANSFER - OUT REPORT: 
 
Verbal report given to Karlee Callejas on [place] Lang  being transferred to [facility] for routine progression of care Report consisted of patients Situation, Background, Assessment and  
Recommendations(SBAR). Information from the following report(s) SBAR, ED Summary, [facility] and Recent Results was reviewed with the receiving nurse. Lines:  
Peripheral IV 07/14/19 Right Antecubital (Active) Site Assessment Clean, dry, & intact 7/15/2019 12:14 AM  
Phlebitis Assessment 0 7/15/2019 12:14 AM  
Infiltration Assessment 0 7/15/2019 12:14 AM  
Dressing Status Clean, dry, & intact 7/15/2019 12:14 AM  
Hub Color/Line Status Pink 7/15/2019 12:14 AM  
  
 
Opportunity for questions and clarification was provided. Patient transported with: 
 [employer]

## 2019-07-15 NOTE — PROGRESS NOTES
TRANSFER - IN REPORT: 
 
Verbal report received from [facility] [address] on Marialuisa Vasquez  being received from ER for routine progression of care Report consisted of patients Situation, Background, Assessment and  
Recommendations(SBAR). Information from the following report(s) SBAR, ED Summary, Intake/Output, MAR and Recent Results was reviewed with the receiving nurse. Opportunity for questions and clarification was provided. Assessment completed upon patients arrival to unit and care assumed.

## 2019-07-15 NOTE — PROGRESS NOTES
Interdisciplinary Rounds completed 07/15/19. Nursing, Case Management, Physician and PT present. Plan of care reviewed and updated.  
 
Probable d/c in am.

## 2019-07-15 NOTE — PROGRESS NOTES
Problem: Self Care Deficits Care Plan (Adult) Goal: *Acute Goals and Plan of Care (Insert Text) Description 1. Pt will toilet with SBA 2. Pt will complete functional mobility for ADLs with SBA 3. Pt will complete lower body dressing with SBA using AE as needed 4. Pt will complete grooming and hygiene at sink with SBA 5. Pt will demonstrate independence with HEP to promote increased BUE strength and functional use for ADLs 6. Pt will tolerate 23 minutes functional activity with min or fewer rest breaks to promote increased endurance for ADLs 7. Pt will independently demonstrate/ verbalize 2+ Energy conservation techniques to promote increased endurance for ADLs Timeframe: 7 days Outcome: Progressing Towards Goal 
  
OCCUPATIONAL THERAPY: Initial Assessment and Daily Note 7/15/2019 INPATIENT: OT Visit Days: 1 [place] / [place] B / Product Type: Medicare /  
  
NAME/AGE/GENDER: Francesco Benedict is a 82 y.o. male PRIMARY DIAGNOSIS:  JAZZY (acute kidney injury) ([facility] [address] [N17.9] Nausea with vomiting [R11.2] JAZZY (acute kidney injury) ([facility] [address] JAZZY (acute kidney injury) ([facility] [address] ICD-10: Treatment Diagnosis:  
 Generalized Muscle Weakness (M62.81) Precautions/Allergies: 
   Lortab [hydrocodone-acetaminophen]; Lortab [hydrocodone-acetaminophen]; and Other medication ASSESSMENT:  
 
Mr. Jeny Canas was admitted with N&V, JAZZY. Pt was recently diagnosed with stage 3 lung cancer earlier this month, is on 1L home O2. Pt lives alone and is independent with ADLs, does not use an AD for mobility. Pt's grandson visits pt daily, assists with IADLs as needed and supervises showers. This session, pt presented generally weak with deficits in endurance, mobility, strength, and balance impacting ADLs. Pt A&O X4, cognitively intact. Pt completed bed mobility with SBA, stood and transferred from bed to chair with CGA using RW.  Pt required mod A to don/ doff socks, educated on use of sock aide but was unable to successfully use d/t getting foot and toenail stuck in sock. Pt fatigued and became SOB very quickly with all activity, demonstrated limited activity tolerance for ADLs. BUE strength is generally decreased but functional, grossly 4/5. Pt is below his functional baseline and would benefit from skilled OT services to address deficits to promote increased safety and independence with ADLs. This section established at most recent assessment PROBLEM LIST (Impairments causing functional limitations): 
Decreased Strength Decreased ADL/Functional Activities Decreased Transfer Abilities Decreased Balance Decreased Activity Tolerance Increased Fatigue Increased Shortness of Breath INTERVENTIONS PLANNED: (Benefits and precautions of occupational therapy have been discussed with the patient.) Activities of daily living training Adaptive equipment training Balance training Therapeutic activity Therapeutic exercise TREATMENT PLAN: Frequency/Duration: Follow patient 3 times/ week to address above goals. Rehabilitation Potential For Stated Goals: Good REHAB RECOMMENDATIONS (at time of discharge pending progress):   
Placement: It is my opinion, based on this patient's performance to date, that Mr. Nikita Lepe may benefit from intensive therapy at a [address] [place] [address] after discharge due to the functional deficits listed above that are likely to improve with skilled rehabilitation and concerns that he/she may be unsafe to be unsupervised at home due to impaired ADLs/ fall risk . Equipment:  
None at this time OCCUPATIONAL PROFILE AND HISTORY:  
History of Present Injury/Illness (Reason for Referral): 
See H&P Past Medical History/Comorbidities:  
Mr. Nikita Lepe  has a past medical history of Abnormal EKG, Acute kidney injury ([facility] [address] AICD (automatic cardioverter/defibrillator) present, Alterations of sensations, Anemia, Arthritis, Back pain (6/17/2016), Benign prostatic hyperplasia (6/17/2016), Bilateral pubic rami fractures (HCC), CAD (coronary artery disease), CHF (congestive heart failure) ([address] Chronic obstructive pulmonary disease ([address] Chronic systolic CHF (congestive heart failure) ([address] (10/7/2014), CRI (chronic renal insufficiency), Depression, Dizziness, Dyspnea, Elevated ferritin, Elevated PSA (6/17/2016), GERD (gastroesophageal reflux disease), Heart failure ([address] Hernia, inguinal (6/17/2016), Hyperlipidemia (10/19/2015), Hypertension, Hypokalemia (6/17/2016), Ischemia of left lower extremity (3/13/2019), Keratosis, actinic, Leg cramps, Malaise and fatigue (6/17/2016), NSTEMI (non-ST elevated myocardial infarction) ([address] (6/24/2019), OA (osteoarthritis) of hip (9/16/2016), Orthostasis, Pneumonia, Respiratory failure ([address] (6/17/2016), Sinoatrial node dysfunction ([address] and Sinusitis. Mr. Jayda Cagle  has a past surgical history that includes hx pacemaker; hx cataract removal; hx other surgical (4/2006); hx other surgical; hx other surgical; hx orthopaedic; hx carpal tunnel release; hx heart catheterization; hx cataract removal; hx colonoscopy; hx other surgical; and vascular surgery procedure unlist (03/13/2019). Social History/Living Environment:  
Home Environment: Private residence # Steps to Enter: 0 One/Two Story Residence: One story Living Alone: Yes Support Systems: Family member(s) Patient Expects to be Discharged to[de-identified] Unknown Current DME Used/Available at Home: Walker, rollator, Cane, straight, Tub transfer bench, Grab bars, Raised toilet seat Tub or Shower Type: Tub/Shower combination Prior Level of Function/Work/Activity: 
Independent, lives alone, no AD for mobility Number of Personal Factors/Comorbidities that affect the Plan of Care: Expanded review of therapy/medical records (1-2):  MODERATE COMPLEXITY ASSESSMENT OF OCCUPATIONAL PERFORMANCE[de-identified]  
Activities of Daily Living: Basic ADLs (From Assessment) Complex ADLs (From Assessment) Feeding: Setup Oral Facial Hygiene/Grooming: Contact guard assistance Bathing: Minimum assistance, Moderate assistance Upper Body Dressing: Setup Lower Body Dressing: Minimum assistance, Moderate assistance Toileting: Contact guard assistance Instrumental ADL Meal Preparation: Moderate assistance Homemaking: Maximum assistance Grooming/Bathing/Dressing Activities of Daily Living Grooming Grooming Assistance: Set-up(seated) Cognitive Retraining Safety/Judgement: Awareness of environment; Fall prevention Lower Body Dressing Assistance Socks: Moderate assistance Bed/Mat Mobility Rolling: Stand-by assistance Supine to Sit: Stand-by assistance Sit to Supine: Stand-by assistance Sit to Stand: Minimum assistance Stand to Sit: Contact guard assistance Bed to Chair: Contact guard assistance Scooting: Stand-by assistance Most Recent Physical Functioning:  
Gross Assessment: 
AROM: Within functional limits Strength: Generally decreased, functional 
Coordination: Within functional limits Posture: 
  
Balance: 
Sitting: Intact Standing: Impaired Standing - Static: Fair Standing - Dynamic : Fair Bed Mobility: 
Rolling: Stand-by assistance Supine to Sit: Stand-by assistance Sit to Supine: Stand-by assistance Scooting: Stand-by assistance Wheelchair Mobility: 
  
Transfers: 
Sit to Stand: Minimum assistance Stand to Sit: Contact guard assistance Bed to Chair: Contact guard assistance Interventions: Safety awareness training; Tactile cues; Verbal cues Patient Vitals for the past 6 hrs: 
 BP BP Patient Position SpO2 O2 Flow Rate (L/min) Pulse  
07/15/19 1121    2 l/min   
07/15/19 1329 102/61 At rest 96 %  84  
07/15/19 1353   97 % 2 l/min Mental Status Neurologic State: Alert Orientation Level: Oriented X4 Cognition: Follows commands Perception: Appears intact Perseveration: No perseveration noted Safety/Judgement: Awareness of environment, Fall prevention Physical Skills Involved: 
Balance Strength Activity Tolerance Cognitive Skills Affected (resulting in the inability to perform in a timely and safe manner): 
none  Psychosocial Skills Affected: 
Habits/Routines Number of elements that affect the Plan of Care: 3-5:  MODERATE COMPLEXITY CLINICAL DECISION MAKIN[address] [place] [address] AM-PAC? ?6 Clicks? Daily Activity Inpatient Short Form How much help from another person does the patient currently need. .. Total A Lot A Little None 1. Putting on and taking off regular lower body clothing? ? 1   ? 2   ? 3   ? 4  
2. Bathing (including washing, rinsing, drying)? ? 1   ? 2   ? 3   ? 4  
3. Toileting, which includes using toilet, bedpan or urinal?   ? 1   ? 2   ? 3   ? 4  
4. Putting on and taking off regular upper body clothing? ? 1   ? 2   ? 3   ? 4  
5. Taking care of personal grooming such as brushing teeth? ? 1   ? 2   ? 3   ? 4  
6. Eating meals? ? 1   ? 2   ? 3   ? 4  
© , Trustees of [address] [place] [address], under license to [employer]. All rights reserved Score:  Initial: 21 Most Recent: X (Date: -- ) Interpretation of Tool:  Represents activities that are increasingly more difficult (i.e. Bed mobility, Transfers, Gait). Medical Necessity:    
Patient demonstrates good 
 rehab potential due to higher previous functional level. Reason for Services/Other Comments: 
Patient continues to require present interventions due to patient's inability to complete ADLs Jamila Meraz Use of outcome tool(s) and clinical judgement create a POC that gives a: MODERATE COMPLEXITY  
 
 
 
TREATMENT:  
(In addition to Assessment/Re-Assessment sessions the following treatments were rendered) Pre-treatment Symptoms/Complaints:   
Pain: Initial:  
Pain Intensity 1: 3 Pain Location 1: Abdomen Pain Intervention(s) 1: Declines  Post Session: 2 Self Care: (8 min): Procedure(s) (per grid) utilized to improve and/or restore self-care/home management as related to dressing and grooming. Required minimal   cueing to facilitate activities of daily living skills and compensatory activities. Braces/Orthotics/Lines/Etc:  
IV 
O2 Device: Nasal cannula Treatment/Session Assessment:   
Response to Treatment:  fatigue Interdisciplinary Collaboration: Occupational Therapist 
Registered Nurse After treatment position/precautions:  
Up in chair Bed alarm/tab alert on Bed/Chair-wheels locked Bed in low position Call light within reach RN notified Compliance with Program/Exercises: Compliant all of the time. Recommendations/Intent for next treatment session: \"Next visit will focus on advancements to more challenging activities and reduction in assistance provided\". Total Treatment Duration: OT Patient Time In/Time Out Time In: 3668 Time Out: 1350 Lydia Garcia OT

## 2019-07-15 NOTE — CONSULTS
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 improved with supportive care. If this recurs or persists then a GI evaluation can be considered. His outpatient staging scans including PET scan and brain MRI will need to be rescheduled. Patient should follow-up with oncology after these restaging scans. Alison Hillman MD 
[facility] Hematology/Oncology [address] [place] [address] Office : [phone] Fax : [phone]

## 2019-07-15 NOTE — ED TRIAGE NOTES
Pt arrives with family with c/o n/v and shortness of breath. Onset approx 0900 this am. Family reports unable to keep anything down. Denies diarrhea. Denies chest pain. Family reports recently diagnosed with \"stage 3 lung cancer\". Seen by oncology, first visit, 7/11. Seen here 7/9 for similar symptoms. Family reports hypotension over last couple days.

## 2019-07-15 NOTE — PROGRESS NOTES
Pt c/o headache, Tylenol 650 mg given per MAR. Hourly rounds completed throughout this shift. Bed low and locked. Bed alarm turned on. Call light within pt's reach. Family in room. Pt resting in bed; denies needs at this time. Will continue to monitor and report to oncoming night shift nurse.

## 2019-07-15 NOTE — PROGRESS NOTES
Hourly rounds done. Pt denies pain, nausea, vomiting. Grandson to bring POA papers & home med list this am. 
UA obtained/sent down. All needs met at this time.

## 2019-07-15 NOTE — PROGRESS NOTES
Initial visit by  to convey care and concern and encourage patient that  services are available if desired. No needs were voiced during the visit. Provided 's business card for future reference. Chaplains remain available for support. Nancy Loera MDiv Board Certified [address] [place] [address]

## 2019-07-15 NOTE — ED PROVIDER NOTES
Patient presents here with family for continued vomiting or weakness. Patient with a recent diagnosis of stage III lung cancer. Family reports he has had some generalized fatigue and weakness since then. Has had recurrent episodes of nausea and vomiting. Was here in the ER 3 days ago for similar complaints. Had some fair improvement 2 days ago. However all today he has had nausea, vomiting and weakness. Family denies any fevers or chills. Denies any hematemesis or melena. The history is provided by the patient. Vomiting This is a recurrent problem. The current episode started 6 to 12 hours ago. The problem has not changed since onset. The emesis has an appearance of stomach contents. There has been no fever. Pertinent negatives include no fever, no abdominal pain, no diarrhea and no myalgias. Past Medical History:  
Diagnosis Date  Abnormal EKG  Acute kidney injury ([address]  AICD (automatic cardioverter/defibrillator) present  Alterations of sensations  Anemia  Arthritis  Back pain 6/17/2016  Benign prostatic hyperplasia 6/17/2016  Bilateral pubic rami fractures ([address]  CAD (coronary artery disease) ? MI 4-2014  CHF (congestive heart failure) ([address]  Chronic obstructive pulmonary disease ([address]  Chronic systolic CHF (congestive heart failure) ([address] 10/7/2014  CRI (chronic renal insufficiency)  Depression  Dizziness  Dyspnea  Elevated ferritin  Elevated PSA 6/17/2016  GERD (gastroesophageal reflux disease)  Heart failure ([address]  Hernia, inguinal 6/17/2016  Hyperlipidemia 10/19/2015  Hypertension  Hypokalemia 6/17/2016  Ischemia of left lower extremity 3/13/2019  Keratosis, actinic  Leg cramps  Malaise and fatigue 6/17/2016  
 NSTEMI (non-ST elevated myocardial infarction) ([address] 6/24/2019  OA (osteoarthritis) of hip 9/16/2016  Orthostasis  Pneumonia  Respiratory failure ([address] 2016 Due to tractor accident  Sinoatrial node dysfunction (HCC)  Sinusitis Past Surgical History:  
Procedure Laterality Date  HX CARPAL TUNNEL RELEASE    
 bilat  HX CATARACT REMOVAL    
 HX CATARACT REMOVAL    
 bilat  HX COLONOSCOPY    
 HX HEART CATHETERIZATION    
 HX ORTHOPAEDIC    
 internal fixation fractured pelvis  HX OTHER SURGICAL  2006  
 prostate bx  HX OTHER SURGICAL    
 transiliac screw fixation of L hemipelvis and ORIF of anterior pelvic ring disruption  HX OTHER SURGICAL    
 irrigation and debridment of R hip wound: VAC placement  HX OTHER SURGICAL    
 implanted defibrillator  HX PACEMAKER    
 dual chamber  VASCULAR SURGERY PROCEDURE UNLIST  2019  
 left common femoral artery thrombo-embolectomy Family History:  
Problem Relation Age of Onset  No Known Problems Mother  No Known Problems Father  Heart Attack Son 48  
     mi  
 Heart Disease Other  Lung Cancer Son   
     also had pulmonary fibrosis Social History Socioeconomic History  Marital status:  Spouse name: Not on file  Number of children: Not on file  Years of education: Not on file  Highest education level: Not on file Occupational History  Occupation: retired [employer] Social Needs  Financial resource strain: Not on file  Food insecurity:  
  Worry: Not on file Inability: Not on file  Transportation needs:  
  Medical: Not on file Non-medical: Not on file Tobacco Use  Smoking status: Former Smoker Packs/day: 2.00 Years: 50.00 Pack years: 100.00 Last attempt to quit: 10/17/2013 Years since quittin.7  Smokeless tobacco: Never Used  Tobacco comment: quit spring 2019 Substance and Sexual Activity  Alcohol use: No  
 Drug use: No  
 Sexual activity: Not on file Lifestyle  Physical activity: Days per week: Not on file Minutes per session: Not on file  Stress: Not on file Relationships  Social connections:  
  Talks on phone: Not on file Gets together: Not on file Attends Mandaen service: Not on file Active member of club or organization: Not on file Attends meetings of clubs or organizations: Not on file Relationship status: Not on file  Intimate partner violence:  
  Fear of current or ex partner: Not on file Emotionally abused: Not on file Physically abused: Not on file Forced sexual activity: Not on file Other Topics Concern  Not on file Social History Narrative Lives alone ALLERGIES: Lortab [hydrocodone-acetaminophen]; Lortab [hydrocodone-acetaminophen]; and Other medication Review of Systems Constitutional: Positive for fatigue. Negative for fever. HENT: Negative for congestion and dental problem. Eyes: Negative for photophobia and visual disturbance. Respiratory: Negative for chest tightness and shortness of breath. Cardiovascular: Negative for leg swelling. Gastrointestinal: Positive for vomiting. Negative for abdominal pain and diarrhea. Genitourinary: Negative for hematuria and urgency. Musculoskeletal: Negative for myalgias and neck pain. Neurological: Positive for weakness and light-headedness. Negative for syncope. Hematological: Negative for adenopathy. Does not bruise/bleed easily. All other systems reviewed and are negative. Vitals:  
 07/14/19 2145 BP: 106/68 Pulse: 85 Resp: 16 Temp: 97.3 °F (36.3 °C) SpO2: 97% Weight: 62.6 kg (138 lb) Height: 5' 6\" (1.676 m) Physical Exam  
Constitutional: He appears well-developed and well-nourished. Eyes: Pupils are equal, round, and reactive to light. Conjunctivae and EOM are normal.  
Cardiovascular: Normal rate and regular rhythm. Pulmonary/Chest: Effort normal and breath sounds normal. No stridor.  No respiratory distress. Abdominal: Soft. Bowel sounds are normal. He exhibits no distension. There is no tenderness. Musculoskeletal: Normal range of motion. He exhibits no edema or deformity. Neurological: He is alert. Nursing note and vitals reviewed. MDM Number of Diagnoses or Management Options Diagnosis management comments: No focal abdominal tenderness. We will check basic labs here 11:41 PM 
Labs are significant for troponin of 0.7 EKG shows ventricular paced rhythm no acute ischemic changes 12:16 AM 
Given continued weakness and dizziness with intractable nausea and vomiting case discussed with hospitalist for admission. Amount and/or Complexity of Data Reviewed Clinical lab tests: ordered and reviewed Tests in the radiology section of CPT®: ordered and reviewed Risk of Complications, Morbidity, and/or Mortality Presenting problems: moderate Diagnostic procedures: low Management options: moderate Patient Progress Patient progress: stable Procedures Results Include: 
 
Recent Results (from the past 24 hour(s)) CBC WITH AUTOMATED DIFF Collection Time: 07/14/19  9:53 PM  
Result Value Ref Range WBC 7.7 4.3 - 11.1 K/uL  
 RBC 2.72 (L) 4.23 - 5.6 M/uL  
 HGB 10.0 (L) 13.6 - 17.2 g/dL HCT 27.3 (L) 41.1 - 50.3 % .4 (H) 79.6 - 97.8 FL  
 MCH 36.8 (H) 26.1 - 32.9 PG  
 MCHC 36.6 (H) 31.4 - 35.0 g/dL  
 RDW 18.7 (H) 11.9 - 14.6 % PLATELET 915 666 - 718 K/uL MPV 10.6 9.4 - 12.3 FL ABSOLUTE NRBC 0.00 0.0 - 0.2 K/uL  
 DF AUTOMATED NEUTROPHILS 85 (H) 43 - 78 % LYMPHOCYTES 7 (L) 13 - 44 % MONOCYTES 6 4.0 - 12.0 % EOSINOPHILS 0 (L) 0.5 - 7.8 % BASOPHILS 0 0.0 - 2.0 % IMMATURE GRANULOCYTES 1 0.0 - 5.0 %  
 ABS. NEUTROPHILS 6.6 1.7 - 8.2 K/UL  
 ABS. LYMPHOCYTES 0.5 0.5 - 4.6 K/UL  
 ABS. MONOCYTES 0.5 0.1 - 1.3 K/UL  
 ABS. EOSINOPHILS 0.0 0.0 - 0.8 K/UL  
 ABS. BASOPHILS 0.0 0.0 - 0.2 K/UL ABS. IMM. GRANS. 0.1 0.0 - 0.5 K/UL METABOLIC PANEL, COMPREHENSIVE Collection Time: 07/14/19  9:53 PM  
Result Value Ref Range Sodium 133 (L) 136 - 145 mmol/L Potassium 5.5 (H) 3.5 - 5.1 mmol/L Chloride 97 (L) 98 - 107 mmol/L  
 CO2 27 21 - 32 mmol/L Anion gap 9 7 - 16 mmol/L Glucose 111 (H) 65 - 100 mg/dL BUN 56 (H) 8 - 23 MG/DL Creatinine 2.20 (H) 0.8 - 1.5 MG/DL  
 GFR est AA 37 (L) >60 ml/min/1.73m2 GFR est non-AA 31 (L) >60 ml/min/1.73m2 Calcium 9.7 8.3 - 10.4 MG/DL Bilirubin, total 0.6 0.2 - 1.1 MG/DL  
 ALT (SGPT) 50 12 - 65 U/L  
 AST (SGOT) 64 (H) 15 - 37 U/L Alk. phosphatase 197 (H) 50 - 136 U/L Protein, total 7.4 6.3 - 8.2 g/dL Albumin 2.9 (L) 3.2 - 4.6 g/dL Globulin 4.5 (H) 2.3 - 3.5 g/dL A-G Ratio 0.6 (L) 1.2 - 3.5 LIPASE Collection Time: 07/14/19  9:53 PM  
Result Value Ref Range Lipase 86 73 - 393 U/L  
TROPONIN I Collection Time: 07/14/19  9:53 PM  
Result Value Ref Range Troponin-I, Qt. 0.77 (HH) 0.02 - 0.05 NG/ML  
BNP Collection Time: 07/14/19  9:53 PM  
Result Value Ref Range BNP 1,212 (H) 0 pg/mL Voice dictation software was used during the making of this note. This software is not perfect and grammatical and other typographical errors may be present. This note has been proofread, but may still contain errors.  
Rachel Elliott MD; 7/15/2019 @12:16 AM  
===================================================================

## 2019-07-15 NOTE — PROGRESS NOTES
Nutrition Reason for assessment: BPA - Malnutrition Screening Tool positive for unintended weight loss. Recently Lost Weight Without Trying: Yes If Yes, How Much Weight Loss: 14 - 23 lbs Eating Poorly Due to Decreased Appetite: Yes Assessment:  
  
Food/Nutrition Patient History:  History provided by patient and several  Family members. They state that patient has not been eating very much for last few months. They offer meals and snacks, but patient only take a few bites. They have been buying Premier Protein shakes for patient also which he accepts. No information on number per day. Patient states that he is feeling better today. He has been able to eat some jello, broth, and juice. He states he did drink Ensure Clear with am meal. 
Labs: Reviewed and significant for decreased Na+, elevated K+, elevated glucose, elevated creatinine, decreased GFR 
PMH: CAD, afib, CHF, GERD, HTN, HLD, COPD, CKD3, lung cancer (recent diagnosis) DIET CLEAR LIQUID 
DIET NUTRITIONAL SUPPLEMENTS All Meals; Ensure Clear Anthropometrics:Height: 5' 6\" (167.6 cm), Weight at MD office on 7/10/19 136# (61.8 kg) per family, BMI 21.9,  BMI class of underweight for age >71. Family reports that patient has lost ~16# in last 3 months (9.5% UBW). Malnutrition Criteria:  Sever Malnutrition in setting of Chronic Illness Energy intake: <75% energy intake compares to estimated energy needs for greater than or equal to 1 month Weight loss: >7.5 in 3 months Macronutrient needs: 74kg standard body weight EER:  7772-4968 kcal /day (25-30 kcal/kg) EPR:  59-74 grams protein/day (0.8-1 grams/kg) Intake/Comparative Standards: Recorded meal(s): Patient on Clear liquid diet with one Ensure Clear consumed today. This potentially meets <25% of kcal and <25% of protein needs Nutrition Diagnosis: Inadequate oral intake related to chronic disease and poor appetite as evidenced by patient reported barrier to PO intake, family report of diet history prior to admission, currently on clear liquid diet meetin less than 25% estimated needs, 9.5% loss of UBW in last 3 months. Intervention: 
Meals and snacks: Advance diet as medically appropriate Nutrition supplement therapy: continue Ensure Clear, consider change to Ensure or Nepro pending labs as diet advances Education: Family asking about ONS for home use: explained that Premier Protein is lower in kcal and higher in protein and that another ONS that would provide more kcal would be more appropriate such as Ensure or Ensure Plus, [employer] version also okay. Explained that the Cancer society can also assist with ONS monthly. Discharge Nutrition Plan: Too soon to determine. [address] [place] [address], [address], [address]

## 2019-07-15 NOTE — PROGRESS NOTES
07/15/19 6643 Peripheral Vascular Peripheral Vascular (WDL) X  
LLE Peripheral Vascular Capillary Refill Less than/equal to 3 seconds Temperature Cool Pedal Pulse +2;Present RLE Peripheral Vascular Capillary Refill Less than/equal to 3 seconds Temperature Cool Pedal Pulse +2;Present Dual Skin Pressure Injury Assessment Dual Skin Pressure Injury Assessment WDL Second Care Provider (Based on [facility]) [facility], [facility] Skin Integumentary Skin Integumentary (WDL) WDL Skin Color Pale;Ecchymosis (comment) Skin Condition/Temp Cool;Flaky;Dry Skin Integrity Scars (comment) No skin breakdown noted. LOLLY Simental Mustache (grandson) to bring home med list & POA papers tomorrow am. 
Oriented to room/surroundings. No needs at this time.
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
## 2019-07-16 NOTE — PROGRESS NOTES
Problem: Falls - Risk of 
Goal: *Absence of Falls Description Document Bernice Becerril Fall Risk and appropriate interventions in the flowsheet. Outcome: Progressing Towards Goal 
  
Problem: Pressure Injury - Risk of 
Goal: *Prevention of pressure injury Description Document Chance Scale and appropriate interventions in the flowsheet.  
Outcome: Progressing Towards Goal

## 2019-07-16 NOTE — PROGRESS NOTES
Met with patient and patient's grandson who is POYASSINE. Oswaldo Dumont is Gilda, [phone]. Discussed that recommendation is for SNF rehab. Provided grandson with snf list. He will review and let CM know choices. Grandson unsure if they want SNF vs HH.

## 2019-07-16 NOTE — PROGRESS NOTES
Hospitalist Progress Note 2019 Admit Date: 2019 11:21 PM  
NAME: Skylar Mckenzie :  1935 MRN:  [id number] Attending: Man Chilel MD 
PCP:  Yue Velázquez NP 
 
SUBJECTIVE:  
 
Skylar Mckenzie is a 82years old male with hx of prostate cancer, CAD, A.fib, systolic CHF with EF 54-61%, moderated pulmonary HTN with recently diagnosed minimum stage 3 lung cancer admitted on 7/15 with nausea/vomiting, dehydration, acute renal failure. : 
Pt seen at bedside. Pt's grandson present as well. Pt reports feeling slightly better since admission. Denies any further nausea/vomiting. As per grandson, pt's appetite is poor. He has been informed that due to underlying cancer, poor appetite/weight loss are commonly seen. No fever, chills, chest pain, SOB, cough. Review of Systems negative with exception of pertinent positives noted above PHYSICAL EXAM  
 
 
Visit Vitals /69 (BP 1 Location: Left arm, BP Patient Position: At rest) Pulse 80 Temp 98.1 °F (36.7 °C) Resp 18 Ht 5' 6\" (1.676 m) Wt 69.2 kg (152 lb 9.6 oz) SpO2 97% BMI 24.63 kg/m² Temp (24hrs), Av.5 °F (36.4 °C), Min:96.4 °F (35.8 °C), Max:98.1 °F (36.7 °C) Oxygen Therapy O2 Sat (%): 97 % (19 0701) Pulse via Oximetry: 82 beats per minute (07/15/19 1949) O2 Device: Nasal cannula (07/15/19 1949) O2 Flow Rate (L/min): 2 l/min (07/15/19 1949) Intake/Output Summary (Last 24 hours) at 2019 6282 Last data filed at 2019 2217 Gross per 24 hour Intake 1020 ml Output 1200 ml Net -180 ml General: No acute distress. Head:  Atraumatic Normocephalic. Eyes:  PERRLA, EOMI, Anicteric. ENT:  No discharges/lesions. Lungs:  CTA Bilaterally. CVS:  Regular rate and rhythm,  No murmur, rub, or gallop, No JVD, No lower   extremity edema. Abdomen: Soft, Non distended, Non tender, Positive bowel sounds. MSK:  No deformities, lesions, Spontaneously moves extremities. Neurologic:  GCS 15, no motor or sensory deficits, CN 2-12 intact Psychiatry:      No anxiety/Depression Skin:   No rash/lesions. Good skin turgor Heme/Lymph/Immune:  No petechiae, ecchymoses, overt signs of bleeding or    lymphadenopathy noted. Recent Results (from the past 24 hour(s)) CBC W/O DIFF Collection Time: 07/16/19  5:26 AM  
Result Value Ref Range WBC 6.0 4.3 - 11.1 K/uL  
 RBC 2.25 (L) 4.23 - 5.6 M/uL HGB 9.1 (L) 13.6 - 17.2 g/dL HCT 23.3 (L) 41.1 - 50.3 % .6 (H) 79.6 - 97.8 FL  
 MCH 40.4 (H) 26.1 - 32.9 PG  
 MCHC 39.1 (H) 31.4 - 35.0 g/dL  
 RDW 19.1 (H) 11.9 - 14.6 % PLATELET 417 (L) 135 - 450 K/uL MPV 9.9 9.4 - 12.3 FL ABSOLUTE NRBC 0.00 0.0 - 0.2 K/uL METABOLIC PANEL, BASIC Collection Time: 07/16/19  5:26 AM  
Result Value Ref Range Sodium 136 136 - 145 mmol/L Potassium 4.8 3.5 - 5.1 mmol/L Chloride 102 98 - 107 mmol/L  
 CO2 25 21 - 32 mmol/L Anion gap 9 7 - 16 mmol/L Glucose 104 (H) 65 - 100 mg/dL BUN 43 (H) 8 - 23 MG/DL Creatinine 1.94 (H) 0.8 - 1.5 MG/DL  
 GFR est AA 43 (L) >60 ml/min/1.73m2 GFR est non-AA 35 (L) >60 ml/min/1.73m2 Calcium 9.1 8.3 - 10.4 MG/DL MAGNESIUM Collection Time: 07/16/19  5:26 AM  
Result Value Ref Range Magnesium 1.9 1.8 - 2.4 mg/dL PHOSPHORUS Collection Time: 07/16/19  5:26 AM  
Result Value Ref Range Phosphorus 4.5 (H) 2.3 - 3.7 MG/DL Imaging [facility] Chest X-ray 
  INDICATION: Shortness of breath and cough 
  
PA and lateral views of the chest were obtained. 
  
FINDINGS: The lungs are clear. There are no infiltrates or effusions. There is 
stable cardiomegaly. Pacemaker is present.   
  
IMPRESSION IMPRESSION: No acute findings in the chest 
 
ASSESSMENT Hospital Problems as of 7/16/2019 Date Reviewed: 7/11/2019 Codes Class Noted - Resolved POA Nausea with vomiting ICD-10-CM: R11.2 ICD-9-CM: 787.01  7/15/2019 - Present Yes * (Principal) JAZZY (acute kidney injury) ([facility] [address] [address] ICD-10-CM: N17.9 ICD-9-CM: 584.9  7/15/2019 - Present Yes Malignant neoplasm of hilus of left lung [facility]) ICD-10-CM: C34.02 
ICD-9-CM: 162.2  7/3/2019 - Present Yes History of DVT (deep vein thrombosis) (Chronic) ICD-10-CM: X91.206 ICD-9-CM: V12.51  6/24/2019 - Present Yes Atrial fibrillation (HCC) (Chronic) ICD-10-CM: I48.91 
ICD-9-CM: 427.31  3/22/2019 - Present Yes Essential hypertension ICD-10-CM: I10 
ICD-9-CM: 401.9  9/4/2018 - Present Yes Overview Addendum 7/7/2019  5:31 PM by Loreto Duron NP  
  BP readings have been in target range. No complications noted from the medication presently being used. GERD (gastroesophageal reflux disease) ICD-10-CM: K21.9 ICD-9-CM: 530.81  9/4/2018 - Present Yes Cardiac defibrillator in place ICD-10-CM: Z95.810 ICD-9-CM: V45.02  9/4/2018 - Present Yes Overview Addendum 7/7/2019  5:31 PM by Loreto Duron NP [employer] DDD ICD History of MI (myocardial infarction) ICD-10-CM: I25.2 ICD-9-CM: 127  9/4/2018 - Present Yes History of prostate cancer ICD-10-CM: Z85.46 
ICD-9-CM: V10.46  9/4/2018 - Present Yes Overview Signed 7/7/2019  5:29 PM by Loreto Duron NP Overview:  
Added automatically from request for surgery [id number] CAD (coronary artery disease), atherosclerotic of the native vessel (Chronic) ICD-10-CM: I25.10 ICD-9-CM: 414.00  Unknown - Present Yes Overview Addendum 10/19/2015  9:21 AM by Carlitos Larose ? MI 4-2014 Angina has improved with no episodes of chest pain since the last visit. Mild CAD by cath 6/2014. Chronic obstructive lung disease ([facility] [address] ICD-10-CM: J44.9 ICD-9-CM: 960  Unknown - Present Yes Chronic congestive heart failure (HCC) ICD-10-CM: I50.9 ICD-9-CM: 428.0  10/7/2014 - Present Yes Overview Addendum 7/7/2019  5:31 PM by Radha Ventura NP [place] Class I.  EF 35%. Plan: 
- nausea/vomiting is improving with antiemetics IV fluids, caution with fluid overload in setting of advanced CHF with EF 25-30% - JAZZY is improving, Creatinine down from 2.20 to 1.94 (baseline 1.6-1.7) Pt has chronically elevated troponin and BNP, no active chest pain. Elevated troponin from demand ischemia. EKG not with ischemic changes Pt and his grandson are counseled about poor prognosis given multiple medical comorbidities including stage 3 lung cancer and advanced systolic CHF. Encouraged to discuss about code status and goals of care. Pt will need to rescheduled PET-CT to a later date. Will follow up with Oncology after PET-CT 
 
DVT Prophylaxis: eliquis High risk Dispo: pending for now Will be discharged to home with HHA/PT/OT once medically cleared Salma Gallagher MD

## 2019-07-16 NOTE — PROGRESS NOTES
Interdisciplinary Rounds completed 07/16/19. Nursing, Case Management, Physician and PT present. Plan of care reviewed and updated.

## 2019-07-16 NOTE — PROGRESS NOTES
Pt A&O x 4, ambulates with exertion, on 2. 5LNC, pain meds given x 1, no distress noted, report given to oncoming RN.

## 2019-07-17 NOTE — PROGRESS NOTES
Nutrition follow-up:  Reason for initial assessment: BPA - Malnutrition Screening Tool positive for unintended weight loss. Recently Lost Weight Without Trying: Yes  If Yes, How Much Weight Loss: 14 - 23 lbs  Eating Poorly Due to Decreased Appetite: Yes  Assessment:                 Food/Nutrition Patient History:  Admitted with NV/JAZZY improving, demand ischemia, stage III lung cancer. PMH: CAD, afib, CHF, GERD, HTN, HLD, COPD, CKD3, lung cancer (recent diagnosis). Visit with patient and grandson at bedside. Pt c/o being too weak to do anything relates to eating, going to the bathroom. Primarily taking chicken broth, has consumed portions of ensure clear. Initially not interested in advancing diet while indicating NV controlled with medications. Upon education and encouragement of grandson re the benefits of additional nutrition quality options pt is agreeable to try diet advance. Emmett Rizo reports he will help feed him if needed which seems to reassure pt of trying diet advance.       Diet: DIET CLEAR LIQUID  DIET NUTRITIONAL SUPPLEMENTS All Meals; Ensure Clear     BUN trending down, GFR 35. NS @ 75ml/hr. Anthropometrics:Height: 5' 6\" (167.6 cm), Weight at MD office on 7/10/19 136# (61.8 kg) per family, BMI 21.9,  BMI class of underweight for age >71. Family reports that patient has lost ~16# in last 3 months (9.5% UBW).    Weight: 65.7 kg (144 lb 12.8 oz) 7/17 unspecified source  Malnutrition Criteria:  Sever Malnutrition in setting of Chronic Illness  Energy intake: <75% energy intake compares to estimated energy needs for greater than or equal to 1 month  Weight loss: >7.5 in 3 months      Macronutrient needs: 74kg standard body weight  EER:  3197-8084 kcal /day (25-30 kcal/kg)  EPR:  59-74 grams protein/day (0.8-1 grams/kg)     Intake/Comparative Standards: Current clear liquids with clear liquid supplement is inadequate to meet estimated needs.       Nutrition Diagnosis: Inadequate oral intake related to chronic disease and poor appetite as evidenced by patient reported barrier to PO intake, family report of diet history prior to admission, currently on clear liquid diet meetin less than 25% estimated needs, 9.5% loss of UBW in last 3 months.     Intervention:  Meals and snacks: Discussed with Dr. Devyn Cruz will progress to gi soft. Nutrition supplement therapy: discontinue Ensure Clear, add Ensure Enlive TID. Coordination of Care: Mary Ann Bourgeois RN; Dr. Devyn Cruz. Discharge Nutrition Plan: Too soon to determine. Anticipate pt will require oral supplement secondary fatigue with po intake.      [place], [address], [place] [address]

## 2019-07-17 NOTE — PROGRESS NOTES
Pt and family requested referral be sent to [facility]. Referral sent-liaisons notified. Joe Mandel

## 2019-07-17 NOTE — PROGRESS NOTES
Interdisciplinary Rounds completed 07/17/19. Nursing, Case Management, Physician and PT present. Plan of care reviewed and updated.     Possible d/c in am.

## 2019-07-17 NOTE — PROGRESS NOTES
Hospitalist Progress Note    2019  Admit Date: 2019 11:21 PM   NAME: Francesoc Benedict   :  1935   MRN:  [id number]   Attending: Leoncio Meza MD  PCP:  Pat Coleman NP    SUBJECTIVE:     Francesco Benedict is a 82years old male with hx of prostate cancer, CAD, A.fib, systolic CHF with EF 98-40%, moderated pulmonary HTN with recently diagnosed minimum stage 3 lung cancer admitted on 7/15 with nausea/vomiting, dehydration, acute renal failure. :  Pt reports feeling okay, still feels weak and lethargic but no further nausea/vomiting. He mentioned that he understand that his health is declining due to cancer, and if time comes, he would like to go in peace. I asked him if he would want any chest compression or being kept alive on machines, he stated that he does not want any aggressive measures. Denies any chest/abdominal pain, fever, chills. Appetite is slightly better      Review of Systems negative with exception of pertinent positives noted above      PHYSICAL EXAM       Visit Vitals  /48 (BP 1 Location: Right arm, BP Patient Position: At rest)   Pulse 85   Temp 98.2 °F (36.8 °C)   Resp 20   Ht 5' 6\" (1.676 m)   Wt 65.7 kg (144 lb 12.8 oz)   SpO2 94%   BMI 23.37 kg/m²      Temp (24hrs), Av °F (36.7 °C), Min:97.4 °F (36.3 °C), Max:98.3 °F (36.8 °C)    Oxygen Therapy  O2 Sat (%): 94 % (19)  Pulse via Oximetry: 84 beats per minute (19)  O2 Device: Nasal cannula (19)  O2 Flow Rate (L/min): 2 l/min (19)    Intake/Output Summary (Last 24 hours) at 2019 0800  Last data filed at 2019 2348  Gross per 24 hour   Intake    Output 100 ml   Net -100 ml          General: Elderly, frail, NAD  Head:  Atraumatic Normocephalic. Eyes:  PERRLA, EOMI, Anicteric. ENT:  No discharges/lesions. Lungs:  CTA Bilaterally. CVS:  Regular rate and rhythm,  No murmur, rub, or gallop, No JVD, No lower   extremity edema.   Abdomen: Soft, Non distended, Non tender, Positive bowel sounds. MSK:  No deformities, lesions, Spontaneously moves extremities. Neurologic:  GCS 15, no motor or sensory deficits, CN 2-12 intact  Psychiatry:      No anxiety/Depression  Skin:   No rash/lesions. Good skin turgor  Heme/Lymph/Immune:  No petechiae, ecchymoses, overt signs of bleeding or    lymphadenopathy noted. No results found for this or any previous visit (from the past 24 hour(s)). Imaging /Procedures /Studies   Chest X-ray     INDICATION: Shortness of breath and cough     PA and lateral views of the chest were obtained.     FINDINGS: The lungs are clear. There are no infiltrates or effusions. There is  stable cardiomegaly. Pacemaker is present.       IMPRESSION  IMPRESSION: No acute findings in the chest    ASSESSMENT      Hospital Problems as of 7/17/2019 Date Reviewed: 7/11/2019          Codes Class Noted - Resolved POA    Nausea with vomiting ICD-10-CM: R11.2  ICD-9-CM: 787.01  7/15/2019 - Present Yes        * (Principal) JAZZY (acute kidney injury) ([facility] ICD-10-CM: N17.9  ICD-9-CM: 584.9  7/15/2019 - Present Yes        Malignant neoplasm of hilus of left lung ([facility] ICD-10-CM: C34.02  ICD-9-CM: 162.2  7/3/2019 - Present Yes        History of DVT (deep vein thrombosis) (Chronic) ICD-10-CM: Z86.718  ICD-9-CM: V12.51  6/24/2019 - Present Yes        Atrial fibrillation (HCC) (Chronic) ICD-10-CM: I48.91  ICD-9-CM: 427.31  3/22/2019 - Present Yes        Essential hypertension ICD-10-CM: I10  ICD-9-CM: 401.9  9/4/2018 - Present Yes    Overview Addendum 7/7/2019  5:31 PM by Ailza PresidentMARY     BP readings have been in target range. No complications noted from the medication presently being used.                GERD (gastroesophageal reflux disease) ICD-10-CM: K21.9  ICD-9-CM: 530.81  9/4/2018 - Present Yes        Cardiac defibrillator in place ICD-10-CM: Z95.810  ICD-9-CM: V45.02  9/4/2018 - Present Yes    Overview Addendum 7/7/2019  5:31 PM by Francesca Gonzalez, Nimco Schmitz NP     Biotronik DDD ICD              History of MI (myocardial infarction) ICD-10-CM: I25.2  ICD-9-CM: 283  9/4/2018 - Present Yes        History of prostate cancer ICD-10-CM: Z85.46  ICD-9-CM: V10.46  9/4/2018 - Present Yes    Overview Signed 7/7/2019  5:29 PM by Madonna Templeton NP     Overview:   Added automatically from request for surgery [id number]             CAD (coronary artery disease), atherosclerotic of the native vessel (Chronic) ICD-10-CM: I25.10  ICD-9-CM: 414.00  Unknown - Present Yes    Overview Addendum 10/19/2015  9:21 AM by Mariah Mejia     ? MI 4-2014  Angina has improved with no episodes of chest pain since the last visit. Mild CAD by cath 6/2014. Chronic obstructive lung disease ([facility] [address] ICD-10-CM: J44.9  ICD-9-CM: 991  Unknown - Present Yes        Chronic congestive heart failure ([facility] [address] ICD-10-CM: I50.9  ICD-9-CM: 428.0  10/7/2014 - Present Yes    Overview Addendum 7/7/2019  5:31 PM by Madonna Templeton NP     [place] Class I.  EF 35%. Plan:  - nausea/vomiting; resolved with improving appetite  IV fluids, caution with fluid overload in setting of advanced CHF with EF 25-30%  - JAZZY is improving, Creatinine stable at 1.94, likely the new baseline    Pt has chronically elevated troponin and BNP, no active chest pain. Elevated troponin from demand ischemia. EKG not with ischemic changes    Pt wants to be DNR/DNI. Pt's grandson, Pam Henry has been informed about this. Pt will need to rescheduled PET-CT to a later date. Will follow up with Oncology after PET-CT    DVT Prophylaxis: eliquis  High risk   Dispo: pt does not want to got to [facility] or NH. He wishes to go home with [facility] with transition to hospice at a later date. Pt's diet is advanced to GI soft diet today after speech eval.  He should be discharged to home with [facility] tomorrow if medically stable.     Jannette Arthur MD

## 2019-07-17 NOTE — PROGRESS NOTES
[facility] [place] - INPATIENT  Face to Face Encounter    Patients Name: Erickson Verdugo    YOB: 1935    Ordering Physician: Dr. Devyn Cruz    Primary Diagnosis: JAZZY (acute kidney injury) [facility]) [N17.9]  Nausea with vomiting [R11.2]    Date of Face to Face:   7/17/2019                                  Face to Face Encounter findings are related to primary reason for home care:   yes. 1. I certify that the patient needs intermittent care as follows: skilled nursing care:  skilled observation/assessment, patient education  physical therapy: strengthening, stretching/ROM, transfer training, gait/stair training, balance training and pt/caregiver education  occupational therapy:  ADL safety (ie. cooking, bathing, dressing), ROM and pt/caregiver education    2. I certify that this patient is homebound, that is: 1) patient requires the use of a  device, special transportation, or assistance of another to leave the home; or 2) patient's condition makes leaving the home medically contraindicated; and 3) patient has a normal inability to leave the home and leaving the home requires considerable and taxing effort. Patient may leave the home for infrequent and short duration for medical reasons, and occasional absences for non-medical reasons. Homebound status is due to the following functional limitations: Patient with strength deficits limiting the performance of all ADL's without caregiver assistance or the use of an assistive device. 3. I certify that this patient is under my care and that I, or a nurse practitioner or  [id number], or clinical nurse specialist, or certified nurse midwife, working with me, had a Face-to-Face Encounter that meets the physician Face-to-Face Encounter requirements.   The following are the clinical findings from the [address] [place] [address] encounter that support the need for skilled services and is a summary of the encounter: see hospital chart    See summary of the patient's cora Goncalves LMSW  7/17/2019      THE FOLLOWING TO BE COMPLETED BY THE COMMUNITY PHYSICIAN:    I concur with the findings described above from the F2F encounter that this patient is homebound and in need of a skilled service.     Certifying Physician: _____________________________________      Printed Certifying Physician Name: _____________________________________    Date: _________________

## 2019-07-17 NOTE — PROGRESS NOTES
Hourly rounds completed on patient. patient had complaints of pain and N/V, medicated per Mar.  Patient had  Patient denies any needs at this time. Will report to oncoming nurse.

## 2019-07-17 NOTE — PROGRESS NOTES
Problem: Falls - Risk of  Goal: *Absence of Falls  Description  Document Travis Boeck Fall Risk and appropriate interventions in the flowsheet. Outcome: Progressing Towards Goal     Problem: Patient Education: Go to Patient Education Activity  Goal: Patient/Family Education  Outcome: Progressing Towards Goal     Problem: Pressure Injury - Risk of  Goal: *Prevention of pressure injury  Description  Document Chance Scale and appropriate interventions in the flowsheet.   Outcome: Progressing Towards Goal     Problem: Patient Education: Go to Patient Education Activity  Goal: Patient/Family Education  Outcome: Progressing Towards Goal     Problem: Patient Education: Go to Patient Education Activity  Goal: Patient/Family Education  Outcome: Progressing Towards Goal     Problem: Patient Education: Go to Patient Education Activity  Goal: Patient/Family Education  Outcome: Progressing Towards Goal     Problem: Nutrition Deficit  Goal: *Optimize nutritional status  Outcome: Progressing Towards Goal     Problem: Gas Exchange - Impaired  Goal: *Absence of hypoxia  Outcome: Progressing Towards Goal

## 2019-07-17 NOTE — PROGRESS NOTES
Hourly rounds completed this shift. All needs met. Bed low/locked. No c/o pain. Call light in reach. Will continue to monitor pt and give report to oncoming RN. Peripheral IV 07/14/19 Right Antecubital (Active)   Site Assessment Clean, dry, & intact 7/17/2019  2:51 PM   Phlebitis Assessment 0 7/17/2019  2:51 PM   Infiltration Assessment 0 7/17/2019  2:51 PM   Dressing Status Clean, dry, & intact 7/17/2019  2:51 PM   Dressing Type Transparent;Tape 7/17/2019  2:51 PM   Hub Color/Line Status Pink; Infusing;Patent 7/17/2019  2:51 PM   Alcohol Cap Used No 7/17/2019  4:08 AM

## 2019-07-17 NOTE — PROGRESS NOTES
PT NOTE:    Pt politely declined therapy this morning stating he wasn't up to it. Will check back at later time/date as schedule allows.     Cristal White, PTA

## 2019-07-18 NOTE — PROGRESS NOTES
This RN in to give pt am meds. Pt O2 sat 75%. Pt was only wearing 2L via NC. Pt placed on 3L-O2 only 85%. Pt up to 94% on 4L via NC. Will continue to monitor.

## 2019-07-18 NOTE — PROGRESS NOTES
Per PT, pt and grandson feeling overwhelmed and asking to speak with MD.  DME being requested as well. Palliative consult placed, as they can explain options for pt at home. MD notified. CM will follow.

## 2019-07-18 NOTE — CONSULTS
Palliative Care    Patient: Erickson Verdugo MRN: [id number]  SSN: [id number]    YOB: 1935  Age: 82 y.o. Sex: male       Date of Request: 7/18/2019  Date of Consult:  7/18/2019  Reason for Consult:  goals of care  Requesting Physician: Dr Devyn Cruz      Assessment/Plan:     Principal Diagnosis:    Debility, Unspecified  R53.81    Additional Diagnoses:   · Fatigue, Lethargy  R53.83  · Frailty  R54  · Pain, chest  R07.9  · Encounter for Palliative Care  Z51.5    Palliative Performance Scale (PPS):  PPS: 60    Medical Decision Making:   Reviewed and summarized notes from admission to present   Discussed case with appropriate providers- Anny Camilo RN  Reviewed laboratory and x-ray data from admission to present     Pt resting in bed, no distress noted. No family at bedside. Introduced role of PC and reviewed events of hospitalization. Pt cannot give any details of his hospitalization, but states he is feeling better and ready to go home. He denies pain and dyspnea at present, but states sometimes he has pain when he breathes. He acknowledges that he has lung cancer, and states he is waiting to start treatment. He states he does want to pursue treatment for his cancer. He has a grandson, Doni Mora, and other family who assist with his care at home. Discussed with Anny Camilo RN. She states Tramadol is not always effective, and family is requesting different pain medications. Lortab is listed as an allergy, but it's more of an intolerance (nausea). Will try oxycodone 2.5 mg or 5 mg PRN. Will continue to follow.      Will discuss findings with members of the interdisciplinary team.      Thank you for this referral.          .    Subjective:     History obtained from:  Patient, Care Provider and Chart    Chief Complaint: Dehydration, JAZZY  History of Present Illness:  Mr Chance Crocker is an 81 yo  male with PMH of CHF, prostate CA, lung CA, COPD, GERD, HTN, HLD, and other conditions listed below, who presented to the ER from home on 7/14/2019 with c/o n/v for several days. Pt denied diarrhea, fevers, chills, urinary symptoms. Work up revealed JAZZY. Pt was admitted for further management. His n/v has resolved, and his renal function improved with gentle hydration. He was scheduled to be discharged today, however, his family was feeling overwhelmed and asked that it be delayed. Pt reports he is doing fine and wants to go home. Advance Directive: Unknown      Code Status:  DNR            Health Care Power of : Unknown    Past Medical History:   Diagnosis Date    Abnormal EKG     Acute kidney injury ([address]     AICD (automatic cardioverter/defibrillator) present     Alterations of sensations     Anemia     Arthritis     Back pain 6/17/2016    Benign prostatic hyperplasia 6/17/2016    Bilateral pubic rami fractures (HCC)     CAD (coronary artery disease)     ?  MI 4-2014    CHF (congestive heart failure) (HCC)     Chronic obstructive pulmonary disease (HCC)     Chronic systolic CHF (congestive heart failure) ([address] 10/7/2014    CRI (chronic renal insufficiency)     Depression     Dizziness     Dyspnea     Elevated ferritin     Elevated PSA 6/17/2016    GERD (gastroesophageal reflux disease)     Heart failure ([address]     Hernia, inguinal 6/17/2016    Hyperlipidemia 10/19/2015    Hypertension     Hypokalemia 6/17/2016    Ischemia of left lower extremity 3/13/2019    Keratosis, actinic     Leg cramps     Malaise and fatigue 6/17/2016    NSTEMI (non-ST elevated myocardial infarction) ([address] 6/24/2019    OA (osteoarthritis) of hip 9/16/2016    Orthostasis     Pneumonia     Respiratory failure ([address] 6/17/2016    Due to tractor accident      Sinoatrial node dysfunction ([address]     Sinusitis       Past Surgical History:   Procedure Laterality Date    HX CARPAL TUNNEL RELEASE      bilat    HX CATARACT REMOVAL      HX CATARACT REMOVAL      bilat    HX COLONOSCOPY      HX HEART CATHETERIZATION  HX ORTHOPAEDIC      internal fixation fractured pelvis    HX OTHER SURGICAL  2006    prostate bx     HX OTHER SURGICAL      transiliac screw fixation of L hemipelvis and ORIF of anterior pelvic ring disruption     HX OTHER SURGICAL      irrigation and debridment of R hip wound: VAC placement     HX OTHER SURGICAL      implanted defibrillator    HX PACEMAKER      dual chamber     VASCULAR SURGERY PROCEDURE UNLIST  2019    left common femoral artery thrombo-embolectomy     Family History   Problem Relation Age of Onset    No Known Problems Mother     No Known Problems Father     Heart Attack Son 48        mi    Heart Disease Other     Lung Cancer Son         also had pulmonary fibrosis      Social History     Tobacco Use    Smoking status: Former Smoker     Packs/day: 2.00     Years: 50.00     Pack years: 100.00     Last attempt to quit: 10/17/2013     Years since quittin.7    Smokeless tobacco: Never Used    Tobacco comment: quit spring 2019   Substance Use Topics    Alcohol use: No     Prior to Admission medications    Medication Sig Start Date End Date Taking? Authorizing Provider   traMADol (ULTRAM) 50 mg tablet Take 1 Tab by mouth every eight (8) hours as needed for Pain for up to 3 days. Max Daily Amount: 150 mg. 19 Yes Karis Mejia MD   ondansetron (ZOFRAN ODT) 4 mg disintegrating tablet Take 1 Tab by mouth every eight (8) hours as needed for Nausea. 19  Yes Virgilio Huerta MD   apixaban (ELIQUIS) 5 mg tablet Take 1 Tab by mouth two (2) times a day. 19  Yes Karli Mann MD   furosemide (LASIX) 20 mg tablet Take 1 Tab by mouth two (2) times a day. 19  Yes Ross Thurman MD   omeprazole (PRILOSEC) 20 mg capsule Take 20 mg by mouth. 3/27/17  Yes Provider, Historical   rosuvastatin (CRESTOR) 20 mg tablet Take 1 Tab by mouth nightly. 19  Yes Jean Pierre NICOLE NP   tamsulosin (FLOMAX) 0.4 mg capsule Take 1 Cap by mouth daily. 2/21/19  Yes Shanice Dasilva, NP   fluticasone-vilanterol (BREO ELLIPTA) 100-25 mcg/dose inhaler Take 1 Puff by inhalation daily. Yes Provider, Historical   cholecalciferol (VITAMIN D3) 1,000 unit tablet Take 1,000 Units by mouth daily. Yes Provider, Historical   aspirin delayed-release 81 mg tablet Take 81 mg by mouth daily. Yes Provider, Historical   cyanocobalamin (VITAMIN B-12) 1,000 mcg sublingual tablet Take 1,000 mcg by mouth daily. Yes Provider, Historical   aspirin-calcium carbonate 81 mg-300 mg calcium(777 mg) tab Take 81 mg by mouth. Provider, Historical   lisinopril (PRINIVIL, ZESTRIL) 2.5 mg tablet Take 1 Tab by mouth daily. 6/27/19   Jessica NICOLE, MARY   metoprolol succinate (TOPROL-XL) 25 mg XL tablet Take 1 Tab by mouth daily. 3/22/19   Agens NICOLE NP   albuterol-ipratropium (DUO-NEB) 2.5 mg-0.5 mg/3 ml nebu 3 mL by Nebulization route every four (4) hours as needed for Cough. 2/27/19   Shanice Dasilva NP   albuterol (PROVENTIL HFA, VENTOLIN HFA, PROAIR HFA) 90 mcg/actuation inhaler Take 2 Puffs by inhalation every four (4) hours as needed for Wheezing. 11/15/18   Shanice Dasilva NP   tiotropium (SPIRIVA WITH HANDIHALER) 18 mcg inhalation capsule Take 1 Cap by inhalation daily. Provider, Historical       Allergies   Allergen Reactions    Lortab [Hydrocodone-Acetaminophen] Other (comments)     Makes him sick. 1/2 tab doesn't make him sick     Lortab [Hydrocodone-Acetaminophen] Other (comments)     Pt states that a whole pill makes him feel sick. 1/2 tab does not.  Other Medication Other (comments)     He has a hx of a prolonged QT interval, so precaution with meds that affect that. Review of Systems:  A comprehensive review of systems was negative except for:   Constitutional: Positive for fatigue.      Objective:     Visit Vitals  BP 99/69 (BP 1 Location: Right arm, BP Patient Position: At rest)   Pulse 80   Temp 97.4 °F (36.3 °C)   Resp 22 Ht 5' 6\" (1.676 m)   Wt 147 lb 11.2 oz (67 kg)   SpO2 95%   BMI 23.84 kg/m²        Physical Exam:    General:  Cooperative. Debilitated. No acute distress. Eyes:  Conjunctivae/corneas clear    Nose: Nares normal. Septum midline. Neck: Supple, symmetrical, trachea midline   Lungs:   Clear to auscultation bilaterally, unlabored   Heart:  Regular rate and rhythm   Abdomen:   Soft, non-tender, non-distended   Extremities: Normal, atraumatic, no cyanosis or edema   Skin: Skin color, texture, turgor normal.   Neurologic: Nonfocal   Psych: Alert and oriented      Assessment:     Hospital Problems  Date Reviewed: 7/11/2019          Codes Class Noted POA    Nausea with vomiting ICD-10-CM: R11.2  ICD-9-CM: 787.01  7/15/2019 Yes        * (Principal) JAZZY (acute kidney injury) ([facility] [address] ICD-10-CM: N17.9  ICD-9-CM: 584.9  7/15/2019 Yes        Malignant neoplasm of hilus of left lung (HCC) ICD-10-CM: C34.02  ICD-9-CM: 162.2  7/3/2019 Yes        History of DVT (deep vein thrombosis) (Chronic) ICD-10-CM: Z86.718  ICD-9-CM: V12.51  6/24/2019 Yes        Atrial fibrillation (HCC) (Chronic) ICD-10-CM: I48.91  ICD-9-CM: 427.31  3/22/2019 Yes        Essential hypertension ICD-10-CM: I10  ICD-9-CM: 401.9  9/4/2018 Yes    Overview Addendum 7/7/2019  5:31 PM by Aliza President, NP     BP readings have been in target range. No complications noted from the medication presently being used.                GERD (gastroesophageal reflux disease) ICD-10-CM: K21.9  ICD-9-CM: 530.81  9/4/2018 Yes        Cardiac defibrillator in place ICD-10-CM: Z95.810  ICD-9-CM: V45.02  9/4/2018 Yes    Overview Addendum 7/7/2019  5:31 PM by Aliza President, NP     Biotronik DDD ICD              History of MI (myocardial infarction) ICD-10-CM: I25.2  ICD-9-CM: 898  9/4/2018 Yes        History of prostate cancer ICD-10-CM: Z85.46  ICD-9-CM: V10.46  9/4/2018 Yes    Overview Signed 7/7/2019  5:29 PM by Aliza Presriky, MARY     Overview:   Added automatically from request for surgery [id number]             CAD (coronary artery disease), atherosclerotic of the native vessel (Chronic) ICD-10-CM: I25.10  ICD-9-CM: 414.00  Unknown Yes    Overview Addendum 10/19/2015  9:21 AM by Kami Mccabe     ? MI 4-2014  Angina has improved with no episodes of chest pain since the last visit. Mild CAD by cath 6/2014. Chronic obstructive lung disease ([facility] ICD-10-CM: J44.9  ICD-9-CM: 062  Unknown Yes        Chronic congestive heart failure ([facility] ICD-10-CM: I50.9  ICD-9-CM: 428.0  10/7/2014 Yes    Overview Addendum 7/7/2019  5:31 PM by Park Wiley NP     [place] Class I.  EF 35%.                      Signed By: Destiny Hamilton NP     July 18, 2019

## 2019-07-18 NOTE — PROGRESS NOTES
Interdisciplinary Rounds completed 07/18/19. Nursing, Case Management, Physician and PT present. Plan of care reviewed and updated. Palliative care consult.

## 2019-07-18 NOTE — PROGRESS NOTES
Problem: Falls - Risk of  Goal: *Absence of Falls  Description  Document Jamir Aguas Fall Risk and appropriate interventions in the flowsheet. Outcome: Progressing Towards Goal    Problem: Pressure Injury - Risk of  Goal: *Prevention of pressure injury  Description  Document Chance Scale and appropriate interventions in the flowsheet.   Outcome: Progressing Towards Goal    Problem: Nutrition Deficit  Goal: *Optimize nutritional status  Outcome: Progressing Towards Goal

## 2019-07-18 NOTE — PROGRESS NOTES
Cancer center called this RN to ask if pt could have MRI of brain to assess brain mets. Dr. Atilio Brennan paged-he states if okay with family order for brain MRI may be placed. Call placed to [facility] POA-at this time the main concern for the pt & family is to get the pt home, hopefully with a positive outlook. They do not wish to proceed with the MRI at this time.

## 2019-07-19 NOTE — PROGRESS NOTES
Hospital bed ordered through [facility]. They can deliver today. Liaison checking with  to determine time of delivery. CM will arrange Ulanda Blank to coincide with hospital bed delivery.

## 2019-07-19 NOTE — PROGRESS NOTES
Discharge instructions and prescriptions provided and explained to patient and family, patient voiced understanding. Medication side effect sheet reviewed with pt. No home meds or valuables to return. Opportunity for questions provided. Pt to be discharged alter this afternoon after hospital bed is delivered.

## 2019-07-19 NOTE — DISCHARGE INSTRUCTIONS
Patient Education     DISCHARGE SUMMARY from Nurse    PATIENT INSTRUCTIONS:    After general anesthesia or intravenous sedation, for 24 hours or while taking prescription Narcotics:  · Limit your activities  · Do not drive and operate hazardous machinery  · Do not make important personal or business decisions  · Do  not drink alcoholic beverages  · If you have not urinated within 8 hours after discharge, please contact your surgeon on call. Report the following to your surgeon:  · Excessive pain, swelling, redness or odor of or around the surgical area  · Temperature over 100.5  · Nausea and vomiting lasting longer than 4 hours or if unable to take medications  · Any signs of decreased circulation or nerve impairment to extremity: change in color, persistent  numbness, tingling, coldness or increase pain  · Any questions    What to do at Home:  Recommended activity: Activity as tolerated,     If you experience any of the following symptoms fever, new or unrelieved pain, persistent nausea or vomiting, or any other worrisome symptoms , please follow up with pcp  . *  Please give a list of your current medications to your Primary Care Provider. *  Please update this list whenever your medications are discontinued, doses are      changed, or new medications (including over-the-counter products) are added. *  Please carry medication information at all times in case of emergency situations. These are general instructions for a healthy lifestyle:    No smoking/ No tobacco products/ Avoid exposure to second hand smoke  Surgeon General's Warning:  Quitting smoking now greatly reduces serious risk to your health.     Obesity, smoking, and sedentary lifestyle greatly increases your risk for illness    A healthy diet, regular physical exercise & weight monitoring are important for maintaining a healthy lifestyle    You may be retaining fluid if you have a history of heart failure or if you experience any of the following symptoms:  Weight gain of 3 pounds or more overnight or 5 pounds in a week, increased swelling in our hands or feet or shortness of breath while lying flat in bed. Please call your doctor as soon as you notice any of these symptoms; do not wait until your next office visit. The discharge information has been reviewed with the patient. The patient verbalized understanding. Discharge medications reviewed with the patient and appropriate educational materials and side effects teaching were provided. ___________________________________________________________________________________________________________________________________     Acute Kidney Injury: Care Instructions  Your Care Instructions    Acute kidney injury (JAZZY) is a sudden decrease in kidney function. This can happen over a period of hours, days or, in some cases, weeks. JAZZY used to be called acute renal failure, but kidney failure doesn't always happen with JAZZY. Common causes of JAZZY are dehydration, blood loss, and medicines. When JAZZY happens, the kidneys have trouble removing waste and excess fluids from the body. The waste and fluids build up and become harmful. Kidney function may return to normal if the cause of JAZZY is treated quickly. Your chance of a full recovery depends on what caused the problem, how quickly the cause was treated, and what other medical problems you have. A machine may be used to help your kidneys remove waste and fluids for a short period of time. This is called dialysis. Follow-up care is a key part of your treatment and safety. Be sure to make and go to all appointments, and call your doctor if you are having problems. It's also a good idea to know your test results and keep a list of the medicines you take. How can you care for yourself at home? · Talk to your doctor about how much fluid you should drink. · Eat a balanced diet.  Talk to your doctor or a dietitian about what type of diet may be best for you. You may need to limit sodium, potassium, and phosphorus. · If you need dialysis, follow the instructions and schedule for dialysis that your doctor gives you. · Do not smoke. Smoking can make your condition worse. If you need help quitting, talk to your doctor about stop-smoking programs and medicines. These can increase your chances of quitting for good. · Do not drink alcohol. · Review all of your medicines with your doctor. Do not take any medicines, including nonsteroidal anti-inflammatory drugs (NSAIDs) such as ibuprofen (Advil, Motrin) or naproxen (Aleve), unless your doctor says it is safe for you to do so. · Make sure that anyone treating you for any health problem knows that you have had JAZZY. When should you call for help? Call 911 anytime you think you may need emergency care. For example, call if:    · You passed out (lost consciousness).    Call your doctor now or seek immediate medical care if:    · You have new or worse nausea and vomiting.     · You have much less urine than normal, or you have no urine.     · You are feeling confused or cannot think clearly.     · You have new or more blood in your urine.     · You have new swelling.     · You are dizzy or lightheaded, or feel like you may faint.    Watch closely for changes in your health, and be sure to contact your doctor if:    · You do not get better as expected. Where can you learn more? Go to http://rachel-amarilys.info/. Enter J907 in the search box to learn more about \"Acute Kidney Injury: Care Instructions. \"  Current as of: March 14, 2018  Content Version: 11.9  © 7427-9866 [employer], [employer]. Care instructions adapted under license by [facility] (which disclaims liability or warranty for this information).  If you have questions about a medical condition or this instruction, always ask your healthcare professional. [address] any warranty or liability for your use of this information.

## 2019-07-19 NOTE — PROGRESS NOTES
Problem: Mobility Impaired (Adult and Pediatric)  Goal: *Acute Goals and Plan of Care  Description  Acute PT Goals:  (1.)Ac Vega  will move from supine to sit and sit to supine , scoot up and down and roll side to side with MODIFIED INDEPENDENCE within 7 treatment day(s). (2.)Ac Vega will transfer from bed to chair and chair to bed with MODIFIED INDEPENDENCE using the least restrictive device within 7 treatment day(s). (3.)Ac Vega will ambulate with MODIFIED INDEPENDENCE for 250+ feet with the least restrictive device within 7 treatment day(s). (4.)Ac Vgea will perform standing static and dynamic balance activities x 23 minutes with MODIFIED INDEPENDENCE to improve safety and activity tolerance within 7 treatment day(s). (5.)Ac Vega will ascend and descend 2 stairs using bilateral hand rail(s) with MODIFIED INDEPENDENCE to improve functional mobility and safety within 7 treatment day(s). (6.)Ac Vega will perform bilateral lower extremity exercises x 15 min for HEP with MODIFIED INDEPENDENCE to improve strength, endurance, and functional mobility within 7 treatment day(s). PHYSICAL THERAPY: Daily Note and AM 7/19/2019  INPATIENT: PT Visit Days : 3  [place] / [place] / Product Type: Medicare /       NAME/AGE/GENDER: Rojelio Mae is a 82 y.o. male   PRIMARY DIAGNOSIS: JAZZY (acute kidney injury) ([facility] [N17.9]  Nausea with vomiting [R11.2] JAZZY (acute kidney injury) ([facility]   JAZZY (acute kidney injury) ([facility]         ICD-10: Treatment Diagnosis:   · Generalized Muscle Weakness (M62.81)  · Difficulty in walking, Not elsewhere classified (R26.2)  · Other abnormalities of gait and mobility (R26.89)   Precaution/Allergies:  Lortab [hydrocodone-acetaminophen]; Lortab [hydrocodone-acetaminophen]; and Other medication      ASSESSMENT:     Mr. Jeremi Vega is an 82year old M who presents to hospital with nausea, vomiting, SOB and weakness.  Pt recently diagnosed with stage 3 lung cancer, hx of prostate cancer. Prior to hospital admission pt lives alone in a one story home with no step(s) to enter. Pt endorses no falls in past 6 months. Prior to admission Mr. Getachew Garcia uses usually no DME for mobility, but recently due to onset of weakness he has been using a walker for mobility. He also reports using \"a little bit\" of oxygen at baseline. 7/19- pt presents in supine, needs encouragement to participate with therapy treatment. He appears weak and tired. Requires two attempts and increased assist with bed mobility and transfer to sitting. Intact seated balance once at edge of bed. Performs sit-stand transfer with CGA-SBA. Pt unsteady once in standing and needs constant support. Weak and reaching for railings on wall to for support which further impairs his balance. Takes steps from bed to chair (8') with min-mod handheld assist. Once in chair, O2 sats 86% on 4L, increased to 5L and instructed on pursed lip breathing, slowly increase to low 90's. Suggested trying gait with walker, pt performs transfers with cueing for body mechanics and technique, stood and ambulates 15 ft in room with CGA using walker. Demonstrates slow gait pace, still with impaired balance. Pt up to chair again for another rest break, 89-91%. Took short rest break and performs a few lower extremity exercises with min verbal/visual cues and fair participation. Kevin Thayer still seems quite weak and with decreased activity tolerance. Pt states he wants home with [facility] and to continue outpatient oncology workup. This section established at most recent assessment   PROBLEM LIST (Impairments causing functional limitations):  1. Decreased Strength  2. Decreased Transfer Abilities  3. Decreased Ambulation Ability/Technique  4. Decreased Balance  5. Increased Pain  6.  Decreased Activity Tolerance   INTERVENTIONS PLANNED: (Benefits and precautions of physical therapy have been discussed with the patient.)  1. Balance Exercise  2. Bed Mobility  3. Family Education  4. Gait Training  5. Home Exercise Program (HEP)  6. Manual Therapy  7. Neuromuscular Re-education/Strengthening  8. Range of Motion (ROM)  9. Therapeutic Activites  10. Therapeutic Exercise/Strengthening  11. Transfer Training     TREATMENT PLAN: Frequency/Duration: 3 times a week for duration of hospital stay  Rehabilitation Potential For Stated Goals: Good     REHAB RECOMMENDATIONS (at time of discharge pending progress):    Placement: It is my opinion, based on this patient's performance to date, that Mr. Jeny Canas may benefit from intensive therapy at a [address] [place] [address] after discharge due to the functional deficits listed above that are likely to improve with skilled rehabilitation and concerns that he/she may be unsafe to be unsupervised at home due to medical complications and mobility deficits which impact his level of function and put him at increased risk of falling and/or functional decline. pt wants home with [facility] PT.  Equipment:    None at this time              HISTORY:   History of Present Injury/Illness (Reason for Referral):  Per H&P: \"Mr. Jeny Canas is an 81 y/o male with hx prostate cancer, CAD, afib, CHF, GERD,  HTN, HLD, AICD who was recently diagnosed with stage 3 lung cancer earlier this month. This week he has had nausea and vomiting and tonight is the second ER visit this week for same. He is unable to keep food or liquids down. No abdominal pain. No constipation or diarrhea. Denies fever or chills. He has worsening JAZZY with a Bun 56/Cr2.20. Will admit for further treatment. \"  Past Medical History/Comorbidities:   Mr. Jeny Canas  has a past medical history of Abnormal EKG, Acute kidney injury ([address] AICD (automatic cardioverter/defibrillator) present, Alterations of sensations, Anemia, Arthritis, Back pain (6/17/2016), Benign prostatic hyperplasia (6/17/2016), Bilateral pubic rami fractures ([address] CAD (coronary artery disease), CHF (congestive heart failure) ([facility] Chronic obstructive pulmonary disease ([facility] Chronic systolic CHF (congestive heart failure) ([facility] (10/7/2014), CRI (chronic renal insufficiency), Depression, Dizziness, Dyspnea, Elevated ferritin, Elevated PSA (6/17/2016), GERD (gastroesophageal reflux disease), Heart failure ([facility] Hernia, inguinal (6/17/2016), Hyperlipidemia (10/19/2015), Hypertension, Hypokalemia (6/17/2016), Ischemia of left lower extremity (3/13/2019), Keratosis, actinic, Leg cramps, Malaise and fatigue (6/17/2016), NSTEMI (non-ST elevated myocardial infarction) ([facility] (6/24/2019), OA (osteoarthritis) of hip (9/16/2016), Orthostasis, Pneumonia, Respiratory failure ([facility] (6/17/2016), [facility]atrial node dysfunction ([facility] and Sinusitis. Mr. Cleola Baumgarten  has a past surgical history that includes hx pacemaker; hx cataract removal; hx other surgical (4/2006); hx other surgical; hx other surgical; hx orthopaedic; hx carpal tunnel release; hx heart catheterization; hx cataract removal; hx colonoscopy; hx other surgical; and vascular surgery procedure unlist (03/13/2019). Social History/Living Environment:   Home Environment: Private residence  # Steps to Enter: 0  One/Two Story Residence: One story  Living Alone: Yes  Support Systems: Family member(s)  Patient Expects to be Discharged to[de-identified] Unknown  Current DME Used/Available at Home: Walker, rollator, Chris Gehrig, straight, Tub transfer bench, Grab bars, Raised toilet seat  Tub or Shower Type: Tub/Shower combination  Prior Level of Function/Work/Activity:  Independent, lives alone.      Number of Personal Factors/Comorbidities that affect the Plan of Care: 1-2: MODERATE COMPLEXITY   EXAMINATION:   Most Recent Physical Functioning:   Gross Assessment:                  Posture:     Balance:  Sitting: Intact  Standing: Impaired  Standing - Static: Fair  Standing - Dynamic : Fair Bed Mobility:  Supine to Sit: Moderate assistance  Scooting: Contact guard assistance  Wheelchair Mobility:     Transfers:  Sit to Stand: Contact guard assistance  Stand to Sit: Contact guard assistance  Bed to Chair: Minimum assistance  Interventions: Verbal cues; Safety awareness training; Tactile cues  Duration: 25 Minutes  Gait:     Base of Support: Center of gravity altered  Speed/Tonya: Pace decreased (<100 feet/min); Slow  Gait Abnormalities: Trunk sway increased;Decreased step clearance; Path deviations  Distance (ft): 15 Feet (ft)  Assistive Device: Walker, rolling  Ambulation - Level of Assistance: Contact guard assistance;Minimal assistance; Moderate assistance      Body Structures Involved:  1. Lungs  2. Muscles Body Functions Affected:  1. Respiratory  2. Neuromusculoskeletal  3. Movement Related Activities and Participation Affected:  1. General Tasks and Demands  2. Mobility   Number of elements that affect the Plan of Care: 3: MODERATE COMPLEXITY   CLINICAL PRESENTATION:   Presentation: Stable and uncomplicated: LOW COMPLEXITY   CLINICAL DECISION MAKIN[address] [place] [id number] AM-PAC 6 Clicks   Basic Mobility Inpatient Short Form  How much difficulty does the patient currently have. .. Unable A Lot A Little None   1. Turning over in bed (including adjusting bedclothes, sheets and blankets)? ? 1   ? 2   ? 3   ? 4   2. Sitting down on and standing up from a chair with arms ( e.g., wheelchair, bedside commode, etc.)   ? 1   ? 2   ? 3   ? 4   3. Moving from lying on back to sitting on the side of the bed?   ? 1   ? 2   ? 3   ? 4   How much help from another person does the patient currently need. .. Total A Lot A Little None   4. Moving to and from a bed to a chair (including a wheelchair)? ? 1   ? 2   ? 3   ? 4   5. Need to walk in hospital room? ? 1   ? 2   ? 3   ? 4   6. Climbing 3-5 steps with a railing? ? 1   ? 2   ? 3   ? 4   © 2007, Trustees of [address] [place] [id number], under license to [employer].  All rights reserved      Score:  Initial: 20 Most Recent: X (Date: -- )    Interpretation of Tool:  Represents activities that are increasingly more difficult (i.e. Bed mobility, Transfers, Gait). Medical Necessity:     · Patient is expected to demonstrate progress in strength, balance, coordination, functional technique and activity tolerance   ·  to improve safety during all mobility. · .  Reason for Services/Other Comments:  · Patient continues to require skilled intervention due to medical complications and mobility deficits which impact his level of function, safety, and independence as indicated above. · .   Use of outcome tool(s) and clinical judgement create a POC that gives a: Clear prediction of patient's progress: LOW COMPLEXITY        TREATMENT:   (In addition to Assessment/Re-Assessment sessions the following treatments were rendered)   Pre-treatment Symptoms/Complaints:  'I can't do it\"  Pain: Initial:   Pain Intensity 1: 0  Post Session:  0/10     Therapeutic Activity: (  25 Minutes): Therapeutic activities including Bed mobility, sit-stand transfers, standing balance, Chair transfers, Ambulation on level ground x2 trials without and with rolling walker and while monitoring O2 sats, and seated LE exercises to improve mobility, strength, balance and activity tolerance. Required minimal   verbal and safety cues to promote static and dynamic balance in standing and promote coordination of bilateral, lower extremity(s).       Date:  7/15/19 Date:  7/19/19 Date:     Activity/Exercise Parameters Parameters Parameters   Seated Ankle Pumps 1 x 20 BLE 10x AB    Seated LAQ 1 x 10 BLE 10x AB    Seated Marches 1 x 10 BLE 10x AB    Sit to Stands 2 x 5 repetitions 3x                        Braces/Orthotics/Lines/Etc:   · O2 Device: Nasal cannula  Treatment/Session Assessment:    · Response to Treatment:  Pt weak, unsteady  · Interdisciplinary Collaboration:   o Physical Therapist  o Registered Nurse  · After treatment position/precautions:   o Up in chair  o Bed/Chair-wheels locked  o Bed in low position  o Call light within reach  o RN notified  o family present   · Compliance with Program/Exercises: Will assess as treatment progresses  · Recommendations/Intent for next treatment session: \"Next visit will focus on advancements to more challenging activities and reduction in assistance provided\".     Total Treatment Duration:  PT Patient Time In/Time Out  Time In: 1008  Time Out: [address] Sherrie Mccrary DPT

## 2019-07-19 NOTE — PROGRESS NOTES
[facility] will deliver hospital bed to pt's home around 5pm this evening. Pt's grandson will be there to let [facility] in and will have space cleared for hospital bed. Adolfo Munoz will transport pt home today at 6:30pm-allowing grandson time to get to pt's home for delivery. [facility] liaisons notified of DC. No other needs voiced at this time. Care Management Interventions  PCP Verified by CM: Yes  Mode of Transport at Discharge:  Other (see comment)  Transition of Care Consult (CM Consult): Discharge Planning  Discharge Durable Medical Equipment: No  Physical Therapy Consult: Yes  Occupational Therapy Consult: Yes  Current Support Network: Own Home  Confirm Follow Up Transport: Family  Plan discussed with Pt/Family/Caregiver: Yes  Freedom of Choice Offered: Yes  Discharge Location  Discharge Placement: Home with home health

## 2019-07-19 NOTE — PROGRESS NOTES
Palliative Care Progress Note    Patient: Alisson Fournier MRN: [id number]  SSN: [id number]    YOB: 1935  Age: 82 y.o. Sex: male       Assessment/Plan:     Chief Complaint/Interval History: reports he slept well. Denies pain at present. Wants to go home today       Principal Diagnosis:    · Debility, Unspecified  R53.81    Additional Diagnoses:   · Dyspnea  R06.00  · Fatigue, Lethargy  R53.83  · Frailty  R54  · Pain, chest  R07.9  · Encounter for Palliative Care  Z51.5    Palliative Performance Scale (PPS)  PPS: 60    Medical Decision Making:   Reviewed and summarized notes over last 24 hours   Discussed case with appropriate providers  Reviewed laboratory and x-ray data- BMP    Pt resting in bed, no distress noted. No family at bedside. Pt reports he is doing well today, and slept well last night. He had an Oxycodone 5 mg last night, and reports it worked well for him. He had some mild nausea but it was relieved with Zofran. Would recommend he be discharged home with Rx for Oxycodone PRN for pain and dyspnea. He has indicated that he wants to pursue treatment for his lung CA, therefor, would recommend discharge home with home health, which is being arranged by . He has not had complete staging of his cancer- this will be completed as an outpatient. We are certainly glad to see him in follow up at the [facility]- will send referral.  Of note, his renal function is declining, and may preclude cancer directed therapies. Regardless, this will be decided on an outpatient basis. No further PC needs- we will sign off. Thank you for allowing us to participate in Mr Davey Stephenson' care. Will discuss findings with members of the interdisciplinary team.         More than 50% of this 15 minute visit was spent counseling and coordination of care as outlined above. Subjective:     Review of Systems:  A comprehensive review of systems was negative except for:   Constitutional: Positive for fatigue. Objective:     Visit Vitals  BP 95/62 (BP 1 Location: Right arm, BP Patient Position: At rest)   Pulse 77   Temp 97.5 °F (36.4 °C)   Resp 18   Ht 5' 6\" (1.676 m)   Wt 146 lb 8 oz (66.5 kg)   SpO2 95%   BMI 23.65 kg/m²       Physical Exam:    General:  Cooperative. Debilitated and frail. No acute distress. Eyes:  Conjunctivae/corneas clear    Nose: Nares normal. Septum midline.    Neck: Supple, symmetrical, trachea midline   Lungs:   Clear to auscultation bilaterally, unlabored   Heart:  Regular rate and rhythm   Abdomen:   Soft, non-tender, non-distended   Extremities: Normal, atraumatic, no cyanosis or edema   Skin: Skin color, texture, turgor normal.    Neurologic: Nonfocal   Psych: Alert and oriented     Signed By: Apoorva Wilson NP     July 19, 2019

## 2019-07-19 NOTE — CDMP QUERY
Pt admitted with acute kidney injury and has malnutrition documented. Please further specify type of malnutrition.       Malnutrition (mild, moderate, severe)    Protein calorie malnutrition (mild, moderate, severe)    Other (please specify)   Unable to determine    The medical record reflects the following:    Risk Factors: age, prostate cancer, A Fib, CAD, CHF, HTN, stage 3 lung cancer, JAZZY    Clinical Indicators: per progress note written on 7/17 @ 1028 by RD [place] \"Malnutrition Criteria:  Sever Malnutrition in setting of Chronic Illness  Energy intake: <75% energy intake compares to estimated energy needs for greater than or equal to 1 month  Weight loss: >7.5 in 3 months\"    Treatment: RD Consult, progressed to GI soft diet, discontinue Ensure Clear and add Ensure Enlive TID    Thanks,  JOSEPH RoblesN, RN, CDS  Compliant Documentation Management Program  [phone]

## 2019-07-19 NOTE — PROGRESS NOTES
Problem: Falls - Risk of  Goal: *Absence of Falls  Description  Document Tabatha Payment Fall Risk and appropriate interventions in the flowsheet. 7/18/2019 2008 by Esa Alba RN  Outcome: Progressing Towards Goal  Note:   Fall Risk Interventions:  Mobility Interventions: Patient to call before getting OOB, Communicate number of staff needed for ambulation/transfer         Medication Interventions: Patient to call before getting OOB    Elimination Interventions: Call light in reach, Patient to call for help with toileting needs, Urinal in reach           7/18/2019 1953 by Esa Alba RN  Outcome: Progressing Towards Goal  Note:   Fall Risk Interventions:  Mobility Interventions: Patient to call before getting OOB, Communicate number of staff needed for ambulation/transfer  No falls noted       Medication Interventions: Patient to call before getting OOB    Elimination Interventions: Call light in reach, Patient to call for help with toileting needs, Urinal in reach              Problem: Patient Education: Go to Patient Education Activity  Goal: Patient/Family Education  7/18/2019 2008 by Esa Alba RN  Outcome: Progressing Towards Goal  7/18/2019 1953 by Esa Alba RN  Outcome: Progressing Towards Goal     Problem: Pressure Injury - Risk of  Goal: *Prevention of pressure injury  Description  Document Chance Scale and appropriate interventions in the flowsheet.   7/18/2019 2008 by Esa Alba RN  Outcome: Progressing Towards Goal  Note:   Pressure Injury Interventions:  Sensory Interventions: Check visual cues for pain, Keep linens dry and wrinkle-free, Minimize linen layers    Moisture Interventions: Absorbent underpads    Activity Interventions: Increase time out of bed, Chair cushion    Mobility Interventions: HOB 30 degrees or less    Nutrition Interventions: Document food/fluid/supplement intake                  7/18/2019 1953 by Esa Alba RN  Outcome: Progressing Towards Goal  Note: Pressure Injury Interventions:  Sensory Interventions: Check visual cues for pain, Keep linens dry and wrinkle-free, Minimize linen layers    Moisture Interventions: Absorbent underpads    Activity Interventions: Increase time out of bed, PT/OT evaluation    Mobility Interventions: HOB 30 degrees or less, PT/OT evaluation, Turn and reposition approx.  every two hours(pillow and wedges)    Nutrition Interventions: Document food/fluid/supplement intake, Offer support with meals,snacks and hydration

## 2019-07-19 NOTE — PROGRESS NOTES
[facility] Hematology & Oncology        Inpatient Hematology / Oncology Progress Note      Admission Date: 2019 11:21 PM  Reason for Admission/Hospital Course: JAZZY (acute kidney injury) ([facility] [address] [N17.9]  Nausea with vomiting [R11.2]      24 Hour Events:  Discharge delayed yesterday  Palliative care consulted--recommend d/c home with home health  Will need outpatient f/u for cancer staging    ROS:  Constitutional: Positive for fatigue, weakness; negative for fever, chills. CV: Positive for occasional chest pain with deep inspiration; negative for palpitations, edema. Respiratory: Positive for dyspnea, home O2; negative for cough, wheezing. GI: Positive for nausea; negative for abdominal pain, diarrhea. 10 point review of systems is otherwise negative with the exception of the elements mentioned above in the HPI. Allergies   Allergen Reactions    Lortab [Hydrocodone-Acetaminophen] Other (comments)     Makes him sick. 1/2 tab doesn't make him sick     Lortab [Hydrocodone-Acetaminophen] Other (comments)     Pt states that a whole pill makes him feel sick. 1/2 tab does not.  Other Medication Other (comments)     He has a hx of a prolonged QT interval, so precaution with meds that affect that. OBJECTIVE:  Patient Vitals for the past 8 hrs:   BP Temp Pulse Resp SpO2 Weight   19 0755     95 %    19 0733 95/62 97.5 °F (36.4 °C) 77 18 95 %    19 0547      146 lb 8 oz (66.5 kg)   19 0400 94/56 97.9 °F (36.6 °C) 76 20 91 %      Temp (24hrs), Av.7 °F (36.5 °C), Min:97.4 °F (36.3 °C), Max:97.9 °F (36.6 °C)    No intake/output data recorded. Physical Exam:  Constitutional: Well developed, well nourished male in no acute distress, sitting comfortably in the hospital bed. HEENT: Normocephalic and atraumatic. Oropharynx is clear, mucous membranes are moist.  Neck supple. Lymph node   Deferred. Skin Warm and dry. No bruising and no rash noted.   No erythema. No pallor. Respiratory Lungs are clear to auscultation bilaterally without wheezes, rales or rhonchi, normal air exchange without accessory muscle use. CVS Normal rate, regular rhythm and normal S1 and S2. No murmurs, gallops, or rubs. Abdomen Soft, nontender and nondistended, normoactive bowel sounds. Neuro Grossly nonfocal with no obvious sensory or motor deficits. MSK Normal range of motion in general.  No edema and no tenderness. Psych Appropriate mood and affect. Labs:      Recent Labs     07/17/19  0821   WBC 7.0   RBC 2.62*   HGB 8.7*   HCT 26.3*   .4*   MCH 33.2*   MCHC 33.1   RDW 16.8*           Recent Labs     07/19/19  0814 07/18/19 0814 07/17/19 0821    134* 136   K 4.7 4.2 3.8    101 103   CO2 23 23 23   AGAP 11 10 10   * 119* 95   BUN 58* 44* 38*   CREA 2.77* 2.36* 1.95*   GFRAA 28* 34* 43*   GFRNA 23* 28* 35*   CA 9.3 9.3 8.9         Imaging:  XR CHEST SNGL V [[id number]] Collected: 07/17/19 1737   Order Status: Completed Updated: 07/17/19 1740   Narrative:     Portable chest x-ray    Clinical location: Shortness of breath    FINDINGS: There is stable cardiomegaly with a left-sided AICD in place. The  lungs are expanded and clear. No pleural effusion or pneumothorax. Impression:     IMPRESSION: Cardiomegaly, otherwise no acute abnormality. XR CHEST PA LAT [[id number]] Collected: 07/14/19 2234   Order Status: Completed Updated: 07/14/19 2237   Narrative:     Chest X-ray    INDICATION: Shortness of breath and cough    PA and lateral views of the chest were obtained. FINDINGS: The lungs are clear.  There are no infiltrates or effusions. Dugan Racer is  stable cardiomegaly.  Pacemaker is present.     Impression:     IMPRESSION: No acute findings in the chest       Medications:  Current Facility-Administered Medications   Medication Dose Route Frequency    oxyCODONE IR (ROXICODONE) tablet 5 mg  5 mg Oral Q4H PRN    oxyCODONE IR (ROXICODONE) tablet 2.5 mg  2.5 mg Oral Q4H PRN    furosemide (LASIX) tablet 20 mg  20 mg Oral DAILY    albuterol-ipratropium (DUO-NEB) 2.5 MG-0.5 MG/3 ML  3 mL Nebulization Q4H PRN    albuterol (PROVENTIL VENTOLIN) nebulizer solution 2.5 mg  2.5 mg Nebulization Q4H PRN    apixaban (ELIQUIS) tablet 2.5 mg  2.5 mg Oral BID    aspirin delayed-release tablet 81 mg  81 mg Oral DAILY    cholecalciferol (VITAMIN D3) tablet 1,000 Units  1,000 Units Oral DAILY    traMADol (ULTRAM) tablet 50 mg  50 mg Oral Q6H PRN    tamsulosin (FLOMAX) capsule 0.4 mg  0.4 mg Oral DAILY    rosuvastatin (CRESTOR) tablet 20 mg  20 mg Oral QHS    ondansetron (ZOFRAN ODT) tablet 4 mg  4 mg Oral Q8H PRN    pantoprazole (PROTONIX) tablet 40 mg  40 mg Oral ACB    metoprolol succinate (TOPROL-XL) XL tablet 25 mg  25 mg Oral DAILY    tiotropium (SPIRIVA) inhalation capsule 18 mcg  1 Cap Inhalation DAILY    cyanocobalamin tablet 1,000 mcg  1,000 mcg Oral DAILY    promethazine (PHENERGAN) with saline injection 12.5 mg  12.5 mg IntraVENous Q6H PRN    sodium chloride (NS) flush 5-40 mL  5-40 mL IntraVENous Q8H    sodium chloride (NS) flush 5-40 mL  5-40 mL IntraVENous PRN    acetaminophen (TYLENOL) tablet 650 mg  650 mg Oral Q4H PRN    ondansetron (ZOFRAN) injection 4 mg  4 mg IntraVENous Q4H PRN    famotidine (PF) (PEPCID) 20 mg in sodium chloride 0.9% 10 mL injection  20 mg IntraVENous DAILY    budesonide (PULMICORT) 500 mcg/2 ml nebulizer suspension  500 mcg Nebulization BID RT    And    albuterol (PROVENTIL VENTOLIN) nebulizer solution 2.5 mg  2.5 mg Nebulization Q6HWA RT         ASSESSMENT:    Problem List  Date Reviewed: 7/11/2019          Codes Class Noted    Nausea with vomiting ICD-10-CM: R11.2  ICD-9-CM: 787.01  7/15/2019        * (Principal) [facility] ICD-10-CM: N17.9  ICD-9-CM: 584.9  7/15/2019        Thyroid nodule ICD-10-CM: E04.1  ICD-9-CM: 241.0  7/7/2019        Hilar mass ICD-10-CM: R91.8  ICD-9-CM: 786.6  7/3/2019        Malignant neoplasm of hilus of left lung (HCC) ICD-10-CM: C34.02  ICD-9-CM: 162.2  7/3/2019        Rib pain ICD-10-CM: R07.81  ICD-9-CM: 786.50  7/1/2019        Anemia (Chronic) ICD-10-CM: D64.9  ICD-9-CM: 285.9  7/1/2019        Chronic respiratory failure with hypoxia (HCC) (Chronic) ICD-10-CM: J96.11  ICD-9-CM: 518.83, 799.02  7/1/2019        Hilar adenopathy ICD-10-CM: R59.0  ICD-9-CM: 785.6  7/1/2019        Mediastinal adenopathy ICD-10-CM: R59.0  ICD-9-CM: 785.6  7/1/2019        Chronic renal insufficiency ICD-10-CM: N18.9  ICD-9-CM: 585.9  6/24/2019        History of DVT (deep vein thrombosis) (Chronic) ICD-10-CM: L11.967  ICD-9-CM: V12.51  6/24/2019        Long term (current) use of anticoagulants (Chronic) ICD-10-CM: Z79.01  ICD-9-CM: V58.61  4/23/2019        Atrial fibrillation (HCC) (Chronic) ICD-10-CM: I48.91  ICD-9-CM: 427.31  3/22/2019        Thrombus of left atrial appendage ICD-10-CM: I51.3  ICD-9-CM: 429.89  3/22/2019        Essential hypertension ICD-10-CM: I10  ICD-9-CM: 401.9  9/4/2018    Overview Addendum 7/7/2019  5:31 PM by Loreto Duron NP     BP readings have been in target range. No complications noted from the medication presently being used.                GERD (gastroesophageal reflux disease) ICD-10-CM: K21.9  ICD-9-CM: 530.81  9/4/2018        Cardiac defibrillator in place ICD-10-CM: Z95.810  ICD-9-CM: V45.02  9/4/2018    Overview Addendum 7/7/2019  5:31 PM by Loreto Duron NP     [place] DDD ICD              Change in bowel habits ICD-10-CM: R19.4  ICD-9-CM: 787.99  9/4/2018    Overview Signed 7/7/2019  5:29 PM by Loreto Duron NP     Overview:   Added automatically from request for surgery [id number]             Constipation ICD-10-CM: K59.00  ICD-9-CM: 564.00  9/4/2018        History of MI (myocardial infarction) ICD-10-CM: I25.2  ICD-9-CM: 171  9/4/2018        History of prostate cancer ICD-10-CM: Z85.46  ICD-9-CM: V10.46 9/4/2018    Overview Signed 7/7/2019  5:29 PM by Mayte Gupta NP     Overview:   Added automatically from request for surgery [id number]             Prostate cancer [facility]) ICD-10-CM: Haritha Tubbs  ICD-9-CM: 185  11/1/2016    Overview Addendum 9/18/2017  8:43 PM by Lorena Ness     Last Assessment & Plan:   Despite the patient's age and his substantial comorbidities, nontreatment is not a very attractive option for him. His adjusted PSA off Proscar would be greater than 20 and he has Willy 9 disease as his highest [place] score. This would be considered very high risk prostate carcinoma. His staging workup shows no evidence of metastatic disease. His underlying LUTS already on Proscar and Flomax. Radiation therapy will present some challenges in terms of his urinary function, but I believe we can get him through it with acceptable toxicity. Surgery is not a viable option. I believe his 2 reasonable choices would be androgen deprivation alone, which of course would not be curative option that may provide disease control for a substantial period of time. Unfortunately, with his [place] 8 and 9 histology he may become androgen independent very quickly. Second option would be 2 years of androgen suppression in combination with definitive intensity modulated, image guided radiation therapy. We discussed that extensively today including both the short-term and long-term toxicities. It offer some a chance for cure, and even better chance for disease control which, given his health, could easily exceed his expected lifespan. I believe that his life expectancy, given his comorbidities, is in the 3 to 5 year range. Therefore, a definitive course of radiation therapy and androgen deprivation would be considered curative. After prolonged and thorough discussion he would like to proceed with the second option. We will arrange Casodex and Lupron with radiation therapy to start right around the first of the year.  We'll plan 3371-9606 centigrays at 200 cGy per day using intensity modulated, image guided technique. Last Assessment & Plan:   Despite the patient's age and his substantial comorbidities, nontreatment is not a very attractive option for him. His adjusted PSA off Proscar would be greater than 20 and he has Willy 9 disease as his highest Willy score. This would be considered very high risk prostate carcinoma. His staging workup shows no evidence of metastatic disease. His underlying LUTS already on Proscar and Flomax. Radiation therapy will present some challenges in terms of his urinary function, but I believe we can get him through it with acceptable toxicity. Surgery is not a viable option. I believe his 2 reasonable choices would be androgen deprivation alone, which of course would not be curative option that may provide disease control for a substantial period of time. Unfortunately, with his Willy 8 and 9 histology he may become androgen independent very quickly. Second option would be 2 years of androgen suppression in combination with definitive intensity modulated, image guided radiation therapy. We discussed that extensively today including both the short-term and long-term toxicities. It offer some a chance for cure, and even better chance for disease control which, given his health, could easily exceed his expected lifespan. I believe that his life expectancy, given his comorbidities, is in the 3 to 5 year range. Therefore, a definitive course of radiation therapy and androgen deprivation would be considered curative. After prolonged and thorough discussion he would like to proceed with the second option. We will arrange Casodex and Lupron with radiation therapy to start right around the first of the year. We'll plan 4755-0567 centigrays at 200 cGy per day using intensity modulated, image guided technique.   Last Assessment & Plan:   Despite the patient's age and his substantial comorbidities, nontreatment is not a very attractive option for him. His adjusted PSA off Proscar would be greater than 20 and he has [place] 9 disease as his highest [place] score. This would be considered very high risk prostate carcinoma. His staging workup shows no evidence of metastatic disease. His underlying LUTS already on Proscar and Flomax. Radiation therapy will present some challenges in terms of his urinary function, but I believe we can get him through it with acceptable toxicity. Surgery is not a viable option. I believe his 2 reasonable choices would be androgen deprivation alone, which of course would not be curative option that may provide disease control for a substantial period of time. Unfortunately, with his Willy 8 and 9 histology he may become androgen independent very quickly. Second option would be 2 years of androgen suppression in combination with definitive intensity modulated, image guided radiation therapy. We discussed that extensively today including both the short-term and long-term toxicities. It offer some a chance for cure, and even better chance for disease control which, given his health, could easily exceed his expected lifespan. I believe that his life expectancy, given his comorbidities, is in the 3 to 5 year range. Therefore, a definitive course of radiation therapy and androgen deprivation would be considered curative. After prolonged and thorough discussion he would like to proceed with the second option. We will arrange Casodex and Lupron with radiation therapy to start right around the first of the year. We'll plan 5641-9424 centigrays at 200 cGy per day using intensity modulated, image guided technique.              OA (osteoarthritis) of hip (Chronic) ICD-10-CM: M16.9  ICD-9-CM: 715.95  9/16/2016    Overview Addendum 9/16/2016 10:57 AM by Anurag Rodriguez     Right hip, advanced on prostate MRI 9/2016             Back pain ICD-10-CM: M54.9  ICD-9-CM: 724.5  6/17/2016        Inguinal hernia ICD-10-CM: K40.90  ICD-9-CM: 550.90  6/17/2016    Overview Addendum 7/7/2019  5:31 PM by Franklin Todd NP     Bilateral on MRI 9/6/16              Benign prostatic hyperplasia, (Chronic) ICD-10-CM: N40.0  ICD-9-CM: 600.00  6/17/2016    Overview Signed 9/16/2016 10:55 AM by Chele PRABHAKAR     MRI abnormal 9/6/16 of concern for cancer. CRI (chronic renal insufficiency) (Chronic) ICD-10-CM: N18.9  ICD-9-CM: 585. 9  Unknown        Arthritis (Chronic) ICD-10-CM: M19.90  ICD-9-CM: 716.90  Unknown        CAD (coronary artery disease), atherosclerotic of the native vessel (Chronic) ICD-10-CM: I25.10  ICD-9-CM: 414.00  Unknown    Overview Addendum 10/19/2015  9:21 AM by Lemond Pollen     ? MI 4-2014  Angina has improved with no episodes of chest pain since the last visit. Mild CAD by cath 6/2014. Chronic obstructive lung disease ([facility] [address] ICD-10-CM: J44.9  ICD-9-CM: 497  Unknown        History of sinoatrial node dysfunction (Chronic) ICD-10-CM: Z86.79  ICD-9-CM: V12.59  Unknown        Hyperlipidemia (Chronic) ICD-10-CM: E78.5  ICD-9-CM: 272.4  10/19/2015    Overview Signed 10/19/2015  9:20 AM by Lemond Pollen     Stable. Medication well tolerated. Chronic congestive heart failure ([facility] ICD-10-CM: I50.9  ICD-9-CM: 428.0  10/7/2014    Overview Addendum 7/7/2019  5:31 PM by Franklin Todd NP     [place] Class I.  EF 35%.                Fracture of multiple pubic rami [facility]) ICD-10-CM: M76.277Q  ICD-9-CM: 808.2  10/5/2013            Mr. Jeannie Richter is an 82 y.o. white male with a history of tobacco use (former 2 pack per day smoker x 50+ years; quit approximately 4 months ago), prostate cancer, sinoatrial node dysfunction, OA, NSTEMI, ischemia of LLE, hypokalemia, HTN, HLD, inguinal hernia, heart failure, GERD, elevated ferritin, depression, stage 3 CKD, CHF, COPD, chronic respiratory failure with home O2 of 2 L NC, CAD, BPH, A-Fib, arthritis, anemia, AICD placement, left common femoral artery thrombo-embolectomy, prostate biopsy, heart catheterization, bilateral cataract removal and multiple orthopedic surgeries. He was previously admitted here for left sided chest pain between 7/1 and 7/4. A left hilar mass was found on the CT scan from 7/1/2019 with right hilar LN. He had a fine needle biopsy of a left hilar mass on 7/3 that was consistent with malignancy. He was seen by Dr. Kwabena Ramires in the office on 7/11. Per Dr. Leonard Faria note he was awaiting a PET scan that was originally scheduled for 7/16/2019 to distinguish between stage III and stage IV lung cancer. Also had planned for an MRI of the brain to complete workup. Prostate cancer under good control and being managed by urology. He has not begun treatment yet for the lung cancer. Admitted on 7/15/19 with nausea and vomiting and inability to keep down food or liquids. Also with worsening JAZZY. PLAN:  Lung cancer  -?Stage III or stage IV, need PET scan and brain MRI outpatient which will need to be rescheduled once discharged  -Will need to f/u with Dr. Kwabena Ramires within a week of d/c  -7/19 Will need f/u with outpatient for complete staging including PET scan and MRI of the brain. Navigator contacted and knows to reschedule these tests and a f/u with Dr. Kwabena Ramires.     JAZZY/CKD III  -Worsening Cr from baseline, continue IVFs at 75 cc/hr, Cr yesterday 2.20  -7/19 Cr worsened to 2.77, felt to be from fluid overload r/t CHF     Nausea/vomiting  -Resolved since being admitted per patient, could consider GI consult if reoccurs/persists  -On clear liquid diet  -PRN anti-emetics   -7/19 Appetite improving with mild nausea today     Hx of A fib  -On Eliquis 2.5 mg bid     Discharging home today. F/u scheduled with Dr. Kwabena Ramires on 7/26. Navigator to reschedule PET and brain MRI. We will sign off. Thank you for allowing us to participate in his care. Goals and plan of care reviewed with the patient.   All questions answered to the best of our ability. Gernoimo Gomez NP   [facility] Hematology & Oncology  [address] [address]  Office : [phone]  Fax : [phone]       Attending Addendum:  I have personally performed a face to face diagnostic evaluation on this patient. I have reviewed and agree with the care plan as documented above by  Geronimo Gomez N.P.  My findings are as follows: Patient appears lethargic, heart rate regular without murmurs, abdomen is non-tender, bowel sounds are positive. 82 male, extensive past medical history, more recently diagnosed with at a minimum stage III lung cancer now admitted with an N/V along with poor p.o. intake and JAZZY. Renal function has improved with hydration. His nausea and vomiting appears to have improved with supportive care. If this recurs or persists then a GI evaluation can be considered. His outpatient staging scans including PET scan and brain MRI will need to be rescheduled. For discharge home today per primary team. F/u in oncology scheduled for 7/26/19. Total time 25min, 50% in direct consultation.         MD Nina RosenbaumAshtabula [facility] Hospital [facility]  [id number] [facility]  Office : [phone]  Fax : [phone]

## 2019-07-19 NOTE — DISCHARGE SUMMARY
Hospitalist Discharge Summary     Patient ID:  Clover Garnica  [id number]  89 y.o.  1935  Admit date: 7/14/2019 11:21 PM  Discharge date and time: 7/19/2019  Attending: Annie Ansari MD  PCP:  Melonie Roque NP  Treatment Team: Attending Provider: Annie Ansari MD; Consulting Provider: Zach Stahl MD; Consulting Provider: Kia Almendarez MD; Utilization Review: Supriya Salmeron RN; Care Manager: Ana Villa RN; Primary Nurse: Nathan Valiente RN; Primary Nurse: Estella Vasquez, RN; Primary Nurse: Nicci Erickson, RN; Occupational Therapist: Kai Quiroga OT; Physical Therapist: Maria Teresa Drew DPT; Charge Nurse: Haim NICOLE    Principal Diagnosis; Acute on chronic renal failure  Nausea/vomiting  Dehydration  Stage 3 lung cancer  Hx of prostate cancer  Debility  Physical deconditioning  Acute on chronic respiratory failure      Principal Problem:    JAZZY (acute kidney injury) ([facility] [address] (7/15/2019)    Active Problems:    Chronic congestive heart failure ([facility] [address] (10/7/2014)      Overview: [place] Class I.  EF 35%. Essential hypertension (9/4/2018)      Overview: BP readings have been in target range. No complications noted from the       medication presently being used. GERD (gastroesophageal reflux disease) (9/4/2018)      CAD (coronary artery disease), atherosclerotic of the native vessel ()      Overview: ? MI 4-2014      Angina has improved with no episodes of chest pain since the last visit. Mild CAD by cath 6/2014.         Chronic obstructive lung disease (HCC) ()      Cardiac defibrillator in place (9/4/2018)      Overview: [employer] DDD ICD       Atrial fibrillation ([facility] [address] (3/22/2019)      History of DVT (deep vein thrombosis) (6/24/2019)      Malignant neoplasm of hilus of left lung ([facility] [address] (7/3/2019)      History of MI (myocardial infarction) (9/4/2018)      History of prostate cancer (9/4/2018)      Overview: Overview:       Added automatically from request for surgery [id number]      Nausea with vomiting (7/15/2019)             Hospital Course:  Please refer to the admission H&P for details of presentation. In summary, the patient is 82years old male with hx of prostate cancer, CAD, A.fib, systolic CHF with EF 11-75%, moderated pulmonary HTN with recently diagnosed minimum stage 3 lung cancer admitted on 7/15 with nausea/vomiting, dehydration, acute renal failure. Pt was placed on gentle IV fluids to avoid fluid overload. Pt's nausea/vomiting improved with antiemetics. He was also eating well, given underlying malignancy and multiple medical co-morbidities pt does not have a great appetite, but his oral intake is definitely better now. I had discussion about goals of care with pt and his grandson. Pt verbalized that he does not want any heroic measures to keep him alive and when the time comes, he would like to go in peace. Pt's grandson has been informed about pt's wishes, and he is okay with DNR/DNI status. Pt was offered to go to [facility], but he wishes to go home with HHA. Pt requires frequent changes in body position and requires positioning of the body in ways not feasible with an ordinary bed in order to alleviate pain and to help him breathe better. Pt is at high risk of decline due to frailty, underlying malignancy, advanced systolic CHF and acute on chronic renal failure. Pt will benefit from hospice services in near future. Rest of the hospital course was uneventful. For further details, please refer to daily progress notes. Significant Diagnostic Studies:   Portable chest x-ray     Clinical location: Shortness of breath     FINDINGS: There is stable cardiomegaly with a left-sided AICD in place. The  lungs are expanded and clear. No pleural effusion or pneumothorax.     IMPRESSION  IMPRESSION: Cardiomegaly, otherwise no acute abnormality.     Labs: Results:       Chemistry Recent Labs     07/19/19  8717 07/18/19  9163 07/17/19  0821   * 119* 95    134* 136   K 4.7 4.2 3.8    101 103   CO2 23 23 23   BUN 58* 44* 38*   CREA 2.77* 2.36* 1.95*   CA 9.3 9.3 8.9   AGAP 11 10 10      CBC w/Diff Recent Labs     07/17/19  0821   WBC 7.0   RBC 2.62*   HGB 8.7*   HCT 26.3*         Cardiac Enzymes No results for input(s): CPK, CKND1, RONY in the last 72 hours. No lab exists for component: CKRMB, TROIP   Coagulation No results for input(s): PTP, INR, APTT in the last 72 hours. No lab exists for component: INREXT    Lipid Panel Lab Results   Component Value Date/Time    Cholesterol, total 95 06/25/2019 03:34 AM    HDL Cholesterol 45 06/25/2019 03:34 AM    LDL, calculated 31.2 06/25/2019 03:34 AM    VLDL, calculated 18.8 06/25/2019 03:34 AM    Triglyceride 94 06/25/2019 03:34 AM    CHOL/HDL Ratio 2.1 06/25/2019 03:34 AM      BNP No results for input(s): BNPP in the last 72 hours. Liver Enzymes No results for input(s): TP, ALB, TBIL, AP, SGOT, GPT in the last 72 hours. No lab exists for component: DBIL   Thyroid Studies Lab Results   Component Value Date/Time    TSH 1.570 06/26/2017 01:21 PM            Discharge Exam:  Visit Vitals  BP (!) 87/54 (BP 1 Location: Right arm, BP Patient Position: Sitting)   Pulse 82   Temp 97.5 °F (36.4 °C)   Resp 18   Ht 5' 6\" (1.676 m)   Wt 66.5 kg (146 lb 8 oz)   SpO2 90%   BMI 23.65 kg/m²     General appearance: elderly, frail, NAD, on 3-4 ltrs NC  Lungs: coarse breath sounds bilaterally  Heart: regular rate and rhythm, S1, S2 normal, no murmur, click, rub or gallop  Abdomen: soft, non-tender.  Bowel sounds normal. No masses,  no organomegaly  Extremities: no cyanosis or edema  Neurologic: Grossly normal  Psych: depressed mood    Disposition: home with HHA  Discharge Condition: stable  Patient Instructions:   Current Discharge Medication List      START taking these medications    Details   !! ondansetron (ZOFRAN ODT) 8 mg disintegrating tablet Take 1 Tab by mouth every eight (8) hours as needed for Nausea. Qty: 30 Tab, Refills: 2       !! - Potential duplicate medications found. Please discuss with provider. CONTINUE these medications which have CHANGED    Details   traMADol (ULTRAM) 50 mg tablet Take 1 Tab by mouth every eight (8) hours as needed for Pain for up to 3 days. Max Daily Amount: 150 mg.  Qty: 30 Tab, Refills: 0    Associated Diagnoses: Cancer related pain         CONTINUE these medications which have NOT CHANGED    Details   !! ondansetron (ZOFRAN ODT) 4 mg disintegrating tablet Take 1 Tab by mouth every eight (8) hours as needed for Nausea. Qty: 20 Tab, Refills: 0      apixaban (ELIQUIS) 5 mg tablet Take 1 Tab by mouth two (2) times a day. Qty: 60 Tab, Refills: 11      omeprazole (PRILOSEC) 20 mg capsule Take 20 mg by mouth. rosuvastatin (CRESTOR) 20 mg tablet Take 1 Tab by mouth nightly. Qty: 30 Tab, Refills: 11      tamsulosin (FLOMAX) 0.4 mg capsule Take 1 Cap by mouth daily. Qty: 90 Cap, Refills: 1    Associated Diagnoses: Benign prostatic hyperplasia without lower urinary tract symptoms      fluticasone-vilanterol (BREO ELLIPTA) 100-25 mcg/dose inhaler Take 1 Puff by inhalation daily. cholecalciferol (VITAMIN D3) 1,000 unit tablet Take 1,000 Units by mouth daily. aspirin delayed-release 81 mg tablet Take 81 mg by mouth daily. cyanocobalamin (VITAMIN B-12) 1,000 mcg sublingual tablet Take 1,000 mcg by mouth daily. Associated Diagnoses: Weakness      aspirin-calcium carbonate 81 mg-300 mg calcium(777 mg) tab Take 81 mg by mouth.      metoprolol succinate (TOPROL-XL) 25 mg XL tablet Take 1 Tab by mouth daily. Qty: 30 Tab, Refills: 5      albuterol-ipratropium (DUO-NEB) 2.5 mg-0.5 mg/3 ml nebu 3 mL by Nebulization route every four (4) hours as needed for Cough.   Qty: 30 Nebule, Refills: 11    Associated Diagnoses: Chronic bronchitis, unspecified chronic bronchitis type (HCC)      albuterol (PROVENTIL HFA, VENTOLIN HFA, PROAIR HFA) 90 mcg/actuation inhaler Take 2 Puffs by inhalation every four (4) hours as needed for Wheezing. Qty: 1 Inhaler, Refills: 3    Associated Diagnoses: Chronic obstructive pulmonary disease, unspecified COPD type (HCC)      tiotropium (SPIRIVA WITH HANDIHALER) 18 mcg inhalation capsule Take 1 Cap by inhalation daily. !! - Potential duplicate medications found. Please discuss with provider. STOP taking these medications       furosemide (LASIX) 20 mg tablet Comments:   Reason for Stopping:         lisinopril (PRINIVIL, ZESTRIL) 2.5 mg tablet Comments:   Reason for Stopping:               Activity: Activity as tolerated  Diet: Comfort feeding  Wound Care: None needed    Follow-up  ·  follow up with Oncology and PCP in 2-3 weeks as scheduled.    Time spent to discharge patient 35 minutes  Signed:  Kiana Guevara MD  7/19/2019  11:51 AM

## 2019-07-19 NOTE — PROGRESS NOTES
Patient c/o nausea one time this shift. Nausea meds effective. Prn Pain meds effective tonight. cont on O2@ 5 lpm sats ranging from 90-93 while at rest.Hourly rounds completed this shift.  Will continue to monitor and report to oncoming nurse

## 2019-07-19 NOTE — PROGRESS NOTES
Problem: Falls - Risk of  Goal: *Absence of Falls  Description  Document Axel Simmons Fall Risk and appropriate interventions in the flowsheet. 7/18/2019 2033 by Liana Khan RN  Outcome: Progressing Towards Goal  Note:   Fall Risk Interventions:  Mobility Interventions: Patient to call before getting OOB, Communicate number of staff needed for ambulation/transfer         Medication Interventions: Patient to call before getting OOB    Elimination Interventions: Call light in reach, Patient to call for help with toileting needs, Urinal in reach           7/18/2019 2008 by Liana Khan RN  Outcome: Progressing Towards Goal  Note:   Fall Risk Interventions:  Mobility Interventions: Patient to call before getting OOB, Communicate number of staff needed for ambulation/transfer         Medication Interventions: Patient to call before getting OOB    Elimination Interventions: Call light in reach, Patient to call for help with toileting needs, Urinal in reach           7/18/2019 1953 by Liana Khan RN  Outcome: Progressing Towards Goal  Note:   Fall Risk Interventions:  Mobility Interventions: Patient to call before getting OOB, Communicate number of staff needed for ambulation/transfer  No falls noted       Medication Interventions: Patient to call before getting OOB    Elimination Interventions: Call light in reach, Patient to call for help with toileting needs, Urinal in reach              Problem: Pressure Injury - Risk of  Goal: *Prevention of pressure injury  Description  Document Chance Scale and appropriate interventions in the flowsheet.   7/18/2019 2033 by Liana Khan RN  Outcome: Progressing Towards Goal  Note:   Pressure Injury Interventions:  Sensory Interventions: Check visual cues for pain, Keep linens dry and wrinkle-free, Minimize linen layers    Moisture Interventions: Absorbent underpads    Activity Interventions: Increase time out of bed, Chair cushion    Mobility Interventions: HOB 30 degrees or less    Nutrition Interventions: Document food/fluid/supplement intake                  7/18/2019 2008 by Germania Burrows RN  Outcome: Progressing Towards Goal  Note:   Pressure Injury Interventions:  Sensory Interventions: Check visual cues for pain, Keep linens dry and wrinkle-free, Minimize linen layers    Moisture Interventions: Absorbent underpads    Activity Interventions: Increase time out of bed, Chair cushion    Mobility Interventions: HOB 30 degrees or less    Nutrition Interventions: Document food/fluid/supplement intake                  7/18/2019 1953 by Germania Burrows RN  Outcome: Progressing Towards Goal  Note:   Pressure Injury Interventions:  Sensory Interventions: Check visual cues for pain, Keep linens dry and wrinkle-free, Minimize linen layers    Moisture Interventions: Absorbent underpads    Activity Interventions: Increase time out of bed, PT/OT evaluation    Mobility Interventions: HOB 30 degrees or less, PT/OT evaluation, Turn and reposition approx.  every two hours(pillow and wedges)    Nutrition Interventions: Document food/fluid/supplement intake, Offer support with meals,snacks and hydration

## 2019-07-21 PROBLEM — R09.02 HYPOXIA: Status: ACTIVE | Noted: 2019-01-01

## 2019-07-21 PROBLEM — R06.00 DYSPNEA: Status: ACTIVE | Noted: 2019-01-01

## 2019-07-21 NOTE — ED TRIAGE NOTES
Patient arrives via EMS. Home health nurse called out because patient was SOB. Home health nurse moved oxygen up to 5L and patient was only 89%. Patient's abdomen is distended. Patient has not urinated in 24 hours per home health nurse. Last time patient urinated was 1pm yesterday. Patient has had n/v x 4 weeks. Patient was recently diagnosed with lung cancer but as not started chemo yet. When EMS arrived patient was on 15 L NRB at 97%. HR 80 paced. /70. Temp 97.2 orally. Nothing given in route. Before EMS arrived home health nurse had just given patient albuterol treatment. Denies chest pain.

## 2019-07-22 NOTE — PROGRESS NOTES
Total UOP over shift 275ml, pt with extremely poor PO intake d/t nausea, CIVF at 50/hr. Dr Petit Dies notified - no new orders.

## 2019-07-22 NOTE — PROGRESS NOTES
Pt arrived to the unit  via stretcher accompany by transporter, admitted to room  816,A/Ox4, calm and pleasant, pt oriented to the unit , call light within reach , bed in low position  and locked,  oxygen via nasal cannula 4L, respirations even and unlabored, IV site 18G on right arm and 20 G on left arm, admission completed, no distress noted, dual skin assessment complete with [facility] RN, bilateral arm bruises,  Discolorations, bilateral lower leg discoloration, sacrum redness, old  Wound scar noted, groin surgical scar noted.

## 2019-07-22 NOTE — ED NOTES
Pt soiled in bed, brief changed, placed in gown, pt turned to the left hip with pillow, candelaria placed per MD orders, pt given warm blankets and pillows, call light in reach, nausea bag in hand.

## 2019-07-22 NOTE — INTERDISCIPLINARY ROUNDS
Interdisciplinary team rounds were held 7/22/2019 with the following team members:Care Management, Physical Therapy, Physician and Clinical Coordinator and the patient. Plan of care discussed. See clinical pathway and/or care plan for interventions and desired outcomes.

## 2019-07-22 NOTE — PROGRESS NOTES
End of shift report given to oncoming, pt  is stable resting in bed, no c/o pain , call light within reach, bed locked and low position, pt respirations even and unlabored, no distress noted.

## 2019-07-22 NOTE — ED NOTES
TRANSFER - OUT REPORT:    Verbal report given to Colleen(name) on Wilfrido Post  being transferred to [facility](unit) for routine progression of care       Report consisted of patients Situation, Background, Assessment and   Recommendations(SBAR). Information from the following report(s) SBAR was reviewed with the receiving nurse. Lines:   Peripheral IV 07/21/19 Right Forearm (Active)   Site Assessment Clean, dry, & intact 7/21/2019  8:27 PM   Phlebitis Assessment 0 7/21/2019  8:27 PM   Infiltration Assessment 0 7/21/2019  8:27 PM   Dressing Status Clean, dry, & intact 7/21/2019  8:27 PM   Dressing Type Transparent 7/21/2019  8:09 PM   Hub Color/Line Status Green 7/21/2019  8:09 PM       Peripheral IV 07/21/19 Left Forearm (Active)   Site Assessment Clean, dry, & intact 7/21/2019  8:27 PM   Phlebitis Assessment 0 7/21/2019  8:27 PM   Infiltration Assessment 0 7/21/2019  8:27 PM   Dressing Status Clean, dry, & intact 7/21/2019  8:27 PM   Dressing Type Transparent 7/21/2019  8:10 PM   Hub Color/Line Status Pink 7/21/2019  8:10 PM        Opportunity for questions and clarification was provided.       Patient transported with:   "Shahab P. Tabatabai, [employer]"

## 2019-07-22 NOTE — PROGRESS NOTES
HOSPITALIST  NAME:  Francesco Benedict   Age:  82 y.o.  :   1935   MRN:   [id number]  PCP: Pat Coleman NP    subj     82yoM with PMH significant for CHF, COPD, and lung cancer who presents from home after [facility] RN found him to be short of breath. He was hypoxic and oxygen was increased to 5L/m. Additionally, he has had decreased urine output over the last day. He denies fevers/chills/nausea/vomiting. On ER evaluation, CXR does not show acute process. Troponin is elevated at 0.8, but this appears to be chronic. Pt also has evidence of JAZZY with BUN/Cr higher than on recent discharge on Friday. Patient will be admitted for further work-up and evaluation. Pt reports decreased PO intake and appetite over the last month. Denies any pain. Denies chest pain, fevers, nausea, vomiting. Does report decreased urine output, but denies dysuria. Today, on 5-6L O2, normally on 4L at home, feels some improvement     Objective:     Visit Vitals  BP 94/61   Pulse 78   Temp 97.4 °F (36.3 °C)   Resp 20   Ht 5' 6\" (1.676 m)   Wt 66.2 kg (146 lb)   SpO2 92%   BMI 23.57 kg/m²      Temp (24hrs), Av.9 °F (36.1 °C), Min:95.6 °F (35.3 °C), Max:98.2 °F (36.8 °C)    Oxygen Therapy  O2 Sat (%): 92 % (19)  Pulse via Oximetry: 80 beats per minute (19)  O2 Device: Nasal cannula (19)  O2 Flow Rate (L/min): 6 l/min (19)  FIO2 (%): 40 % (19)  Physical Exam:  General:    Alert, cooperative, pale, moderate distress, appears stated age. Lungs:   jorge luis exp wheez  Heart:   Regular rate and rhythm,  no murmur, rub or gallop. Abdomen:   Soft, non-tender. Not distended. Bowel sounds normal.   Extremities: No cyanosis. No edema. No clubbing  Skin:     Texture, turgor normal. No rashes or lesions.   Not Jaundiced  Neurologic: Alert and oriented x 3, no focal deficits     Data Review:   Recent Results (from the past 24 hour(s))   EKG, 12 LEAD, INITIAL    Collection Time: 07/21/19  8:01 PM   Result Value Ref Range    Ventricular Rate 82 BPM    Atrial Rate 86 BPM    QRS Duration 162 ms    Q-T Interval 484 ms    QTC Calculation (Bezet) 565 ms    Calculated R Axis -96 degrees    Calculated T Axis 102 degrees    Diagnosis       Electronic ventricular pacemaker  Premature ventricular complexes  Abnormal ECG  When compared with ECG of 14-JUL-2019 21:44,  No significant change was found  Confirmed by ST MAXWELL STRATTON MD (), RANJIT JONES ([id number]) on 7/22/2019 7:05:45 AM     CBC WITH AUTOMATED DIFF    Collection Time: 07/21/19  8:11 PM   Result Value Ref Range    WBC 10.5 4.3 - 11.1 K/uL    RBC 2.36 (L) 4.23 - 5.6 M/uL    HGB 8.8 (L) 13.6 - 17.2 g/dL    HCT 24.8 (L) 41.1 - 50.3 %    .1 (H) 79.6 - 97.8 FL    MCH 37.3 (H) 26.1 - 32.9 PG    MCHC 35.5 (H) 31.4 - 35.0 g/dL    RDW 22.6 (H) 11.9 - 14.6 %    PLATELET 666 162 - 751 K/uL    MPV 10.7 9.4 - 12.3 FL    ABSOLUTE NRBC 0.12 0.0 - 0.2 K/uL    DF AUTOMATED      NEUTROPHILS 80 (H) 43 - 78 %    LYMPHOCYTES 9 (L) 13 - 44 %    MONOCYTES 8 4.0 - 12.0 %    EOSINOPHILS 1 0.5 - 7.8 %    BASOPHILS 0 0.0 - 2.0 %    IMMATURE GRANULOCYTES 2 0.0 - 5.0 %    ABS. NEUTROPHILS 8.4 (H) 1.7 - 8.2 K/UL    ABS. LYMPHOCYTES 0.9 0.5 - 4.6 K/UL    ABS. MONOCYTES 0.9 0.1 - 1.3 K/UL    ABS. EOSINOPHILS 0.2 0.0 - 0.8 K/UL    ABS. BASOPHILS 0.0 0.0 - 0.2 K/UL    ABS. IMM. GRANS. 0.2 0.0 - 0.5 K/UL   METABOLIC PANEL, COMPREHENSIVE    Collection Time: 07/21/19  8:11 PM   Result Value Ref Range    Sodium 136 136 - 145 mmol/L    Potassium 5.0 3.5 - 5.1 mmol/L    Chloride 100 98 - 107 mmol/L    CO2 22 21 - 32 mmol/L    Anion gap 14 7 - 16 mmol/L    Glucose 122 (H) 65 - 100 mg/dL    BUN 85 (H) 8 - 23 MG/DL    Creatinine 3.50 (H) 0.8 - 1.5 MG/DL    GFR est AA 22 (L) >60 ml/min/1.73m2    GFR est non-AA 18 (L) >60 ml/min/1.73m2    Calcium 9.5 8.3 - 10.4 MG/DL    Bilirubin, total 0.8 0.2 - 1.1 MG/DL    ALT (SGPT) 84 (H) 12 - 65 U/L    AST (SGOT) 105 (H) 15 - 37 U/L    Alk. phosphatase 229 (H) 50 - 136 U/L    Protein, total 6.6 6.3 - 8.2 g/dL    Albumin 2.8 (L) 3.2 - 4.6 g/dL    Globulin 3.8 (H) 2.3 - 3.5 g/dL    A-G Ratio 0.7 (L) 1.2 - 3.5     TROPONIN I    Collection Time: 07/21/19  8:11 PM   Result Value Ref Range    Troponin-I, Qt. 0.82 (HH) 0.02 - 0.05 NG/ML   BNP    Collection Time: 07/21/19  8:11 PM   Result Value Ref Range     (H) 0 pg/mL   POC LACTIC ACID    Collection Time: 07/21/19  8:14 PM   Result Value Ref Range    Lactic Acid (POC) 1.72 0.5 - 1.9 mmol/L   URINE MICROSCOPIC    Collection Time: 07/21/19  8:17 PM   Result Value Ref Range    WBC 0-3 0 /hpf    RBC 0-3 0 /hpf    Epithelial cells 0-3 0 /hpf    Bacteria TRACE 0 /hpf    Casts GRANULAR 0 /lpf    Crystals, urine 0 0 /LPF    Amorphous Crystals TRACE 0      Mucus 0 0 /lpf   POC G3    Collection Time: 07/21/19  8:20 PM   Result Value Ref Range    Device: NASAL CANNULA      FIO2 (POC) 44 %    pH (POC) 7.448 7.35 - 7.45      pCO2 (POC) 30.6 (L) 35 - 45 MMHG    pO2 (POC) 69 (L) 75 - 100 MMHG    HCO3 (POC) 21.2 (L) 22 - 26 MMOL/L    sO2 (POC) 95 95 - 98 %    Base deficit (POC) 2 mmol/L    Allens test (POC) NOT APPLICABLE      Site LEFT BRACHIAL      Patient temp.  98.6      Specimen type (POC) ARTERIAL      Performed by Nanda     CO2, POC 22 MMOL/L    Flow rate (POC) 6.000 L/min    Respiratory comment: PhysicianNotified     COLLECT TIME 1,574     METABOLIC PANEL, BASIC    Collection Time: 07/22/19  6:15 AM   Result Value Ref Range    Sodium 137 136 - 145 mmol/L    Potassium 5.1 3.5 - 5.1 mmol/L    Chloride 101 98 - 107 mmol/L    CO2 22 21 - 32 mmol/L    Anion gap 14 7 - 16 mmol/L    Glucose 102 (H) 65 - 100 mg/dL    BUN 87 (H) 8 - 23 MG/DL    Creatinine 3.54 (H) 0.8 - 1.5 MG/DL    GFR est AA 21 (L) >60 ml/min/1.73m2    GFR est non-AA 18 (L) >60 ml/min/1.73m2    Calcium 9.4 8.3 - 10.4 MG/DL   CBC WITH AUTOMATED DIFF    Collection Time: 07/22/19  6:15 AM   Result Value Ref Range    WBC 8.8 4.3 - 11.1 K/uL RBC 2.34 (L) 4.23 - 5.6 M/uL    HGB 9.0 (L) 13.6 - 17.2 g/dL    HCT 25.3 (L) 41.1 - 50.3 %    .1 (H) 79.6 - 97.8 FL    MCH 38.5 (H) 26.1 - 32.9 PG    MCHC 35.6 (H) 31.4 - 35.0 g/dL    RDW 22.6 (H) 11.9 - 14.6 %    PLATELET 928 522 - 365 K/uL    MPV 10.7 9.4 - 12.3 FL    ABSOLUTE NRBC 0.10 0.0 - 0.2 K/uL    DF AUTOMATED      NEUTROPHILS 81 (H) 43 - 78 %    LYMPHOCYTES 8 (L) 13 - 44 %    MONOCYTES 9 4.0 - 12.0 %    EOSINOPHILS 1 0.5 - 7.8 %    BASOPHILS 1 0.0 - 2.0 %    IMMATURE GRANULOCYTES 1 0.0 - 5.0 %    ABS. NEUTROPHILS 7.2 1.7 - 8.2 K/UL    ABS. LYMPHOCYTES 0.7 0.5 - 4.6 K/UL    ABS. MONOCYTES 0.8 0.1 - 1.3 K/UL    ABS. EOSINOPHILS 0.1 0.0 - 0.8 K/UL    ABS. BASOPHILS 0.0 0.0 - 0.2 K/UL    ABS. IMM.  GRANS. 0.1 0.0 - 0.5 K/UL     Imaging /Procedures /Studies     Assessment and Plan:     1- COPD exacerbation  2- Ac on Ch hypoxic resp failure dt #1  3- Lung cancer, recent Dx  4- JAZZY on CKD stage 3  5- Ch CHF, ef 25% and DD2, stable  6- Hypotension to low normal BP  7- Hx of afib, on Eliquis    Rx:  Steroids iv and inh  BD and azithromycin  O2 as needed  Cautious IVF  AM lab  Follow VQ ordered on admission and Oncology input    Dispo: TBD    Signed By: Kami Yanes MD     July 22, 2019

## 2019-07-22 NOTE — PROGRESS NOTES
Pt received schedule medications and prn meds for nausea and pain call light within reach , bed on low position and locked, pt able to make needs known, no distress noted.

## 2019-07-22 NOTE — PROGRESS NOTES
Care Management Interventions  PCP Verified by CM: Yes  Transition of Care Consult (CM Consult): SNF  Partner SNF: Yes  Physical Therapy Consult: Yes  Current Support Network: Lives Alone  Confirm Follow Up Transport: Other (see comment)  Plan discussed with Pt/Family/Caregiver: Yes  Freedom of Choice Offered: Yes  Discharge Location  Discharge Placement: Skilled nursing facility  Patient is asleep. Grandson (POA) at bedside. Patient's ex daughter in law  at bedside. Patient's grandson is concerned about patient returning home in his condition. Itzel Lynch explains that patient has been hospitalized several times in the last few months and been discharged home with [facility] services only to be readmitted a couple days later. He's also concerned that patient hasn't been able to keep any food down for about two weeks. He would like patient to try and discharge to rehab in efforts to get stronger so that he can start chemotherapy. PT consulted. Ppd requested. Carlos requested [id number], [address], or [employer]. CM following.

## 2019-07-22 NOTE — PROGRESS NOTES
Pt returned to room drowsy, responsive to voice, oriented, NAD, SaO2 92% on 6L NC, denies pain, c/o nausea.

## 2019-07-22 NOTE — H&P
HOSPITALIST H&P/CONSULT  NAME:  Kathie Dean   Age:  82 y.o.  :   1935   MRN:   [id number]  PCP: Abraham Owens NP  Consulting MD:  Treatment Team: Attending Provider: Laila Jackson MD; Primary Nurse: Edie Valentin RN  HPI:   Patient is an 82yoM with PMH significant for CHF, COPD, and lung cancer who presents from home after [place] RN found him to be short of breath. He was hypoxic and oxygen was increased to 5L/m. Additionally, he has had decreased urine output over the last day. He denies fevers/chills/nausea/vomiting. On ER evaluation, CXR does not show acute process. Troponin is elevated at 0.8, but this appears to be chronic. Pt also has evidence of JAZZY with BUN/Cr higher than on recent discharge on Friday. Patient will be admitted for further work-up and evaluation. Pt reports decreased PO intake and appetite over the last month. Denies any pain. Denies chest pain, fevers, nausea, vomiting. Does report decreased urine output, but denies dysuria. Complete ROS done and is as stated in HPI or otherwise negative  Past Medical History:   Diagnosis Date    Abnormal EKG     Acute kidney injury ([address]     AICD (automatic cardioverter/defibrillator) present     Alterations of sensations     Anemia     Arthritis     Back pain 2016    Benign prostatic hyperplasia 2016    Bilateral pubic rami fractures (HCC)     CAD (coronary artery disease)     ?  MI -    CHF (congestive heart failure) (HCC)     Chronic obstructive pulmonary disease (HCC)     Chronic systolic CHF (congestive heart failure) ([address] 10/7/2014    CRI (chronic renal insufficiency)     Depression     Dizziness     Dyspnea     Elevated ferritin     Elevated PSA 2016    GERD (gastroesophageal reflux disease)     Heart failure ([address]     Hernia, inguinal 2016    Hyperlipidemia 10/19/2015    Hypertension     Hypokalemia 2016    Ischemia of left lower extremity 3/13/2019    Keratosis, actinic     Leg cramps     Malaise and fatigue 6/17/2016    NSTEMI (non-ST elevated myocardial infarction) ([facility] 6/24/2019    OA (osteoarthritis) of hip 9/16/2016    Orthostasis     Pneumonia     Respiratory failure ([facility] 6/17/2016    Due to tractor accident      Sinoatrial node dysfunction ([facility]     Sinusitis       Past Surgical History:   Procedure Laterality Date    HX CARPAL TUNNEL RELEASE      bilat    HX CATARACT REMOVAL      HX CATARACT REMOVAL      bilat    HX COLONOSCOPY      HX HEART CATHETERIZATION      HX ORTHOPAEDIC      internal fixation fractured pelvis    HX OTHER SURGICAL  4/2006    prostate bx     HX OTHER SURGICAL      transiliac screw fixation of L hemipelvis and ORIF of anterior pelvic ring disruption     HX OTHER SURGICAL      irrigation and debridment of R hip wound: VAC placement     HX OTHER SURGICAL      implanted defibrillator    HX PACEMAKER      dual chamber     VASCULAR SURGERY PROCEDURE UNLIST  03/13/2019    left common femoral artery thrombo-embolectomy      Prior to Admission Medications   Prescriptions Last Dose Informant Patient Reported? Taking? albuterol (PROVENTIL HFA, VENTOLIN HFA, PROAIR HFA) 90 mcg/actuation inhaler   No No   Sig: Take 2 Puffs by inhalation every four (4) hours as needed for Wheezing. albuterol-ipratropium (DUO-NEB) 2.5 mg-0.5 mg/3 ml nebu   No No   Sig: 3 mL by Nebulization route every four (4) hours as needed for Cough. apixaban (ELIQUIS) 5 mg tablet   No No   Sig: Take 1 Tab by mouth two (2) times a day. aspirin delayed-release 81 mg tablet   Yes No   Sig: Take 81 mg by mouth daily. cholecalciferol (VITAMIN D3) 1,000 unit tablet   Yes No   Sig: Take 1,000 Units by mouth daily. cyanocobalamin (VITAMIN B-12) 1,000 mcg sublingual tablet   Yes No   Sig: Take 1,000 mcg by mouth daily. fluticasone-vilanterol (BREO ELLIPTA) 100-25 mcg/dose inhaler   Yes No   Sig: Take 1 Puff by inhalation daily. metoprolol succinate (TOPROL-XL) 25 mg XL tablet   No No   Sig: Take 1 Tab by mouth daily. omeprazole (PRILOSEC) 20 mg capsule   Yes No   Sig: Take 20 mg by mouth. ondansetron (ZOFRAN ODT) 4 mg disintegrating tablet   No No   Sig: Take 1 Tab by mouth every eight (8) hours as needed for Nausea. ondansetron (ZOFRAN ODT) 8 mg disintegrating tablet   No No   Sig: Take 1 Tab by mouth every eight (8) hours as needed for Nausea. rosuvastatin (CRESTOR) 20 mg tablet   No No   Sig: Take 1 Tab by mouth nightly. tamsulosin (FLOMAX) 0.4 mg capsule   No No   Sig: Take 1 Cap by mouth daily. Patient not taking: Reported on 2019   tiotropium (SPIRIVA WITH HANDIHALER) 18 mcg inhalation capsule   Yes No   Sig: Take 1 Cap by inhalation daily. traMADol (ULTRAM) 50 mg tablet   No No   Sig: Take 1 Tab by mouth every eight (8) hours as needed for Pain for up to 3 days. Max Daily Amount: 150 mg. Facility-Administered Medications: None     Allergies   Allergen Reactions    Lortab [Hydrocodone-Acetaminophen] Other (comments)     Makes him sick. 1/2 tab doesn't make him sick     Lortab [Hydrocodone-Acetaminophen] Other (comments)     Pt states that a whole pill makes him feel sick. 1/2 tab does not.  Other Medication Other (comments)     He has a hx of a prolonged QT interval, so precaution with meds that affect that.          Social History     Tobacco Use    Smoking status: Former Smoker     Packs/day: 2.00     Years: 50.00     Pack years: 100.00     Last attempt to quit: 10/17/2013     Years since quittin.7    Smokeless tobacco: Never Used    Tobacco comment: quit spring 2019   Substance Use Topics    Alcohol use: No      Family History   Problem Relation Age of Onset    No Known Problems Mother     No Known Problems Father     Heart Attack Son 48        mi    Heart Disease Other     Lung Cancer Son         also had pulmonary fibrosis      Objective:     Visit Vitals  /73   Pulse 84   Temp 97.2 °F (36.2 °C)   Resp 25   Ht 5' 6\" (1.676 m)   Wt 66.2 kg (146 lb)   SpO2 93%   BMI 23.57 kg/m²      Temp (24hrs), Av.2 °F (36.2 °C), Min:97.2 °F (36.2 °C), Max:97.2 °F (36.2 °C)    Oxygen Therapy  O2 Sat (%): 93 % (19)  Pulse via Oximetry: 82 beats per minute (19)  O2 Device: Nasal cannula (19)  O2 Flow Rate (L/min): 5 l/min (19)  Physical Exam:  General:    Alert, cooperative, no distress, appears stated age. Head:   Normocephalic, without obvious abnormality, atraumatic. Nose:  Nares normal. No drainage or sinus tenderness. Lungs:   Clear to auscultation bilaterally. No Wheezing or Rhonchi. No rales. Heart:   Regular rate and rhythm,  no murmur, rub or gallop. Abdomen:   Soft, non-tender. Not distended. Bowel sounds normal.   Extremities: No cyanosis. No edema. No clubbing  Skin:     Texture, turgor normal. No rashes or lesions. Not Jaundiced  Neurologic: Alert and oriented x 3, no focal deficits   Data Review:   Recent Results (from the past 24 hour(s))   CBC WITH AUTOMATED DIFF    Collection Time: 19  8:11 PM   Result Value Ref Range    WBC 10.5 4.3 - 11.1 K/uL    RBC 2.36 (L) 4.23 - 5.6 M/uL    HGB 8.8 (L) 13.6 - 17.2 g/dL    HCT 24.8 (L) 41.1 - 50.3 %    .1 (H) 79.6 - 97.8 FL    MCH 37.3 (H) 26.1 - 32.9 PG    MCHC 35.5 (H) 31.4 - 35.0 g/dL    RDW 22.6 (H) 11.9 - 14.6 %    PLATELET 061 887 - 735 K/uL    MPV 10.7 9.4 - 12.3 FL    ABSOLUTE NRBC 0.12 0.0 - 0.2 K/uL    DF AUTOMATED      NEUTROPHILS 80 (H) 43 - 78 %    LYMPHOCYTES 9 (L) 13 - 44 %    MONOCYTES 8 4.0 - 12.0 %    EOSINOPHILS 1 0.5 - 7.8 %    BASOPHILS 0 0.0 - 2.0 %    IMMATURE GRANULOCYTES 2 0.0 - 5.0 %    ABS. NEUTROPHILS 8.4 (H) 1.7 - 8.2 K/UL    ABS. LYMPHOCYTES 0.9 0.5 - 4.6 K/UL    ABS. MONOCYTES 0.9 0.1 - 1.3 K/UL    ABS. EOSINOPHILS 0.2 0.0 - 0.8 K/UL    ABS. BASOPHILS 0.0 0.0 - 0.2 K/UL    ABS. IMM.  GRANS. 0.2 0.0 - 0.5 K/UL   METABOLIC PANEL, COMPREHENSIVE Collection Time: 07/21/19  8:11 PM   Result Value Ref Range    Sodium 136 136 - 145 mmol/L    Potassium 5.0 3.5 - 5.1 mmol/L    Chloride 100 98 - 107 mmol/L    CO2 22 21 - 32 mmol/L    Anion gap 14 7 - 16 mmol/L    Glucose 122 (H) 65 - 100 mg/dL    BUN 85 (H) 8 - 23 MG/DL    Creatinine 3.50 (H) 0.8 - 1.5 MG/DL    GFR est AA 22 (L) >60 ml/min/1.73m2    GFR est non-AA 18 (L) >60 ml/min/1.73m2    Calcium 9.5 8.3 - 10.4 MG/DL    Bilirubin, total 0.8 0.2 - 1.1 MG/DL    ALT (SGPT) 84 (H) 12 - 65 U/L    AST (SGOT) 105 (H) 15 - 37 U/L    Alk. phosphatase 229 (H) 50 - 136 U/L    Protein, total 6.6 6.3 - 8.2 g/dL    Albumin 2.8 (L) 3.2 - 4.6 g/dL    Globulin 3.8 (H) 2.3 - 3.5 g/dL    A-G Ratio 0.7 (L) 1.2 - 3.5     TROPONIN I    Collection Time: 07/21/19  8:11 PM   Result Value Ref Range    Troponin-I, Qt. 0.82 (HH) 0.02 - 0.05 NG/ML   POC LACTIC ACID    Collection Time: 07/21/19  8:14 PM   Result Value Ref Range    Lactic Acid (POC) 1.72 0.5 - 1.9 mmol/L   URINE MICROSCOPIC    Collection Time: 07/21/19  8:17 PM   Result Value Ref Range    WBC 0-3 0 /hpf    RBC 0-3 0 /hpf    Epithelial cells 0-3 0 /hpf    Bacteria TRACE 0 /hpf    Casts GRANULAR 0 /lpf    Crystals, urine 0 0 /LPF    Amorphous Crystals TRACE 0      Mucus 0 0 /lpf   POC G3    Collection Time: 07/21/19  8:20 PM   Result Value Ref Range    Device: NASAL CANNULA      FIO2 (POC) 44 %    pH (POC) 7.448 7.35 - 7.45      pCO2 (POC) 30.6 (L) 35 - 45 MMHG    pO2 (POC) 69 (L) 75 - 100 MMHG    HCO3 (POC) 21.2 (L) 22 - 26 MMOL/L    sO2 (POC) 95 95 - 98 %    Base deficit (POC) 2 mmol/L    Allens test (POC) NOT APPLICABLE      Site LEFT BRACHIAL      Patient temp. 98.6      Specimen type (POC) ARTERIAL      Performed by [email or web address]     CO2, POC 22 MMOL/L    Flow rate (POC) 6.000 L/min    Respiratory comment: PhysicianNotified     COLLECT TIME 2,018       Imaging /Procedures /Studies     Assessment and Plan:      Active Hospital Problems    Diagnosis Date Noted    Dyspnea 07/21/2019    Hypoxia 07/21/2019    JAZZY [facility]acute [facility]) ([facility] 07/15/2019    Malignant neoplasm of hilus of left lung ([facility] 07/03/2019    Atrial fibrillation ([facility] 03/22/2019    Essential hypertension 09/04/2018     BP readings have been in target range. No complications noted from the medication presently being used.  Chronic obstructive lung disease (HCC)     Chronic congestive heart failure ([facility] 10/07/2014     [place] Class I.  EF 35%.            PLAN  Hypoxia/Dyspnea  - Likely multifactorial given h/o CHF, COPD, and lung cancer  - Will check BNP  - CXR does not show edema, no peripheral edema on exam  - Supplemental O2 as needed  - Will check V/Q scan given active cancer diagnosis    JAZZY  - Cr up from recent discharge  - Will monitor  - Pt appears dry on exam.  Will start gentle IVFs    L lung cancer  - Not yet on chemo/radiation  - Will consult with Onc while here    Afib  - Continue home meds, including Eliquis  - Currently rate controlled    HTN   - Home meds    COPD  - Not currently wheezing  - Will continue nebs        Anticipated discharge: 2-3 days, pending clinical course    Signed By: Sheree Love MD     July 21, 2019

## 2019-07-22 NOTE — ED PROVIDER NOTES
[facility] Emergency Department  Arrival Date/Time: 7/21/2019  8:01 PM      Kathie Dean  MRN: [id number]    YOB: 1935   82 y.o. male    [facility] EMERGENCY DEPT ERB/ B  Seen on 7/21/2019 @ 8:19 PM      Today's Chief Complaint:   Chief Complaint   Patient presents with    Shortness of Breath       HPI: 82-year-old male presents to the emergency department with increased shortness of breath. Patient has lung cancer. Has not been staged yet but has had a biopsy    He was discharged from this hospital on Friday after treatment for JAZZY    He has not urinated in 24 hours. Increasing shortness of breath. No alleviating    He is having difficulty speaking in complete sentences. He appears unwell. No fever no vomiting     HPI    Review of Systems: Review of Systems   Unable to perform ROS: Severe respiratory distress       Past Medical History: Primary Care Doctor: Abraham Owens NP  Meds, PMH, PSHx, SocHx at end of this note     Allergies: Allergies   Allergen Reactions    Lortab [Hydrocodone-Acetaminophen] Other (comments)     Makes him sick. 1/2 tab doesn't make him sick     Lortab [Hydrocodone-Acetaminophen] Other (comments)     Pt states that a whole pill makes him feel sick. 1/2 tab does not.  Other Medication Other (comments)     He has a hx of a prolonged QT interval, so precaution with meds that affect that. Key Anti-Platelet Anticoagulant Meds             apixaban (ELIQUIS) 5 mg tablet Take 1 Tab by mouth two (2) times a day. aspirin-calcium carbonate 81 mg-300 mg calcium(777 mg) tab (Discontinued) Take 81 mg by mouth. aspirin delayed-release 81 mg tablet Take 81 mg by mouth daily. Physical Exam:  Nursing documentation reviewed. Vitals:    07/21/19 2018 07/21/19 2019 07/21/19 2023 07/21/19 2028   BP: 103/64  119/73    Pulse: 82  84    Resp: 15  25    Temp:  97.2 °F (36.2 °C)     SpO2: (!) 81%  92% 93%    Vital signs were reviewed. Physical Exam   Constitutional: He appears ill. He appears distressed. HENT:   Head: Normocephalic and atraumatic. Mouth/Throat: Oropharynx is clear and moist.   Eyes: Pupils are equal, round, and reactive to light. EOM are normal.   Neck: Normal range of motion. Neck supple. Cardiovascular: Normal rate and regular rhythm. Pulmonary/Chest: Effort normal. No accessory muscle usage. Tachypnea noted. No respiratory distress. He has no decreased breath sounds. He has no wheezes. He has rhonchi. He has no rales. Abdominal:   Suprapubic fullness  Bladder ultrasound shows distended urinary bladder   Neurological:   Slow to answer questions. Skin: Capillary refill takes less than 2 seconds. There is pallor. Psychiatric: His mood appears not anxious. He is not agitated. Nursing note and vitals reviewed. MEDICAL DECISION MAKING:   Differential Diagnosis:    MDM  Number of Diagnoses or Management Options  Diagnosis management comments: 61-year-old male worsening shortness of breath since discharge    Has not urinated. Urinary bladder is markedly distended. History of prostate cancer. Recently diagnosed with a lung malignancy that has not been well defined    Oxygen saturations are low on room air. Usually on 4 L    He is afebrile. Not tachycardic at this time. Not hypotensive    I do not suspect infection. He does not meet criteria for sepsis severe sepsis or septic shock    ABG shows a PCO2 of 30. PH 7.4. PO2 of 69 on 4 L    Chest x-ray does not show an infiltrate or pulmonary edema.          Amount and/or Complexity of Data Reviewed  Clinical lab tests: ordered  Tests in the radiology section of CPT®: ordered and reviewed  Tests in the medicine section of CPT®: ordered and reviewed  Review and summarize past medical records: yes  Independent visualization of images, tracings, or specimens: yes    Risk of Complications, Morbidity, and/or Mortality  Presenting problems: high  Diagnostic procedures: minimal  Management options: high          Data:      Lab findings during this visit (only abnormal values will be noted, if no value noted then the result   was normal range):   Labs Reviewed   CBC WITH AUTOMATED DIFF - Abnormal; Notable for the following components:       Result Value    RBC 2.36 (*)     HGB 8.8 (*)     HCT 24.8 (*)     .1 (*)     MCH 37.3 (*)     MCHC 35.5 (*)     RDW 22.6 (*)     NEUTROPHILS 80 (*)     LYMPHOCYTES 9 (*)     ABS. NEUTROPHILS 8.4 (*)     All other components within normal limits   METABOLIC PANEL, COMPREHENSIVE - Abnormal; Notable for the following components:    Glucose 122 (*)     BUN 85 (*)     Creatinine 3.50 (*)     GFR est AA 22 (*)     GFR est non-AA 18 (*)     ALT (SGPT) 84 (*)     AST (SGOT) 105 (*)     Alk. phosphatase 229 (*)     Albumin 2.8 (*)     Globulin 3.8 (*)     A-G Ratio 0.7 (*)     All other components within normal limits   TROPONIN I - Abnormal; Notable for the following components:    Troponin-I, Qt. 0.82 (*)     All other components within normal limits   POC G3 - Abnormal; Notable for the following components:    pCO2 (POC) 30.6 (*)     pO2 (POC) 69 (*)     HCO3 (POC) 21.2 (*)     All other components within normal limits   URINE MICROSCOPIC   BNP   POC LACTIC ACID        Radiology studies during this visit: Xr Chest Port    Result Date: 7/21/2019  IMPRESSION: 1. No acute infiltrate. 2. Known left hilar mass. 3. Cardiomegaly and an indwelling pacemaker. Medications given in the ED: Medications - No data to display     Recheck and Additional Documentation (Sepsis, Stroke, Hip): Ill appearing 72-year-old male brought to the emergency department for shortness of breath    As noted be hypoxic. Difficult speaking in full sentences. Difficulty obtaining a complete history and review of systems    Chest x-ray does not show an infiltrate.   Does not show edema    His lactate was normal.  Does not meet criteria for severe sepsis or septic shock    Blood culture been obtained but no antibiotics have been given at this time    Fuentes was placed about 400 cc of urine was obtained    ===================================================================  This patient is critically ill and there is a high probability of of imminent or life threatening deterioration in the patient's condition without immediate management. The nature of the patient's clinical problem is: shortness of breath, dyspnea    I have spent 35 minutes in direct patient care, documentation, review of labs/xrays/old records, discussion with Staff, Colleague . The time involved in the performance of separately reportable procedures was not counted toward critical care time. Jose Armando Arauz MD; 7/21/2019 @9:41 PM  ===================================================================       Procedure Documentation: Procedures     Assessment and Plan:      Impression:     ICD-10-CM ICD-9-CM   1. Shortness of breath R06.02 786.05   2. Acute kidney injury ([facility] N17.9 584.9   3.  Urinary retention R33.9 788.20        Disposition:   Admitted to the hospital service    Follow-up:   Follow-up Information    None        Metabolic Panel:   Recent Labs     07/21/19 2011 07/19/19  0814    136   K 5.0 4.7    102   CO2 22 23   AGAP 14 11   BUN 85* 58*   CREA 3.50* 2.77*   CA 9.5 9.3   * 117*       Blood Counts  Recent Labs     07/21/19 2011   WBC 10.5   RBC 2.36*   HGB 8.8*   HCT 24.8*          Hepatic Panel  Recent Labs     07/21/19 2011   SGOT 105*   ALT 84*   *   TP 6.6   ALB 2.8*   GLOB 3.8*   AGRAT 0.7*       Last UA  Lab Results   Component Value Date/Time    Color YELLOW 07/15/2019 05:30 AM    Appearance CLEAR 07/15/2019 05:30 AM    Specific gravity 1.009 03/15/2019 10:43 AM    pH (UA) 5.5 07/15/2019 05:30 AM    Protein NEGATIVE  07/15/2019 05:30 AM    Glucose NEGATIVE  07/15/2019 05:30 AM    Ketone NEGATIVE  07/15/2019 05:30 AM    Bilirubin NEGATIVE  07/15/2019 05:30 AM    Blood NEGATIVE  07/15/2019 05:30 AM    Urobilinogen 0.2 07/15/2019 05:30 AM    Nitrites NEGATIVE  07/15/2019 05:30 AM    Leukocyte Esterase NEGATIVE  07/15/2019 05:30 AM     Discharge Medications:   Current Discharge Medication List           Carlos Doshi MD; 07/21/19  8:19 PM      Other ED Course Notes:     ED Course as of Jul 21 2141   Sun Jul 21, 2019   2027 Lactic Acid (POC): 1.72 [GH]   2027 pH (POC): 7.448 [GH]   2027 pCO2 (POC)(!): 30.6 [GH]   2027 pO2 (POC)(!): 69 [GH]   2027 HCO3 (POC)(!): 21.2 [GH]   2103 Troponin-I, Qt.(!!): 0.82 [GH]      ED Course User Index  [GH] Jose Kumar MD        Past Medical History:      Past Medical History:   Diagnosis Date    Abnormal EKG     Acute kidney injury ([address]     AICD (automatic cardioverter/defibrillator) present     Alterations of sensations     Anemia     Arthritis     Back pain 6/17/2016    Benign prostatic hyperplasia 6/17/2016    Bilateral pubic rami fractures ([address]     CAD (coronary artery disease)     ?  MI 4-2014    [facility]     Chronic obstructive pulmonary disease (HCC)     Chronic systolic CHF (congestive heart failure) ([address] 10/7/2014    CRI (chronic renal insufficiency)     Depression     Dizziness     Dyspnea     Elevated ferritin     Elevated PSA 6/17/2016    GERD (gastroesophageal reflux disease)     Heart failure ([address]     Hernia, inguinal 6/17/2016    Hyperlipidemia 10/19/2015    Hypertension     Hypokalemia 6/17/2016    Ischemia of left lower extremity 3/13/2019    Keratosis, actinic     Leg cramps     Malaise and fatigue 6/17/2016    NSTEMI (non-ST elevated myocardial infarction) ([address] 6/24/2019    OA (osteoarthritis) of hip 9/16/2016    Orthostasis     Pneumonia     Respiratory failure ([address] 6/17/2016    Due to tractor accident      Sinoatrial node dysfunction ([address]     Sinusitis      Past Surgical History:   Procedure Laterality Date  HX CARPAL TUNNEL RELEASE      bilat    HX CATARACT REMOVAL      HX CATARACT REMOVAL      bilat    HX COLONOSCOPY      HX HEART CATHETERIZATION      HX ORTHOPAEDIC      internal fixation fractured pelvis    HX OTHER SURGICAL  2006    prostate bx     HX OTHER SURGICAL      transiliac screw fixation of L hemipelvis and ORIF of anterior pelvic ring disruption     HX OTHER SURGICAL      irrigation and debridment of R hip wound: VAC placement     HX OTHER SURGICAL      implanted defibrillator    HX PACEMAKER      dual chamber     VASCULAR SURGERY PROCEDURE UNLIST  2019    left common femoral artery thrombo-embolectomy     Social History     Tobacco Use    Smoking status: Former Smoker     Packs/day: 2.00     Years: 50.00     Pack years: 100.00     Last attempt to quit: 10/17/2013     Years since quittin.7    Smokeless tobacco: Never Used    Tobacco comment: quit spring 2019   Substance Use Topics    Alcohol use: No    Drug use: No      Home Medication:   Prior to Admission Medications   Prescriptions Last Dose Informant Patient Reported? Taking? albuterol (PROVENTIL HFA, VENTOLIN HFA, PROAIR HFA) 90 mcg/actuation inhaler   No No   Sig: Take 2 Puffs by inhalation every four (4) hours as needed for Wheezing. albuterol-ipratropium (DUO-NEB) 2.5 mg-0.5 mg/3 ml nebu   No No   Sig: 3 mL by Nebulization route every four (4) hours as needed for Cough. apixaban (ELIQUIS) 5 mg tablet   No No   Sig: Take 1 Tab by mouth two (2) times a day. aspirin delayed-release 81 mg tablet   Yes No   Sig: Take 81 mg by mouth daily. aspirin-calcium carbonate 81 mg-300 mg calcium(777 mg) tab   Yes No   Sig: Take 81 mg by mouth. cholecalciferol (VITAMIN D3) 1,000 unit tablet   Yes No   Sig: Take 1,000 Units by mouth daily. cyanocobalamin (VITAMIN B-12) 1,000 mcg sublingual tablet   Yes No   Sig: Take 1,000 mcg by mouth daily.    fluticasone-vilanterol (BREO ELLIPTA) 100-25 mcg/dose inhaler   Yes No   Sig: Take 1 Puff by inhalation daily. metoprolol succinate (TOPROL-XL) 25 mg XL tablet   No No   Sig: Take 1 Tab by mouth daily. omeprazole (PRILOSEC) 20 mg capsule   Yes No   Sig: Take 20 mg by mouth. ondansetron (ZOFRAN ODT) 4 mg disintegrating tablet   No No   Sig: Take 1 Tab by mouth every eight (8) hours as needed for Nausea. ondansetron (ZOFRAN ODT) 8 mg disintegrating tablet   No No   Sig: Take 1 Tab by mouth every eight (8) hours as needed for Nausea. rosuvastatin (CRESTOR) 20 mg tablet   No No   Sig: Take 1 Tab by mouth nightly. tamsulosin (FLOMAX) 0.4 mg capsule   No No   Sig: Take 1 Cap by mouth daily. tiotropium (SPIRIVA WITH HANDIHALER) 18 mcg inhalation capsule   Yes No   Sig: Take 1 Cap by inhalation daily. traMADol (ULTRAM) 50 mg tablet   No No   Sig: Take 1 Tab by mouth every eight (8) hours as needed for Pain for up to 3 days. Max Daily Amount: 150 mg.       Facility-Administered Medications: None

## 2019-07-22 NOTE — PROGRESS NOTES
Spiritual Care Visit, initial visit. Visited with patient and family members at bedside. Patient and family were sleeping;  conversed with family. Prayed for patient's healing and health. Visit by Malgorzata Drake, Staff .  Irene., Th.B., B.A.

## 2019-07-22 NOTE — PROGRESS NOTES
Attempted to see but patient off floor for VQ scan. We will follow up tomorrow. Staging work up is not complete therefore treatment plan is not yet determined.  Needs PET and MRI brain

## 2019-07-23 NOTE — PROGRESS NOTES
HOSPITALIST  NAME:  Humaira Cadena   Age:  82 y.o.  :   1935   MRN:   [id number]  PCP: Oc Christina NP    subj     82yoM with PMH significant for CHF, COPD, and lung cancer who presents from home after [place] RN found him to be short of breath. He was hypoxic and oxygen was increased to 5L/m. Additionally, he has had decreased urine output over the last day. He denies fevers/chills/nausea/vomiting. On ER evaluation, CXR does not show acute process. Troponin is elevated at 0.8, but this appears to be chronic. Pt also has evidence of JAZZY with BUN/Cr higher than on recent discharge on Friday. Patient will be admitted for further work-up and evaluation. Pt reports decreased PO intake and appetite over the last month. Denies any pain. Denies chest pain, fevers, nausea, vomiting. Does report decreased urine output, but denies dysuria. Today, on 5-6L O2, normally on 4L at home, feels nauseated     Objective:     Visit Vitals  BP 90/64   Pulse 82   Temp 98 °F (36.7 °C)   Resp 18   Ht 5' 6\" (1.676 m)   Wt 62 kg (136 lb 9.6 oz)   SpO2 (!) 89%   BMI 22.05 kg/m²      Temp (24hrs), Av °F (36.7 °C), Min:97.7 °F (36.5 °C), Max:98.3 °F (36.8 °C)    Oxygen Therapy  O2 Sat (%): (!) 89 % (19 1531)  Pulse via Oximetry: 88 beats per minute (19 1355)  O2 Device: Nasal cannula (19 135)  O2 Flow Rate (L/min): 6 l/min (19 1355)  FIO2 (%): 44 % (19)  Physical Exam:  General:    Alert, cooperative, pale, moderate distress, appears stated age. Lungs:   jorge luis exp wheez less  Heart:   Regular rate and rhythm,  no murmur, rub or gallop. Abdomen:   Soft, non-tender. Not distended. Bowel sounds normal.   Extremities: No cyanosis. No edema. No clubbing  Skin:     Texture, turgor normal. No rashes or lesions.   Not Jaundiced  Neurologic: Alert and oriented x 3, no focal deficits     Data Review:   Recent Results (from the past 24 hour(s))   METABOLIC PANEL, BASIC Collection Time: 07/23/19  7:15 AM   Result Value Ref Range    Sodium 137 136 - 145 mmol/L    Potassium 5.6 (H) 3.5 - 5.1 mmol/L    Chloride 103 98 - 107 mmol/L    CO2 20 (L) 21 - 32 mmol/L    Anion gap 14 7 - 16 mmol/L    Glucose 152 (H) 65 - 100 mg/dL     (H) 8 - 23 MG/DL    Creatinine 3.80 (H) 0.8 - 1.5 MG/DL    GFR est AA 20 (L) >60 ml/min/1.73m2    GFR est non-AA 16 (L) >60 ml/min/1.73m2    Calcium 9.0 8.3 - 10.4 MG/DL   CREATININE, UR, RANDOM    Collection Time: 07/23/19  8:51 AM   Result Value Ref Range    Creatinine, urine 85.40 mg/dL   SODIUM, UR, RANDOM    Collection Time: 07/23/19  8:51 AM   Result Value Ref Range    Sodium,urine random 21 MMOL/L     Imaging /Procedures /Studies     Assessment and Plan:     1- COPD exacerbation  2- Ac on Ch hypoxic resp failure dt #1  3- Lung cancer, recent Dx  4- JAZZY on CKD stage 3  5- Ch CHF, ef 25% and DD2, stable  6- Hypotension to low normal BP  7- Hx of afib, on Eliquis    Rx:  Steroids iv and inh  BD and azithromycin  O2 as needed  Cautious IVF  Seen by Nephrology  AM lab  Follow VQ ordered on admission and Oncology input    Dispo: rehab    Signed By: Keira Bailey MD     July 23, 2019

## 2019-07-23 NOTE — INTERDISCIPLINARY ROUNDS
Interdisciplinary team rounds were held 7/23/2019 with the following team members:Care Management, Physical Therapy, Physician and Clinical Coordinator and the patient. Plan of care discussed. See clinical pathway and/or care plan for interventions and desired outcomes.

## 2019-07-23 NOTE — PROGRESS NOTES
Pt is resting quietly in bed. Respirations present. NC at 6L. No s/s of distress. Will give report to YANI Casarez.

## 2019-07-23 NOTE — PROGRESS NOTES
BSR to [employer]. Pt stable. Family at bedside. Requiring 6L NC to maintain SaO2 greater than 88%.

## 2019-07-23 NOTE — CONSULTS
[facility] Hematology & Oncology        Inpatient Hematology / Oncology Consult Note    Reason for Consult:  Hypoxia [R09.02]  Dyspnea [R06.00]  Referring Physician:  Carl Kramer MD    History of Present Illness:  Mr. Jeanette Shukla is a 82 y.o. male admitted on 7/21/2019 . PMH CHF (EF 25%), COPD. He has been seen by Dr. Esperanza Snow for newly diagnosed lung cancer, treatment plan TBD. Oncology history as follows: He developed chest pain and workup lead to finding of left hilar mass, extensive LAD of mediastinum and right hilum, EBUS showed malignancy of left hilar mass and right hilar LN, at least stage IIIc disease. Pt and family presented to [facility] on 7/11/19, and we discussed his h/o prostate cancer appeared well treated and controlled in remission, current cancer no specific IHC but clinically most consistent with lung cancer, however the chest wall tenderness concerns for metastasis, need full staging with PET and MRI. He was planned to return with the completion of those scans to discuss treatment options. He was readmitted on 7/21 for worsening kidney function, increased dyspnea. Symptoms felt to be multifactorial and he is on treatment for COPD exacerbation and BNP was high. CXR negative. His kidney function has not improved despite gentle hydration. The patient continues to feel poorly overall. We were consulted for recommendations regarding his NSCLC. Review of Systems:  Constitutional + fatigue/malaise, weakness     HEENT Denies trauma, blurry vision, hearing loss, ear pain, nosebleeds, sore throat, neck pain    Skin Denies lesions or rashes. Lungs +dyspnea. Denies cough, sputum production or hemoptysis. Cardiovascular Denies chest pain, palpitations, or lower extremity edema. Gastrointestinal Denies nausea, vomiting, changes in bowel habits, bloody or black stools, abdominal pain.  Denies dysuria, frequency or hesitancy of urination. Neuro Denies headaches, visual changes or ataxia.  Denies dizziness, tingling, tremors, sensory change, speech change, focal weakness      Hematology Denies easy bruising or bleeding, denies gingival bleeding or epistaxis. Endo Denies heat/cold intolerance, denies diabetes or thyroid abnormalities. MSK Denies back pain, arthralgias, myalgias or frequent falls. Psychiatric/Behavioral + depressed         Allergies   Allergen Reactions    Lortab [Hydrocodone-Acetaminophen] Other (comments)     Makes him sick. 1/2 tab doesn't make him sick     Lortab [Hydrocodone-Acetaminophen] Other (comments)     Pt states that a whole pill makes him feel sick. 1/2 tab does not.  Other Medication Other (comments)     He has a hx of a prolonged QT interval, so precaution with meds that affect that. Past Medical History:   Diagnosis Date    Abnormal EKG     Acute kidney injury (HCC)     AICD (automatic cardioverter/defibrillator) present     Alterations of sensations     Anemia     Arthritis     Back pain 6/17/2016    Benign prostatic hyperplasia 6/17/2016    Bilateral pubic rami fractures (HCC)     CAD (coronary artery disease)     ?  MI 4-2014    CHF (congestive heart failure) (HCC)     Chronic obstructive pulmonary disease (HCC)     Chronic systolic CHF (congestive heart failure) ([address] 10/7/2014    CRI (chronic renal insufficiency)     Depression     Dizziness     Dyspnea     Elevated ferritin     Elevated PSA 6/17/2016    GERD (gastroesophageal reflux disease)     Heart failure ([address]     Hernia, inguinal 6/17/2016    Hyperlipidemia 10/19/2015    Hypertension     Hypokalemia 6/17/2016    Ischemia of left lower extremity 3/13/2019    Keratosis, actinic     Leg cramps     Malaise and fatigue 6/17/2016    NSTEMI (non-ST elevated myocardial infarction) ([address] 6/24/2019    OA (osteoarthritis) of hip 9/16/2016    Orthostasis     Pneumonia     Respiratory failure ([address] 6/17/2016    Due to tractor accident      Sinoatrial node dysfunction ([address]  Sinusitis      Past Surgical History:   Procedure Laterality Date    HX CARPAL TUNNEL RELEASE      bilat    HX CATARACT REMOVAL      HX CATARACT REMOVAL      bilat    HX COLONOSCOPY      HX HEART CATHETERIZATION      HX ORTHOPAEDIC      internal fixation fractured pelvis    HX OTHER SURGICAL  2006    prostate bx     HX OTHER SURGICAL      transiliac screw fixation of L hemipelvis and ORIF of anterior pelvic ring disruption     HX OTHER SURGICAL      irrigation and debridment of R hip wound: VAC placement     HX OTHER SURGICAL      implanted defibrillator    HX PACEMAKER      dual chamber     VASCULAR SURGERY PROCEDURE UNLIST  2019    left common femoral artery thrombo-embolectomy     Family History   Problem Relation Age of Onset    No Known Problems Mother     No Known Problems Father     Heart Attack Son 48        mi    Heart Disease Other     Lung Cancer Son         also had pulmonary fibrosis     Social History     Socioeconomic History    Marital status:      Spouse name: Not on file    Number of children: Not on file    Years of education: Not on file    Highest education level: Not on file   Occupational History    Occupation: retired hay bailer   Social Needs    Financial resource strain: Not on file    Food insecurity:     Worry: Not on file     Inability: Not on file    Transportation needs:     Medical: Not on file     Non-medical: Not on file   Tobacco Use    Smoking status: Former Smoker     Packs/day: 2.00     Years: 50.00     Pack years: 100.00     Last attempt to quit: 10/17/2013     Years since quittin.7    Smokeless tobacco: Never Used    Tobacco comment: quit spring 2019   Substance and Sexual Activity    Alcohol use: No    Drug use: No    Sexual activity: Not on file   Lifestyle    Physical activity:     Days per week: Not on file     Minutes per session: Not on file    Stress: Not on file   Relationships    Social connections:     Talks on phone: Not on file     Gets together: Not on file     Attends Faith service: Not on file     Active member of club or organization: Not on file     Attends meetings of clubs or organizations: Not on file     Relationship status: Not on file    Intimate partner violence:     Fear of current or ex partner: Not on file     Emotionally abused: Not on file     Physically abused: Not on file     Forced sexual activity: Not on file   Other Topics Concern    Not on file   Social History Narrative    Lives alone          Current Facility-Administered Medications   Medication Dose Route Frequency Provider Last Rate Last Dose    [START ON 7/24/2019] azithromycin (ZITHROMAX) tablet 250 mg  250 mg Oral DAILY Frannie Chew MD        tiotropium [facility]) inhalation capsule 18 mcg  1 Cap Inhalation DAILY Cathy Lubin MD   18 mcg at 07/23/19 0832    albuterol (PROVENTIL VENTOLIN) nebulizer solution 2.5 mg  2.5 mg Nebulization Q4H PRN Carry MD Tristian        [facility]) capsule 0.4 mg  0.4 mg Oral DAILY Cathy Lubin MD   0.4 mg at 07/23/19 0854    albuterol-ipratropium (DUO-NEB) 2.5 MG-0.5 MG/3 ML  3 mL Nebulization Q4H PRN Carry MD Tristian        apixaban [place] [facility]) tablet 2.5 mg  2.5 mg Oral Q12H Carry MD Tristian   2.5 mg at 07/23/19 0854    traMADol (ULTRAM) tablet 50 mg  50 mg Oral Q6H PRN Carry MD Tristian   50 mg at 07/22/19 2227    sodium chloride (NS) flush 5-40 mL  5-40 mL IntraVENous Q8H Carry MD Tristian   5 mL at 07/23/19 1308    sodium chloride (NS) flush 5-40 mL  5-40 mL IntraVENous PRN Carry MD Tristian        acetaminophen (TYLENOL) tablet 650 mg  650 mg Oral Q4H PRN Carry MD Tristian        ondansetron [facility]) injection 4 mg  4 mg IntraVENous Q4H PRN Carry MD Tristian   4 mg at 07/23/19 1110    budesonide (PULMICORT) 500 mcg/2 ml nebulizer suspension  500 mcg Nebulization BID RT Carry MD Tristian   500 mcg at 07/23/19 9012    And    albuterol (PROVENTIL VENTOLIN) nebulizer solution 2.5 mg  2.5 mg Nebulization Q6HWA RT Mansi James MD   2.5 mg at 19 1353    0.9% sodium chloride infusion  50 mL/hr IntraVENous CONTINUOUS Travis Powell MD 50 mL/hr at 19 2230 50 mL/hr at 19 2230    methylPREDNISolone (PF) (Solu-MEDROL) injection 60 mg  60 mg IntraVENous Q8H Travis Powell MD   60 mg at 19 0902    pantoprazole (PROTONIX) tablet 40 mg  40 mg Oral ACB Travis Powell MD   40 mg at 19 0548    tuberculin injection 5 Units  5 Units IntraDERMal Hill Coronel MD   5 Units at 19 1755       OBJECTIVE:  Patient Vitals for the past 8 hrs:   BP Temp Pulse Resp SpO2   19 1531 90/64 98 °F (36.7 °C) 82 18 (!) 89 %   19 1355     90 %   19 1117 90/62 97.7 °F (36.5 °C) 81 20 90 %     Temp (24hrs), Av °F (36.7 °C), Min:97.7 °F (36.5 °C), Max:98.3 °F (36.8 °C)    No intake/output data recorded. Physical Exam:  Constitutional: Elderly, ill appearing male in no acute distress, lying in hospital bed   HEENT: Normocephalic and atraumatic. Oropharynx is clear, mucous membranes are moist.  Neck supple    Lymph node   Deferred   Skin Warm and dry. No bruising and no rash noted. No erythema. No pallor. Respiratory + occasional wheeze    CVS Normal rate, regular rhythm and normal S1 and S2. No murmurs, gallops, or rubs. Abdomen Soft, nontender and nondistended, normoactive bowel sounds. No palpable mass. Neuro Grossly nonfocal with no obvious sensory or motor deficits. MSK Normal range of motion in general.  No edema and no tenderness. Psych Appropriate mood and affect.         Labs:    Recent Results (from the past 24 hour(s))   METABOLIC PANEL, BASIC    Collection Time: 19  7:15 AM   Result Value Ref Range    Sodium 137 136 - 145 mmol/L    Potassium 5.6 (H) 3.5 - 5.1 mmol/L    Chloride 103 98 - 107 mmol/L    CO2 20 (L) 21 - 32 mmol/L    Anion gap 14 7 - 16 mmol/L    Glucose 152 (H) 65 - 100 mg/dL     (H) 8 - 23 MG/DL    Creatinine 3.80 (H) 0.8 - 1.5 MG/DL    GFR est AA 20 (L) >60 ml/min/1.73m2    GFR est non-AA 16 (L) >60 ml/min/1.73m2    Calcium 9.0 8.3 - 10.4 MG/DL   CREATININE, UR, RANDOM    Collection Time: 07/23/19  8:51 AM   Result Value Ref Range    Creatinine, urine 85.40 mg/dL   SODIUM, UR, RANDOM    Collection Time: 07/23/19  8:51 AM   Result Value Ref Range    Sodium,urine random 21 MMOL/L       Imaging:  [unfilled]    ASSESSMENT:  Problem List  Date Reviewed: 7/11/2019          Codes Class Noted    Dyspnea ICD-10-CM: R06.00  ICD-9-CM: 786.09  7/21/2019        * (Principal) Hypoxia ICD-10-CM: R09.02  ICD-9-CM: 799.02  7/21/2019        Nausea with vomiting ICD-10-CM: R11.2  ICD-9-CM: 787.01  7/15/2019        JAZZY (acute kidney injury) ([facility] ICD-10-CM: N17.9  ICD-9-CM: 584.9  7/15/2019        Thyroid nodule ICD-10-CM: E04.1  ICD-9-CM: 241.0  7/7/2019        Hilar mass ICD-10-CM: R91.8  ICD-9-CM: 786.6  7/3/2019        Malignant neoplasm of hilus of left lung ([facility] ICD-10-CM: C34.02  ICD-9-CM: 162.2  7/3/2019        Rib pain ICD-10-CM: R07.81  ICD-9-CM: 786.50  7/1/2019        Anemia (Chronic) ICD-10-CM: D64.9  ICD-9-CM: 285.9  7/1/2019        Chronic respiratory failure with hypoxia (HCC) (Chronic) ICD-10-CM: J96.11  ICD-9-CM: 518.83, 799.02  7/1/2019        Hilar adenopathy ICD-10-CM: R59.0  ICD-9-CM: 785.6  7/1/2019        Mediastinal adenopathy ICD-10-CM: R59.0  ICD-9-CM: 785.6  7/1/2019        Chronic renal insufficiency ICD-10-CM: N18.9  ICD-9-CM: 585.9  6/24/2019        History of DVT (deep vein thrombosis) (Chronic) ICD-10-CM: Z86.718  ICD-9-CM: V12.51  6/24/2019        Long term (current) use of anticoagulants (Chronic) ICD-10-CM: Z79.01  ICD-9-CM: V58.61  4/23/2019        Atrial fibrillation (HCC) (Chronic) ICD-10-CM: I48.91  ICD-9-CM: 427.31  3/22/2019        Thrombus of left atrial appendage ICD-10-CM: I51.3  ICD-9-CM: 429.89  3/22/2019        Essential hypertension ICD-10-CM: I10  ICD-9-CM: 401.9  9/4/2018    Overview Addendum 7/7/2019  5:31 PM by Danielle Ortega NP     BP readings have been in target range. No complications noted from the medication presently being used. GERD (gastroesophageal reflux disease) ICD-10-CM: K21.9  ICD-9-CM: 530.81  9/4/2018        Cardiac defibrillator in place ICD-10-CM: Z95.810  ICD-9-CM: V45.02  9/4/2018    Overview Addendum 7/7/2019  5:31 PM by Danielle Ortega NP     [place] DDD ICD              Change in bowel habits ICD-10-CM: R19.4  ICD-9-CM: 787.99  9/4/2018    Overview Signed 7/7/2019  5:29 PM by Danielle Ortega NP     Overview:   Added automatically from request for surgery [id number]             Constipation ICD-10-CM: K59.00  ICD-9-CM: 564.00  9/4/2018        History of MI (myocardial infarction) ICD-10-CM: I25.2  ICD-9-CM: 737  9/4/2018        History of prostate cancer ICD-10-CM: Z85.46  ICD-9-CM: V10.46  9/4/2018    Overview Signed 7/7/2019  5:29 PM by Danielle Ortega NP     Overview:   Added automatically from request for surgery [id number]             Prostate cancer [facility]) ICD-10-CM: C61  ICD-9-CM: 185  11/1/2016    Overview Addendum 9/18/2017  8:43 PM by Liza Albright Assessment & Plan:   Despite the patient's age and his substantial comorbidities, nontreatment is not a very attractive option for him. His adjusted PSA off Proscar would be greater than 20 and he has [place] 9 disease as his highest Willy score. This would be considered very high risk prostate carcinoma. His staging workup shows no evidence of metastatic disease. His underlying LUTS already on Proscar and Flomax. Radiation therapy will present some challenges in terms of his urinary function, but I believe we can get him through it with acceptable toxicity. Surgery is not a viable option.     I believe his 2 reasonable choices would be androgen deprivation alone, which of course would not be curative option that may provide disease control for a substantial period of time. Unfortunately, with his [place] 8 and 9 histology he may become androgen independent very quickly. Second option would be 2 years of androgen suppression in combination with definitive intensity modulated, image guided radiation therapy. We discussed that extensively today including both the short-term and long-term toxicities. It offer some a chance for cure, and even better chance for disease control which, given his health, could easily exceed his expected lifespan. I believe that his life expectancy, given his comorbidities, is in the 3 to 5 year range. Therefore, a definitive course of radiation therapy and androgen deprivation would be considered curative. After prolonged and thorough discussion he would like to proceed with the second option. We will arrange Casodex and Lupron with radiation therapy to start right around the first of the year. We'll plan 4816-8921 centigrays at 200 cGy per day using intensity modulated, image guided technique. Last Assessment & Plan:   Despite the patient's age and his substantial comorbidities, nontreatment is not a very attractive option for him. His adjusted PSA off Proscar would be greater than 20 and he has [place] 9 disease as his highest [place] score. This would be considered very high risk prostate carcinoma. His staging workup shows no evidence of metastatic disease. His underlying LUTS already on Proscar and Flomax. Radiation therapy will present some challenges in terms of his urinary function, but I believe we can get him through it with acceptable toxicity. Surgery is not a viable option. I believe his 2 reasonable choices would be androgen deprivation alone, which of course would not be curative option that may provide disease control for a substantial period of time. Unfortunately, with his [place] 8 and 9 histology he may become androgen independent very quickly.  Second option would be 2 years of androgen suppression in combination with definitive intensity modulated, image guided radiation therapy. We discussed that extensively today including both the short-term and long-term toxicities. It offer some a chance for cure, and even better chance for disease control which, given his health, could easily exceed his expected lifespan. I believe that his life expectancy, given his comorbidities, is in the 3 to 5 year range. Therefore, a definitive course of radiation therapy and androgen deprivation would be considered curative. After prolonged and thorough discussion he would like to proceed with the second option. We will arrange Casodex and Lupron with radiation therapy to start right around the first of the year. We'll plan 3613-2040 centigrays at 200 cGy per day using intensity modulated, image guided technique. Last Assessment & Plan:   Despite the patient's age and his substantial comorbidities, nontreatment is not a very attractive option for him. His adjusted PSA off Proscar would be greater than 20 and he has [place] 9 disease as his highest [place] score. This would be considered very high risk prostate carcinoma. His staging workup shows no evidence of metastatic disease. His underlying LUTS already on Proscar and Flomax. Radiation therapy will present some challenges in terms of his urinary function, but I believe we can get him through it with acceptable toxicity. Surgery is not a viable option. I believe his 2 reasonable choices would be androgen deprivation alone, which of course would not be curative option that may provide disease control for a substantial period of time. Unfortunately, with his [place] 8 and 9 histology he may become androgen independent very quickly. Second option would be 2 years of androgen suppression in combination with definitive intensity modulated, image guided radiation therapy. We discussed that extensively today including both the short-term and long-term toxicities.  It offer some a chance for cure, and even better chance for disease control which, given his health, could easily exceed his expected lifespan. I believe that his life expectancy, given his comorbidities, is in the 3 to 5 year range. Therefore, a definitive course of radiation therapy and androgen deprivation would be considered curative. After prolonged and thorough discussion he would like to proceed with the second option. We will arrange Casodex and Lupron with radiation therapy to start right around the first of the year. We'll plan 1025-2992 centigrays at 200 cGy per day using intensity modulated, image guided technique. OA (osteoarthritis) of hip (Chronic) ICD-10-CM: M16.9  ICD-9-CM: 715.95  9/16/2016    Overview Addendum 9/16/2016 10:57 AM by Roslyn Rivera     Right hip, advanced on prostate MRI 9/2016             Back pain ICD-10-CM: M54.9  ICD-9-CM: 724.5  6/17/2016        Inguinal hernia ICD-10-CM: K40.90  ICD-9-CM: 550.90  6/17/2016    Overview Addendum 7/7/2019  5:31 PM by Krystyna Cade NP     Bilateral on MRI 9/6/16              Benign prostatic hyperplasia, (Chronic) ICD-10-CM: N40.0  ICD-9-CM: 600.00  6/17/2016    Overview Signed 9/16/2016 10:55 AM by Laura PRABHAKAR     MRI abnormal 9/6/16 of concern for cancer. CRI (chronic renal insufficiency) (Chronic) ICD-10-CM: N18.9  ICD-9-CM: 585. 9  Unknown        Arthritis (Chronic) ICD-10-CM: M19.90  ICD-9-CM: 716.90  Unknown        CAD (coronary artery disease), atherosclerotic of the native vessel (Chronic) ICD-10-CM: I25.10  ICD-9-CM: 414.00  Unknown    Overview Addendum 10/19/2015  9:21 AM by Colette Cardenas MI 4-2014  Angina has improved with no episodes of chest pain since the last visit. Mild CAD by cath 6/2014.                Chronic obstructive lung disease ([facility] ICD-10-CM: J44.9  ICD-9-CM: 495  Unknown        History of sinoatrial node dysfunction (Chronic) ICD-10-CM: Z86.79  ICD-9-CM: V12.59  Unknown Hyperlipidemia (Chronic) ICD-10-CM: E78.5  ICD-9-CM: 272.4  10/19/2015    Overview Signed 10/19/2015  9:20 AM by Reji Munoz. Medication well tolerated. Chronic congestive heart failure ([facility] ICD-10-CM: I50.9  ICD-9-CM: 428.0  10/7/2014    Overview Addendum 7/7/2019  5:31 PM by Roxanne Albert NP     [place] Class I.  EF 35%. Fracture of multiple pubic rami [facility]) ICD-10-CM: H74.262R  ICD-9-CM: 808.2  10/5/2013                RECOMMENDATIONS:  NSCLC  - Not fully staged but he is clearly too weak for any cancer-directed therapies. If he improves, can follow up outpatient with Dr. Cole Huitron for ongoing discussions    Dyspnea  - Multifactorial. Management for COPD exacerbation per primary team but no clear clinical improvement per patient report. VQ scan done and negative for PE    Acute on chronic renal failure  - On gentle fluids. Creatinine remains elevated. Lab studies and imaging studies (VQ scan, CXR) were personally reviewed. Thank you for allowing us to participate in the care of Mr. Romario Lewis. Unfortunately, nothing to add from heme-onc standpoint at this time. We discussed code status and he had discussed with his grandson who is is POA he would want to be a DNR. Orders placed in chart. Julio Holguin NP   [facility] Hematology & Oncology  [address] [address]  Office : [phone]  Fax : [phone]     Attending Addendum:  Patient seen with MARY Pérez. Mr Romario Lewis is an 81yo man with lung cancer. Pt of Dr Cole Huitron. Pmhx includes CHF (EF 25%), COPD. He originally p/w chest pain and was found to have a left hilar mass and LAD. EBUS confirmed malignancy - at least Stage IIIc most c/w lung primary. At time of consultation in early July, pt was recommended to undergo PET and MRI to complete the staging. During this admission, he p/w dyspnea and JAZZY. He is being treated for COPD and CHF exacerbation. He look weak and debilitated. Today, he is seen with daughter at bedside. She answers most questions as pt answers intermittently and remained with his eyes closed. His POA is Jacky Rowley - pt's grandson. I personally performed a face to face diagnostic evaluation on this patient. My findings are as follows: awake intermittently, when opens eyes and answers questions - he does so appropraitely, lungs with scattered rhonchi, heart regular, abdomen benign and no LE edema, significant bruising over right arm. His wish is to re-instate DNR/DNI and per daughter at bedside, he has discussed this with Jacky Rowley in the past.  His wished will be honored. I do not think he is a candidate for any systemic therapy at this time. If he clinically improves, he will f/u with Dr Beronica Heller once discharged. He may elect to proceed with palliative care alone and I think this may be more in line with his wishes. Of note, per chart review he has a defibrillator - may need to address with cardiology regarding his DNR/DNI. I have reviewed and agree with the care plan. Thank you for the opportunity to take care of Mr Candelaria Matthew along with you.              Ivett Verdin MD  [facility] Hematology and Oncology  [address] [place] [address]  Office : [phone]  Fax : [phone]

## 2019-07-23 NOTE — PROGRESS NOTES
Pt B/P 83/60, retaken manually was 82/54. Dr. Micah Celaya called, pt. Baseline is around B/P 70s and 80s, continue to monitor, if B/P gets into the 70s to call the  back and administer fluid bolus.

## 2019-07-23 NOTE — PROGRESS NOTES
PT Note:  PT orders received and chart review initiated. Attempted evaluation, however, patient refused stating \"I just can't right now\". Per patient he is too nauseated and feels bad. Will attempt another time/day as schedule permits.   Tonya Allen, PT, DPT

## 2019-07-23 NOTE — PROGRESS NOTES
Bedside report received from  Elmer Miles, [place]. Assessment completed. Bed is in low and locked position, with floor free of clutter. Respirations even and unlabored. Breath sounds wheezing. Alert and Oriented x4. NC at 6 L O2. Call light within reach.

## 2019-07-23 NOTE — CONSULTS
[facility] CONSULT NOTE    Admission Date:  7/21/2019    Admission Diagnosis:  Hypoxia [R09.02]  Dyspnea [R06.00]    Consulting physician: Cliff Tao MD    Reason for consult: JAZZY on CKD    Subjective:   History of Present Illness: Clara Trevino is a pleasant 82year old male with history of JAZZY earlier this year in March, a-fib, urinary retention, CAD, chronic sytolic CHF with EF 25 - 30%, s/p AICD placement, COPD, prostate cancer s/p radiation therapy, depression, GERD, and recently diagnosed lung cancer. He was recently admitted from 7/14/19 to 7/19/19 with, N/V, dehydration, and JAZZY. He was treated with IVF with improvement in renal function. Creatinine was 2.77 at time on discharge on 7/19. Prior to discharge on last admission goals of care was discussed with the patient and his grandson. The patient verbalized that he did not want any heroic measures to keep him alive and he would like to die in peace when the time comes. He was made a DNR/DNI. He went home with home health and was sent to the ED on 7/21 when his home health RN found him to be short of breath. He reports ongoing nausea and poor appetite. He reports decreased urine output. Creatinine was 3.50 on admission and up to 3.80 today despite IVF. His blood pressure has been low in the 39'D to 47'A systolic. He is on 6LPM NC. He is lethargic and slow to respond. He reports continued nausea and vomiting but denies diarrhea. He has a candelaria present with uop 375 ml over the last 24 hours and none documented today but there is some urine in his bag. Nursing reports candelaria leaking with bed pad saturated earlier today. Nephrology is consulted for JAZZY on CKD.      Past Medical History:   Diagnosis Date    Abnormal EKG     Acute kidney injury (HCC)     AICD (automatic cardioverter/defibrillator) present     Alterations of sensations     Anemia     Arthritis     Back pain 6/17/2016    Benign prostatic hyperplasia 6/17/2016    Bilateral [facility]ic rami fractures [facility]     CAD (coronary artery disease)     ?  MI 4-2014    CH[facility]congestive [facility] failure) [facility]     Chronic obstructive pulmonary disease (HCC)     Chronic systolic CHF (congestive heart failure) ([address] 10/7/2014    CRI (chronic renal insufficiency)     Depression     Dizziness     Dyspnea     Elevated ferritin     Elevated PSA 6/17/2016    GERD (gastroesophageal reflux disease)     Heart failure ([address]     Hernia, inguinal 6/17/2016    Hyperlipidemia 10/19/2015    Hypertension     Hypokalemia 6/17/2016    Ischemia of left lower extremity 3/13/2019    Keratosis, actinic     Leg cramps     Malaise and fatigue 6/17/2016    NSTEMI (non-ST elevated myocardial infarction) ([address] 6/24/2019    OA (osteoarthritis) of hip 9/16/2016    Orthostasis     Pneumonia     Respiratory failure ([address] 6/17/2016    Due to tractor accident      Sinoatrial node dysfunction ([address]     Sinusitis       Past Surgical History:   Procedure Laterality Date    HX CARPAL TUNNEL RELEASE      bilat    HX CATARACT REMOVAL      HX CATARACT REMOVAL      bilat    HX COLONOSCOPY      HX HEART CATHETERIZATION      HX ORTHOPAEDIC      internal fixation fractured pelvis    HX OTHER SURGICAL  4/2006    prostate bx     HX OTHER SURGICAL      transiliac screw fixation of L hemipelvis and ORIF of anterior pelvic ring disruption     HX OTHER SURGICAL      irrigation and debridment of R hip wound: VAC placement     HX OTHER SURGICAL      implanted defibrillator    HX PACEMAKER      dual chamber     VASCULAR SURGERY PROCEDURE UNLIST  03/13/2019    left common femoral artery thrombo-embolectomy      Current Facility-Administered Medications   Medication Dose Route Frequency    [START ON 7/24/2019] azithromycin (ZITHROMAX) tablet 250 mg  250 mg Oral DAILY    tiotropium (SPIRIVA) inhalation capsule 18 mcg  1 Cap Inhalation DAILY    albuterol (PROVENTIL VENTOLIN) nebulizer solution 2.5 mg  2.5 mg Nebulization Q4H PRN  tamsulosin (FLOMAX) capsule 0.4 mg  0.4 mg Oral DAILY    albuterol-ipratropium (DUO-NEB) 2.5 MG-0.5 MG/3 ML  3 mL Nebulization Q4H PRN    apixaban (ELIQUIS) tablet 2.5 mg  2.5 mg Oral Q12H    traMADol (ULTRAM) tablet 50 mg  50 mg Oral Q6H PRN    sodium chloride (NS) flush 5-40 mL  5-40 mL IntraVENous Q8H    sodium chloride (NS) flush 5-40 mL  5-40 mL IntraVENous PRN    acetaminophen (TYLENOL) tablet 650 mg  650 mg Oral Q4H PRN    ondansetron (ZOFRAN) injection 4 mg  4 mg IntraVENous Q4H PRN    budesonide (PULMICORT) 500 mcg/2 ml nebulizer suspension  500 mcg Nebulization BID RT    And    albuterol (PROVENTIL VENTOLIN) nebulizer solution 2.5 mg  2.5 mg Nebulization Q6HWA RT    0.9% sodium chloride infusion  50 mL/hr IntraVENous CONTINUOUS    methylPREDNISolone (PF) (Solu-MEDROL) injection 60 mg  60 mg IntraVENous Q8H    pantoprazole (PROTONIX) tablet 40 mg  40 mg Oral ACB    tuberculin injection 5 Units  5 Units IntraDERMal ONCE     Allergies   Allergen Reactions    Lortab [Hydrocodone-Acetaminophen] Other (comments)     Makes him sick. 1/2 tab doesn't make him sick     Lortab [Hydrocodone-Acetaminophen] Other (comments)     Pt states that a whole pill makes him feel sick. 1/2 tab does not.  Other Medication Other (comments)     He has a hx of a prolonged QT interval, so precaution with meds that affect that.          Social History     Tobacco Use    Smoking status: Former Smoker     Packs/day: 2.00     Years: 50.00     Pack years: 100.00     Last attempt to quit: 10/17/2013     Years since quittin.7    Smokeless tobacco: Never Used    Tobacco comment: quit spring 2019   Substance Use Topics    Alcohol use: No      Family History   Problem Relation Age of Onset    No Known Problems Mother     No Known Problems Father     Heart Attack Son 48        mi    Heart Disease Other     Lung Cancer Son         also had pulmonary fibrosis        Review of Systems  Gen - no fever, no chills, appetite poor, generalized malaise  HEENT - no sore throat, no decreased vision, no hearing loss  Neck - no neck mass  CV - no chest pain, no palpitation, no orthopnea  Lung - + shortness of breath, no cough, no hemoptysis  Abd - + tenderness, + nausea/vomiting, no bloody stool  Ext - + edema, no clubbing, no cyanosis  Musculoskeletal - no joint pain, no back pain  Neurologic - no headaches, no dizziness, no seizures  Psychiatric - no anxiety, no depression  Skin - no rashes, no pupura  Genitourinary - + decreased urine output, no hematuria, no foamy urine    Objective:   Vitals:    07/23/19 0719 07/23/19 0836 07/23/19 1117 07/23/19 1355   BP: 94/66  90/62    Pulse: 81  81    Resp: 19  20    Temp: 98.3 °F (36.8 °C)  97.7 °F (36.5 °C)    SpO2: 92% 91% 90% 90%   Weight:       Height:           Intake/Output Summary (Last 24 hours) at 7/23/2019 1453  Last data filed at 7/23/2019 1410  Gross per 24 hour   Intake 0 ml   Output 375 ml   Net -375 ml       Physical Exam  GEN :ill appearing, in no distress. Sleepy but arousable  HEENT: anicteric sclerae, eomi. Oropharynx without lesions. Mucous membranes are moist.  Neck - supple without JVD, no thyromegaly. No lymphadenopathy. CV - irregularly irregular  Lung - expiratory wheezes bilaterally, lungs expand symmetrically  Chest wall - normal appearance  Abd - soft, mild tenderness throughout with palpation, bowel sounds present, no hepatosplenomegaly  Ext - no clubbing, no cyanosis, trace pitting edema  Neurologic - nonfocal  Genitourinary - bladder nonpalpable  Skin - bruises to arms bilaterally  Psychiatric: Normal mood and affect.       Data Review:   Recent Labs     07/22/19 0615 07/21/19 2011   WBC 8.8 10.5   HGB 9.0* 8.8*   HCT 25.3* 24.8*    206     Recent Labs     07/23/19 0715 07/22/19 0615 07/21/19 2011    137 136   K 5.6* 5.1 5.0    101 100   CO2 20* 22 22   * 87* 85*   CREA 3.80* 3.54* 3.50*   * 102* 122*   CA 9.0 9.4 9.5 No results for input(s): PH, PCO2, PO2, PCO2 in the last 72 hours. Problem List:     Patient Active Problem List    Diagnosis Date Noted    Dyspnea 07/21/2019    Hypoxia 07/21/2019    Nausea with vomiting 07/15/2019    JAZZY (acute kidney injury) ([address] 07/15/2019    Thyroid nodule 07/07/2019    Hilar mass 07/03/2019    Malignant neoplasm of hilus of left lung ([address] 07/03/2019    Rib pain 07/01/2019    Anemia 07/01/2019    Chronic respiratory failure with hypoxia (HCC) 07/01/2019    Hilar adenopathy 07/01/2019    Mediastinal adenopathy 07/01/2019    Chronic renal insufficiency 06/24/2019    History of DVT (deep vein thrombosis) 06/24/2019    Long term (current) use of anticoagulants 04/23/2019    Atrial fibrillation ([address] 03/22/2019    Thrombus of left atrial appendage 03/22/2019    Essential hypertension 09/04/2018    GERD (gastroesophageal reflux disease) 09/04/2018    Cardiac defibrillator in place 09/04/2018    Change in bowel habits 09/04/2018    Constipation 09/04/2018    History of MI (myocardial infarction) 09/04/2018    History of prostate cancer 09/04/2018    Prostate cancer ([address] 11/01/2016    OA (osteoarthritis) of hip 09/16/2016    Back pain 06/17/2016    Inguinal hernia 06/17/2016    Benign prostatic hyperplasia, 06/17/2016    CRI (chronic renal insufficiency)     Hyperlipidemia 10/19/2015    Arthritis     CAD (coronary artery disease), atherosclerotic of the native vessel     Chronic obstructive lung disease ([address]     History of sinoatrial node dysfunction     Chronic congestive heart failure ([address] 10/07/2014    Fracture of multiple pubic rami ([address] 10/05/2013       Assessment and Plan:  JAZZY on CKD  - history of multiple JAZZY episodes this year  - history of baseline CKD dating back to at least 2014. Creatinine 1.6-1.8 in June and up to 2.77 on 7/19  - creatinine up to 3.8 today in setting of poor po intake, volume depletion with n/v and hypotension.  May be an ATN at this point. Some UOP but oliguric  - recheck renal US given urinary retention and candelaria leaking  - agree with cautious IVF.   EF 25-30%  - check urine lytes  - avoid nephrotoxic agents  - he does not want dialysis  - follow closely    SHEILA Pop

## 2019-07-23 NOTE — PROGRESS NOTES
100ml UOP documented overnight. Pt with 60ml urine currently in candelaria bag. Bed pad noted to be saturated in unmeasurable amount of urine. RN unable to identify obvious leak in candelaria catheter. Urine sample sent to lab per order. Pt noted to have dried dark blood on lips and inside mouth. Mouth care performed and swabbed.

## 2019-07-24 PROBLEM — C34.91 NON-SMALL CELL CANCER OF RIGHT LUNG (HCC): Status: ACTIVE | Noted: 2019-01-01

## 2019-07-24 NOTE — PROGRESS NOTES
PT note:    Chart reviewed. Pt family had discussion with pallative care per note and are wishing to pursue comfort measures and have planned meeting with hospice. Will discharge from PT services at this time.     Thanks,  DANIA PandeyT

## 2019-07-24 NOTE — PROGRESS NOTES
Pt is resting quietly in bed. Respirations even and unlabored. No s/s of distress. Will give report to oncoming RN.

## 2019-07-24 NOTE — PROGRESS NOTES
HOSPITALIST  NAME:  Booker Whiting   Age:  82 y.o.  :   1935   MRN:   [id number]  PCP: Austin Godinez, NP    subj     82yoM with PMH significant for CHF, COPD, and lung cancer who presents from home after [facility] RN found him to be short of breath. He was hypoxic and oxygen was increased to 5L/m. Additionally, he has had decreased urine output over the last day. He denies fevers/chills/nausea/vomiting. On ER evaluation, CXR does not show acute process. Troponin is elevated at 0.8, but this appears to be chronic. Pt also has evidence of JAZZY with BUN/Cr higher than on recent discharge on Friday. Patient will be admitted for further work-up and evaluation. Pt reports decreased PO intake and appetite over the last month. Denies any pain. Denies chest pain, fevers, nausea, vomiting. Does report decreased urine output, but denies dysuria. Today, on 5-6L O2, normally on 4L at home, feels nauseated, less awake     Objective:     Visit Vitals  BP 95/64   Pulse 68   Temp 97.7 °F (36.5 °C)   Resp 19   Ht 5' 6\" (1.676 m)   Wt 69.1 kg (152 lb 6.4 oz)   SpO2 94%   BMI 24.60 kg/m²      Temp (24hrs), Av.8 °F (36.6 °C), Min:97.7 °F (36.5 °C), Max:98 °F (36.7 °C)    Oxygen Therapy  O2 Sat (%): 94 % (19)  Pulse via Oximetry: 81 beats per minute (19)  O2 Device: Nasal cannula (19)  O2 Flow Rate (L/min): 5 l/min (19)  FIO2 (%): 40 % (19)  Physical Exam:  General:    Alert, cooperative, pale, moderate distress, appears stated age. Lungs:   jorge luis exp wheez less  Heart:   Regular rate and rhythm,  no murmur, rub or gallop. Abdomen:   Soft, non-tender. Not distended. Bowel sounds normal.   Extremities: No cyanosis. No edema. No clubbing  Skin:     Texture, turgor normal. No rashes or lesions.   Not Jaundiced  Neurologic: Alert and oriented x 3, no focal deficits     Data Review:   Recent Results (from the past 24 hour(s))   [facility] PPD TEST IN 24 HRS    Collection Time: 07/23/19  6:10 PM   Result Value Ref Range    PPD      mm Induration 0 0 - 5 mm   METABOLIC PANEL, BASIC    Collection Time: 07/24/19  7:01 AM   Result Value Ref Range    Sodium 139 136 - 145 mmol/L    Potassium 5.9 (H) 3.5 - 5.1 mmol/L    Chloride 105 98 - 107 mmol/L    CO2 15 (L) 21 - 32 mmol/L    Anion gap 19 (H) 7 - 16 mmol/L    Glucose 121 (H) 65 - 100 mg/dL     (H) 8 - 23 MG/DL    Creatinine 4.37 (H) 0.8 - 1.5 MG/DL    GFR est AA 17 (L) >60 ml/min/1.73m2    GFR est non-AA 14 (L) >60 ml/min/1.73m2    Calcium 9.0 8.3 - 10.4 MG/DL     Imaging /Procedures /Studies     Assessment and Plan:     1- COPD exacerbation  2- Ac on Ch hypoxic resp failure dt #1  3- Lung cancer stage 3C at [facility], recent Dx  4- JAZZY on CKD stage 3- worsening  5- Ch CHF, ef 25% and DD2, stable  6- Hypotension to low normal BP  7- Hx of afib, on Eliquis  8- Hx of prostate ca, planned to receive hormaonal Rx    Rx:  Steroids iv and inh  BD and azithromycin  O2 as needed  Cautious IVF  Seen by Nephrology  AM lab daily  IO monitor  Follow VQ ordered on admission and Oncology input    Poor prognosis, palliative care consulted  Dispo: rehab?     Signed By: Katrin Doshi MD     July 24, 2019

## 2019-07-24 NOTE — PROGRESS NOTES
Problem: Falls - Risk of  Goal: *Absence of Falls  Description  Document Jamir Francisco Fall Risk and appropriate interventions in the flowsheet.   Outcome: Progressing Towards Goal  Note:   Fall Risk Interventions:            Medication Interventions: Patient to call before getting OOB    Elimination Interventions: Call light in reach

## 2019-07-24 NOTE — PROGRESS NOTES
Beside shift report received from Maryana RN  Patient lying in bed  Respirations present  No signs of distress  No needs expressed at this time  Safety measures in place

## 2019-07-24 NOTE — HOSPICE
Patient evaluated for [facility] level of care at [facility]. Hospice philosophy,levels of care, services presented to patient's grandson Harmony Mcdermott and they are on board with pursuing hospice care with comfort measures being intiated at the hospital. Case dicussed with Dr. Turner Nurse and patient approved for [facility] level of care at [facility]. Will await deactivation of ICD before transferring patient. Please transport patient at 1930 to room 108 at [facility].   Thank you for this referral.  Julio Khan, RN, BSN  Community Nurse Liaison  [facility]  [phone]

## 2019-07-24 NOTE — PROGRESS NOTES
Pt. In bed sleeping. Given scheduled and prn meds. No s/sx of distress. IV infusing at 50mL/hr of NS. Call light within reach. Bed low and locked.

## 2019-07-24 NOTE — PROGRESS NOTES
[facility] PROGRESS NOTE    Follow up for:    Subjective:   Patient seen and examined. Doing poorly. Discussed with palliative care. Hospice to meet with family today.      ROS:  UTO    Objective:   Exam:  Vitals:    07/24/19 0321 07/24/19 0731 07/24/19 0819 07/24/19 1114   BP: 95/61 95/64  96/67   Pulse: 79 68  76   Resp: 20 19 18   Temp: 97.7 °F (36.5 °C) 97.7 °F (36.5 °C)  97.9 °F (36.6 °C)   SpO2: 94% 94% 94% 94%   Weight: 69.1 kg (152 lb 6.4 oz)      Height:             Intake/Output Summary (Last 24 hours) at 7/24/2019 1139  Last data filed at 7/24/2019 0618  Gross per 24 hour   Intake 0 ml   Output 310 ml   Net -310 ml       Current Facility-Administered Medications   Medication Dose Route Frequency    promethazine (PHENERGAN) tablet 25 mg  25 mg Oral Q4H PRN    azithromycin (ZITHROMAX) tablet 250 mg  250 mg Oral DAILY    tiotropium (SPIRIVA) inhalation capsule 18 mcg  1 Cap Inhalation DAILY    albuterol (PROVENTIL VENTOLIN) nebulizer solution 2.5 mg  2.5 mg Nebulization Q4H PRN    tamsulosin (FLOMAX) capsule 0.4 mg  0.4 mg Oral DAILY    albuterol-ipratropium (DUO-NEB) 2.5 MG-0.5 MG/3 ML  3 mL Nebulization Q4H PRN    apixaban (ELIQUIS) tablet 2.5 mg  2.5 mg Oral Q12H    traMADol (ULTRAM) tablet 50 mg  50 mg Oral Q6H PRN    sodium chloride (NS) flush 5-40 mL  5-40 mL IntraVENous Q8H    sodium chloride (NS) flush 5-40 mL  5-40 mL IntraVENous PRN    acetaminophen (TYLENOL) tablet 650 mg  650 mg Oral Q4H PRN    ondansetron (ZOFRAN) injection 4 mg  4 mg IntraVENous Q4H PRN    budesonide (PULMICORT) 500 mcg/2 ml nebulizer suspension  500 mcg Nebulization BID RT    And    albuterol (PROVENTIL VENTOLIN) nebulizer solution 2.5 mg  2.5 mg Nebulization Q6HWA RT    0.9% sodium chloride infusion  50 mL/hr IntraVENous CONTINUOUS    methylPREDNISolone (PF) (Solu-MEDROL) injection 60 mg  60 mg IntraVENous Q8H    pantoprazole (PROTONIX) tablet 40 mg  40 mg Oral ACB       EXAM  GEN - Alert, oriented, in no distress  CV - S1, S2, RRR, no rub, murmur, or gallop  Lung - clear to auscultation bilaterally  Abd - soft, nontender, BS present  Ext - no edema    Recent Labs     07/22/19  0615 07/21/19 2011   WBC 8.8 10.5   HGB 9.0* 8.8*   HCT 25.3* 24.8*    206        Recent Labs     07/24/19  0701 07/23/19  0715 07/22/19 0615    137 137   K 5.9* 5.6* 5.1    103 101   CO2 15* 20* 22   * 102* 87*   CREA 4.37* 3.80* 3.54*   CA 9.0 9.0 9.4   * 152* 102*       Assessment and Plan:     JAZZY- renal function worse. Not pursuing dialysis. Agree with hospice evaluation. Hyperkalemia- 2/2 #1. Start Narciso Rizvi. Metabolic acidosis- again 2/2 #1. Overall poor prognosis. Palliative care consulted. .  Family in room would like to pursue hospice. We will sign off for now.       Edd Ivan MD

## 2019-07-24 NOTE — PROGRESS NOTES
[facility] completed. Bed is in low and locked position, with floor free of clutter. Respirations even and unlabored. Breath sounds wheezing and coarse. Alert and Oriented x4. NC at 6 L O2. Call light within reach.

## 2019-07-24 NOTE — CONSULTS
Palliative Care    Patient: Wilfrido Post MRN: [id number]  SSN: [id number]    YOB: 1935  Age: 82 y.o. Sex: male       Date of Request: 7/24/2019  Date of Consult:  7/24/2019  Reason for Consult:  advanced care plan planning/end of life  Requesting Physician: Dr. Rubin Munoz     Assessment/Plan:     Principal Diagnosis:    Altered Mental Status R41.82    Additional Diagnoses:   · Debility, Unspecified  R53.81  · Frailty  R54  · Pain, abdomen  R10.9  · Counseling, Encounter for Medical Advice  Z71.9  · Encounter for Palliative Care  Z51.5    Palliative Performance Scale (PPS):   20%    Medical Decision Making:   Reviewed and summarized labs and imaging. I met with pt Carlos who is POA. Pt would awaken briefly but falls back asleep during conversation. Carlos notes continued functional and mental decline that has led to recurrent hospitalizations over the past month. Pt now with little po intake and is unable to complete ADLs. We discussed severity of comorbidities and the lack of treatment options. Marlene Hendricks has a good understanding and was asking about hospice services. I discussed hospice philosophy and levels of care. Marlene Hendricks is in agreement and wishes to pursue comfort measures. I have discussed the case with hospice liaisons and case management. Placed order to have defibrillator turned off   Started ativan 1 mg iv prn agitation, anxiety  Continue zofran iv for nausea  Will add dilaudid 0.5 mg iv prn pain. No morphine given renal failure  Will continue to follow to assist with transitions. Will discuss findings with members of the interdisciplinary team.      Thank you for this referral.         Subjective:     History obtained from:  Family, chart    Chief Complaint: nausea, abd pain  History of Present Illness:  Patient is an 82yoM with PMH significant for CHF, COPD, and lung cancer who presents from home after [place] RN found him to be short of breath.   He was hypoxic and oxygen was increased to 5L/m. Additionally, he has had decreased urine output over the last day. He denies fevers/chills/nausea/vomiting. On ER evaluation, CXR does not show acute process. Troponin is elevated at 0.8, but this appears to be chronic. Pt also has evidence of JAZZY with BUN/Cr higher than on recent discharge on Friday. Patient will be admitted for further work-up and evaluation.     Pt reports decreased PO intake and appetite over the last month. Denies any pain. Denies chest pain, fevers, nausea, vomiting. Does report decreased urine output, but denies dysuria. Pt with continued functional decline. Tolerating minimal liquids and approaching HD needs. Opting towards comfort care and hospice      Advance Directive: Yes       Code Status:  DNR            Health Care Power of : Yes - Copy of [address] on file. Past Medical History:   Diagnosis Date    Abnormal EKG     Acute kidney injury (HCC)     AICD (automatic cardioverter/defibrillator) present     Alterations of sensations     Anemia     Arthritis     Back pain 6/17/2016    Benign prostatic hyperplasia 6/17/2016    Bilateral pubic rami fractures (HCC)     CAD (coronary artery disease)     ?  MI 4-2014    CHF (congestive heart failure) (HCC)     Chronic obstructive pulmonary disease (HCC)     Chronic systolic CHF (congestive heart failure) ([address] 10/7/2014    CRI (chronic renal insufficiency)     Depression     Dizziness     Dyspnea     Elevated ferritin     Elevated PSA 6/17/2016    GERD (gastroesophageal reflux disease)     Heart failure ([address]     Hernia, inguinal 6/17/2016    Hyperlipidemia 10/19/2015    Hypertension     Hypokalemia 6/17/2016    Ischemia of left lower extremity 3/13/2019    Keratosis, actinic     Leg cramps     Malaise and fatigue 6/17/2016    NSTEMI (non-ST elevated myocardial infarction) ([address] 6/24/2019    OA (osteoarthritis) of hip 9/16/2016    Orthostasis     Pneumonia     Respiratory failure ([facility] 2016    Due to tractor accident      Sinoatrial node dysfunction ([facility]     Sinusitis       Past Surgical History:   Procedure Laterality Date    HX CARPAL TUNNEL RELEASE      bilat    HX CATARACT REMOVAL      HX CATARACT REMOVAL      bilat    HX COLONOSCOPY      HX HEART CATHETERIZATION      HX ORTHOPAEDIC      internal fixation fractured pelvis    HX OTHER SURGICAL  2006    prostate bx     HX OTHER SURGICAL      transiliac screw fixation of L hemipelvis and ORIF of anterior pelvic ring disruption     HX OTHER SURGICAL      irrigation and debridment of R hip wound: VAC placement     HX OTHER SURGICAL      implanted defibrillator    HX PACEMAKER      dual chamber     VASCULAR SURGERY PROCEDURE UNLIST  2019    left common femoral artery thrombo-embolectomy     Family History   Problem Relation Age of Onset    No Known Problems Mother     No Known Problems Father     Heart Attack Son 48        mi    Heart Disease Other     Lung Cancer Son         also had pulmonary fibrosis      Social History     Tobacco Use    Smoking status: Former Smoker     Packs/day: 2.00     Years: 50.00     Pack years: 100.00     Last attempt to quit: 10/17/2013     Years since quittin.7    Smokeless tobacco: Never Used    Tobacco comment: quit spring 2019   Substance Use Topics    Alcohol use: No     Prior to Admission medications    Medication Sig Start Date End Date Taking? Authorizing Provider   ondansetron (ZOFRAN ODT) 4 mg disintegrating tablet Take 1 Tab by mouth every eight (8) hours as needed for Nausea. 19  Yes Virgilio Huerta MD   apixaban (ELIQUIS) 5 mg tablet Take 1 Tab by mouth two (2) times a day. 19  Yes Andrea Rice MD   omeprazole (PRILOSEC) 20 mg capsule Take 20 mg by mouth. 3/27/17  Yes Provider, Historical   rosuvastatin (CRESTOR) 20 mg tablet Take 1 Tab by mouth nightly.  19  Yes Chinmay Lipscomb NP   metoprolol succinate (TOPROL-XL) 25 mg XL tablet Take 1 Tab by mouth daily. 3/22/19  Yes Magaly NICOLE NP   albuterol-ipratropium (DUO-NEB) 2.5 mg-0.5 mg/3 ml nebu 3 mL by Nebulization route every four (4) hours as needed for Cough. 2/27/19  Yes Shanice Dasilva, MARY   tamsulosin (FLOMAX) 0.4 mg capsule Take 1 Cap by mouth daily. 2/21/19  Yes Shanice Dasilva, MARY   albuterol (PROVENTIL HFA, VENTOLIN HFA, PROAIR HFA) 90 mcg/actuation inhaler Take 2 Puffs by inhalation every four (4) hours as needed for Wheezing. 11/15/18  Yes Shanice Dasilva NP   tiotropium (SPIRIVA WITH HANDIHALER) 18 mcg inhalation capsule Take 1 Cap by inhalation daily. Yes Provider, Historical   fluticasone-vilanterol (BREO ELLIPTA) 100-25 mcg/dose inhaler Take 1 Puff by inhalation daily. Yes Provider, Historical   cholecalciferol (VITAMIN D3) 1,000 unit tablet Take 1,000 Units by mouth daily. Yes Provider, Historical   aspirin delayed-release 81 mg tablet Take 81 mg by mouth daily. Yes Provider, Historical   cyanocobalamin (VITAMIN B-12) 1,000 mcg sublingual tablet Take 1,000 mcg by mouth daily. Yes Provider, Historical   ondansetron (ZOFRAN ODT) 8 mg disintegrating tablet Take 1 Tab by mouth every eight (8) hours as needed for Nausea. 7/19/19   Ivett Hidalgo MD       Allergies   Allergen Reactions   Pollie Mas [Hydrocodone-Acetaminophen] Other (comments)     Makes him sick. 1/2 tab doesn't make him sick     Lortab [Hydrocodone-Acetaminophen] Other (comments)     Pt states that a whole pill makes him feel sick. 1/2 tab does not.  Other Medication Other (comments)     He has a hx of a prolonged QT interval, so precaution with meds that affect that.            Review of systems unable to obtain due to mentation     Objective:     Visit Vitals  BP 96/67   Pulse 76   Temp 97.9 °F (36.6 °C)   Resp 18   Ht 5' 6\" (1.676 m)   Wt 152 lb 6.4 oz (69.1 kg)   SpO2 94%   BMI 24.60 kg/m²        Physical Exam:    General:  Lethargic, no distress   Eyes:  Conjunctivae/corneas clear    Nose: Nares normal. Septum midline. Neck: Supple, symmetrical, trachea midline, no JVD   Lungs:   Coarse bilateral bs   Heart:  Regular rate and rhythm, no murmur    Abdomen:   Soft, non-tender, non-distended. Positive bowel sounds   Extremities: Normal, atraumatic, no cyanosis or edema   Skin: Skin color, texture, turgor normal. No rash or lesions. Neurologic: Nonfocal. Moves extremities spontaneously    Psych: lethargic      Assessment:     Hospital Problems  Date Reviewed: 7/11/2019          Codes Class Noted POA    Dyspnea ICD-10-CM: R06.00  ICD-9-CM: 786.09  7/21/2019 Unknown        * (Principal) Hypoxia ICD-10-CM: R09.02  ICD-9-CM: 799.02  7/21/2019 Unknown        JAZZY (acute kidney injury) ([facility] ICD-10-CM: N17.9  ICD-9-CM: 584.9  7/15/2019 Yes        Malignant neoplasm of hilus of left lung ([facility] ICD-10-CM: C34.02  ICD-9-CM: 162.2  7/3/2019 Yes        Atrial fibrillation ([facility] (Chronic) ICD-10-CM: I48.91  ICD-9-CM: 427.31  3/22/2019 Yes        Essential hypertension ICD-10-CM: I10  ICD-9-CM: 401.9  9/4/2018 Yes    Overview Addendum 7/7/2019  5:31 PM by Deven Rouse NP     BP readings have been in target range. No complications noted from the medication presently being used. Chronic obstructive lung disease ([facility] ICD-10-CM: J44.9  ICD-9-CM: 305  Unknown Yes        Chronic congestive heart failure ([facility] ICD-10-CM: I50.9  ICD-9-CM: 428.0  10/7/2014 Yes    Overview Addendum 7/7/2019  5:31 PM by Deven Rouse NP     [place] Class I.  EF 35%.                      Signed By: Selwyn Felix MD     July 24, 2019

## 2019-07-24 NOTE — PROGRESS NOTES
Dr. Ben Hines called for pt c/o of nausea after taking Zofran 2 hrs prior. Ordered Phenergan 25mg oral tab.

## 2019-07-24 NOTE — DISCHARGE SUMMARY
Physician Discharge Summary     Patient ID:  Lucas Cartwright  [id number]  82 y.o.  1935    Admit date: 7/21/2019    Discharge date: 7-    Diagnosis:  1- COPD exacerbation  2- Ac on Ch hypoxic resp failure dt #1  3- Lung cancer stage 3C at leat, recent Dx  4- JAZZY on CKD stage 3- worsening  5- Ch CHF, ef 25% and DD2, stable  6- Hypotension to low normal BP  7- Hx of afib, on Eliquis  8- Hx of prostate ca, planned to receive hormaonal Rx       Poor prognosis. Seen by Nephrology, Oncology and palliative care. Family chose Hospice house. Hospital course:   82yoM with PMH significant for CHF, COPD, and lung cancer who presents from home after [facility] RN found him to be short of breath. He was hypoxic and oxygen was increased to 5L/m. Additionally, he has had decreased urine output over the last day. He denies fevers/chills/nausea/vomiting. On ER evaluation, CXR does not show acute process. Troponin is elevated at 0.8, but this appears to be chronic. Pt also has evidence of JAZZY with BUN/Cr higher than on recent discharge on Friday. Patient will be admitted for further work-up and evaluation. Pt reports decreased PO intake and appetite over the last month. Denies any pain. Denies chest pain, fevers, nausea, vomiting. Does report decreased urine output, but denies dysuria. Patient admitted and treated as above. On day of discharge, patient exam showed lethargic pt w/ oliguria, nauseated.     PCP: Nelly Aragon NP    Patient Instructions:   Current Discharge Medication List      STOP taking these medications       traMADol (ULTRAM) 50 mg tablet Comments:   Reason for Stopping:         ondansetron (ZOFRAN ODT) 4 mg disintegrating tablet Comments:   Reason for Stopping:         apixaban (ELIQUIS) 5 mg tablet Comments:   Reason for Stopping:         omeprazole (PRILOSEC) 20 mg capsule Comments:   Reason for Stopping:         rosuvastatin (CRESTOR) 20 mg tablet Comments:   Reason for Stopping:         metoprolol succinate (TOPROL-XL) 25 mg XL tablet Comments:   Reason for Stopping:         albuterol-ipratropium (DUO-NEB) 2.5 mg-0.5 mg/3 ml nebu Comments:   Reason for Stopping:         tamsulosin (FLOMAX) 0.4 mg capsule Comments:   Reason for Stopping:         albuterol (PROVENTIL HFA, VENTOLIN HFA, PROAIR HFA) 90 mcg/actuation inhaler Comments:   Reason for Stopping:         tiotropium (SPIRIVA WITH HANDIHALER) 18 mcg inhalation capsule Comments:   Reason for Stopping:         fluticasone-vilanterol (BREO ELLIPTA) 100-25 mcg/dose inhaler Comments:   Reason for Stopping:         cholecalciferol (VITAMIN D3) 1,000 unit tablet Comments:   Reason for Stopping:         aspirin delayed-release 81 mg tablet Comments:   Reason for Stopping:         cyanocobalamin (VITAMIN B-12) 1,000 mcg sublingual tablet Comments:   Reason for Stopping:         ondansetron (ZOFRAN ODT) 8 mg disintegrating tablet Comments:   Reason for Stopping:         aspirin-calcium carbonate 81 mg-300 mg calcium(777 mg) tab Comments:   Reason for Stopping:               Instructions:     Medications and overall management per Hospice  Time 35 min  Please send copy to primary physician.     Signed:  Blank Muñoz MD  7/24/2019  4:21 PM

## 2019-07-24 NOTE — PROGRESS NOTES
TRANSFER - OUT REPORT:    Verbal report given to [facility] RN(name) on Hellen Mabry  being transferred to [facility]) for routine progression of care       Report consisted of patients Situation, Background, Assessment and   Recommendations(SBAR). Information from the following report(s) SBAR was reviewed with the receiving nurse. Lines:   Peripheral IV 07/21/19 Right Forearm (Active)   Site Assessment Clean, dry, & intact 7/24/2019  2:16 PM   Phlebitis Assessment 0 7/24/2019  2:16 PM   Infiltration Assessment 0 7/24/2019  2:16 PM   Dressing Status Clean, dry, & intact 7/24/2019  2:16 PM   Dressing Type Tape;Transparent 7/24/2019  2:16 PM   Hub Color/Line Status Flushed;Patent 7/24/2019  2:30 AM       Peripheral IV 07/21/19 Left Forearm (Active)   Site Assessment Clean, dry, & intact 7/24/2019  2:16 PM   Phlebitis Assessment 0 7/24/2019  2:16 PM   Infiltration Assessment 0 7/24/2019  2:16 PM   Dressing Status Clean, dry, & intact 7/24/2019  2:16 PM   Dressing Type Tape;Transparent 7/24/2019  2:16 PM   Hub Color/Line Status Infusing;Flushed;Patent 7/24/2019  2:30 AM        Opportunity for questions and clarification was provided.       Patient transported with:   O2 @ 5 liters

## 2019-07-25 NOTE — PROGRESS NOTES
This note will not be viewable in [address] No HEBERT outreach indicated due to discharge to hospice. Patient currently engaged with CCM RN Chace Clark. Care Coordinator will notify of DC to Natalia Santos.

## 2019-07-25 NOTE — PROGRESS NOTES
Problem: Anticipatory Grief  Goal: Grief heard and acknowledged, anxiety reduced, patient coping identified, patient/family expressed gratitude  Description  Family will identify and maintain a functional support system to aid in the grief process. Family coping appropriately at this time. Outcome: Progressing Towards Goal  Note:   Family is sad but coping appropriately.

## 2019-07-25 NOTE — PROGRESS NOTES
Problem: Emotional Support Needs  Goal: Patient/family is receiving emotional support  Outcome: Progressing Towards Goal

## 2019-07-25 NOTE — PROGRESS NOTES
2030 - Pt arived via transport. Stephen RN to complete admission. Lorri Recinos, zacarias, accompanied pt, and was given a tour of the facility and oriented to [address]. 2115 - Bedside report received from [facility] Pt identified. Pt in bed with eyes closed with no c/o or S&S of pain, dyspnea, agitation, nausea, or vomiting. FLACC 0. Bed locked and low; side rails up X2; tabs/bed alarm in place for pt safety. Call light within reach and door opened for continued monitoring. Family remains at bedside. Question/concerns addressed. No other needs at this time. Discussed plan of care with CNA and CNA operating under RN supervision. 2203 - Humidity added to 5L O2 via nasal canula per order. Family remains at bedside. Zacarias stated that pt's main problem was N/V. He asked that we given him nausa meds whenever they were due to prevent him from actually getting sick. 2215 - Orders requested and received for scheduled haldol q8. PRN remains in place. Family informed and expressed their appreciation. 0030 - Pt grimacing and fidgety. Said yes when asked if he was in pain. PRN dilaudid and scheduled haldol administered at [employer]. Pt pulled up and repositioned. 0120 - Pt resting comfortably--eyes closed, resp present. No S&S of distress at this time    0245 - VS taken. Weeping noted from right arm. Gown and linens changed. Pt repositioned. No c/o pain. No S&S of distress at this time. 0300 - No urine output since arrival. Candelaria was DC'd at [facility] prior to admission. Bladder scan = 82.    0531 - Pt gagging, moaning, grimacing, c/o pain 4/10 and nausea. Pt positioned upright in bed. Emesis bag in hand. Scheduled haldol administered at [address]. PRN dilaudid administered at 0526.     0630 - Extra large incontinent of urine episode. Gabby care provided. Barrier cream applied to sacrum. Linens and incontinent pad changed. Pt is able to assist with turning. #16 Israeli candelaria catheter placed utilizing sterile technique. Sarahy Adrian RN assisted. Pt tolerated procedure with no adverse issues. Pt stated feeling comfortable. No other needs voiced at this time. 0700 - Report given to Dayan[facility]'s Jannet.

## 2019-07-25 NOTE — [facility] IDG SOCIAL WORKER NOTES
Patient: Lucas Cartwright    Date: 07/25/19  Time: 11:04 AM    [facility]  Notes   has reviewed the RN comprehensive assessment and have acknowledged there are no immediate needs for the patient or family.        [address]         Signed by: Adeola Jose RN

## 2019-07-25 NOTE — [facility] IDG VOLUNTEER NOTES
Patient: Lelia Kraus    Date: 07/25/19  Time: 1:58 PM    [facility] Volunteer Notes  VC has reviewed the initial RN Comprehensive assessment and plan of care. Pt will receive general support by volunteers as evidenced by comfort bag delivery, snack cart, supplies to the room, companionship visits, pet therapy and/or therapeutic music.              Signed by: Mary Ellen Rivers

## 2019-07-25 NOTE — [facility] IDG NURSE NOTES
Patient: Kathie Dean    Date: 07/25/19  Time: 9:05 AM    [facility] Nurse Notes     has reviewed the RN comprehensive assessment and have acknowledged there are no immediate needs for the patient or family.            Signed by: Jarod Lamb

## 2019-07-25 NOTE — [facility] IDG MASTER NOTE
[facility] Collabor[facility]  Date: 07/25/19  Time: 3:07 PM    ___________________    Patient: Kenneth John  Coverage Information:     Pay[place]: [place]     Plan: [place] MEDICARE PART A AND B     Subscriber ID: [id number]     Phone Number:   MRN: [id number]  Current Benefit Period: Benefit Period 1  Start Date: 7/24/2019  End Date: 10/21/2019      Medical Director: Candice Nazario MD   Hospice Attending Provider: Get Smith MD  [address] [place] [address]  [address]  Phone: [phone]  Fax: [phone]    Level of Care: General Inpatient Care      ___________________    Diagnoses:  Diagnoses of Chronic systolic congestive heart failure ([facility] [address] Chronic obstructive pulmonary disease, unspecified COPD type ([facility] [address] History of prostate cancer, and Non-small cell cancer of right lung [facility]) were pertinent to this visit.     Current Medications:    Current Facility-Administered Medications:     sodium chloride (NS) flush 3 mL, 3 mL, IntraVENous, Q12H, Derby Paola, NP, 3 mL at 07/25/19 0918    sodium chloride (NS) flush 3 mL, 3 mL, IntraVENous, PRN, Derby Paola, NP, 3 mL at 07/25/19 0621    HYDROmorphone (DILAUDID) syringe 0.5 mg, 0.5 mg, SubCUTAneous, Q30MIN PRN **OR** HYDROmorphone (DILAUDID) syringe 0.5 mg, 0.5 mg, IntraVENous, Q30MIN PRN, Derby Paola, NP, 0.5 mg at 07/25/19 0917    haloperidol lactate (HALDOL) injection 2 mg, 2 mg, SubCUTAneous, Q1H PRN **OR** haloperidol lactate (HALDOL) injection 2 mg, 2 mg, IntraVENous, Q1H PRN, Derby Paola, NP, 2 mg at 07/25/19 5857    acetaminophen (TYLENOL) suppository 650 mg, 650 mg, Rectal, Q3H PRN, Derby Paola, NP    bisacodyl (DULCOLAX) suppository 10 mg, 10 mg, Rectal, PRN, Jennifer Guevara, NP    haloperidol lactate (HALDOL) injection 2 mg, 2 mg, SubCUTAneous, Q1H PRN **OR** haloperidol lactate (HALDOL) injection 2 mg, 2 mg, IntraVENous, Q1H PRN, Jennifer Guevara, NP    glycopyrrolate (ROBINUL) injection 0.2 mg, 0.2 mg, IntraVENous, Q4H PRN, Ashok Forman NP    albuterol-ipratropium (DUO-NEB) 2.5 MG-0.5 MG/3 ML, 3 mL, Nebulization, Q4H PRN, Ashok Forman NP    haloperidol lactate (HALDOL) injection 2 mg, 2 mg, IntraVENous, Q8H, Ashok Forman NP, 2 mg at 07/25/19 0522    Orders:  Orders Placed This Encounter    IP CONSULT TO SPIRITUAL CARE Once on week one, then PRN. For [facility] patients only. For contracted patients, primary hospice will continue to manage spiritual care needs     Once on week one, then PRN. For [facility] patients only. For contracted patients, primary hospice will continue to manage spiritual care needs     Standing Status:   Standing     Number of Occurrences:   1     Order Specific Question:   Reason for Consult: Answer:   Spiritual crisis intervention or per patient or caregiver request    DIET PLEASURE     Standing Status:   Standing     Number of Occurrences:   1    VITAL SIGNS     Standing Status:   Standing     Number of Occurrences:   1    VITAL SIGNS     Standing Status:   Standing     Number of Occurrences:   1    NURSING-MISCELLANEOUS: comfort measures Enter comfort measures above. CONTINUOUS     Enter comfort measures above. Standing Status:   Standing     Number of Occurrences:   1     Order Specific Question:   Description of Order:     Answer:   comfort measures    GUTIERREZ CATHETER, CARE     1. Gutierrez Catheter care every shift and PRN  2. Notify Physician of Gutierrez Catheter leakage, occlusion, gross adherent sediment or accidental removal  3. Change Gutierrez 30 days after insertion. 4. May flush catheter prn leakage or gross adherent sediment or mucus. Standing Status:   Standing     Number of Occurrences:   1    BLADDER CHECKS     May scan bladder PRN for urinary retention and or patient discomfort     Standing Status:   Standing     Number of Occurrences:   1    NURSING ASSESSMENT:  SPECIFY Assess for GIP, routine, or respite level of care.  Q SHIFT Routine     Standing Status:   Standing     Number of Occurrences:   1     Order Specific Question:   Please describe the test or procedure you would like to order. Answer:   Assess for GIP, routine, or respite level of care.  PAIN ASSESSMENT Pain and Symptoms: Assess ever 4 hours and PRN, for GIP level of care. PRN Routine     Standing Status:   Standing     Number of Occurrences:   1     Order Specific Question:   Please describe the test or procedure you would like to order. Answer:   Pain and Symptoms: Assess ever 4 hours and PRN, for GIP level of care.  BEDREST, COMPLETE     Standing Status:   Standing     Number of Occurrences:   1    NURSING-MISCELLANEOUS: admit 7/24: ([facility]) Admitted GIP with Non-small cell lung cancer arising in the right lower lobe for management of pain, dyspnea, nausea and agitation. Hospice Dx: Non-small cell lung cancer arising in the right lower lobe Ass. .. 7/24: ([facility]) Admitted GIP with Non-small cell lung cancer arising in the right lower lobe for management of pain, dyspnea, nausea and agitation. Hospice Dx: Non-small cell lung cancer arising in the right lower lobe     Associated Dx: extensive mediastinal malignant adenopathy, pleural effusion, rib pain, hypoxic respiratory failure, acute renal failure, metabolic encephalopathy, hypoalbuminemia, chest wall pain, dyspnea, nausea     Non Associated Dx: chronic LV systolic dysfunction associated CHF, COPD, tobacco dependence, adenocarcinoma of the prostate, implanted defibrillator, atrial fibrillation, atrial thrombus, DVT, thyroid nodule     Standing Status:   Standing     Number of Occurrences:   1     Order Specific Question:   Description of Order:     Answer:   admit    WOUND CARE, DRESSING CHANGE     Wound Care:  Location: sacrum  Excoriation (Cavalon)- Cleanse wound location with wound cleanser, pat dry and apply Cavilon Durable Barrier Cream every 12 hours and PRN if soiled. Turn every 2 hours.  Assess with each nursing assessment visit. Standing Status:   Standing     Number of Occurrences:   30    DO NOT RESUSCITATE     Standing Status:   Standing     Number of Occurrences:   1    OXYGEN CANNULA Liters per minute: 5; Indications for O2 therapy: RESPIRATORY DISTRESS PRN Routine     Please add humidification for patient comfort     Standing Status:   Standing     Number of Occurrences:   1     Order Specific Question:   Liters per minute: Answer:   5     Order Specific Question:   Indications for O2 therapy     Answer:   RESPIRATORY DISTRESS    sodium chloride (NS) flush 3 mL    sodium chloride (NS) flush 3 mL    OR Linked Order Group     HYDROmorphone (DILAUDID) syringe 0.5 mg     HYDROmorphone (DILAUDID) syringe 0.5 mg    OR Linked Order Group     haloperidol lactate (HALDOL) injection 2 mg     haloperidol lactate (HALDOL) injection 2 mg    acetaminophen (TYLENOL) suppository 650 mg    bisacodyl (DULCOLAX) suppository 10 mg    OR Linked Order Group     haloperidol lactate (HALDOL) injection 2 mg     haloperidol lactate (HALDOL) injection 2 mg    glycopyrrolate (ROBINUL) injection 0.2 mg    albuterol-ipratropium (DUO-NEB) 2.5 MG-0.5 MG/3 ML     Order Specific Question:   MODE OF DELIVERY     Answer:   Nebulizer    haloperidol lactate (HALDOL) injection 2 mg    INITIAL PHYSICIAN ORDER: HOSPICE Level Of Care: General Inpatient; Reason for Admission: 7/24: ([facility]) Admitted GIP with Non-small cell lung cancer arising in the right lower lobe for management of pain, dyspnea, nausea and agitation. Standing Status:   Standing     Number of Occurrences:   1     Order Specific Question:   Status     Answer:   Hospice     Order Specific Question:   Level Of Care     Answer:   General Inpatient     Order Specific Question:   Reason for Admission     Answer:   7/24: ([facility]) Admitted GIP with Non-small cell lung cancer arising in the right lower lobe for management of pain, dyspnea, nausea and agitation. Order Specific Question:   Inpatient Hospitalization Certified Necessary for the Following Reasons     Answer:   3. Patient receiving treatment that can only be provided in an inpatient setting (further clarification in H&P documentation)     Order Specific Question:   Admitting Diagnosis     Answer:   Non-small cell cancer of right lung [facility]) [[id number]]     Order Specific Question:   Terminal Prognosis Diagnosis(es)     Answer:   Non-small cell cancer of right lung [facility]) [[id number]]     Order Specific Question:   Admitting Physician     Answer:   Ksenia Decree     Order Specific Question:   Attending Physician     Answer:   Ksenia Estrella     Order Specific Question:   Discharge Plan:     Answer: Other (Specify)    IP CONSULT TO SOCIAL WORK     Once on week one, then PRN for Psychosocial crisis intervention or per patient or caregiver request.  For [facility] patients only. For contracted patients, primary hospice will continue to manage social work needs. Standing Status:   Standing     Number of Occurrences:   1     Order Specific Question:   Reason for Consult: Answer: Once on week one, then PRN for Psychosocial crisis intervention or per patient or caregiver request.       Allergies: Allergies   Allergen Reactions    Lortab [Hydrocodone-Acetaminophen] Other (comments)     Pt states that a whole pill makes him feel sick. 1/2 tab does not.  Other Medication Other (comments)     He has a hx of a prolonged QT interval, so precaution with meds that affect that.           Care Plan:  Multidisciplinary Problems (Active)     Problem: Emotional Support Needs     Dates: Start: 07/25/19       Description:     Disciplines: Interdisciplinary    Goal: Patient/family is receiving emotional support     Dates: Start: 07/25/19   Expected End: 07/31/19       Description:     Disciplines: Interdisciplinary    Intervention: Assess for emotional distress     Dates: Start: 07/25/19 Description:           Intervention: Provide emotional support     Dates: Start: 07/25/19       Description:                       Problem: Falls - Risk of     Dates: Start: 07/24/19       Description:     Disciplines: Interdisciplinary    Goal: *Absence of Falls     Dates: Start: 07/24/19   Expected End: 07/27/19       Description: Document Diana Helena Fall Risk and appropriate interventions in the flowsheet.     Disciplines: Interdisciplinary                Problem: Nausea/Vomiting (Adult)     Dates: Start: 07/25/19       Description:     Disciplines: Interdisciplinary    Goal: *Absence of nausea/vomiting     Dates: Start: 07/25/19       Description: Pt will remain reduce feeling of N/V after medication administration AEB no N/V     Disciplines: Interdisciplinary    Intervention: Dehydration signs and symptoms (eg: Weight/lab monitoring; vomiting/diarrhea/urine; tenting; mucous membranes; dizziness/lethargy/irritability/confusion; weak pulse; tachycardia; blood pressure; I&O)     Dates: Start: 07/25/19       Description:           Intervention: Aspiration risk - history (eg:  Dysphagia; recurrent respiratory illness; neurologic disease/event; injury/surgery to mouth/throat; congenital anomaly; reflux; elderly)     Dates: Start: 07/25/19       Description:           Intervention: Aspiration risk - signs and symptoms (eg: Ineffective cough; altered level of consciousness; impaired mobility; drooling; poor dentition; vomiting; slurred speech; prolonged supine)     Dates: Start: 07/25/19       Description:                       Problem: Pain     Dates: Start: 07/25/19       Description:     Disciplines: Nurse, Interdisciplinary, RT    Goal: *Control of Pain     Dates: Start: 07/25/19       Description: Pt will report pain level =/< 3 after receiving pain medication     Disciplines: Nurse, Interdisciplinary, RT    Intervention: Assess pain characteristics (eg: Intensity scale; onset; location; quality; severity; duration; frequency; radiation)     Dates: Start: 07/25/19       Description:           Intervention: Assess pain management - barriers (eg: Past pain experiences)     Dates: Start: 07/25/19       Description:           Intervention: Identify pain expectations (eg: Patient's pain goal; somatic experiences; behavioral changes; affect)     Dates: Start: 07/25/19       Description:           Intervention: Identify pain medication concerns (eg: Cultural considerations; addiction concerns)     Dates: Start: 07/25/19       Description:           Intervention: Support system identification (eg: Caregiver; community resource; family; friends; Baptism; support group)     Dates: Start: 07/25/19       Description:           Intervention: Monitor for change in patient condition (eg:  Vital signs changes; changes in level of consciousness; nausea; behavioral changes)     Dates: Start: 07/25/19       Description:           Intervention: Medication side-effect assessment     Dates: Start: 07/25/19       Description:           Intervention: Pain-relief response reassessment (eg: Frequency based on route of administration; effectiveness)     Dates: Start: 07/25/19       Description:                       Problem: Patient Education: Go to Patient Education Activity     Dates: Start: 07/24/19       Description:     Disciplines: Interdisciplinary    Goal: Patient/Family Education     Dates: Start: 07/24/19   Expected End: 07/27/19       Description:     Disciplines: Interdisciplinary                Problem: Patient Education: Go to Patient Education Activity     Dates: Start: 07/24/19       Description:     Disciplines: Interdisciplinary    Goal: Patient/Family Education     Dates: Start: 07/24/19   Expected End: 07/27/19       Description:     Disciplines: Interdisciplinary                Problem: Pressure Injury - Risk of     Dates: Start: 07/24/19       Description:     Disciplines: Interdisciplinary    Goal: *Prevention of pressure injury Dates: Start: 07/24/19   Expected End: 07/27/19       Description: Document Chance Scale and appropriate interventions in the flowsheet.     Disciplines: Interdisciplinary                  Care Plan Problems/Goals      Progressing Towards Goal (7)     *Absence of Falls     Problem: Falls - Risk of    Disciplines: Interdisciplinary    Expected end:  07/27/19     Progressing Towards Goal By Alexis Cisneros RN on 07/25/19 6034               Patient/Family Education     Problem: Patient Education: Go to Patient Education Activity    Disciplines: Interdisciplinary    Expected end:  07/27/19     Progressing Towards Goal By Alexis Cisneros RN on 07/25/19 4493               *Prevention of pressure injury     Problem: Pressure Injury - Risk of    Disciplines: Interdisciplinary    Expected end:  07/27/19     Progressing Towards Goal By Alexis Cisneros RN on 07/25/19 5154               Patient/Family Education     Problem: Patient Education: Go to Patient Education Activity    Disciplines: Interdisciplinary    Expected end:  07/27/19     Progressing Towards Goal By Alexis Cisneros RN on 07/25/19 1268               *Absence of nausea/vomiting     Problem: Nausea/Vomiting (Adult)    Disciplines: Interdisciplinary    Expected end:  -     Progressing Towards Goal By Alexis Cisneros RN on 07/25/19 9218               *Control of Pain     Problem: Pain    Disciplines: Nurse, Interdisciplinary, RT    Expected end:  -     Progressing Towards Goal By Alexis Cisneros RN on 07/25/19 5946               Patient/family is receiving emotional support     Problem: Emotional Support Needs    Disciplines: Interdisciplinary    Expected end:  07/31/19     Progressing Towards Goal By Tina Dewitt on 07/25/19 1414                           ___________________    Care Team Notes          POC/IDG Notes      [facility] IDG  Notes by Cesilia Or at 07/25/19 1185  Version 1 of 1    Author:  Cesilia Or Service:  Spiritual Care Author Type:  Pastoral Care    Filed:  07/25/19 1506 Date of Service:  07/25/19 1505 Status:  Signed    :  Myrtle Mancilla (Pastoral Care)       Patient: Darrin Ruiz    Date: 07/25/19  Time: 3:05 PM    [facility]  Notes   has reviewed  Initial comprehensive Assessment and Initial Plan of Care for [facility].  is in agreement with the plans put in place and goals set thus far.  will be making a Spiritual and bereavement Assessment to determine needs that can be supported through [facility]. Signed by: Josue Carlton       [address] [facility] Volunteer Notes by Myrna Moreno at 07/25/19 1358  Version 1 of 1    Author:  Myrna Moreno Service:  Rufus Pickens Author Type:  Hospice Volunteer/    Filed:  07/25/19 1358 Date of Service:  07/25/19 1358 Status:  Signed    :  Myrna Moreno (Hospice Volunteer/)       Patient: Darrin Ruiz    Date: 07/25/19  Time: 1:58 PM    [facility] Volunteer Notes  VC has reviewed the initial RN Comprehensive assessment and plan of care. Pt will receive general support by volunteers as evidenced by comfort bag delivery, snack cart, supplies to the room, companionship visits, pet therapy and/or therapeutic music. Signed by: Oli Giles       [address] [place] [address]  Notes by Merline Speller, RN at 07/25/19 1104  Version 1 of 1    Author:  Merline Speller, RN Service:  Rufus Pickens Author Type:  Registered Nurse    Filed:  07/25/19 1105 Date of Service:  07/25/19 1104 Status:  Signed    :  Merline Speller, RN (Registered Nurse)       Patient: Darrin Ruiz    Date: 07/25/19  Time: 11:04 AM    [facility]  Notes   has reviewed the RN comprehensive assessment and have acknowledged there are no immediate needs for the patient or family.        [address]         Signed by: Shana Candelaria RN       [facility] ID Other Notes by Norbert Pillai, Vera METZGER at 07/25/19 1001  Version 1 of 1    Author:  Maura METZGER Service:  HOSPICE Author Type:  Pastoral Care    Filed:  07/25/19 1005 Date of Service:  07/25/19 1001 Status:  Signed    :  Nakul Ambrose (Pastoral Care)       Bereavement Coordinator has reviewed RN Initial Comprehensive Assessment and reviewed Care Plan. I acknowledge no urgent Bereavement Care needs identified at time of admission. [facility] IDG Nurse Notes by Batsheva Stern at 07/25/19 7215  Version 1 of 1    Author:  Batsheva Stern Service:  Nettie Cat Author Type:      Filed:  07/25/19 0908 Date of Service:  07/25/19 0905 Status:  Signed    :  Batsheva Stern ()       Patient: Patricia Bustamante    Date: 07/25/19  Time: 9:05 AM    [facility] Nurse Notes     has reviewed the RN comprehensive assessment and have acknowledged there are no immediate needs for the patient or family. Signed by: Lidia Funes       [facility] IDG Nurse Notes by Kavitha Avendaño RN at 07/24/19 2113  Version 1 of 1    Author:  Kavitha Avendaño RN Service:  Damaris Noreen Author Type:  Registered Nurse    Filed:  07/24/19 2113 Date of Service:  07/24/19 2113 Status:  Signed    : Kavitha Avendaño RN (Registered Nurse)       Patient: Patricia Bustamante    Date: 07/24/19  Time: 9:13 PM    Patient arrived via [facility] ambulance stretcher to room 108 accompanied by zacarias Lemus. Transferred from stretcher to to bed, 02 applied via NC at 5l/min continuous. Physical assessment complete. Patient complaining of nausea and Haldol 2mg IVP administered via left forearm peripheral IV at 2031. Patient with PIV to left and right forearm which are patent and flush well. Patient denies pain and able to rate 0/10. Patient will verbalize when cued or asked questions but is mainly drowsy and lethargic. Small excoriated area to sacrum, barrier cream applied and brief left off.  Patient admitted for GIP level of care with diagnosis of metastastic  non-small cell lung cancer arising in the right lower lobe. Patient with two hospitalizations within the past month for weakness, dyspnea and nausea. Patient was on home 02 at 3-4 L/min via NC prior to hospitalization for COPD and chronic LV systolic dysfunction associated CHF with EF of >25%. He is now requiring 5-6L/min via NC. Patient has a pacemaker with ICD but ICD was deactivated at the hospital by Tomás Singer prior to transfer to hospice house.  At present patient is resting with eyes closed with family at bedside and does not look to be in any distress or has any further complaints of nausea      Signed by: Navin Wilder RN                Care Team Present:   Team Members Present: Physician, , Bereavement, Nurse, Vol Coordinator,   Physician Team Member: Radha Das MD  Nurse Team Member: Edison Anderson RN  Social Work Team Member: Harriet Aaron [facility] Team Member: Sylvia Alberto   Vol Coordinator: Sendy Ordaz

## 2019-07-25 NOTE — Clinical Note
Patient DC to [facility]. I opened my encounter before I realized he was engaged with you, so I went ahead and documented on him.

## 2019-07-25 NOTE — PROGRESS NOTES
The patient report and walking rounds completed with the off going RN at 4168. The patient was identified by name and . He shows no signs of any distress at this time. The bed is low and locked with two bed rails up and the call light within his reach. The door to the room is left open for close observation. 7811- The patient has the green barf bag in his hand and is dry heaving. Medicated with Haldol for the nausea and with Hydromorphone 0.5 for dyspnea. Repositioned the patient for comfort. 9440- The patient is now resting quietly with no signs of distress observed. Respirations are even and unlabored. 1130-The patient continues to rest quietly with no signs of distress observed. The patient has been turned recently. Mouth care completed. Family remains at the bedside. 1330- Turned and repositioned for comfort. The patient did not respond. Cream applied to his marisol area. 1545- The patient continues to rest with no signs of distress observed. Family remains at the bedside. 1800- The patient is now awake and is alert to person only. Family remain at the bedside. Reported off to the on coming RN.

## 2019-07-25 NOTE — PROGRESS NOTES
Problem: Falls - Risk of  Goal: *Absence of Falls  Description  Document Travis Boeck Fall Risk and appropriate interventions in the flowsheet. Outcome: Progressing Towards Goal  Note:   Fall Risk Interventions:            Medication Interventions: Bed/chair exit alarm    Elimination Interventions: Bed/chair exit alarm, Call light in reach              Problem: Patient Education: Go to Patient Education Activity  Goal: Patient/Family Education  Outcome: Progressing Towards Goal     Problem: Pressure Injury - Risk of  Goal: *Prevention of pressure injury  Description  Document Chance Scale and appropriate interventions in the flowsheet.   Outcome: Progressing Towards Goal  Note:   Pressure Injury Interventions:  Sensory Interventions: Minimize linen layers, Pressure redistribution bed/mattress (bed type), Keep linens dry and wrinkle-free    Moisture Interventions: Absorbent underpads, Apply protective barrier, creams and emollients    Activity Interventions: Pressure redistribution bed/mattress(bed type)    Mobility Interventions: HOB 30 degrees or less, Pressure redistribution bed/mattress (bed type), Float heels    Nutrition Interventions: Document food/fluid/supplement intake, Offer support with meals,snacks and hydration    Friction and Shear Interventions: Apply protective barrier, creams and emollients, Minimize layers, HOB 30 degrees or less                Problem: Patient Education: Go to Patient Education Activity  Goal: Patient/Family Education  Outcome: Progressing Towards Goal     Problem: Nausea/Vomiting (Adult)  Goal: *Absence of nausea/vomiting  Description  Pt will remain reduce feeling of N/V after medication administration AEB no N/V   Outcome: Progressing Towards Goal     Problem: Pain  Goal: *Control of Pain  Description  Pt will report pain level =/< 3 after receiving pain medication   Outcome: Progressing Towards Goal

## 2019-07-25 NOTE — PROGRESS NOTES
Situation: Rojelio Mae is a 82year old gentleman  who has a diagnosis of  non-small cell lung cancer  in the right lower lobe with extensive mediastinal metastases.  He now has Encephalopathy and  is no longer able to consume  Food, fluid or medication.  His family has elected to stop life extending intervention, and to deactivate his implanted defibrillator. The f  They have been informed his life expectancy is now less than 10 days. Background: Mr. Jeremi Vega has been a  for 9 years. In addition to loosing his wife, he has lost both of his sons, one to lung cancer and another to a massive heart attack. Iris Abrams has a grandson, Gilda and a granddaughter Socrates Gonzales. Gilda describes his grandfather as one who worked all the time and could do most any job from building houses, running a back hoe, digging wells, auto mechanics and many other things. 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 Gilda grew up with his grandparents and describes his grandfather as a second father. Yoshi's mom says he was like a dad to her as well. Mr. Jeremi Vega is a [place]. He is a member of [employer] in [facility]. His  is Rev. Greg Hernández. She has not been involved with his care as of recent. Assessment: Mr. Jeremi Vega appears to be obtunded. There is no sign of distress. His symptoms appear to be well managed. He is surrounded by friends and family. The room is peaceful. The family is sharing stories of Mr. Jeremi Vega life. They appear to be grieving appropriately. No signs of spiritual distress or complicated grief at this time. They are unsure of   planning at this time.  assured them of availability to help with any or all of the planning if needed.       offered support this visit by providing a safe space for open communication, facilitating a conversation around Mr. Jeremi Vega life and legacy, affirming the family's lyndon, empathizing with the losses in the family in the span of 9 years. ( patient lost wife and two sons).  provided the [address] [place] [address] [employer].  offered prayer. Plan:  will continue to provide spiritual and emotional support throughout patient's time at [facility]. See Care Plan.

## 2019-07-25 NOTE — PROGRESS NOTES
Pt is a 83 y/o  male admitted from [address] [place] [address] to the [facility] on 19 for [facility] level of care with a diagnosis of metastatic non-small lung CA arising from the right lower lobe (diagnosed 2 wks. ago per grandson). Pt is a DNR code status. Initial SWA completed today. Pt was lying in bed with eyes closed and 02 in place. Pt was mouthing breathing and non responsive to verbal stimuli (calling his name several times). No S/S pf pain noted as evidenced by relaxed body features, no facial grimacing, etc. Pt.'s grandson/POA, Gilberto Marcus arrived during the visit. Jessieteddy Jorje openly discussed his grandfathers recent decline (Pt was working on his farm 5 wks. ago), recent hospitalization, current prognosis and the decision to pursue comfort care only going forward. Social history: Pt.'s wife  5 yrs ago. They had 2 sons - both are . Pt.'s grandson, Lorri Recinos shared that he was \"raised\" by his grandfather and grandmother. Pt is a member of [employer] but has not attended in sometime per Lorri Recinos. Family is planning to use [facility] in [place] at the time of Pt.'s passing. Pt.'s grandson appears to be coping well under the circumstances but will benefit from bereavement follow up. SW provided emotional support for grandson via active listening along with validation of his feelings, education and reassurance. SW will continue to provide emotional support and assessment of psychosocial and bereavement concerns and needs.

## 2019-07-25 NOTE — H&P
History and Physical    Patient: Kenneth John MRN: [id number]  SSN: [id number]    YOB: 1935  Age: 82 y.o. Sex: male      Subjective:      Kenneth John is a 82 y.o. male who had an evaluation for chest pain and disclosed non-small cell lung cancer arising in the right lower lobe with extensive mediastinal metastases.  CEA of 230 suggest this may well be an adenocarcinoma.  Prior to lung cancer diagnosis he underwent left ventricular catheterization showing mild diffuse coronary artery disease and severe nonischemic cardiomyopathy.  He had clinical LV systolic dysfunction associated CHF with pulmonary edema and elevated BNP.  He will return to hospital 7/21/2019 with accelerating dyspnea, malaise and fatigue.  He was found to have acute exacerbation of his chronic renal failure with BUN rising to 116 and creatinine to 4.37 milligrams per DL.  The patient's clinical condition has degenerated while in hospital to reflect severe AA 02 gradient with arterial blood gas showing PO2 of 69 mmHg on 6 L oxygen per minute via nasal cannula.  Encephalopathy has deepen and the patient is no longer able to consume  Food, fluid or medication.  His family has elected to stop life extending intervention, to deactivate his implanted defibrillator and to commence inpatient hospice care. Raymond Robb have been informed his life expectancy is now less than 10 days.  Pelvic fracture secondary to farm accident 2018, DVT, pelvis 2019, treated aggressive confined as no carcinoma of the prostate treated to resolution with radiation, atrial fibrillation, atrial appendage thrombus and pleural effusion are diagnoses apart from non-small cell lung cancer which nonetheless contribute adversely to his poor prognosis.  He is requiring antiemetic medication for effects of acute renal failure, parenteral opioid for chest pain and dyspnea associated with this cancer and psychotropic medication for deepening delirium.      Past Medical History:   Diagnosis Date    Abnormal EKG     Acute kidney injury (HCC)     AICD (automatic cardioverter/defibrillator) present     Alterations of sensations     Anemia     Arthritis     Back pain 6/17/2016    Benign prostatic hyperplasia 6/17/2016    Bilateral pubic rami fractures (HCC)     CAD (coronary artery disease)     ?  MI 4-2014    CHF (congestive heart failure) (HCC)     Chronic obstructive pulmonary disease (HCC)     Chronic systolic CHF (congestive heart failure) ([address] 10/7/2014    CRI (chronic renal insufficiency)     Depression     Dizziness     Dyspnea     Elevated ferritin     Elevated PSA 6/17/2016    GERD (gastroesophageal reflux disease)     Heart failure ([address]     Hernia, inguinal 6/17/2016    Hyperlipidemia 10/19/2015    Hypertension     Hypokalemia 6/17/2016    Ischemia of left lower extremity 3/13/2019    Keratosis, actinic     Leg cramps     Malaise and fatigue 6/17/2016    NSTEMI (non-ST elevated myocardial infarction) ([address] 6/24/2019    OA (osteoarthritis) of hip 9/16/2016    Orthostasis     Pneumonia     Respiratory failure ([address] 6/17/2016    Due to tractor accident      Sinoatrial node dysfunction ([address]     Sinusitis      Past Surgical History:   Procedure Laterality Date    HX CARPAL TUNNEL RELEASE      bilat    HX CATARACT REMOVAL      HX CATARACT REMOVAL      bilat    HX COLONOSCOPY      HX HEART CATHETERIZATION      HX ORTHOPAEDIC      internal fixation fractured pelvis    HX OTHER SURGICAL  4/2006    prostate bx     HX OTHER SURGICAL      transiliac screw fixation of L hemipelvis and ORIF of anterior pelvic ring disruption     HX OTHER SURGICAL      irrigation and debridment of R hip wound: VAC placement     HX OTHER SURGICAL      implanted defibrillator    HX PACEMAKER      dual chamber     VASCULAR SURGERY PROCEDURE UNLIST  03/13/2019    left common femoral artery thrombo-embolectomy      Family History   Problem Relation Age of Onset    No Known Problems Mother     No Known Problems Father     Heart Attack Son 48        mi    Heart Disease Other     Lung Cancer Son         also had pulmonary fibrosis     Social History     Tobacco Use    Smoking status: Former Smoker     Packs/day: 2.00     Years: 50.00     Pack years: 100.00     Last attempt to quit: 10/17/2013     Years since quittin.7    Smokeless tobacco: Never Used    Tobacco comment: quit spring 2019   Substance Use Topics    Alcohol use: No      Prior to Admission medications    Not on File        Allergies   Allergen Reactions    Lortab [Hydrocodone-Acetaminophen] Other (comments)     Pt states that a whole pill makes him feel sick. 1/2 tab does not.  Other Medication Other (comments)     He has a hx of a prolonged QT interval, so precaution with meds that affect that. Review of Systems: The remainder of the admission historical database is as outlined in the Clinical Record. Objective:     Vitals:    19 0245   BP: 106/71 (!) 83/65   Pulse: 79 80   Resp: 18 14   Temp: 95.6 °F (35.3 °C) 95.6 °F (35.3 °C)        Physical Exam:    Vital signs are stable as outlined with an irregular apical pulse is about 100. Skin examination revealed senile changes only. Head and neck examination of the mucosa no adenopathy. Thorax examination reveals rhonchi in all lung fields with no signs of respiratory distress and a subcutaneous pacemaker pocket on the left side anteriorly. Abdominal examination reveals no palpable masses or tenderness although there is fullness in the right upper quadrant without a clearly palpate a liver edge. Extremities revealed mild osteoarthritic changes but no acutely inflamed unstable joints. Peripheral vascular examination reveals mild edema without calf tenderness or peripheral ischemic changes.   Neurologic examination reveals patient to be poorly arousalable with no seizure activity or twitching no abnormalities neuromuscular tone. Assessment:     Hospital Problems  Date Reviewed: 7/24/2019          Codes Class Noted POA    * (Principal) Non-small cell cancer of right lung ([facility] ICD-10-CM: C34.91  ICD-9-CM: 162.9  7/24/2019 Unknown        Dyspnea ICD-10-CM: R06.00  ICD-9-CM: 786.09  7/21/2019 Yes        Hypoxia ICD-10-CM: R09.02  ICD-9-CM: 799.02  7/21/2019 Yes        Hilar mass ICD-10-CM: R91.8  ICD-9-CM: 786.6  7/3/2019 Yes        Malignant neoplasm of hilus of left lung (HCC) ICD-10-CM: C34.02  ICD-9-CM: 162.2  7/3/2019 Yes        Chronic respiratory failure with hypoxia (HCC) (Chronic) ICD-10-CM: J96.11  ICD-9-CM: 518.83, 799.02  7/1/2019 Yes        Hilar adenopathy ICD-10-CM: R59.0  ICD-9-CM: 785.6  7/1/2019 Yes        Mediastinal adenopathy ICD-10-CM: R59.0  ICD-9-CM: 785.6  7/1/2019 Yes        Chronic obstructive lung disease ([facility] ICD-10-CM: J44.9  ICD-9-CM: 669  Unknown Yes        Chronic congestive heart failure ([facility] ICD-10-CM: I50.9  ICD-9-CM: 428.0  10/7/2014 Yes    Overview Addendum 7/7/2019  5:31 PM by Theodore Roberson NP     [place] Class I.  EF 35%.                      Plan:     Current Facility-Administered Medications   Medication Dose Route Frequency    sodium chloride (NS) flush 3 mL  3 mL IntraVENous Q12H    sodium chloride (NS) flush 3 mL  3 mL IntraVENous PRN    HYDROmorphone (DILAUDID) syringe 0.5 mg  0.5 mg SubCUTAneous Q30MIN PRN    Or    HYDROmorphone (DILAUDID) syringe 0.5 mg  0.5 mg IntraVENous Q30MIN PRN    haloperidol lactate (HALDOL) injection 2 mg  2 mg SubCUTAneous Q1H PRN    Or    haloperidol lactate (HALDOL) injection 2 mg  2 mg IntraVENous Q1H PRN    acetaminophen (TYLENOL) suppository 650 mg  650 mg Rectal Q3H PRN    bisacodyl (DULCOLAX) suppository 10 mg  10 mg Rectal PRN    haloperidol lactate (HALDOL) injection 2 mg  2 mg SubCUTAneous Q1H PRN    Or    haloperidol lactate (HALDOL) injection 2 mg  2 mg IntraVENous Q1H PRN    glycopyrrolate (ROBINUL) injection 0.2 mg  0.2 mg IntraVENous Q4H PRN    albuterol-ipratropium (DUO-NEB) 2.5 MG-0.5 MG/3 ML  3 mL Nebulization Q4H PRN    haloperidol lactate (HALDOL) injection 2 mg  2 mg IntraVENous Q8H       I have reviewed the situation with family and staff. He feels to be stable and comfortable to present time. I would anticipate test within the next week. His current Comfort Care regimen appropriate affect of the uterus reassessed daily basis.       Signed By: Gabi Mccracken MD     July 25, 2019

## 2019-07-25 NOTE — HSPC IDG NURSE NOTES
Patient: Marialuisa Vasquez    Date: 07/24/19  Time: 9:13 PM    Patient arrived via [facility] stretcher to room 108 accompanied by zacarias Avalos. Transferred from stretcher to to bed, 02 applied via NC at 5l/min continuous. Physical assessment complete. Patient complaining of nausea and Haldol 2mg IVP administered via left forearm peripheral IV at 2031. Patient with PIV to left and right forearm which are patent and flush well. Patient denies pain and able to rate 0/10. Patient will verbalize when cued or asked questions but is mainly drowsy and lethargic. Small excoriated area to sacrum, barrier cream applied and brief left off. Patient admitted for [facility] level of care with diagnosis of metastastic  non-small cell lung cancer arising in the right lower lobe. Patient with two hospitalizations within the past month for weakness, dyspnea and nausea. Patient was on home 02 at 3-4 L/min via NC prior to hospitalization for COPD and chronic LV systolic dysfunction associated CHF with EF of >25%. He is now requiring 5-6L/min via NC. Patient has a pacemaker with ICD but ICD was deactivated at the hospital by Gustabo Riedel prior to transfer to hospice house.  At present patient is resting with eyes closed with family at bedside and does not look to be in any distress or has any further complaints of nausea      Signed by: Juan Pablo Mann RN

## 2019-07-25 NOTE — HSPC IDG CHAPLAIN NOTES
Patient: Annie Marcos    Date: 07/25/19  Time: 3:05 PM    [facility]  Notes   has reviewed  Initial comprehensive Assessment and Initial Plan of Care for Dali Hinkle.  is in agreement with the plans put in place and goals set thus far.  will be making a Spiritual and bereavement Assessment to determine needs that can be supported through [facility].           Signed by: Lemuel Barrera

## 2019-07-25 NOTE — HOSPICE
1850: Report received from off going RN. Patient resting with eyes closed, easily aroused, states no to pain or any other discomfort. FLACC score 0/10. Multiple family members in room. No voiced requests at this time. 1955: Physical assessment complete. Patient without any complaints of pain, nausea or dyspnea. Settled well after being turned. FLACC score remains 0/10. Family in room, no voiced requests. 2300: Family left for the night. Patient resting with eyes closed, resps even and regular. FLACC score 0/10.  Report given to on coming RN

## 2019-07-25 NOTE — PROGRESS NOTES
Problem: Anticipatory Grief  Goal: Grief heard and acknowledged, anxiety reduced, patient coping identified, patient/family expressed gratitude  Description  Family will identify and maintain a functional support system to aid in the grief process. Family coping appropriately at this time. 7/25/2019 1645 by Jan Heading  Outcome: Progressing Towards Goal  7/25/2019 1643 by Jan Heading  Outcome: Progressing Towards Goal  Note:   Family is sad but coping appropriately.

## 2019-07-25 NOTE — HOSPICE
Patient arrived via [facility] stretcher to room 108 accompanied by zacarias Nguyễn. Transferred from stretcher to to bed, 02 applied via NC at 5l/min continuous. Physical assessment complete. Patient complaining of nausea and Haldol 2mg IVP administered via left forearm peripheral IV at 2031. Patient with PIV to left and right forearm which are patent and flush well. Patient denies pain and able to rate 0/10. Patient will verbalize when cued or asked questions but is mainly drowsy and lethargic. Small excoriated area to sacrum, barrier cream applied and brief left off. Patient admitted for [facility] level of care with diagnosis of metastastic  non-small cell lung cancer arising in the right lower lobe. Patient with two hospitalizations within the past month for weakness, dyspnea and nausea. Patient was on home 02 at 3-4 L/min via NC prior to hospitalization for COPD and chronic LV systolic dysfunction associated CHF with EF of >25%. He is now requiring 5-6L/min via NC. Patient has a pacemaker with ICD but ICD was deactivated at the hospital by Ibis Nguyễn prior to transfer to hospice house. At present patient is resting with eyes closed with family at bedside and does not look to be in any distress or has any further complaints of nausea.

## 2019-07-26 NOTE — HSPC IDG VOLUNTEER NOTES
Patient: Misti Alexander    Date: 07/26/19  Time: 11:19 AM    [facility] Volunteer Notes  Volunteer  Hospice Interdisciplinary Plan of Care Review     Status Codes C=Continued R=Revised RS = Resolved  NV= No visits per Infection Control Policy     C. Volunteer     Volunteers continue to provide visits for companionship and emotional support.            Signed by: Jasmin Moctezuma

## 2019-07-26 NOTE — PROGRESS NOTES
Problem: Nausea/Vomiting (Adult)  Goal: *Absence of nausea/vomiting  Description  Pt will remain reduce feeling of N/V after medication administration AEB no N/V   Outcome: Progressing Towards Goal   Scheduled Haldol resolves the nausea

## 2019-07-26 NOTE — PROGRESS NOTES
Received report from Judith Snow RN. Pt I'd by name and . Pt lying in bed, calm. No distress. Denies pain at this time. Flacc =0-1. Resp non labored on 5L n/c. HR irreg. BS hyper. Lungs with bilateral rales noted. Fuentes cath draining valdo urine. Fentanyl 25 mcg/hr patch intact on right upper arm and dated 19. Dressing to peripheral line in LFA intact, dry, clean. SR up x2. Bed low/locked. Call light with in reach. Family at the bedside.   Peripheral line flushed with 3ml NS. Flushed well. Pt sleeping. Calm. No distress. Flacc =0-1. Resp non labored on 5L n/c. SR up x2. Bed low/locked. Call light with in reach. Door opened.   Pt with BM. Incontinent care completed. Medium loose stool with blood in it noticed. SR up x2. Bed low/locked. Call light with in reach. Family at the bedside.   Pt lying in bed with eyes closed. Calm. No distress. No facial grimace. Flacc =0-1. Resp non labored on 5L n/c. Pt repositioned in bed. Scheduled Haldol 2mg ivp given. SR up x2. Bed low/locked. Call light with in reach. Door opened. 1  Pt lying in bed with eyes closed. Resting comfortably. No facial grimace. No moans. Flacc =0-1. Resp non labored on 5L n/c. Pt with small loose bloody BM. Incontinent care completed. Pt repositioned on right side. SR up x2. Bed low/locked. Call light with in reach. Door opened. 0315  Pt resting quietly with eyes opened. Denies pain at this time. Flacc =0-1. Resp non labored on 5L n/c. Pt repositioned in bed. SR up x2. Bed low/locked. Call light with in reach. Door opened. 0522  Pt resting comfortably. No facial grimace. Flacc =0-1. Resp non labored on 5L n/c. VS taken. Documented on flow sheet. Fuentes cath emptied. Documented on flow sheet. Pt repositioned in bed. Small loose stool with blood noted. Incontinent care completed. Scheduled haldol 2mg ivp given. Pt tolerated well. SR up x2. Bed low/locked. Call light with in reach. Door opened.      Report given to Tarun iJ RN.

## 2019-07-26 NOTE — [facility] IDG MASTER NOTE
Hospice Interdisciplinary Group Collaborative  Date: 07/26/19  Time: 12:58 PM    ___________________    Patient: Kevin Thayer  Coverage Information:     [place]     Plan: [facility] MEDICARE PART A AND B     Subscriber ID: [id number]     Phone Number:   MRN: [id number]  Current Benefit Period: Benefit Period 1  Start Date: 7/24/2019  End Date: 10/21/2019      Medical Director: Jared Lyman MD   Hospice Attending Provider: Bon Albright MD  [address] [place] [address]  [address]  Phone: [phone]  Fax: [phone]    Level of Care: General Inpatient Care      ___________________    Diagnoses:  Diagnoses of Chronic systolic congestive heart failure ([facility] [address] Chronic obstructive pulmonary disease, unspecified COPD type ([facility] [address] History of prostate cancer, and Non-small cell cancer of right lung [facility]) were pertinent to this visit.     Current Medications:    Current Facility-Administered Medications:     haloperidol lactate (HALDOL) injection 2 mg, 2 mg, IntraVENous, Q6H, Janae Sánchez NP    fentaNYL (DURAGESIC) 25 mcg/hr patch 1 Patch, 1 Patch, TransDERmal, Q72H, Sandy Norton NP, 1 Patch at 07/26/19 1252    sodium chloride (NS) flush 3 mL, 3 mL, IntraVENous, Q12H, Sandy Norton, NP, 3 mL at 07/26/19 0857    sodium chloride (NS) flush 3 mL, 3 mL, IntraVENous, PRN, Sandy Norton, NP, 3 mL at 07/26/19 0553    HYDROmorphone (DILAUDID) syringe 0.5 mg, 0.5 mg, SubCUTAneous, Q30MIN PRN **OR** HYDROmorphone (DILAUDID) syringe 0.5 mg, 0.5 mg, IntraVENous, Q30MIN PRN, Sandy Norton, NP, 0.5 mg at 07/26/19 0856    haloperidol lactate (HALDOL) injection 2 mg, 2 mg, SubCUTAneous, Q1H PRN **OR** haloperidol lactate (HALDOL) injection 2 mg, 2 mg, IntraVENous, Q1H PRN, Sandy Norton, NP, 2 mg at 07/26/19 0856    acetaminophen (TYLENOL) suppository 650 mg, 650 mg, Rectal, Q3H PRN, Sandy Norton, NP    bisacodyl (DULCOLAX) suppository 10 mg, 10 mg, Rectal, PRN, Sandy Norton, NP    haloperidol lactate (HALDOL) injection 2 mg, 2 mg, SubCUTAneous, Q1H PRN **OR** haloperidol lactate (HALDOL) injection 2 mg, 2 mg, IntraVENous, Q1H PRN, Mayra Kiran NP    glycopyrrolate (ROBINUL) injection 0.2 mg, 0.2 mg, IntraVENous, Q4H PRN, Mayra Kiran NP    albuterol-ipratropium (DUO-NEB) 2.5 MG-0.5 MG/3 ML, 3 mL, Nebulization, Q4H PRN, Mayra Kiran NP    Orders:  Orders Placed This Encounter    IP CONSULT TO SPIRITUAL CARE Once on week one, then PRN. For [facility] patients only. For contracted patients, primary hospice will continue to manage spiritual care needs     Once on week one, then PRN. For [facility] patients only. For contracted patients, primary hospice will continue to manage spiritual care needs     Standing Status:   Standing     Number of Occurrences:   1     Order Specific Question:   Reason for Consult: Answer:   Spiritual crisis intervention or per patient or caregiver request    DIET PLEASURE     Standing Status:   Standing     Number of Occurrences:   1    VITAL SIGNS     Standing Status:   Standing     Number of Occurrences:   1    VITAL SIGNS     Standing Status:   Standing     Number of Occurrences:   1    NURSING-MISCELLANEOUS: comfort measures Enter comfort measures above. CONTINUOUS     Enter comfort measures above. Standing Status:   Standing     Number of Occurrences:   1     Order Specific Question:   Description of Order:     Answer:   comfort measures    GUTIERREZ CATHETER, CARE     1. Gutierrez Catheter care every shift and PRN  2. Notify Physician of Gutierrez Catheter leakage, occlusion, gross adherent sediment or accidental removal  3. Change Gutierrez 30 days after insertion. 4. May flush catheter prn leakage or gross adherent sediment or mucus.      Standing Status:   Standing     Number of Occurrences:   1    BLADDER CHECKS     May scan bladder PRN for urinary retention and or patient discomfort     Standing Status:   Standing     Number of Occurrences: 1    NURSING ASSESSMENT:  SPECIFY Assess for GIP, routine, or respite level of care. Q SHIFT Routine     Standing Status:   Standing     Number of Occurrences:   1     Order Specific Question:   Please describe the test or procedure you would like to order. Answer:   Assess for GIP, routine, or respite level of care.  PAIN ASSESSMENT Pain and Symptoms: Assess ever 4 hours and PRN, for GIP level of care. PRN Routine     Standing Status:   Standing     Number of Occurrences:   1     Order Specific Question:   Please describe the test or procedure you would like to order. Answer:   Pain and Symptoms: Assess ever 4 hours and PRN, for GIP level of care.  BEDREST, COMPLETE     Standing Status:   Standing     Number of Occurrences:   1    NURSING-MISCELLANEOUS: admit 7/24: (SFD) Admitted GIP with Non-small cell lung cancer arising in the right lower lobe for management of pain, dyspnea, nausea and agitation. Hospice Dx: Non-small cell lung cancer arising in the right lower lobe Ass. .. 7/24: (SFD) Admitted GIP with Non-small cell lung cancer arising in the right lower lobe for management of pain, dyspnea, nausea and agitation.   Hospice Dx: Non-small cell lung cancer arising in the right lower lobe     Associated Dx: extensive mediastinal malignant adenopathy, pleural effusion, rib pain, hypoxic respiratory failure, acute renal failure, metabolic encephalopathy, hypoalbuminemia, chest wall pain, dyspnea, nausea     Non Associated Dx: chronic LV systolic dysfunction associated CHF, COPD, tobacco dependence, adenocarcinoma of the prostate, implanted defibrillator, atrial fibrillation, atrial thrombus, DVT, thyroid nodule     Standing Status:   Standing     Number of Occurrences:   1     Order Specific Question:   Description of Order:     Answer:   admit    WOUND CARE, DRESSING CHANGE     Wound Care:  Location: sacrum  Excoriation (Cavalon)- Cleanse wound location with wound cleanser, pat dry and apply Cavilon Durable Barrier Cream every 12 hours and PRN if soiled. Turn every 2 hours. Assess with each nursing assessment visit. Standing Status:   Standing     Number of Occurrences:   30    DO NOT RESUSCITATE     Standing Status:   Standing     Number of Occurrences:   1    OXYGEN CANNULA Liters per minute: 5; Indications for O2 therapy: RESPIRATORY DISTRESS PRN Routine     Please add humidification for patient comfort     Standing Status:   Standing     Number of Occurrences:   1     Order Specific Question:   Liters per minute: Answer:   5     Order Specific Question:   Indications for O2 therapy     Answer:   RESPIRATORY DISTRESS    sodium chloride (NS) flush 3 mL    sodium chloride (NS) flush 3 mL    OR Linked Order Group     HYDROmorphone (DILAUDID) syringe 0.5 mg     HYDROmorphone (DILAUDID) syringe 0.5 mg    OR Linked Order Group     haloperidol lactate (HALDOL) injection 2 mg     haloperidol lactate (HALDOL) injection 2 mg    acetaminophen (TYLENOL) suppository 650 mg    bisacodyl (DULCOLAX) suppository 10 mg    OR Linked Order Group     haloperidol lactate (HALDOL) injection 2 mg     haloperidol lactate (HALDOL) injection 2 mg    glycopyrrolate (ROBINUL) injection 0.2 mg    albuterol-ipratropium (DUO-NEB) 2.5 MG-0.5 MG/3 ML     Order Specific Question:   MODE OF DELIVERY     Answer:   Nebulizer    DISCONTD: haloperidol lactate (HALDOL) injection 2 mg    haloperidol lactate (HALDOL) injection 2 mg    fentaNYL (DURAGESIC) 25 mcg/hr patch 1 Patch    INITIAL PHYSICIAN ORDER: HOSPICE Level Of Care: General Inpatient; Reason for Admission: 7/24: (SFD) Admitted GIP with Non-small cell lung cancer arising in the right lower lobe for management of pain, dyspnea, nausea and agitation.      Standing Status:   Standing     Number of Occurrences:   1     Order Specific Question:   Status     Answer:   Hospice     Order Specific Question:   Level Of Care     Answer:   General Inpatient Order Specific Question:   Reason for Admission     Answer:   7/24: (SFD) Admitted GIP with Non-small cell lung cancer arising in the right lower lobe for management of pain, dyspnea, nausea and agitation. Order Specific Question:   Inpatient Hospitalization Certified Necessary for the Following Reasons     Answer:   3. Patient receiving treatment that can only be provided in an inpatient setting (further clarification in H&P documentation)     Order Specific Question:   Admitting Diagnosis     Answer:   Non-small cell cancer of right lung [facility]) [[id number]]     Order Specific Question:   Terminal Prognosis Diagnosis(es)     Answer:   Non-small cell cancer of right lung [facility]) [[id number]]     Order Specific Question:   Admitting Physician     Answer:   Karis Quintanilla     Order Specific Question:   Attending Physician     Answer:   Karis Quintanilla     Order Specific Question:   Discharge Plan:     Answer: Other (Specify)    IP CONSULT TO SOCIAL WORK     Once on week one, then PRN for Psychosocial crisis intervention or per patient or caregiver request.  For [facility] patients only. For contracted patients, primary hospice will continue to manage social work needs. Standing Status:   Standing     Number of Occurrences:   1     Order Specific Question:   Reason for Consult: Answer: Once on week one, then PRN for Psychosocial crisis intervention or per patient or caregiver request.       Allergies: Allergies   Allergen Reactions    Lortab [Hydrocodone-Acetaminophen] Other (comments)     Pt states that a whole pill makes him feel sick. 1/2 tab does not.  Other Medication Other (comments)     He has a hx of a prolonged QT interval, so precaution with meds that affect that.           Care Plan:  Multidisciplinary Problems (Active)     Problem: Anticipatory Grief     Dates: Start: 07/25/19       Description:     Disciplines: Interdisciplinary    Goal: Grief heard and acknowledged, anxiety reduced, patient coping identified, patient/family expressed gratitude     Dates: Start: 07/25/19   Expected End: 07/29/19       Priority: Low    Description: Family will identify and maintain a functional support system to aid in the grief process. Family coping appropriately at this time. Disciplines: Interdisciplinary    Intervention: Assess grief responses     Dates: Start: 07/25/19       Description: Assess for feelings of grief          Intervention: Support grieving process     Dates: Start: 07/25/19       Description: Address feelings of loss, anticipatory grief, expression of concern. Problem: Emotional Support Needs     Dates: Start: 07/25/19       Description:     Disciplines: Interdisciplinary    Goal: Patient/family is receiving emotional support     Dates: Start: 07/25/19   Expected End: 07/31/19       Description:     Disciplines: Interdisciplinary    Intervention: Assess for emotional distress     Dates: Start: 07/25/19       Description:           Intervention: Provide emotional support     Dates: Start: 07/25/19       Description:                       Problem: Falls - Risk of     Dates: Start: 07/24/19       Description:     Disciplines: Interdisciplinary    Goal: *Absence of Falls     Dates: Start: 07/24/19   Expected End: 07/27/19       Description: Document Rebecca Cousin Fall Risk and appropriate interventions in the flowsheet.     Disciplines: Interdisciplinary                Problem: Nausea/Vomiting (Adult)     Dates: Start: 07/25/19       Description:     Disciplines: Interdisciplinary    Goal: *Absence of nausea/vomiting     Dates: Start: 07/25/19       Description: Pt will remain reduce feeling of N/V after medication administration AEB no N/V     Disciplines: Interdisciplinary    Intervention: Dehydration signs and symptoms (eg: Weight/lab monitoring; vomiting/diarrhea/urine; tenting; mucous membranes; dizziness/lethargy/irritability/confusion; weak pulse; tachycardia; blood pressure; I&O)     Dates: Start: 07/25/19       Description:           Intervention: Aspiration risk - history (eg:  Dysphagia; recurrent respiratory illness; neurologic disease/event; injury/surgery to mouth/throat; congenital anomaly; reflux; elderly)     Dates: Start: 07/25/19       Description:           Intervention: Aspiration risk - signs and symptoms (eg: Ineffective cough; altered level of consciousness; impaired mobility; drooling; poor dentition; vomiting; slurred speech; prolonged supine)     Dates: Start: 07/25/19       Description:                       Problem: Pain     Dates: Start: 07/25/19       Description:     Disciplines: Nurse, Interdisciplinary, RT    Goal: *Control of Pain     Dates: Start: 07/25/19       Description: Pt will report pain level =/< 3 after receiving pain medication     Disciplines: Nurse, Interdisciplinary, RT    Intervention: Assess pain characteristics (eg: Intensity scale; onset; location; quality; severity; duration; frequency; radiation)     Dates: Start: 07/25/19       Description:           Intervention: Assess pain management - barriers (eg: Past pain experiences)     Dates: Start: 07/25/19       Description:           Intervention: Identify pain expectations (eg: Patient's pain goal; somatic experiences; behavioral changes; affect)     Dates: Start: 07/25/19       Description:           Intervention: Identify pain medication concerns (eg: Cultural considerations; addiction concerns)     Dates: Start: 07/25/19       Description:           Intervention: Support system identification (eg: Caregiver; community resource; family; friends; L[employer]; support group)     Dates: Start: 07/25/19       Description:           Intervention: Monitor for change in patient condition (eg:  Vital signs changes; changes in level of consciousness; nausea; behavioral changes)     Dates: Start: 07/25/19       Description:           Intervention: Medication side-effect assessment     Dates: Start: 07/25/19       Description:           Intervention: Pain-relief response reassessment (eg: Frequency based on route of administration; effectiveness)     Dates: Start: 07/25/19       Description:                       Problem: Patient Education: Go to Patient Education Activity     Dates: Start: 07/24/19       Description:     Disciplines: Interdisciplinary    Goal: Patient/Family Education     Dates: Start: 07/24/19   Expected End: 07/27/19       Description:     Disciplines: Interdisciplinary                Problem: Patient Education: Go to Patient Education Activity     Dates: Start: 07/24/19       Description:     Disciplines: Interdisciplinary    Goal: Patient/Family Education     Dates: Start: 07/24/19   Expected End: 07/27/19       Description:     Disciplines: Interdisciplinary                Problem: Pressure Injury - Risk of     Dates: Start: 07/24/19       Description:     Disciplines: Interdisciplinary    Goal: *Prevention of pressure injury     Dates: Start: 07/24/19   Expected End: 07/27/19       Description: Document Chance Scale and appropriate interventions in the flowsheet.     Disciplines: Interdisciplinary                  Care Plan Problems/Goals      Progressing Towards Goal (8)     *Absence of Falls     Problem: Falls - Risk of    Disciplines: Interdisciplinary    Expected end:  07/27/19     Progressing Towards Goal By René Syed RN on 07/25/19 2173               Patient/Family Education     Problem: Patient Education: Go to Patient Education Activity    Disciplines: Interdisciplinary    Expected end:  07/27/19     Progressing Towards Goal By René Syed RN on 07/25/19 0416               *Prevention of pressure injury     Problem: Pressure Injury - Risk of    Disciplines: Interdisciplinary    Expected end:  07/27/19     Progressing Towards Goal By René Syed RN on 07/25/19 1911               Patient/Family Education     Problem: Patient Education: Go to Patient Education Activity    Disciplines: Interdisciplinary    Expected end:  07/27/19     Progressing Towards Goal By Shobha Upton RN on 07/25/19 3732               *Absence of nausea/vomiting     Problem: Nausea/Vomiting (Adult)    Disciplines: Interdisciplinary    Expected end:  -     Progressing Towards Goal By Shobha Upton RN on 07/25/19 0944               *Control of Pain     Problem: Pain    Disciplines: Nurse, Interdisciplinary, RT    Expected end:  -     Progressing Towards Goal By Shobha Upton RN on 07/25/19 0663               Patient/family is receiving emotional support     Problem: Emotional Support Needs    Disciplines: Interdisciplinary    Expected end:  07/31/19     Progressing Towards Goal By Park Laguna on 07/25/19 1414               Grief heard and acknowledged, anxiety reduced, patient coping identified, patient/family expressed gratitude     Problem: Anticipatory Grief    Disciplines: Interdisciplinary    Expected end:  07/29/19     Progressing Towards Goal By Bashir Ford on 07/25/19 1645                           ___________________    Care Team Notes          POC[facility]IDG Notes      [facility]  Notes by Syed Gil LMSW at 07/26/19 1207  Version 1 of 1    Author:  Syed Gil LMSW Service:  Licensed Clinical  Author Type:  Licensed Masters in Social Work    Filed:  07/26/19 1216 Date of Service:  07/26/19 1207 Status:  Signed    :  Syed Gil LMSW (Licensed Masters in Social Work)       Patient: Reagan Deleon    Date: 07/26/19  Time: 12:07 PM    [facility]  Notes  Continue to follow for support needs of  patient and family. Patient progressing toward goals.  Continue to follow        Signed by: Queenie Person LMSW       [facility] IDG  Notes by Bashir Ford at 07/26/19 1035  Version 1 of 1    Author:  Bashir Ford Service:  Spiritual Care Author Type:  Pastoral Care    Filed:  07/26/19 1216 Date of Service:  19 1035 Status:  Signed    :  Nasim Gutierrez (Pastoral Care)       Patient: Patricia Bustamante    Date: 19  Time: 10:35 AM    \[facility]\""  Notes  Intervention: Explored patient and family's belief system and image of God. Engaged in meaningful conversation about Amaya and family. Facilitated life review of patient by grandson Jonathon Lemus. Completed an Initial Spiritual and Bereavement Assessment. Offered prayer. Outcome:   Anthony Sánchez shared a great deal about Mr. Miguel Alcazar love of life and family, his work, his amaya. Losses identified, Patient's wife and sons all . Amaya Tradition Identified as Alevism. Referral made to [facility] for Early Bereavement. Anticipatory Grief recognized, feelings expressed. Progressing Toward The Goals:  Anticipatory Grief            Signed by: Ruby BAY [facility] IDG Nurse Notes by Liza Glover RN at 19 1121  Version 1 of 1    Author:  Liza Glover RN Service:  Damaris Sorto Author Type:  Registered Nurse    Filed:  19 9475 Date of Service:  19 1121 Status:  Signed    :  Liza Glover RN (Registered Nurse)       Patient: Patricia Bustamante    Date: 19  Time: 11:21 AM    \[facility]\"" Nurse Notes  Pt is general in patient for control of pain, dyspnea and agitation. Fuentes with 625 mls bloody UOP in last 24 hours. Haldol 2 mg q 8 hours for nausea. Pt has also received 3 additional doses of 2 mg for uncontrolled nausea. New order received this IDG to increase to q 6 hours for continued nausea. Pt also is having bright red blood from the rectum that has started in the last 24 hours. Medications are being administered via peripheral IV site. Pt has excoriation to rectal area. No other skin breakdown. Pt is hypotensive. Dilaudid 0.5 x 3 doses for pain and dyspnea. Pt is taking sips and chips. Pt alert and weak. Pt is bed bound.  Fentanyl 25 mcg is being added this IDG for control of pain. No other changes this IDG   Comprehensive plan of care reviewed. IDG and pt./family in agreement with plan of care. The IDG identifies through on-going assessment when a change is needed to the POC; the pt/family will receive care and services necessitated by changes in POC. Medications reviewed by the pharmacist and Medical Director. No other changes this IDG. Signed by: Stephan Figueroa RN       [address] IDG Volunteer Notes by Higinio Nunn at 07/26/19 1119  Version 1 of 1    Author:  Higinio Nunn Service:  Joellen Babcock Author Type:  Hospice Volunteer/    Filed:  07/26/19 1143 Date of Service:  07/26/19 1119 Status:  Signed    :  Higinio Nunn (Hospice Volunteer/)       Patient: Merline Khalil    Date: 07/26/19  Time: 11:19 AM    [facility] Volunteer Notes  Volunteer  Hospice Interdisciplinary Plan of Care Review     Status Codes C=Continued R=Revised RS = Resolved  NV= No visits per Infection Control Policy     C. Volunteer     Volunteers continue to provide visits for companionship and emotional support. Signed by: Mark Garcia       [facility] IDG  Notes by Luis A Babcock at 07/25/19 1505  Version 1 of 1    Author:  Luis A Babcock Service:  Spiritual Care Author Type:  Pastoral Care    Filed:  07/25/19 1506 Date of Service:  07/25/19 1505 Status:  Signed    :  Luis A Babcock (Pastoral Care)       Patient: Merline Khalil    Date: 07/25/19  Time: 3:05 PM    [facility]  Notes   has reviewed  Initial comprehensive Assessment and Initial Plan of Care for [facility].  is in agreement with the plans put in place and goals set thus far.  will be making a Spiritual and bereavement Assessment to determine needs that can be supported through [facility].           Signed by: Harjinder Davis       [address] IDG Volunteer Notes by Higinio Nunn at 07/25/19 8173  Version 1 of 1    Author: Shira Franco Service:  Latha Montelongo Author Type:  Hospice Volunteer/    Filed:  07/25/19 1358 Date of Service:  07/25/19 1358 Status:  Signed    :  Shira Franco (Hospice Volunteer/)       Patient: Aguilar Phillips    Date: 07/25/19  Time: 1:58 PM    [facility] Volunteer Notes  VC has reviewed the initial RN Comprehensive assessment and plan of care. Pt will receive general support by volunteers as evidenced by comfort bag delivery, snack cart, supplies to the room, companionship visits, pet therapy and/or therapeutic music. Signed by: Danette Mckenzie       [address] [place] [address]  Notes by Selina Alegre RN at 07/25/19 1104  Version 1 of 1    Author:  Selina Alegre RN Service:  Latha Montelongo Author Type:  Registered Nurse    Filed:  07/25/19 1105 Date of Service:  07/25/19 1104 Status:  Signed    :  Selina Alegre RN (Registered Nurse)       Patient: Aguilar Phillips    Date: 07/25/19  Time: 11:04 AM    [facility]  Notes   has reviewed the RN comprehensive assessment and have acknowledged there are no immediate needs for the patient or family. [address]         Signed by: Claire Duval RN       [facility] IDG Other Notes by Pervis Alpers at 07/25/19 1001  Version 1 of 1    Author:  Judie METZGER Service:  HOSPICE Author Type:  Pastoral Care    Filed:  07/25/19 1005 Date of Service:  07/25/19 1001 Status:  Signed    :  Pervis Alpers (Pastoral Care)       Bereavement Coordinator has reviewed RN Initial Comprehensive Assessment and reviewed Care Plan. I acknowledge no urgent Bereavement Care needs identified at time of admission.        [facility] IDG Nurse Notes by Laura Kwok at 07/25/19 9012  Version 1 of 1    Author:  Laura Kwok Service:  [place] Author Type:      Filed:  07/25/19 0908 Date of Service:  07/25/19 0905 Status:  Signed    :  Laura Kwok () Patient: Kathie Dean    Date: 07/25/19  Time: 9:05 AM    \[facility]\"" Nurse Notes     has reviewed the RN comprehensive assessment and have acknowledged there are no immediate needs for the patient or family. Signed by: Jarod BAY [facility] IDG Nurse Notes by Dale Sanchez RN at 07/24/19 2113  Version 1 of 1    Author:  Dale Sanchez RN Service:  Aye Merchant Author Type:  Registered Nurse    Filed:  07/24/19 2113 Date of Service:  07/24/19 2113 Status:  Signed    : Dale Sanchez RN (Registered Nurse)       Patient: Kathie Dean    Date: 07/24/19  Time: 9:13 PM    Patient arrived via [facility] ambulance stretcher to room 108 accompanied by zacarias Gomes. Transferred from stretcher to to bed, 02 applied via NC at 5l/min continuous. Physical assessment complete. Patient complaining of nausea and Haldol 2mg IVP administered via left forearm peripheral IV at 2031. Patient with PIV to left and right forearm which are patent and flush well. Patient denies pain and able to rate 0/10. Patient will verbalize when cued or asked questions but is mainly drowsy and lethargic. Small excoriated area to sacrum, barrier cream applied and brief left off. Patient admitted for [facility] level of care with diagnosis of metastastic  non-small cell lung cancer arising in the right lower lobe. Patient with two hospitalizations within the past month for weakness, dyspnea and nausea. Patient was on home 02 at 3-4 L/min via NC prior to hospitalization for COPD and chronic LV systolic dysfunction associated CHF with EF of >25%. He is now requiring 5-6L/min via NC. Patient has a pacemaker with ICD but ICD was deactivated at the hospital by Jamison Ren prior to transfer to hospice house.  At present patient is resting with eyes closed with family at bedside and does not look to be in any distress or has any further complaints of nausea      Signed by: Julee Bermudez RN                Care Team Present:   Team Members Present: Physician, , Bereavement, Nurse, Vol Coordinator,   Physician Team Member: Tawnya Villar MD   Nurse Team Member: Joanie Jonas RN  Social Work Team Member: Samantha ERAZO   Team Member: Bill Molina   Vol Coordinator: Joan Ford

## 2019-07-26 NOTE — PROGRESS NOTES
2300 Pt identified by name/. Resting peacefully with eyes closed. Respirations easy and unlabored. Facial features flat,relaxed. Bed in low and locked position with side rails up x 2. Door left open as pt is unaccompanied. 0138 Repositioned for comfort. Small soft brown stool on disposable pad. Incontinent care provided with barrier cream applied. Able to assist in turning. Taking sips of cold water. Denies pain or nausea. Pulled up in bed. HOB elevated. Bed in low and locked position with side rails up x2 and call light in reach. [address] from room. C/o nausea. Medicated with Haldol 2 mg IV for nausea. Cool cloth to forehead. Medicated with Dilaudid 0.5 mg IV for c/o abdomen pain 6/10. States aching bad. States \"feels like I have to poop\" Offered bedpan but pt states it hurts to sit on. Encouraged pt to have BM and this writer will swiftly change disposable pads under him as before if that is more comfortable. Pt agrees. 0410 Sleeping soundly. No further pain or nausea noted. Green pad beneath pt is clean.  Scheduled Haldol given as ordered. Checked pt, incontinent care provided for stool incontinence. Denies pain or nausea. Pt is very drowsy this am but still helps this writer turn. Asks to be left in supine position post care with HOB elevated. Tolerated well.         Report given to [facility]

## 2019-07-26 NOTE — PROGRESS NOTES
Patient report and walking rounds completed with the off going RNSkip at 1563. The patient was identified by name and . He is resting with eyes closed. Respirations are even and unlabored on oxygen at 5 L minute via N/C. The bed is low and locked with two side rails up for positioning and safety. The door to the patient room is left open as the patient is alone. 8241- the patient states that he needs his bag near him to throw up in. He states pain in his lower right side back. Administered prn Haldol for nausea and Dilaudid for pain. Floated the patients heels and placed pillows under his hands to relieve the edema. 1615- The patient is resting quietly in the bed with no signs of distress observed. His right  IV is bleeding around it. Removed the IV intact. Informed the Provider that the IV was now out of the right forearm. 1100- Family has arrived and they were updated on the patient's night and the morning so far. The family members voiced understanding. 1250- Educated the patient's grandson on the new order for Fentanyl patch. He voiced understanding. Fentanyl 25 mcg placed right upper arm. 1500- Patient had another loose bloody stool. Cleaned marisol area and creamed as ordered. Repositioned the patient for comfort. 1531- Vital signs taken and recorded. No signs of any distress at this time. Patient did not respond to being repositioned. 1537- The patient has siblings at the bedside that he is now conversing with. He answers yes no questions with no problems. 1820- Medicated with scheduled Haldol. Changed brief from incontinent bowel movement. Alonza Shravan blood in small loose stool. Reported of to Mini Hansen RN. Walking rounds completed.
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
## 2019-07-26 NOTE — HSPC IDG NURSE NOTES
Patient: Alta Redmond    Date: 07/26/19  Time: 11:21 AM    [facility] Nurse Notes  Pt is general in patient for control of pain, dyspnea and agitation. Fuentes with 625 mls bloody UOP in last 24 hours. Haldol 2 mg q 8 hours for nausea. Pt has also received 3 additional doses of 2 mg for uncontrolled nausea. New order received this IDG to increase to q 6 hours for continued nausea. Pt also is having bright red blood from the rectum that has started in the last 24 hours. Medications are being administered via peripheral IV site. Pt has excoriation to rectal area. No other skin breakdown. Pt is hypotensive. Dilaudid 0.5 x 3 doses for pain and dyspnea. Pt is taking sips and chips. Pt alert and weak. Pt is bed bound. Fentanyl 25 mcg is being added this IDG for control of pain. No other changes this IDG   Comprehensive plan of care reviewed. IDG and pt./family in agreement with plan of care. The IDG identifies through on-going assessment when a change is needed to the POC; the pt/family will receive care and services necessitated by changes in POC. Medications reviewed by the pharmacist and Medical Director. No other changes this IDG.          Signed by: Nasreen Boyd RN

## 2019-07-26 NOTE — PROGRESS NOTES
Progress Note    Patient: Clover Garnica MRN: [id number]  SSN: [id number]    YOB: 1935  Age: 82 y.o. Sex: male      Admit Date: 7/24/2019    LOS: 2 days     Subjective:     Drowsy. Has taken in a few sips of pepsi today. Has required Dilaudid IV x 3 for pain/dyspnea and Haldol x 3 IV for nausea. Family at bedside. Review of Systems:  Review of systems not obtained due to patient factors. Objective:     Vitals:    07/25/19 0245 07/25/19 1500 07/25/19 1955 07/26/19 0900   BP: (!) 83/65 (!) 80/63 (!) 89/70 (!) 88/64   Pulse: 80 68 80 (!) 109   Resp: 14 14 13 18   Temp: 95.6 °F (35.3 °C)  95.3 °F (35.2 °C) 99.2 °F (37.3 °C)        Intake and Output:  Current Shift: 07/26 0701 - 07/26 1900  In: 120 [P.O.:120]  Out: -   Last three shifts: 07/24 1901 - 07/26 0700  In: -   Out: 625 [Urine:625]    Physical Exam:   GENERAL: mild distress, appears older than stated age, pale, cachectic  LUNG: Coarse breath sounds with rhonchi and labored respirations. HEART: irregularly irregular rhythm  ABDOMEN: soft, non-tender. Bowel sounds hyperactive. : Fuentes catheter with valdo urine with sediment. EXTREMITIES:  extremities with mild upper ext. Edema. + pulses. SKIN: Ecchymotic areas to upper ext. Warm to touch. Skin intact. NEUROLOGIC: Drowsy. Unable to participate in exam. Bedbound. Lab/Data Review:  No new labs resulted in the last 24 hours. Assessment:     Principal Problem:    Non-small cell cancer of right lung ([facility] (7/24/2019)    Active Problems:    Chronic congestive heart failure ([facility] [address] (10/7/2014)      Overview: [place] Class I.  EF 35%.         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 ([address] (7/3/2019)      Dyspnea (7/21/2019)      Hypoxia (7/21/2019)        Plan: Current Facility-Administered Medications   Medication Dose Route Frequency    haloperidol lactate (HALDOL) injection 2 mg  2 mg IntraVENous Q6H    fentaNYL (DURAGESIC) 25 mcg/hr patch 1 Patch  1 Patch TransDERmal Q72H    sodium chloride (NS) flush 3 mL  3 mL IntraVENous Q12H    sodium chloride (NS) flush 3 mL  3 mL IntraVENous PRN    HYDROmorphone (DILAUDID) syringe 0.5 mg  0.5 mg SubCUTAneous Q30MIN PRN    Or    HYDROmorphone (DILAUDID) syringe 0.5 mg  0.5 mg IntraVENous Q30MIN PRN    haloperidol lactate (HALDOL) injection 2 mg  2 mg SubCUTAneous Q1H PRN    Or    haloperidol lactate (HALDOL) injection 2 mg  2 mg IntraVENous Q1H PRN    acetaminophen (TYLENOL) suppository 650 mg  650 mg Rectal Q3H PRN    bisacodyl (DULCOLAX) suppository 10 mg  10 mg Rectal PRN    haloperidol lactate (HALDOL) injection 2 mg  2 mg SubCUTAneous Q1H PRN    Or    haloperidol lactate (HALDOL) injection 2 mg  2 mg IntraVENous Q1H PRN    glycopyrrolate (ROBINUL) injection 0.2 mg  0.2 mg IntraVENous Q4H PRN    albuterol-ipratropium (DUO-NEB) 2.5 MG-0.5 MG/3 ML  3 mL Nebulization Q4H PRN     Admitted GIP with metastatic non-small cell lung cancer for management of pain, dyspnea, agitation and nausea. 1. Pain: Hydromorphone 0.5mg IV/SQ Q30 minutes as needed. Fentanyl 25mcg/hr patch. 2. Dyspnea: Hydromorphone 0.5mg IV/SQ Q30 minutes as needed. Duonebs q4 prn. Glycopyrrolate 0.2mg q4 prn secretions. Oxygen at 5 L/min prn.    3. Agitation: Haloperidol 2mg IV/SQ Q1 hour prn.    4. Nausea: Haloperidol 2mg IV/SQ Q1 hour prn. Diet as tolerated. 5. Family/Pt Support: Family at bedside during exam. Medications and plan of care discussed with nursing staff and family. Will continue to monitor for symptoms and adjust medications as needed to maintain patient comfort. PPS 20%. Case discussed with Dr. Meghna Mott and [facility]. Increase Haldol to Q6 to help alleviate nausea. Add fentanyl 25mcg patch for pain.     Signed By: Yaquelin Rogel NP     July 26, 2019

## 2019-07-26 NOTE — HSPC IDG SOCIAL WORKER NOTES
Patient: Tolu Murphy    Date: 07/26/19  Time: 12:07 PM    [facility]  Notes  Continue to follow for support needs of  patient and family. Patient progressing toward goals.  Continue to follow        Signed by: Stephanie Leija LMSW

## 2019-07-26 NOTE — HSPC IDG CHAPLAIN NOTES
Patient: Douglas Baeza    Date: 19  Time: 10:35 AM    [facility]  Notes  Intervention: Explored patient and family's belief system and image of God. Engaged in meaningful conversation about Amaya and family. Facilitated life review of patient by grandson Jasvir Simpson. Completed an Initial Spiritual and Bereavement Assessment. Offered prayer. Outcome:   Jose Xiong shared a great deal about Mr. Jeanette Shukla love of life and family, his work, his amaya. Losses identified, Patient's wife and sons all . Amaya Tradition Identified as Evangelical. Referral made to [facility] for Early Bereavement. Anticipatory Grief recognized, feelings expressed.     Progressing Toward The Goals:  Anticipatory Grief            Signed by: Yovani Beckett

## 2019-07-27 NOTE — PROGRESS NOTES
Problem: Pressure Injury - Risk of  Goal: *Prevention of pressure injury  Description  Pt will remain free from skin breakdown while admitted to [address] [place] [address]    Outcome: Progressing Towards Goal  Note:   Pressure Injury Interventions:  Sensory Interventions: Assess changes in LOC, Float heels, Check visual cues for pain, Maintain/enhance activity level, Minimize linen layers, Monitor skin under medical devices, Pressure redistribution bed/mattress (bed type)    Moisture Interventions: Absorbent underpads, Apply protective barrier, creams and emollients, Internal/External urinary devices, Maintain skin hydration (lotion/cream), Minimize layers, Moisture barrier    Activity Interventions: Pressure redistribution bed/mattress(bed type)    Mobility Interventions: Pressure redistribution bed/mattress (bed type), HOB 30 degrees or less, Float heels    Nutrition Interventions: Document food/fluid/supplement intake    Friction and Shear Interventions: Apply protective barrier, creams and emollients, Lift sheet, Minimize layers                Problem: Patient Education: Go to Patient Education Activity  Goal: Patient/Family Education  Description  Family will state rationale for patient being repositioned and turned    Outcome: Progressing Towards Goal     Problem: Nausea/Vomiting (Adult)  Goal: *Absence of nausea/vomiting  Description  Pt will remain reduce feeling of N/V after medication administration AEB no N/V   Outcome: Progressing Towards Goal     Problem: Pain  Goal: *Control of Pain  Description  Pt will report pain level =/< 3 after receiving pain medication   Outcome: Progressing Towards Goal     Problem: Emotional Support Needs  Goal: Patient/family is receiving emotional support  Outcome: Progressing Towards Goal     Problem: Anticipatory Grief  Goal: Grief heard and acknowledged, anxiety reduced, patient coping identified, patient/family expressed gratitude  Description  Family will identify and maintain a functional support system to aid in the grief process. Family coping appropriately at this time.    Outcome: Progressing Towards Goal     Problem: Falls - Risk of  Goal: *Absence of Falls  Description  Patient will remain free from falls while admitted to [facility] F[facility] house    Outcome: Progressing Towards Goal  Note:   Fall Risk Interventions:       Mentation Interventions: Door open when patient unattended, Bed/chair exit alarm, Evaluate medications/consider consulting pharmacy, Room close to nurse's station    Medication Interventions: Bed/chair exit alarm, Evaluate medications/consider consulting pharmacy    Elimination Interventions: Call light in reach, Bed/chair exit alarm              Problem: Patient Education: Go to Patient Education Activity  Goal: Patient/Family Education  Outcome: Progressing Towards Goal

## 2019-07-27 NOTE — PROGRESS NOTES
I saw Jerson Whaley in room 108 on afternoon rounds. His grandson was at bedside. He reported the Celine Promise has been awake for brief intervals was taken a few small sips of Pepsi-Cola and water. Review of vital sign shows that he remains hypotensive, though slightly less so than yesterday 90/52. His urine output is negligible. He is afebrile. He has had no episodes of vomiting in the last 48 hours. He has required hydromorphone 0.5 mg 3 times in 16 hours with sentinel 25 MCG per hour patch in place for chest pain associated with newly diagnosed right hilar and right lower lobe cancer and associated diagnosis of ischemic cardiomyopathy with LV dysfunction CHF. Patient's hands are cool radial pulse is barely distinguishable. Dorsalis pedis pulses absent bilaterally. Feet are cool but there is no gross cyanosis. Heart rhythm is regular but distant. There are coarse rhonchi on the right lung field. Impression patient BUN was 116 on 7/23. He has remained oliguric and I anticipate that the BUN is rising. Atenolol continues to be necessary for suppression of associated nausea and vomiting in acute renal failure. No and hydromorphone are appropriate opioid analgesia in that same setting of acute renal failure. I anticipate this patient's electrolyte disturbance were reach point we're arrhythmia is going to occur. Potassium level was 5.9, MDQ on 7/23/2019. His life expectancy is less than less than 5 days. Family remains tentative bedside.   No change in medication at this time

## 2019-07-27 NOTE — PROGRESS NOTES
Received report from Jevon Greer RN. Pt I'd by name and . Pt lying in bed with eyes closed. No distress. Does not respond to verbal stimulation at this time. No facial grimace . No moans. Flacc =0-1. Resp non labored at this time on 5 L n/c. HR irreg. Lung sounds coarse. BS hypo. Edema 2+ noted in BUE. Fentanyl 25 mcg/hr patch intact on right upper arm and dated 19. Fuentes cath draining valdo urine. SR up x2. Bed low/locked. Call light with in reach. Family at the bedside.   P)t lethargic. Dyspnea on 5L n/c. Dilaudid 0.5 mg ivp given. Peripheral line flushed with 3ml ns. SR up x2. Bed low/locked. Call light with in reach. Family at the bedside. 2304  Pt with eyes closed. No facial grimace. Flacc =0-1. Resp  irreg, non labored on 5 L n/c. SR up x 2. Bed low/locked. Call light with in reach. Door opened. 0136  Pt lethargic. No facial grimace. Flacc =0-1. Resp labored on 5 L n/c. Increased secretions noted with gurgling. Dilaudid 0.5 mg and glycopyrrolate 0.2 mg ivp given. Pt repositioned in bed. SR up x 2. Bed low/locked. Call light with in reach. Door opened. 0306  Pt lying with eyes closed. No facial grimace. Flacc =0-1. Resp non labored on 5L n/c. Gurgling continues. SR up x2. Bed low/locked. Call light with in reach. Door opened. 0559   Pt with eyes closed. No facial grimace. Flacc =0-1. Resp irreg, non labored on 5 L n/c. Gurgling noted. Scheduled haldol 2 mg and glycopyrrolate 0.2 mg ivp given. Pt repositioned in bed. VS taken and documented on flow sheet. Fuentes cath emptied and documented on flow sheet. SR up x 2. Bed low/locked. Call light with in reach. Door opened.      Report given to Jevon Greer RN

## 2019-07-27 NOTE — PROGRESS NOTES
Problem: Pressure Injury - Risk of  Goal: *Prevention of pressure injury  Description  Document Chance Scale and appropriate interventions in the flowsheet.   Outcome: Progressing Towards Goal  Note:   Pressure Injury Interventions:  Sensory Interventions: Assess changes in LOC, Check visual cues for pain, Keep linens dry and wrinkle-free, Minimize linen layers    Moisture Interventions: Absorbent underpads, Internal/External urinary devices, Apply protective barrier, creams and emollients, Limit adult briefs, Minimize layers, Moisture barrier    Activity Interventions: Pressure redistribution bed/mattress(bed type)    Mobility Interventions: Pressure redistribution bed/mattress (bed type)    Nutrition Interventions: Document food/fluid/supplement intake    Friction and Shear Interventions: Apply protective barrier, creams and emollients, Lift sheet, Minimize layers                Problem: Nausea/Vomiting (Adult)  Goal: *Absence of nausea/vomiting  Description  Pt will remain reduce feeling of N/V after medication administration AEB no N/V   Outcome: Progressing Towards Goal     Problem: Pain  Goal: *Control of Pain  Description  Pt will report pain level =/< 3 after receiving pain medication   Outcome: Progressing Towards Goal

## 2019-07-27 NOTE — PROGRESS NOTES
0703:  Received bedside report from Reuben Foster RN. Pt identified and noted to be resting comfortably in bed. Pt's eyes are closed and noted to be on 5 L of oxygen via nasal canula. No S&S of agitation, SOB, nausea, or vomiting. No facial grimacing or moaning noted. FLACC score of 0. Fentanyl patch observed with previous shift RN and noted to bed clean, dry, and intact to right arm. No further needs at this time. No family noted at bedside. Bed low and locked, call light within reach, tab alarm system in place. Door remains open for frequent rounding and observation. 0740:  Pt calling out \"water\", \"water\". This RN gave sips of water to pain via straw. Pt tolerated well and closed eyes. 4479:  Pt in bed watching TV while getting bed bath. No S&S of pain, agitation or SOB. FLACC score of 0.      2836: After bath Pt noted to be breathing labored with us of abdominal muscles at 20/min. Pt whispers, \"I'm give out\". Pt noted to have audible gurgling. 7354:  PRN IV Dilaudid and Robinul given. 8941:   Assessment completed. Pt is alert and still communicates verbally his needs. Lungs are coarse and wet. Non productive cough noted. Pt noted to have expiratory wheezing and labored breathing after activity (IV Dilaudid given). Pt having audible gurgling (PRN IV Robinul given). Pt on 5 L of oxygen via nasal canula   Pt noted to have loose blood stool and hyperactive bowel sounds. Pt taking in sips of fluids but no intake of solids. No family at bedside at this time. Pt has candelaria catheter draining below bladder. Upper and lower bilateral extremitie are cool to touch. Pt complete assist with all ADL's.     8977:  Pt now resting quietly and comfortably in bed with eyes closed. Pt's respirations are 12/min with no signs or symptoms of distress. FLACC score of 0. Will continue to monitor. 1108: Pt resting comfortably in bed. No S&S of distress or discomfort.   Pt's respirations are even and non labored at 12/min. Friend/Family came to visit pt and prayed with him before leaving. 1149:   Pt resting comfortably in bed. No S&S of pain, SOB, or agitation. FLACC score of 0. Respirations are even and non labored at 10/min. Scheduled Haldol given. 1328:  Pt resting in bed with family at bedside. Pt c/o being hot. This RN removed covers to help to patient be more comfortable. No S&S of pain, agitation, or SOB at this time. 1407:  Pt resting in bed with eyes open. No S&S of pain or agitation. Pt drank water per family. Family requesting apple sauce for pt to eat. RN educated family not to give pt applesauce if he was too lethargic, unable to swallow, or if he begins to cough or choke after eating. Family understood. RN offered pt applesauce and pt declined. 1430:  Pt noted to have labored respirations at 20/min with use of abdominal muscles. Pt noted to have audible gurgling. Pt restless in bed AEB repeatedly moving arms and legs about in the air. Pt.  PRN IV Dilaudid, Haldol, and Robinul given. 1535:  Pt now resting comfortably in bed with eyes closed. No S&S of pain, agitation, or SOB. Respirations are even and non labored at 12/min. Family at bedside. 1749:  Pt resting in bed and noted to be comfortable. Family at bedside and stated pt has been saying a few words to them. Family performed mouth care. No S &S of agitation, SOB, or pain. 1815:  Pt resting calmly and comfortably in bed. No S&S of pain, agitation, or SOB. FLACC score of 0. Pt noted to have audible gurgling. Scheduled Haldol given. PRN Robinul given. 1900: Report given to Dhruv Ennis RN. Pt resting comfortably in bed. Family remains at bedside. Bed low and locked, call light within reach, tab alarm in place. Door remains open for frequent rounding and observations.

## 2019-07-28 NOTE — PROGRESS NOTES
Problem: Pressure Injury - Risk of  Goal: *Prevention of pressure injury  Description  Pt will remain free from skin breakdown while admitted to [address] [place] [address]    Outcome: Progressing Towards Goal  Note:   Pressure Injury Interventions:  Sensory Interventions: Assess changes in LOC, Assess need for specialty bed, Float heels, Minimize linen layers, Pressure redistribution bed/mattress (bed type)    Moisture Interventions: Apply protective barrier, creams and emollients, Absorbent underpads, Internal/External urinary devices, Minimize layers, Moisture barrier, Maintain skin hydration (lotion/cream)    Activity Interventions: Pressure redistribution bed/mattress(bed type)    Mobility Interventions: Pressure redistribution bed/mattress (bed type), Float heels    Nutrition Interventions: Offer support with meals,snacks and hydration    Friction and Shear Interventions: Lift sheet, HOB 30 degrees or less, Feet elevated on foot rest, Minimize layers                Problem: Patient Education: Go to Patient Education Activity  Goal: Patient/Family Education  Description  Family will state rationale for patient being repositioned and turned    Outcome: Progressing Towards Goal     Problem: Nausea/Vomiting (Adult)  Goal: *Absence of nausea/vomiting  Description  Pt will remain reduce feeling of N/V after medication administration AEB no N/V   Outcome: Progressing Towards Goal     Problem: Pain  Goal: *Control of Pain  Description  Pt will report pain level =/< 3 after receiving pain medication   Outcome: Progressing Towards Goal     Problem: Emotional Support Needs  Goal: Patient/family is receiving emotional support  Outcome: Progressing Towards Goal     Problem: Anticipatory Grief  Goal: Grief heard and acknowledged, anxiety reduced, patient coping identified, patient/family expressed gratitude  Description  Family will identify and maintain a functional support system to aid in the grief process.     Family coping appropriately at this time.    Outcome: Progressing Towards Goal     Problem: Falls - Risk of  Goal: *Absence of Falls  Description  Patient will remain free from falls while admitted to [facility] FALL house    Outcome: Progressing Towards Goal  Note:   Fall Risk Interventions:       Mentation Interventions: Adequate sleep, hydration, pain control, Bed/chair exit alarm, Door open when patient unattended, Evaluate medications/consider consulting pharmacy, More frequent rounding, Room close to nurse's station    Medication Interventions: Bed/chair exit alarm, Evaluate medications/consider consulting pharmacy    Elimination Interventions: Call light in reach, Bed/chair exit alarm              Problem: Patient Education: Go to Patient Education Activity  Goal: Patient/Family Education  Outcome: Progressing Towards Goal

## 2019-07-28 NOTE — PROGRESS NOTES
9172:  Received bedside report from Mini Hansen RN. Pt identified and noted to be resting comfortably in bed. Pt's eyes are closed and noted to be on 5 L of oxygen via nasal canula. No S&S of agitation, SOB, nausea, or vomiting. No facial grimacing or moaning noted. FLACC score of 0. Fentanyl patch observed with previous shift RN and noted to bed clean, dry, and intact to right arm. No further needs at this time. No family noted at bedside. Jennifer Jang RN notified grandson, La Nena Alvarez, of patient's decline and transition. Family planning to come to bedside. Bed low and locked, call light within reach, tab alarm system in place. Door remains open for frequent rounding and observation. 0740:  Bed bath completed. Pt tolerated well. No S&S of distress or discomfort. 0745:  Assessment completed. Pt is alert and responds to stimuli. Lungs are coarse and wet. Non productive cough noted. Pt noted to have expiratory wheezing and with respirations even and non labored at 12/min. Pt having audible gurgling. Pt on 5 L of oxygen via nasal canula  Hypoactive bowel sounds noted. Pt not eating or drinking at this time. Mouth care only. Skin is cold and mottled BUE, BLE, and ears. No family at bedside at this time. Pt has candelaria catheter draining below bladder. Pt complete assist with all ADL's.      0813:  Pt continues to have audible gurgling. Robinul unable to be given at this time. Oral suction performed. Thick yellow secretions. Pt pushing and fight against suctioning, oral suctioning stopped. Pt noted to start moaning and move arms about in the air. Respirations become uneven and labored. FLACC score of 5. PRN IV Haldol and Dilaudid given. 7291:  Pt resting comfortably in bed at this time. No S&S of pain, agitation, or SOB. FLACC score of 0. Pt's respirations are even and non labored at 8/min. Family at bedside. 1016:  Pt resting comfortably in bed.   No S&S of discomfort or distress. Pt's respirations are even and non labored at 10/min. Family at bedside. 1113:  Pt resting in bed with eyes closed. No S&S of agitation, SOB, or pain. Scheduled Haldol given. Pt noted to have audible gurgling. PRN IV Robinul given. 1238:  Pt resting in bed with eyes closed. No S&S of discomfort or distress. Pt's respirations are even and non labored at 10/min. FLACC score of 0. Family remains at bedside. 1423:  Pt resting in bed with eyes closed. No S&S of pain, SOB, or agitation. Family remains at bedside. Will continue to monitor. 1532:  Pt noted to be resting comfortably in bed. No S&S of pain, agitation, or SOB. FLACC score of 0. Patient's respirations are even and non labored at 12/min. Pt noted to have audible gurgling. PRN Robinul given. 1741: Pt resting quietly and comfortably in bed with eyes closed. No S&S of distress or discomfort. Scheduled Haldol given. Family remain at bedside. 1850: Report given to Clarice Abad RN. Pt resting comfortably in bed. Family remains at bedside. Bed low and locked, call light within reach, tab alarm in place.   Door remains open for frequent rounding and observations

## 2019-07-28 NOTE — PROGRESS NOTES
Received report from Edin Moreno RN. Pt I'd by name and . Pt with eyes closed. No facial grimace. Flacc =0-1. No distress. Resp non labored on 5 L n/c. Lungs coarse. No BS noted at this time. Mottling noted on bilateral feet and ears. Face is dusky. Peripheral line intact. Fentanyl 25 mcg/hr patch intact on right upper arm and dated 19. Fuentes cath draining valdo urine. SR up x 2. Bed low/locked. Call light with in reach. Family at the bedside. 80  Family states pt has labored resp. Resp labored 24 pm on 5L n/c. Dilaudid 0.5 mg ivp given.   Pt resting comfortably. Resp non labored on 5 L n/c. Flacc =0-1. No distress. Scheded IV flush with n/s completed. Glycopyrrolate 0.2 mg given ivp for increased secretions. HOB elevated. SR up x2. Bed low/locked. Call light with in reach. Family at the bedside. 2303  Pt with eyes closed. No facial grimace noted. Flacc =0-1. Resp irreg, non labored on 5 L n/c. Gurgling remains at this time. No distress. SR up x2. Bed low/locked. Call light with in reach. Door opened. 235  Scheduled haldol 2mg ivp given. 0110   Pt lying in bed with eyes closed. Calm. No distress. No facial grimace. Flacc =0-1. Resp irreg, non labored on 5 L n/c. Slight gurgling noted. SR up x2. Bed low/locked. Call light with in reach. Door opened. 0325  Pt with eyes closed. No distress. No facial grimace. Flacc =0-1. Resp non labored, irreg, gurgling on 5L n/c. Pt suctioned. Moderate amount of beige thick bloody secretions removed. Pt repositioned in bed. SR up x2. Bed low/locked. Call light with in reach. Door opened. 0510  Pt with eyes closed. No distress. No facial grimace. Flacc =0-1. Resp irreg, non labored on 5 L n/c. Scheduled haldol 2mg ivp given. VS taken and documented on flow sheet. Fuentes cath emptied and documented on flow sheet. Pt repositioned in bed. SR up x2. Bed low/locked. Call light with in reach. Door opened. 0907  Pt suctioned.  Glycopyrrolate 0.2 mg ivp given.      Report given to Puma Shipley, RN

## 2019-07-28 NOTE — PROGRESS NOTES
Problem: Pressure Injury - Risk of  Goal: *Prevention of pressure injury  Description  Pt will remain free from skin breakdown while admitted to [address] [place] [address]    Outcome: Progressing Towards Goal  Note:   Pressure Injury Interventions:  Sensory Interventions: Assess changes in LOC, Check visual cues for pain, Keep linens dry and wrinkle-free, Minimize linen layers    Moisture Interventions: Absorbent underpads, Moisture barrier, Apply protective barrier, creams and emollients, Limit adult briefs, Internal/External urinary devices, Minimize layers    Activity Interventions: Pressure redistribution bed/mattress(bed type)    Mobility Interventions: Pressure redistribution bed/mattress (bed type)    Nutrition Interventions: Document food/fluid/supplement intake    Friction and Shear Interventions: Apply protective barrier, creams and emollients, Lift sheet, Minimize layers                Problem: Nausea/Vomiting (Adult)  Goal: *Absence of nausea/vomiting  Description  Pt will remain reduce feeling of N/V after medication administration AEB no N/V   Outcome: Progressing Towards Goal     Problem: Pain  Goal: *Control of Pain  Description  Pt will report pain level =/< 3 after receiving pain medication   Outcome: Progressing Towards Goal     Problem: Falls - Risk of  Goal: *Absence of Falls  Description  Patient will remain free from falls while admitted to [facility] FALL house    Outcome: Progressing Towards Goal  Note:   Fall Risk Interventions:       Mentation Interventions: Door open when patient unattended, Bed/chair exit alarm, Evaluate medications/consider consulting pharmacy, Family/sitter at bedside, Reorient patient    Medication Interventions: Bed/chair exit alarm, Evaluate medications/consider consulting pharmacy    Elimination Interventions: Call light in reach, Bed/chair exit alarm

## 2019-07-29 NOTE — PROGRESS NOTES
provided support for daughter in law following patient's death. Concerns for patient's grandson discussed.    encouraged family to participate in our Bereavement Program.

## 2019-07-29 NOTE — HOSPICE
0700: Report received from Dread Carver, [facility]. Fentanyl 25 mcg noted to right arm. 0730: Family friends at bedside. Pt medicated for dyspnea and FLACC 6/10; haldol 2 mg IVP and dilaudid 0.5 mg IVP. Pt moaning intermittently, raising arms non purposefully. 3756: Pt continues with labored breathing, abdominal muscles, medicated again with dilaudid 0.5 mg IVP. More effective with rate of breathing slower and no longer using abdominal muscles. Jose Honor at the bedside. 0069: Pt taking last breath. Absence of HR noted. RN, grandson, Pt sister, and another family friend at the bedside at [facility].  visit offered but family states \"we are ok right now\". 1000: Family remains at bedside. Not quite ready to call  at this time. 1045: Per Jasvir Rivrea, ok to call [facility] in [place]. Post mortem care completed with Felice Lutz CNA. Fentanyl 25 mcg patch removed and destroyed with Lety Gunter, [address] [place], [place] [address]e with [facility] in [place] arrived to assume care of Pt. Family remained at bedside.

## 2019-07-29 NOTE — PROGRESS NOTES
Problem: Pressure Injury - Risk of  Goal: *Prevention of pressure injury  Description  Pt will remain free from skin breakdown while admitted to [address] [place] [address]    Outcome: Progressing Towards Goal  Note:   Pressure Injury Interventions:  Sensory Interventions: Assess changes in LOC, Check visual cues for pain, Keep linens dry and wrinkle-free, Minimize linen layers    Moisture Interventions: Absorbent underpads, Internal/External urinary devices, Moisture barrier, Apply protective barrier, creams and emollients, Limit adult briefs, Minimize layers    Activity Interventions: Pressure redistribution bed/mattress(bed type)    Mobility Interventions: Pressure redistribution bed/mattress (bed type)    Nutrition Interventions: Document food/fluid/supplement intake    Friction and Shear Interventions: Apply protective barrier, creams and emollients, Lift sheet, Minimize layers    Pt repositioning every 2-3 hours.                Problem: Nausea/Vomiting (Adult)  Goal: *Absence of nausea/vomiting  Description  Pt will remain reduce feeling of N/V after medication administration AEB no N/V   Outcome: Progressing Towards Goal     Problem: Pain  Goal: *Control of Pain  Description  Pt will report pain level =/< 3 after receiving pain medication   Outcome: Progressing Towards Goal     Problem: Falls - Risk of  Goal: *Absence of Falls  Description  Patient will remain free from falls while admitted to [facility] F[facility] house    Outcome: Progressing Towards Goal  Note:   Fall Risk Interventions:       Mentation Interventions: Door open when patient unattended, Family/sitter at bedside, Bed/chair exit alarm, Evaluate medications/consider consulting pharmacy, Room close to nurse's station    Medication Interventions: Bed/chair exit alarm, Evaluate medications/consider consulting pharmacy    Elimination Interventions: Bed/chair exit alarm, Call light in reach

## 2019-07-30 PROCEDURE — 0656 HSPC GENERAL INPATIENT

## 2019-07-31 PROCEDURE — 0656 HSPC GENERAL INPATIENT

## 2020-06-09 NOTE — PROGRESS NOTES
Called Rosita Edgar, [address] on call for cardiology due to low BP of 73/47. Orders received. Will bolus NS slowly and recheck BP. Holding 20 mg PO lasix this evening for BP. Will continue to monitor. What Type Of Note Output Would You Prefer (Optional)?: Standard Output How Severe Is Your Rash?: moderate Is This A New Presentation, Or A Follow-Up?: Rash

## 2023-07-24 NOTE — PROGRESS NOTES
Ngozi Mccullough Admission Date: 3/13/2019 Renal Daily Progress Note: 3/21/2019 The patient's chart is reviewed and the patient is discussed with the staff. Follow up JAZZY Subjective:  
 
Patient seen and examined on rounds, family at the bedside, pt reports left LE tenderness, appetite better today. Fuentes draining clear yellow urine. ROS:  
General: no fever/chills Lung: No SOB, no orthopnea CV: no CP 
GI: No N/V/D Ext: no edema Current Facility-Administered Medications Medication Dose Route Frequency  heparin 25,000 units in dextrose 500 mL infusion  18-36 Units/kg/hr IntraVENous TITRATE  lidocaine (XYLOCAINE) 2 % viscous solution 15 mL  15 mL Mouth/Throat PRN  
 warfarin (COUMADIN) tablet 5 mg  5 mg Oral QPM  
 lip protectant (BLISTEX) ointment   Topical PRN  
 metoprolol succinate (TOPROL-XL) XL tablet 25 mg  25 mg Oral DAILY  albuterol (PROVENTIL VENTOLIN) nebulizer solution 2.5 mg  2.5 mg Nebulization BID RT  
 albuterol-ipratropium (DUO-NEB) 2.5 MG-0.5 MG/3 ML  3 mL Nebulization Q4H PRN  
 aspirin delayed-release tablet 81 mg  81 mg Oral DAILY  furosemide (LASIX) tablet 20 mg  20 mg Oral BID  simvastatin (ZOCOR) tablet 20 mg  20 mg Oral QHS  tamsulosin (FLOMAX) capsule 0.4 mg  0.4 mg Oral DAILY  tiotropium (SPIRIVA) inhalation capsule 18 mcg  1 Cap Inhalation DAILY  dextrose 5% and 0.9% NaCl infusion 1,000 mL  1,000 mL IntraVENous CONTINUOUS  
 sodium chloride (NS) flush 5-40 mL  5-40 mL IntraVENous Q8H  
 sodium chloride (NS) flush 5-40 mL  5-40 mL IntraVENous PRN  
 acetaminophen (TYLENOL) tablet 650 mg  650 mg Oral Q4H PRN  
 oxyCODONE-acetaminophen (PERCOCET) 5-325 mg per tablet 1 Tab  1 Tab Oral Q4H PRN  
 morphine 10 mg/ml injection 5 mg  5 mg IntraVENous Q3H PRN  
 naloxone (NARCAN) injection 0.4 mg  0.4 mg IntraVENous PRN  
 ondansetron (ZOFRAN) injection 4 mg  4 mg IntraVENous Q4H PRN  
  diphenhydrAMINE (BENADRYL) injection 12.5 mg  12.5 mg IntraVENous Q4H PRN  
 albuterol (PROVENTIL VENTOLIN) nebulizer solution 2.5 mg  2.5 mg Nebulization Q4H PRN Objective:  
 
Vitals:  
 03/21/19 5932 03/21/19 0809 03/21/19 0906 03/21/19 1320 BP:  104/66 96/62 94/59 Pulse:  80 81 78 Resp:   17 16 Temp:   97.7 °F (36.5 °C) 98 °F (36.7 °C) SpO2: 97%  98% 95% Weight:      
Height:      
 
Intake and Output:  
03/19 1901 - 03/21 0700 In: 550 [P.O.:250; I.V.:300] Out: 1773 [[id number]] 03/21 0701 - 03/21 1900 In: -  
Out: 250 [Urine:250] Physical Exam:  
Constitutional:  the patient is well developed and in no acute distress HEENT:  Sclera clear, pupils equal, oral mucosa moist 
Lungs: clear bilaterally, no wheezes or crackles Cardiovascular:  RRR without Rub Abd/GI: soft and non-tender; with positive bowel sounds. Ext: warm without cyanosis. There is no lower leg edema. Skin:  no jaundice or rashes Neuro: no gross neuro deficits Psychiatric: Calm. LAB Recent Labs  
  03/21/19 
0040 03/20/19 
0240 WBC 7.5  --   
HGB 11.9*  --   
HCT 36.4*  --   
*  --   
INR 2.0 1.7 Recent Labs  
  03/21/19 
0040 03/20/19 
0240 03/19/19 
1636  139 137  
K 3.7 4.0 3.7  102 100 CO2 30 29 29  103* 106* BUN 48* 50* 42* CREA 2.39* 2.40* 2.55* PHOS 4.1*  --   --   
ALB 2.2*  --   -- No results for input(s): PH, PCO2, PO2, HCO3 in the last 72 hours. Assessment:  (Medical Decision Making) Hospital Problems  Date Reviewed: 3/13/2019 Codes Class Noted POA * (Principal) Ischemia of left lower extremity ICD-10-CM: I99.8 ICD-9-CM: 459.9  3/13/2019 Yes Thrombus ICD-10-CM: I82.90 ICD-9-CM: 453.9  3/13/2019 Unknown CRI (chronic renal insufficiency) ICD-10-CM: N18.9 ICD-9-CM: 264. 9  Unknown Yes Chronic systolic CHF (congestive heart failure) (HCC) ICD-10-CM: P29.95 ICD-9-CM: 428.22, 428.0  10/7/2014 Yes Overview Signed 10/19/2015  9:22 AM by Dominique Perales [place] Class I.  EF 35%. Plan:  (Medical Decision Making) 1. JAZZY/CKD creatinine continues to improve 2.39 today, UOP last 24 hours 1270 ml. 
 
2. Acute limb ischemia 
-s/p Left femoralthromboembolectomy 3/13/2019 by Dr. Yannick Chavis 
 - heparin gtts 3. Afib- s/p MIGUEL yesterday BONILLA thrombus  
-on coumadin Loli Donald NP 
 
 No

## 2024-07-13 NOTE — PROGRESS NOTES
[facility] Progress Note    Follow-Up on: JAZZY/CKD    ROS:  Gen - no fever, no chills, appetite unchanged  CV - no chest pain, no palpitation  Lung - no shortness of breath, no cough  Abd - no tenderness, no nausea/vomiting, no diarrhea  Ext - no edema    Exam:  Vitals:    03/18/19 0420 03/18/19 0446 03/18/19 0733 03/18/19 1026   BP: 100/66 115/75 112/67    Pulse: (!) 122 (!) 103 (!) 115    Resp:  18 18    Temp:  98.5 °F (36.9 °C) 97.4 °F (36.3 °C)    SpO2:  96% 96% 96%   Weight:  67.9 kg (149 lb 11.2 oz)     Height:             Intake/Output Summary (Last 24 hours) at 3/18/2019 1110  Last data filed at 3/18/2019 0900  Gross per 24 hour   Intake 0 ml   Output 2050 ml   Net -2050 ml       Wt Readings from Last 3 Encounters:   03/18/19 67.9 kg (149 lb 11.2 oz)   02/21/19 68.8 kg (151 lb 9.6 oz)   11/15/18 66.9 kg (147 lb 6.4 oz)       GEN - in no distress  CV - regular, no murmur, no rub  Lung - clear bilaterally  Abd - soft, nontender  Ext - no edema    Recent Labs     03/16/19  0952   WBC 10.4   HGB 11.8*   HCT 35.3*           Recent Labs     03/18/19  0429 03/17/19  0320 03/16/19  0436    137 136   K 3.9 3.3* 3.7    104 104   CO2 26 26 24   BUN 41* 44* 38*   CREA 2.77* 3.13* 3.18*   CA 8.8 8.1* 8.4   GLU 94 124* 99   MG 1.8  --  1.8   PHOS 2.9  --  2.5       Assessment / Plan:  Principal Problem:    Ischemia of left lower extremity (3/13/2019)    Active Problems:    Chronic systolic CHF (congestive heart failure) (HCC) (10/7/2014)      Overview: [place] Class I.  EF 35%. CRI (chronic renal insufficiency) ()      Thrombus (3/13/2019)      1. JAZZY/CKD  - Baseline Cr probably low to mid 1's  - Cr improving, low dose diuretics  2. Acute limb ischemia - s/p LE thrombectomy  3.  Afib never

## 2024-12-16 NOTE — ACP (ADVANCE CARE PLANNING)
Specialty Pharmacy Patient Management Program  Per Protocol Prescription Order or Refill       Requested Prescriptions     Signed Prescriptions Disp Refills    Osimertinib Mesylate (Tagrisso) 80 MG tablet 30 tablet 5     Sig: Take 1 tablet by mouth Daily.     Authorizing Provider: SUSU NEWTON     Ordering User: DAVEY CARLISLE     Prescription orders above were sent to [facility] per Collaborative Care Agreement Protocol.     Completed independent double check on medication order/RX.    Jerson Seaman PharmD, [facility]  Clinical Specialty Pharmacist, Oncology  12/16/2024  16:03 EST     Pt stats his grandson, Jj Ewing, is his HCPOA. Asked family to bring copy of document for our records. When asked if he had discussed his wishes regarding care in the event his health worsens, pt simply stated \"Well I would want you to do everything you could. \"  Daughter agrees with this statement. Counseled that when his heart failure worsens, hospice would be an appropriate consideration. He clearly is not ready for hospice at his point, but voiced understanding. He wants to return to the hospital when/if needed. Per notes, pt's grandson wants pt to go to [facility]. Pt states he is going home. He lived [place] prior to admission. 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

## (undated) DEVICE — BASIC SINGLE BASIN-LF: Brand: MEDLINE INDUSTRIES, INC.

## (undated) DEVICE — GEL US 20GM NONIRRITATING OVERWRAPPED FILE PCH TRNSMIT

## (undated) DEVICE — INTRODUCER SHTH 8FR CANN L5.5CM DIL TIP 35MM BLU TUNGSTEN

## (undated) DEVICE — BUTTON SWITCH PENCIL BLADE ELECTRODE, HOLSTER: Brand: EDGE

## (undated) DEVICE — 2000CC GUARDIAN II: Brand: GUARDIAN

## (undated) DEVICE — INTENDED TO BE USED TO OCCLUDE, RETRACT AND IDENTIFY ARTERIES, VEINS, TENDONS AND NERVES IN SURGICAL PROCEDURES: Brand: STERION®  VESSEL LOOP

## (undated) DEVICE — BRONCHOSCOPY PACK: Brand: MEDLINE INDUSTRIES, INC.

## (undated) DEVICE — UNIVERSAL DRAPES: Brand: MEDLINE INDUSTRIES, INC.

## (undated) DEVICE — SINGLE USE SUCTION VALVE MAJ-209: Brand: SINGLE USE SUCTION VALVE (STERILE)

## (undated) DEVICE — REM POLYHESIVE ADULT PATIENT RETURN ELECTRODE: Brand: VALLEYLAB

## (undated) DEVICE — SUTURE PERMAHAND SZ 3-0 L18IN NONABSORBABLE BLK SILK BRAID A184H

## (undated) DEVICE — (D)PREP SKN CHLRAPRP APPL 26ML -- CONVERT TO ITEM 371833

## (undated) DEVICE — AMD ANTIMICROBIAL NON-ADHERENT PAD,0.2% POLYHEXAMETHYLENE BIGUANIDE HCI (PHMB): Brand: TELFA

## (undated) DEVICE — GOWN,PREVENTION PLUS,2XL,ST,22/CS: Brand: MEDLINE

## (undated) DEVICE — FOGARTY ARTERIAL EMBOLECTOMY CATHETER 4F 80CM: Brand: FOGARTY

## (undated) DEVICE — SUTURE PERMAHAND SZ 2-0 L12X18IN NONABSORBABLE BLK SILK A185H

## (undated) DEVICE — CATHETER VASC AD 5FR L65CM 0.038 DIAG KA 2 PERIPH W/O

## (undated) DEVICE — SET EXTN L6IN W/ S STL CLMP

## (undated) DEVICE — SUTURE NONABSORBABLE MONOFILAMENT 5-0 C-1 1X24 IN PROLENE 8725H

## (undated) DEVICE — 3M™ TEGADERM™ TRANSPARENT FILM DRESSING FRAME STYLE, 1626W, 4 IN X 4-3/4 IN (10 CM X 12 CM), 50/CT 4CT/CASE: Brand: 3M™ TEGADERM™

## (undated) DEVICE — DISH PETRI W/LID STRL -- MIN CASE ORDER IS 2 CA

## (undated) DEVICE — 3M™ IOBAN™ 2 ANTIMICROBIAL INCISE DRAPE 6648EZ: Brand: IOBAN™ 2

## (undated) DEVICE — SUTURE MCRYL SZ 4-0 L27IN ABSRB UD L19MM PS-2 1/2 CIR PRIM Y426H

## (undated) DEVICE — SOL INJ SOD CL0.9% 10ML PREFIL --

## (undated) DEVICE — SUTURE VCRL SZ 3-0 L27IN ABSRB UD L26MM SH 1/2 CIR J416H

## (undated) DEVICE — SINGLE USE BIOPSY VALVE MAJ-210: Brand: SINGLE USE BIOPSY VALVE (STERILE)

## (undated) DEVICE — STERILE LATEX POWDER-FREE SURGICAL GLOVESWITH NITRILE COATING: Brand: PROTEXIS

## (undated) DEVICE — SYRINGE WITH HYPODERMIC SAFETY NEEDLE: Brand: MAGELLAN

## (undated) DEVICE — MOUTHPIECE ENDOSCP 20X27MM --

## (undated) DEVICE — DRAPE TWL SURG 16X26IN BLU ORB04] ALLCARE INC]

## (undated) DEVICE — Device: Brand: BALLOON

## (undated) DEVICE — KENDALL RADIOLUCENT FOAM MONITORING ELECTRODE RECTANGULAR SHAPE: Brand: KENDALL

## (undated) DEVICE — (D)SYR 10ML 1/5ML GRAD NSAF -- PKGING CHANGE USE ITEM 338027

## (undated) DEVICE — SUTURE VCRL SZ 2-0 L36IN ABSRB UD L36MM CT-1 1/2 CIR J945H

## (undated) DEVICE — SYR LR LCK 1ML GRAD NSAF 30ML --

## (undated) DEVICE — DRAPE XR C ARM 41X74IN LF --

## (undated) DEVICE — SINGLE USE ASPIRATION NEEDLE: Brand: SINGLE USE ASPIRATION NEEDLE

## (undated) DEVICE — BENTSON WIRE GUIDE 20CM DISTAL FLEXIBILITY WITH SOFTENED TIP: Brand: BENTSON

## (undated) DEVICE — Device

## (undated) DEVICE — STOCKINETTE,DOUBLE PLY,6X48,STERILE: Brand: MEDLINE